# Patient Record
Sex: MALE | Race: BLACK OR AFRICAN AMERICAN | NOT HISPANIC OR LATINO
[De-identification: names, ages, dates, MRNs, and addresses within clinical notes are randomized per-mention and may not be internally consistent; named-entity substitution may affect disease eponyms.]

---

## 2017-01-26 ENCOUNTER — NON-APPOINTMENT (OUTPATIENT)
Age: 67
End: 2017-01-26

## 2017-01-26 ENCOUNTER — APPOINTMENT (OUTPATIENT)
Dept: CARDIOLOGY | Facility: CLINIC | Age: 67
End: 2017-01-26

## 2017-01-26 VITALS
WEIGHT: 120 LBS | SYSTOLIC BLOOD PRESSURE: 130 MMHG | HEART RATE: 86 BPM | BODY MASS INDEX: 19.29 KG/M2 | DIASTOLIC BLOOD PRESSURE: 80 MMHG | HEIGHT: 66 IN | OXYGEN SATURATION: 99 %

## 2017-03-09 ENCOUNTER — LABORATORY RESULT (OUTPATIENT)
Age: 67
End: 2017-03-09

## 2017-03-09 ENCOUNTER — APPOINTMENT (OUTPATIENT)
Dept: INTERNAL MEDICINE | Facility: CLINIC | Age: 67
End: 2017-03-09

## 2017-03-09 VITALS — WEIGHT: 119 LBS | BODY MASS INDEX: 19.13 KG/M2 | HEIGHT: 66 IN

## 2017-03-10 LAB
ALBUMIN SERPL ELPH-MCNC: 3.5 G/DL
ALP BLD-CCNC: 183 U/L
ALT SERPL-CCNC: 77 U/L
ANION GAP SERPL CALC-SCNC: 15 MMOL/L
APPEARANCE: ABNORMAL
AST SERPL-CCNC: 88 U/L
BASOPHILS # BLD AUTO: 0.04 K/UL
BASOPHILS NFR BLD AUTO: 1.1 %
BILIRUB DIRECT SERPL-MCNC: 0.4 MG/DL
BILIRUB INDIRECT SERPL-MCNC: 0.3 MG/DL
BILIRUB SERPL-MCNC: 0.7 MG/DL
BILIRUBIN URINE: ABNORMAL
BLOOD URINE: NEGATIVE
BUN SERPL-MCNC: 21 MG/DL
CALCIUM SERPL-MCNC: 8.8 MG/DL
CHLORIDE SERPL-SCNC: 103 MMOL/L
CHOLEST SERPL-MCNC: 173 MG/DL
CHOLEST/HDLC SERPL: 6.2 RATIO
CO2 SERPL-SCNC: 22 MMOL/L
COLOR: ABNORMAL
CREAT SERPL-MCNC: 1.06 MG/DL
CREAT SPEC-SCNC: 482 MG/DL
EOSINOPHIL # BLD AUTO: 0.08 K/UL
EOSINOPHIL NFR BLD AUTO: 2.3 %
GGT SERPL-CCNC: 808 U/L
GLUCOSE QUALITATIVE U: NORMAL MG/DL
GLUCOSE SERPL-MCNC: 87 MG/DL
HBA1C MFR BLD HPLC: 5.1 %
HCT VFR BLD CALC: 36 %
HCV AB SER QL: ABNORMAL
HCV S/CO RATIO: 4.13 S/CO
HDLC SERPL-MCNC: 28 MG/DL
HGB BLD-MCNC: 12 G/DL
IMM GRANULOCYTES NFR BLD AUTO: 0.3 %
KETONES URINE: ABNORMAL
LDLC SERPL CALC-MCNC: 108 MG/DL
LEUKOCYTE ESTERASE URINE: NEGATIVE
LYMPHOCYTES # BLD AUTO: 1.16 K/UL
LYMPHOCYTES NFR BLD AUTO: 32.7 %
MAN DIFF?: NORMAL
MCHC RBC-ENTMCNC: 28 PG
MCHC RBC-ENTMCNC: 33.3 GM/DL
MCV RBC AUTO: 84.1 FL
MICROALBUMIN 24H UR DL<=1MG/L-MCNC: 2.1 MG/DL
MICROALBUMIN/CREAT 24H UR-RTO: 5 UG/MG
MONOCYTES # BLD AUTO: 0.46 K/UL
MONOCYTES NFR BLD AUTO: 13 %
NEUTROPHILS # BLD AUTO: 1.8 K/UL
NEUTROPHILS NFR BLD AUTO: 50.6 %
NITRITE URINE: NEGATIVE
PH URINE: 5.5
PLATELET # BLD AUTO: 114 K/UL
POTASSIUM SERPL-SCNC: 3.7 MMOL/L
PROT SERPL-MCNC: 6.9 G/DL
PROTEIN URINE: ABNORMAL MG/DL
PSA SERPL-MCNC: 0.1 NG/ML
RBC # BLD: 4.28 M/UL
RBC # FLD: 14.9 %
SODIUM SERPL-SCNC: 140 MMOL/L
SPECIFIC GRAVITY URINE: 1.03
TRIGL SERPL-MCNC: 184 MG/DL
TSH SERPL-ACNC: 0.71 UIU/ML
UROBILINOGEN URINE: 1 MG/DL
WBC # FLD AUTO: 3.55 K/UL

## 2017-03-15 ENCOUNTER — RESULT REVIEW (OUTPATIENT)
Age: 67
End: 2017-03-15

## 2017-04-28 ENCOUNTER — APPOINTMENT (OUTPATIENT)
Dept: CARDIOLOGY | Facility: CLINIC | Age: 67
End: 2017-04-28

## 2017-04-28 VITALS
HEART RATE: 83 BPM | HEIGHT: 66 IN | DIASTOLIC BLOOD PRESSURE: 70 MMHG | WEIGHT: 120 LBS | SYSTOLIC BLOOD PRESSURE: 140 MMHG | BODY MASS INDEX: 19.29 KG/M2 | OXYGEN SATURATION: 98 %

## 2017-06-01 ENCOUNTER — APPOINTMENT (OUTPATIENT)
Dept: INTERNAL MEDICINE | Facility: CLINIC | Age: 67
End: 2017-06-01

## 2017-06-01 VITALS — DIASTOLIC BLOOD PRESSURE: 60 MMHG | HEART RATE: 76 BPM | SYSTOLIC BLOOD PRESSURE: 126 MMHG | RESPIRATION RATE: 12 BRPM

## 2017-06-29 ENCOUNTER — RX RENEWAL (OUTPATIENT)
Age: 67
End: 2017-06-29

## 2017-08-16 ENCOUNTER — RX RENEWAL (OUTPATIENT)
Age: 67
End: 2017-08-16

## 2017-09-06 ENCOUNTER — INPATIENT (INPATIENT)
Facility: HOSPITAL | Age: 67
LOS: 0 days | Discharge: HOME | End: 2017-09-07
Attending: HOSPITALIST | Admitting: HOSPITALIST
Payer: COMMERCIAL

## 2017-09-06 VITALS
RESPIRATION RATE: 18 BRPM | DIASTOLIC BLOOD PRESSURE: 111 MMHG | SYSTOLIC BLOOD PRESSURE: 198 MMHG | TEMPERATURE: 98 F | HEART RATE: 109 BPM

## 2017-09-06 DIAGNOSIS — I10 ESSENTIAL (PRIMARY) HYPERTENSION: ICD-10-CM

## 2017-09-06 DIAGNOSIS — I25.10 ATHEROSCLEROTIC HEART DISEASE OF NATIVE CORONARY ARTERY WITHOUT ANGINA PECTORIS: ICD-10-CM

## 2017-09-06 DIAGNOSIS — B19.20 UNSPECIFIED VIRAL HEPATITIS C WITHOUT HEPATIC COMA: ICD-10-CM

## 2017-09-06 DIAGNOSIS — E87.1 HYPO-OSMOLALITY AND HYPONATREMIA: ICD-10-CM

## 2017-09-06 DIAGNOSIS — M62.82 RHABDOMYOLYSIS: ICD-10-CM

## 2017-09-06 DIAGNOSIS — Z29.9 ENCOUNTER FOR PROPHYLACTIC MEASURES, UNSPECIFIED: ICD-10-CM

## 2017-09-06 DIAGNOSIS — Z98.89 OTHER SPECIFIED POSTPROCEDURAL STATES: Chronic | ICD-10-CM

## 2017-09-06 DIAGNOSIS — R63.8 OTHER SYMPTOMS AND SIGNS CONCERNING FOOD AND FLUID INTAKE: ICD-10-CM

## 2017-09-06 LAB
ALBUMIN SERPL ELPH-MCNC: 4 G/DL — SIGNIFICANT CHANGE UP (ref 3.3–5)
ALP SERPL-CCNC: 186 U/L — HIGH (ref 40–120)
ALT FLD-CCNC: 91 U/L — HIGH (ref 4–41)
APPEARANCE UR: CLEAR — SIGNIFICANT CHANGE UP
AST SERPL-CCNC: 189 U/L — HIGH (ref 4–40)
BASE EXCESS BLDV CALC-SCNC: 2.2 MMOL/L — SIGNIFICANT CHANGE UP
BASOPHILS # BLD AUTO: 0.03 K/UL — SIGNIFICANT CHANGE UP (ref 0–0.2)
BASOPHILS NFR BLD AUTO: 0.5 % — SIGNIFICANT CHANGE UP (ref 0–2)
BILIRUB SERPL-MCNC: 1.4 MG/DL — HIGH (ref 0.2–1.2)
BILIRUB UR-MCNC: NEGATIVE — SIGNIFICANT CHANGE UP
BLOOD GAS VENOUS - CREATININE: 0.8 MG/DL — SIGNIFICANT CHANGE UP (ref 0.5–1.3)
BLOOD UR QL VISUAL: NEGATIVE — SIGNIFICANT CHANGE UP
BUN SERPL-MCNC: 19 MG/DL — SIGNIFICANT CHANGE UP (ref 7–23)
BUN SERPL-MCNC: 20 MG/DL — SIGNIFICANT CHANGE UP (ref 7–23)
BUN SERPL-MCNC: 22 MG/DL — SIGNIFICANT CHANGE UP (ref 7–23)
BUN SERPL-MCNC: 22 MG/DL — SIGNIFICANT CHANGE UP (ref 7–23)
CALCIUM SERPL-MCNC: 8.1 MG/DL — LOW (ref 8.4–10.5)
CALCIUM SERPL-MCNC: 8.2 MG/DL — LOW (ref 8.4–10.5)
CALCIUM SERPL-MCNC: 8.5 MG/DL — SIGNIFICANT CHANGE UP (ref 8.4–10.5)
CALCIUM SERPL-MCNC: 9.4 MG/DL — SIGNIFICANT CHANGE UP (ref 8.4–10.5)
CHLORIDE BLDV-SCNC: 87 MMOL/L — LOW (ref 96–108)
CHLORIDE SERPL-SCNC: 101 MMOL/L — SIGNIFICANT CHANGE UP (ref 98–107)
CHLORIDE SERPL-SCNC: 84 MMOL/L — LOW (ref 98–107)
CHLORIDE SERPL-SCNC: 91 MMOL/L — LOW (ref 98–107)
CHLORIDE SERPL-SCNC: 96 MMOL/L — LOW (ref 98–107)
CHLORIDE UR-SCNC: 21 MMOL/L — SIGNIFICANT CHANGE UP
CK MB BLD-MCNC: 1.9 — SIGNIFICANT CHANGE UP (ref 0–2.5)
CK MB BLD-MCNC: 2 — SIGNIFICANT CHANGE UP (ref 0–2.5)
CK MB BLD-MCNC: 22.27 NG/ML — HIGH (ref 1–6.6)
CK MB BLD-MCNC: 37.25 NG/ML — HIGH (ref 1–6.6)
CK SERPL-CCNC: 1182 U/L — HIGH (ref 30–200)
CK SERPL-CCNC: 1569 U/L — HIGH (ref 30–200)
CK SERPL-CCNC: 1851 U/L — HIGH (ref 30–200)
CO2 SERPL-SCNC: 16 MMOL/L — LOW (ref 22–31)
CO2 SERPL-SCNC: 22 MMOL/L — SIGNIFICANT CHANGE UP (ref 22–31)
CO2 SERPL-SCNC: 23 MMOL/L — SIGNIFICANT CHANGE UP (ref 22–31)
CO2 SERPL-SCNC: 23 MMOL/L — SIGNIFICANT CHANGE UP (ref 22–31)
COLOR SPEC: SIGNIFICANT CHANGE UP
CREAT SERPL-MCNC: 0.83 MG/DL — SIGNIFICANT CHANGE UP (ref 0.5–1.3)
CREAT SERPL-MCNC: 0.9 MG/DL — SIGNIFICANT CHANGE UP (ref 0.5–1.3)
CREAT SERPL-MCNC: 0.93 MG/DL — SIGNIFICANT CHANGE UP (ref 0.5–1.3)
CREAT SERPL-MCNC: 1.08 MG/DL — SIGNIFICANT CHANGE UP (ref 0.5–1.3)
EOSINOPHIL # BLD AUTO: 0.02 K/UL — SIGNIFICANT CHANGE UP (ref 0–0.5)
EOSINOPHIL NFR BLD AUTO: 0.3 % — SIGNIFICANT CHANGE UP (ref 0–6)
GAS PNL BLDV: 119 MMOL/L — CRITICAL LOW (ref 136–146)
GLUCOSE BLDV-MCNC: 110 — HIGH (ref 70–99)
GLUCOSE SERPL-MCNC: 109 MG/DL — HIGH (ref 70–99)
GLUCOSE SERPL-MCNC: 94 MG/DL — SIGNIFICANT CHANGE UP (ref 70–99)
GLUCOSE SERPL-MCNC: 95 MG/DL — SIGNIFICANT CHANGE UP (ref 70–99)
GLUCOSE SERPL-MCNC: 95 MG/DL — SIGNIFICANT CHANGE UP (ref 70–99)
GLUCOSE UR-MCNC: NEGATIVE — SIGNIFICANT CHANGE UP
HCO3 BLDV-SCNC: 26 MMOL/L — SIGNIFICANT CHANGE UP (ref 20–27)
HCT VFR BLD CALC: 34.3 % — LOW (ref 39–50)
HCT VFR BLDV CALC: 39.7 % — SIGNIFICANT CHANGE UP (ref 39–51)
HGB BLD-MCNC: 12.5 G/DL — LOW (ref 13–17)
HGB BLDV-MCNC: 12.9 G/DL — LOW (ref 13–17)
IMM GRANULOCYTES # BLD AUTO: 0.03 # — SIGNIFICANT CHANGE UP
IMM GRANULOCYTES NFR BLD AUTO: 0.5 % — SIGNIFICANT CHANGE UP (ref 0–1.5)
KETONES UR-MCNC: NEGATIVE — SIGNIFICANT CHANGE UP
LACTATE BLDV-MCNC: 1.6 MMOL/L — SIGNIFICANT CHANGE UP (ref 0.5–2)
LEUKOCYTE ESTERASE UR-ACNC: NEGATIVE — SIGNIFICANT CHANGE UP
LYMPHOCYTES # BLD AUTO: 0.87 K/UL — LOW (ref 1–3.3)
LYMPHOCYTES # BLD AUTO: 14.6 % — SIGNIFICANT CHANGE UP (ref 13–44)
MAGNESIUM SERPL-MCNC: 1.3 MG/DL — LOW (ref 1.6–2.6)
MAGNESIUM SERPL-MCNC: 1.9 MG/DL — SIGNIFICANT CHANGE UP (ref 1.6–2.6)
MAGNESIUM SERPL-MCNC: 1.9 MG/DL — SIGNIFICANT CHANGE UP (ref 1.6–2.6)
MCHC RBC-ENTMCNC: 29.4 PG — SIGNIFICANT CHANGE UP (ref 27–34)
MCHC RBC-ENTMCNC: 36.4 % — HIGH (ref 32–36)
MCV RBC AUTO: 80.7 FL — SIGNIFICANT CHANGE UP (ref 80–100)
MONOCYTES # BLD AUTO: 0.51 K/UL — SIGNIFICANT CHANGE UP (ref 0–0.9)
MONOCYTES NFR BLD AUTO: 8.5 % — SIGNIFICANT CHANGE UP (ref 2–14)
NEUTROPHILS # BLD AUTO: 4.51 K/UL — SIGNIFICANT CHANGE UP (ref 1.8–7.4)
NEUTROPHILS NFR BLD AUTO: 75.6 % — SIGNIFICANT CHANGE UP (ref 43–77)
NITRITE UR-MCNC: NEGATIVE — SIGNIFICANT CHANGE UP
NRBC # FLD: 0 — SIGNIFICANT CHANGE UP
OSMOLALITY SERPL: 273 MOSMO/KG — LOW (ref 275–295)
OSMOLALITY UR: 107 MOSMO/KG — SIGNIFICANT CHANGE UP (ref 50–1200)
PCO2 BLDV: 33 MMHG — LOW (ref 41–51)
PH BLDV: 7.5 PH — HIGH (ref 7.32–7.43)
PH UR: 6 — SIGNIFICANT CHANGE UP (ref 4.6–8)
PHOSPHATE SERPL-MCNC: 2.5 MG/DL — SIGNIFICANT CHANGE UP (ref 2.5–4.5)
PHOSPHATE SERPL-MCNC: 2.7 MG/DL — SIGNIFICANT CHANGE UP (ref 2.5–4.5)
PHOSPHATE SERPL-MCNC: 3 MG/DL — SIGNIFICANT CHANGE UP (ref 2.5–4.5)
PLATELET # BLD AUTO: 84 K/UL — LOW (ref 150–400)
PMV BLD: 11.9 FL — SIGNIFICANT CHANGE UP (ref 7–13)
PO2 BLDV: 41 MMHG — HIGH (ref 35–40)
POTASSIUM BLDV-SCNC: 4.1 MMOL/L — SIGNIFICANT CHANGE UP (ref 3.4–4.5)
POTASSIUM SERPL-MCNC: 3.4 MMOL/L — LOW (ref 3.5–5.3)
POTASSIUM SERPL-MCNC: 3.6 MMOL/L — SIGNIFICANT CHANGE UP (ref 3.5–5.3)
POTASSIUM SERPL-MCNC: 3.8 MMOL/L — SIGNIFICANT CHANGE UP (ref 3.5–5.3)
POTASSIUM SERPL-MCNC: 5.1 MMOL/L — SIGNIFICANT CHANGE UP (ref 3.5–5.3)
POTASSIUM SERPL-SCNC: 3.4 MMOL/L — LOW (ref 3.5–5.3)
POTASSIUM SERPL-SCNC: 3.6 MMOL/L — SIGNIFICANT CHANGE UP (ref 3.5–5.3)
POTASSIUM SERPL-SCNC: 3.8 MMOL/L — SIGNIFICANT CHANGE UP (ref 3.5–5.3)
POTASSIUM SERPL-SCNC: 5.1 MMOL/L — SIGNIFICANT CHANGE UP (ref 3.5–5.3)
POTASSIUM UR-SCNC: 14 MEQ/L — SIGNIFICANT CHANGE UP
PROT SERPL-MCNC: 7.9 G/DL — SIGNIFICANT CHANGE UP (ref 6–8.3)
PROT UR-MCNC: NEGATIVE — SIGNIFICANT CHANGE UP
RBC # BLD: 4.25 M/UL — SIGNIFICANT CHANGE UP (ref 4.2–5.8)
RBC # FLD: 13.4 % — SIGNIFICANT CHANGE UP (ref 10.3–14.5)
RBC CASTS # UR COMP ASSIST: SIGNIFICANT CHANGE UP (ref 0–?)
SAO2 % BLDV: 76.2 % — SIGNIFICANT CHANGE UP (ref 60–85)
SODIUM SERPL-SCNC: 124 MMOL/L — LOW (ref 135–145)
SODIUM SERPL-SCNC: 124 MMOL/L — LOW (ref 135–145)
SODIUM SERPL-SCNC: 135 MMOL/L — SIGNIFICANT CHANGE UP (ref 135–145)
SODIUM SERPL-SCNC: 139 MMOL/L — SIGNIFICANT CHANGE UP (ref 135–145)
SODIUM UR-SCNC: 21 MEQ/L — SIGNIFICANT CHANGE UP
SP GR SPEC: 1 — LOW (ref 1–1.03)
SQUAMOUS # UR AUTO: SIGNIFICANT CHANGE UP
TROPONIN T SERPL-MCNC: < 0.06 NG/ML — SIGNIFICANT CHANGE UP (ref 0–0.06)
TROPONIN T SERPL-MCNC: < 0.06 NG/ML — SIGNIFICANT CHANGE UP (ref 0–0.06)
URATE SERPL-MCNC: 6.6 MG/DL — SIGNIFICANT CHANGE UP (ref 3.4–8.8)
UROBILINOGEN FLD QL: NORMAL E.U. — SIGNIFICANT CHANGE UP (ref 0.1–0.2)
WBC # BLD: 5.97 K/UL — SIGNIFICANT CHANGE UP (ref 3.8–10.5)
WBC # FLD AUTO: 5.97 K/UL — SIGNIFICANT CHANGE UP (ref 3.8–10.5)
WBC UR QL: SIGNIFICANT CHANGE UP (ref 0–?)

## 2017-09-06 PROCEDURE — 99223 1ST HOSP IP/OBS HIGH 75: CPT | Mod: GC

## 2017-09-06 RX ORDER — SODIUM CHLORIDE 9 MG/ML
1000 INJECTION INTRAMUSCULAR; INTRAVENOUS; SUBCUTANEOUS
Qty: 0 | Refills: 0 | Status: DISCONTINUED | OUTPATIENT
Start: 2017-09-06 | End: 2017-09-07

## 2017-09-06 RX ORDER — MAGNESIUM SULFATE 500 MG/ML
2 VIAL (ML) INJECTION ONCE
Qty: 0 | Refills: 0 | Status: COMPLETED | OUTPATIENT
Start: 2017-09-06 | End: 2017-09-06

## 2017-09-06 RX ORDER — NIFEDIPINE 30 MG
60 TABLET, EXTENDED RELEASE 24 HR ORAL DAILY
Qty: 0 | Refills: 0 | Status: DISCONTINUED | OUTPATIENT
Start: 2017-09-06 | End: 2017-09-07

## 2017-09-06 RX ORDER — LABETALOL HCL 100 MG
100 TABLET ORAL ONCE
Qty: 0 | Refills: 0 | Status: COMPLETED | OUTPATIENT
Start: 2017-09-06 | End: 2017-09-06

## 2017-09-06 RX ORDER — ASPIRIN/CALCIUM CARB/MAGNESIUM 324 MG
81 TABLET ORAL DAILY
Qty: 0 | Refills: 0 | Status: DISCONTINUED | OUTPATIENT
Start: 2017-09-06 | End: 2017-09-07

## 2017-09-06 RX ORDER — SODIUM CHLORIDE 9 MG/ML
2000 INJECTION INTRAMUSCULAR; INTRAVENOUS; SUBCUTANEOUS ONCE
Qty: 0 | Refills: 0 | Status: COMPLETED | OUTPATIENT
Start: 2017-09-06 | End: 2017-09-06

## 2017-09-06 RX ADMIN — SODIUM CHLORIDE 150 MILLILITER(S): 9 INJECTION INTRAMUSCULAR; INTRAVENOUS; SUBCUTANEOUS at 13:42

## 2017-09-06 RX ADMIN — Medication 100 MILLIGRAM(S): at 07:14

## 2017-09-06 RX ADMIN — Medication 50 GRAM(S): at 08:37

## 2017-09-06 RX ADMIN — Medication 60 MILLIGRAM(S): at 12:16

## 2017-09-06 RX ADMIN — SODIUM CHLORIDE 150 MILLILITER(S): 9 INJECTION INTRAMUSCULAR; INTRAVENOUS; SUBCUTANEOUS at 20:32

## 2017-09-06 RX ADMIN — SODIUM CHLORIDE 2000 MILLILITER(S): 9 INJECTION INTRAMUSCULAR; INTRAVENOUS; SUBCUTANEOUS at 05:49

## 2017-09-06 RX ADMIN — Medication 81 MILLIGRAM(S): at 12:16

## 2017-09-06 RX ADMIN — SODIUM CHLORIDE 100 MILLILITER(S): 9 INJECTION INTRAMUSCULAR; INTRAVENOUS; SUBCUTANEOUS at 22:24

## 2017-09-06 NOTE — H&P ADULT - NSHPSOCIALHISTORY_GEN_ALL_CORE
Social History:    Marital Status:  ( X )    (   ) Single    (   )    (  )   Occupation:    Lives with: (  ) alone  (  ) children   ( X ) spouse   (  ) parents  (  ) other    Substance Use (street drugs): ( X ) never used  (  ) other:  Tobacco Usage:  (   ) never smoked   ( X ) former smoker-  Smoked for 5 years or so in early 20s   Alcohol Usage: Drinks 2 beers a day Social History:    Marital Status:  ( X )    (   ) Single    (   )    (  )   Occupation:    Lives with: (  ) alone  (  ) children   ( X ) spouse   (  ) parents  (  ) other    Substance Use (street drugs): ( X ) never used  (  ) other:  Tobacco Usage:  (   ) never smoked   ( X ) former smoker-  Smoked for 5 years or so in early 20s   Alcohol Usage: Drinks 2 beers a day (denies history of withdrawal)

## 2017-09-06 NOTE — ED ADULT NURSE NOTE - OBJECTIVE STATEMENT
pt received to rm 10 a&ox3 and ambulatory with c/o muscle cramping. pt is a  and states that his legs began to huert and cramp after work this evening. pt had similar c/o in the past and was diagnosed with dehydration. no swelling noted. pt denies dizziness, cp, sob, nvd, fever/chills. 20g IV placed in rt ac, labs sent, will continue to monitor.

## 2017-09-06 NOTE — H&P ADULT - PROBLEM SELECTOR PLAN 4
- Mild transaminitis seen on labs likely 2/2 Hep C  - no signs of acute decompensated liver failure at this time   - has appointment to follow up as outpatient with hepatology

## 2017-09-06 NOTE — H&P ADULT - NSHPLABSRESULTS_GEN_ALL_CORE
12.5   5.97  )-----------( 84       ( 06 Sep 2017 05:39 )             34.3       -    124<L>  |  91<L>  |  22  ----------------------------<  94  5.1   |  16<L>  |  0.83    Ca    8.1<L>      06 Sep 2017 07:44  Phos  3.0       Mg     1.3         TPro  7.9  /  Alb  4.0  /  TBili  1.4<H>  /  DBili  x   /  AST  189<H>  /  ALT  91<H>  /  AlkPhos  186<H>            Urinalysis Basic - ( 06 Sep 2017 05:39 )    Color: PLYEL / Appearance: CLEAR / S.004 / pH: 6.0  Gluc: NEGATIVE / Ketone: NEGATIVE  / Bili: NEGATIVE / Urobili: NORMAL E.U.   Blood: NEGATIVE / Protein: NEGATIVE / Nitrite: NEGATIVE   Leuk Esterase: NEGATIVE / RBC: 0-2 / WBC 0-2   Sq Epi: RARE / Non Sq Epi: x / Bacteria: x        CARDIAC MARKERS ( 06 Sep 2017 07:44 )  x     / x     / 1569 u/L / x     / x      CARDIAC MARKERS ( 06 Sep 2017 05:39 )  x     / < 0.06 ng/mL / 1851 u/L / 37.25 ng/mL / x 12.5   5.97  )-----------( 84       ( 06 Sep 2017 05:39 )             34.3       -    124<L>  |  91<L>  |  22  ----------------------------<  94  5.1   |  16<L>  |  0.83    Ca    8.1<L>      06 Sep 2017 07:44  Phos  3.0       Mg     1.3         TPro  7.9  /  Alb  4.0  /  TBili  1.4<H>  /  DBili  x   /  AST  189<H>  /  ALT  91<H>  /  AlkPhos  186<H>            Urinalysis Basic - ( 06 Sep 2017 05:39 )    Color: PLYEL / Appearance: CLEAR / S.004 / pH: 6.0  Gluc: NEGATIVE / Ketone: NEGATIVE  / Bili: NEGATIVE / Urobili: NORMAL E.U.   Blood: NEGATIVE / Protein: NEGATIVE / Nitrite: NEGATIVE   Leuk Esterase: NEGATIVE / RBC: 0-2 / WBC 0-2   Sq Epi: RARE / Non Sq Epi: x / Bacteria: x        CARDIAC MARKERS ( 06 Sep 2017 07:44 )  x     / x     / 1569 u/L / x     / x      CARDIAC MARKERS ( 06 Sep 2017 05:39 )  x     / < 0.06 ng/mL / 1851 u/L / 37.25 ng/mL / x    EKG- Personally interpreted NSR, normal axis, HR 80, no ST segment elevations or depressions 12.5   5.97  )-----------( 84       ( 06 Sep 2017 05:39 )             34.3       -    124<L>  |  91<L>  |  22  ----------------------------<  94  5.1   |  16<L>  |  0.83    Ca    8.1<L>      06 Sep 2017 07:44  Phos  3.0       Mg     1.3         TPro  7.9  /  Alb  4.0  /  TBili  1.4<H>  /  DBili  x   /  AST  189<H>  /  ALT  91<H>  /  AlkPhos  186<H>            Urinalysis Basic - ( 06 Sep 2017 05:39 )    Color: PLYEL / Appearance: CLEAR / S.004 / pH: 6.0  Gluc: NEGATIVE / Ketone: NEGATIVE  / Bili: NEGATIVE / Urobili: NORMAL E.U.   Blood: NEGATIVE / Protein: NEGATIVE / Nitrite: NEGATIVE   Leuk Esterase: NEGATIVE / RBC: 0-2 / WBC 0-2   Sq Epi: RARE / Non Sq Epi: x / Bacteria: x    CARDIAC MARKERS ( 06 Sep 2017 07:44 )  x     / x     / 1569 u/L / x     / x      CARDIAC MARKERS ( 06 Sep 2017 05:39 )  x     / < 0.06 ng/mL / 1851 u/L / 37.25 ng/mL / x    Labs notable for elevated CK (trop neg x2), hypomagnesemia (s/p repletion) and hyponatremia    No imaging studies available for review.     EKG- Personally interpreted NSR, normal axis, HR 80, no ST segment elevations or depressions

## 2017-09-06 NOTE — H&P ADULT - PROBLEM SELECTOR PLAN 5
- Had LHC in 9/2016 that showed nonobstructive CAD  - Denies any CP or other cardiac complaints   - c/w ASA  - will hold statin 2/2 catarinoo

## 2017-09-06 NOTE — ED PROVIDER NOTE - CARE PLAN
Principal Discharge DX:	Hyponatremia Principal Discharge DX:	Hyponatremia  Secondary Diagnosis:	Hypomagnesemia  Secondary Diagnosis:	Non-traumatic rhabdomyolysis

## 2017-09-06 NOTE — H&P ADULT - PROBLEM SELECTOR PLAN 1
- likely 2/2 dehydration but may have component of SIADH  - will continue to monitor with fluid hydration for Rhabdo  - Will check TSH and AM cortisol as well

## 2017-09-06 NOTE — H&P ADULT - PROBLEM SELECTOR PLAN 3
- BP elevated on admission, given 100mg PO labetalol by ED   - will continue Nifedipine XL 60mg daily for now - BP elevated on admission, given 100mg PO labetalol by ED   - will continue Nifedipine XL 60mg daily for now, monitor pressures, titrate regimen as needed

## 2017-09-06 NOTE — ED PROVIDER NOTE - OBJECTIVE STATEMENT
68 y/o M with PMH HTN, HLD p/w b/l LE cramping since last night. 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 He states he has not been drinking much fluid lately. Currently take nifedipine and a statin for HTN, HLD. denies chest pain, SOB, nausea, vomiting, dysuria.

## 2017-09-06 NOTE — H&P ADULT - NSHPREVIEWOFSYSTEMS_GEN_ALL_CORE
Review of Systems:   CONSTITUTIONAL: No fever, weight changes, fatigue, appetite changes  EYES: No eye pain, visual disturbances, or discharge  ENMT:  No difficulty hearing, tinnitus, vertigo; No sinus or throat pain  NECK: No pain or stiffness  RESPIRATORY: No cough, wheezing, chills or hemoptysis; No shortness of breath  CARDIOVASCULAR: No chest pain, palpitations, dizziness, or leg swelling  GASTROINTESTINAL: No abdominal or epigastric pain. No nausea, vomiting, or hematemesis; No constipation. No melena or hematochezia.+ Loose BM  GENITOURINARY: No dysuria, frequency, hematuria, or incontinence  NEUROLOGICAL: No headaches, memory loss, loss of strength, numbness, or tremors  SKIN: No itching, burning, rashes, or lesions   ENDOCRINE: No heat or cold intolerance; No hair loss  MUSCULOSKELETAL: No joint pain or swelling; + Bilateral LE cramping   PSYCHIATRIC: No depression, anxiety, mood swings, or difficulty sleeping  HEME/LYMPH: No easy bruising, or bleeding gums  ALLERY AND IMMUNOLOGIC: No hives or eczema

## 2017-09-06 NOTE — ED PROVIDER NOTE - MEDICAL DECISION MAKING DETAILS
66 y/o M with PMH HTN, HLD p/w b/l LE cramping since last night. 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 cbc, cmp, ck, IVF, reassess

## 2017-09-06 NOTE — ED PROVIDER NOTE - ATTENDING CONTRIBUTION TO CARE
MD Ruffin:  patient seen and evaluated with the resident.  I was present for key portions of the History & Physical, and I agree with the Impression & Plan.  MD Ruffin:  67M, c/o bilateral thigh cramping x since yesterday afternoon.  Intensity:  now 0/10; was 4/10.  Associated Sx:  no CP/SOB, no abd pain N/V/D, no back pain.  Context:  recently back from vacation in [place], went back to work yesterday as a [employer] .  Symptoms began at the end of his shift.  Location:  bilateral anterior thighs.  VS - borderline tachy.  Physical Exam: adult M, very well-appearing, NCAT, PERRL, EOMI, Neck supple, CTA B, Abd:  s/nd/nt, no edema, no muscle TTP.  Impression:  dehydration vs electrolyte abnormality vs rhabdo.  I do not suspect PVD, given than symptoms did not occur until end of day, after walking for all of the morning.  VBG sodium = 119; confirmatory CMP value pending.  Plan:  confirm electrolyte abnormality; reassess.

## 2017-09-06 NOTE — H&P ADULT - ASSESSMENT
66 y/o M w/ PMHx HTN, HLD, Hepatitis C (newly diagnosed) p/w b/l LE cramping admitted for hyponatremia and rhabdomyolysis with concern for SIADH.

## 2017-09-06 NOTE — ED ADULT TRIAGE NOTE - CHIEF COMPLAINT QUOTE
" I have cramping in my both legs around the ankles." Patient reports that cramping sensation started during the day and is persisting, Denies smoking , drinks alcohol occasionally. Patient works as a mailman. No edema noted.

## 2017-09-06 NOTE — H&P ADULT - NSHPPHYSICALEXAM_GEN_ALL_CORE
Vital Signs Last 24 Hrs  T(C): 36.8 (06 Sep 2017 04:48), Max: 36.9 (06 Sep 2017 04:07)  T(F): 98.2 (06 Sep 2017 04:48), Max: 98.5 (06 Sep 2017 04:07)  HR: 70 (06 Sep 2017 10:15) (70 - 109)  BP: 165/104 (06 Sep 2017 10:15) (165/104 - 198/111)  BP(mean): --  RR: 16 (06 Sep 2017 10:15) (16 - 18)  SpO2: 100% (06 Sep 2017 10:15) (100% - 100%)    PHYSICAL EXAM:    GENERAL: Comfortable, no acute distress   HEAD:  Normocephalic, atraumatic  EYES: EOMI, PERRLA  HEENT: Moist mucous membranes  NECK: Supple, No JVD  NERVOUS SYSTEM:  Alert & Oriented X3, Motor Strength 5/5 B/L upper and lower extremities  CHEST/LUNG: Clear to auscultation bilaterally  HEART: Regular rate and rhythm, no murmur   ABDOMEN: Soft, Nontender, Nondistended, Bowel sounds present, + enlarged liver   EXTREMITIES:   No clubbing, cyanosis, or edema  MUSCULOSKELETAL: No muscle tenderness, no joint tenderness  SKIN:  warm and dry, + skin excoriations 2/2 scratching on legs, arms and upper back Vital Signs Last 24 Hrs  T(C): 36.8 (06 Sep 2017 04:48), Max: 36.9 (06 Sep 2017 04:07)  T(F): 98.2 (06 Sep 2017 04:48), Max: 98.5 (06 Sep 2017 04:07)  HR: 70 (06 Sep 2017 10:15) (70 - 109)  BP: 165/104 (06 Sep 2017 10:15) (165/104 - 198/111)  BP(mean): --  RR: 16 (06 Sep 2017 10:15) (16 - 18)  SpO2: 100% (06 Sep 2017 10:15) (100% - 100%)    PHYSICAL EXAM:    GENERAL: Comfortable, no acute distress   HEAD:  Normocephalic, atraumatic  EYES: EOMI, PERRLA  HEENT: Moist mucous membranes  NECK: Supple, No JVD  NERVOUS SYSTEM:  Alert & Oriented X3, Motor Strength 5/5 B/L upper and lower extremities, sensation intact to light touch  CHEST/LUNG: Clear to auscultation bilaterally  HEART: Regular rate and rhythm, no murmur   ABDOMEN: Soft, Nontender, Nondistended, Bowel sounds present, + enlarged liver   EXTREMITIES:   No clubbing, cyanosis, or edema, distal pulses intact  MUSCULOSKELETAL: No muscle tenderness, no joint tenderness  SKIN:  warm and dry, + skin excoriations 2/2 scratching on legs, arms and upper back

## 2017-09-06 NOTE — H&P ADULT - ATTENDING COMMENTS
Patient seen and examined, d/w HS, agree with above.    66 yo M with hep C, p/w leg cramps setting of exertion on warm day, a/w likely mild rhabdomyolysis and also hyponatremia. No signs of compartment syndrome on exam, symptoms also improving, CK downtrending with IV fluid hydration. Monitoring Na levels. If continues improvement, potential discharge tomorrow, patient in agreement... will f/u otpt GI re: hep C treatment.

## 2017-09-06 NOTE — ED PROVIDER NOTE - FAMILY HISTORY
Father  Still living? Unknown  Family history of heart disease, Age at diagnosis: Age Unknown     Mother  Still living? Yes, Estimated age: Age Unknown  Family history of heart disease, Age at diagnosis: Age Unknown

## 2017-09-06 NOTE — H&P ADULT - PROBLEM SELECTOR PLAN 2
- CK elevated, improved after IV fluids  - likely 2/2 dehydration   - will continue with IV fluids  - will hold statin and meloxicam for now - CK elevated, improved after IV fluids, continue to monitor  - likely 2/2 dehydration/exertion  - will continue with IV fluids  - will hold statin and meloxicam for now

## 2017-09-06 NOTE — H&P ADULT - HISTORY OF PRESENT ILLNESS
66 y/o M w/ PMHx HTN, HLD p/w b/l LE cramping    In ED,   Vitals- T 98.5, , /111, RR 18, SpO2 98 on RA  Given 2L NS, labetalol 100mg PO, 2gr Mag 68 y/o M w/ PMHx HTN, HLD, Hepatitis C (newly diagnosed) p/w b/l LE cramping.   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 Due to the pain, the patient was unable to complete his route and had to come home. Patient report only drinking 1-2 bottles of water that day and was sweating a lot due to the heat. After arriving home, he began drinking a lot of water and Gatorade to relieve the cramping. He reports not urinating during the day, but then urinating every 15mins or so once he began drinking fluids at home. Patient reports his urine was clear in color. After attempting to rehydrate at him, his leg cramping did not improve, so he came to the ED.   Patient reports recently being diagnosed with Hep C by his PCP and is scheduled to see hepatology in the next month or so    In ED,   Vitals- T 98.5, , /111, RR 18, SpO2 98 on RA  Given 2L NS, labetalol 100mg PO, 2gr Mag 68 y/o M w/ PMHx HTN, HLD, Hepatitis C (newly diagnosed) p/w b/l LE cramping.   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 Due to the pain, the patient was unable to complete his route and had to come home. Patient report only drinking 1-2 bottles of water that day and was sweating a lot due to the heat. After arriving home, he began drinking a lot of water and Gatorade to relieve the cramping. He reports not urinating during the day, but then urinating every 15mins or so once he began drinking fluids at home. Patient reports his urine was clear in color. After attempting to rehydrate at him, his leg cramping did not improve, so he came to the ED.   Patient reports recently being diagnosed with Hep C by his PCP and is scheduled to see hepatology in the next month or so.       In ED,   Vitals- T 98.5, , /111, RR 18, SpO2 98 on RA  Given 2L NS, labetalol 100mg PO, 2gr Mag 68 y/o M w/ PMHx HTN, HLD, Hepatitis C (newly diagnosed) p/w b/l LE cramping.   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 Due to the pain, the patient was unable to complete his route and had to come home. Patient report only drinking 1-2 bottles of water that day and was sweating a lot due to the heat. After arriving home, he began drinking a lot of water and Gatorade to relieve the cramping. He reports not urinating during the day, but then urinating every 15mins or so once he began drinking fluids at home. Patient reports his urine was clear in color. After attempting to rehydrate at him, his leg cramping did not improve, so he came to the ED.   Patient reports recently being diagnosed with Hep C by his PCP and is scheduled to see hepatology in the next month or so.   Denies any F/C, N/V, CP, SOB, abdominal pain, constipation, or dysuria. Acknowledges a loose BM after drinking a lot of water at home, but none prior.     Of note, patient reports having very similar symptoms (leg cramping) in Sept 2016 due to dehydration. Patient had a cardiac workup after last admission including Left heart cath that showed no sig coronary disease. Patient reports his symptoms resolved last time with IV fluid hydration.     In ED,   Vitals- T 98.5, , /111, RR 18, SpO2 98 on RA  Given 2L NS, labetalol 100mg PO, 2gr Mag

## 2017-09-06 NOTE — ED PROVIDER NOTE - PROGRESS NOTE DETAILS
MD Ruffin:  Na= 124.  CK 1850.  No SHANIQUA.  +dehydration.  Plan:  hydrate, repeat labs.  Patient has normal renal function, normal UA.  Sign-out:  repeat labs after 2L NS.   If Na improved, and Ck diminishing -->  could d/c home with close PCP follow up. KARLY Berg: Received sign out from Dr. Pichardo and Dr. Ruffin to reassess pt and follow repeat labwork sp 2l NS. Pt also found to have an elevated CK. CK improving, Na persistently low. Will admit to the hospital for further work up. Pt ok with plan. PMD Dr. Dain Saldana, [facility] physician partners admits to hospitalist.

## 2017-09-07 ENCOUNTER — TRANSCRIPTION ENCOUNTER (OUTPATIENT)
Age: 67
End: 2017-09-07

## 2017-09-07 VITALS
TEMPERATURE: 98 F | RESPIRATION RATE: 18 BRPM | SYSTOLIC BLOOD PRESSURE: 142 MMHG | DIASTOLIC BLOOD PRESSURE: 82 MMHG | HEART RATE: 72 BPM | OXYGEN SATURATION: 100 %

## 2017-09-07 LAB
ALBUMIN SERPL ELPH-MCNC: 3.2 G/DL — LOW (ref 3.3–5)
ALP SERPL-CCNC: 128 U/L — HIGH (ref 40–120)
ALT FLD-CCNC: 89 U/L — HIGH (ref 4–41)
AST SERPL-CCNC: 157 U/L — HIGH (ref 4–40)
BASOPHILS # BLD AUTO: 0.01 K/UL — SIGNIFICANT CHANGE UP (ref 0–0.2)
BASOPHILS NFR BLD AUTO: 0.3 % — SIGNIFICANT CHANGE UP (ref 0–2)
BILIRUB DIRECT SERPL-MCNC: 0.7 MG/DL — HIGH (ref 0.1–0.2)
BILIRUB SERPL-MCNC: 1.3 MG/DL — HIGH (ref 0.2–1.2)
BUN SERPL-MCNC: 17 MG/DL — SIGNIFICANT CHANGE UP (ref 7–23)
BUN SERPL-MCNC: 20 MG/DL — SIGNIFICANT CHANGE UP (ref 7–23)
CALCIUM SERPL-MCNC: 8.2 MG/DL — LOW (ref 8.4–10.5)
CALCIUM SERPL-MCNC: 8.3 MG/DL — LOW (ref 8.4–10.5)
CHLORIDE SERPL-SCNC: 100 MMOL/L — SIGNIFICANT CHANGE UP (ref 98–107)
CHLORIDE SERPL-SCNC: 102 MMOL/L — SIGNIFICANT CHANGE UP (ref 98–107)
CK SERPL-CCNC: 628 U/L — HIGH (ref 30–200)
CO2 SERPL-SCNC: 23 MMOL/L — SIGNIFICANT CHANGE UP (ref 22–31)
CO2 SERPL-SCNC: 24 MMOL/L — SIGNIFICANT CHANGE UP (ref 22–31)
CORTIS SERPL-MCNC: 14.7 UG/DL — SIGNIFICANT CHANGE UP (ref 2.7–18.4)
CREAT SERPL-MCNC: 0.92 MG/DL — SIGNIFICANT CHANGE UP (ref 0.5–1.3)
CREAT SERPL-MCNC: 0.94 MG/DL — SIGNIFICANT CHANGE UP (ref 0.5–1.3)
EOSINOPHIL # BLD AUTO: 0.03 K/UL — SIGNIFICANT CHANGE UP (ref 0–0.5)
EOSINOPHIL NFR BLD AUTO: 0.8 % — SIGNIFICANT CHANGE UP (ref 0–6)
GLUCOSE SERPL-MCNC: 135 MG/DL — HIGH (ref 70–99)
GLUCOSE SERPL-MCNC: 89 MG/DL — SIGNIFICANT CHANGE UP (ref 70–99)
HCT VFR BLD CALC: 31.4 % — LOW (ref 39–50)
HGB BLD-MCNC: 11 G/DL — LOW (ref 13–17)
IMM GRANULOCYTES # BLD AUTO: 0.02 # — SIGNIFICANT CHANGE UP
IMM GRANULOCYTES NFR BLD AUTO: 0.5 % — SIGNIFICANT CHANGE UP (ref 0–1.5)
LYMPHOCYTES # BLD AUTO: 0.94 K/UL — LOW (ref 1–3.3)
LYMPHOCYTES # BLD AUTO: 24.7 % — SIGNIFICANT CHANGE UP (ref 13–44)
MAGNESIUM SERPL-MCNC: 1.5 MG/DL — LOW (ref 1.6–2.6)
MCHC RBC-ENTMCNC: 29.3 PG — SIGNIFICANT CHANGE UP (ref 27–34)
MCHC RBC-ENTMCNC: 35 % — SIGNIFICANT CHANGE UP (ref 32–36)
MCV RBC AUTO: 83.5 FL — SIGNIFICANT CHANGE UP (ref 80–100)
MONOCYTES # BLD AUTO: 0.43 K/UL — SIGNIFICANT CHANGE UP (ref 0–0.9)
MONOCYTES NFR BLD AUTO: 11.3 % — SIGNIFICANT CHANGE UP (ref 2–14)
NEUTROPHILS # BLD AUTO: 2.37 K/UL — SIGNIFICANT CHANGE UP (ref 1.8–7.4)
NEUTROPHILS NFR BLD AUTO: 62.4 % — SIGNIFICANT CHANGE UP (ref 43–77)
NRBC # FLD: 0 — SIGNIFICANT CHANGE UP
PHOSPHATE SERPL-MCNC: 2 MG/DL — LOW (ref 2.5–4.5)
PLATELET # BLD AUTO: 70 K/UL — LOW (ref 150–400)
PMV BLD: 12.1 FL — SIGNIFICANT CHANGE UP (ref 7–13)
POTASSIUM SERPL-MCNC: 3.7 MMOL/L — SIGNIFICANT CHANGE UP (ref 3.5–5.3)
POTASSIUM SERPL-MCNC: 3.7 MMOL/L — SIGNIFICANT CHANGE UP (ref 3.5–5.3)
POTASSIUM SERPL-SCNC: 3.7 MMOL/L — SIGNIFICANT CHANGE UP (ref 3.5–5.3)
POTASSIUM SERPL-SCNC: 3.7 MMOL/L — SIGNIFICANT CHANGE UP (ref 3.5–5.3)
PROT SERPL-MCNC: 6.7 G/DL — SIGNIFICANT CHANGE UP (ref 6–8.3)
RBC # BLD: 3.76 M/UL — LOW (ref 4.2–5.8)
RBC # FLD: 13.7 % — SIGNIFICANT CHANGE UP (ref 10.3–14.5)
SODIUM SERPL-SCNC: 135 MMOL/L — SIGNIFICANT CHANGE UP (ref 135–145)
SODIUM SERPL-SCNC: 138 MMOL/L — SIGNIFICANT CHANGE UP (ref 135–145)
TSH SERPL-MCNC: 1.17 UIU/ML — SIGNIFICANT CHANGE UP (ref 0.27–4.2)
WBC # BLD: 3.8 K/UL — SIGNIFICANT CHANGE UP (ref 3.8–10.5)
WBC # FLD AUTO: 3.8 K/UL — SIGNIFICANT CHANGE UP (ref 3.8–10.5)

## 2017-09-07 PROCEDURE — 99239 HOSP IP/OBS DSCHRG MGMT >30: CPT

## 2017-09-07 RX ORDER — MELOXICAM 15 MG/1
1 TABLET ORAL
Qty: 0 | Refills: 0 | COMMUNITY

## 2017-09-07 RX ORDER — ROSUVASTATIN CALCIUM 5 MG/1
1 TABLET ORAL
Qty: 0 | Refills: 0 | COMMUNITY

## 2017-09-07 RX ORDER — MELOXICAM 15 MG/1
1 TABLET ORAL
Qty: 0 | Refills: 0 | DISCHARGE
Start: 2017-09-07

## 2017-09-07 RX ADMIN — Medication 60 MILLIGRAM(S): at 06:20

## 2017-09-07 RX ADMIN — Medication 81 MILLIGRAM(S): at 11:11

## 2017-09-07 NOTE — DISCHARGE NOTE ADULT - MEDICATION SUMMARY - MEDICATIONS TO STOP TAKING
I will STOP taking the medications listed below when I get home from the hospital:    Crestor 10 mg oral tablet  -- 1 tab(s) by mouth once a day (at bedtime)    meloxicam 7.5 mg oral tablet  -- 1 tab(s) by mouth once a day I will STOP taking the medications listed below when I get home from the hospital:    Crestor 10 mg oral tablet  -- 1 tab(s) by mouth once a day (at bedtime)

## 2017-09-07 NOTE — DISCHARGE NOTE ADULT - CARE PROVIDER_API CALL
Gonzales Shaw), Internal MedicineCard Vas Dis  [address] [place] [id number]  Phone: [phone]  Fax: [phone]    Dain Saldana), Internal Medicine  [address] [place] [address]  Phone: [phone]  Fax: [phone]

## 2017-09-07 NOTE — DISCHARGE NOTE ADULT - HOSPITAL COURSE
68 y/o M w/ PMHx HTN, HLD, Hepatitis C (newly diagnosed) p/w b/l LE cramping. Patient was found to have elevated CK and hyponatremia as well as mildly elevated LFTs. He was treated for rhabdomyolysis and hyponatremia w/ IV normal saline infusion. Within 24hrs his Na normalized and CK peaked and continued to down trend. Patient stated he has an established appointment with hepatology and will follow up with them in regards to his hepatitis c. During his hospitalization his statin was held. He is instructed to follow up with his cardiologist and primary care physician within 1-2 weeks of discharge.

## 2017-09-07 NOTE — PROGRESS NOTE ADULT - PROBLEM SELECTOR PLAN 2
- CK downtrensing, improved after IV fluids, continue to monitor  - likely 2/2 dehydration/exertion  - IVF d/c'd will encourage PO water intake  - will hold statin and meloxicam for now

## 2017-09-07 NOTE — PROGRESS NOTE ADULT - ATTENDING COMMENTS
Patient seen and examined earlier today, d/w HS, agree with above.     Patient feels good, ready to go home. CK downtrended, Na improved. Anticipatory guidance provided. Will followup otpt re: hep C.     Discharge time 35 minutes.

## 2017-09-07 NOTE — DISCHARGE NOTE ADULT - PLAN OF CARE
Resolution of symtoms and management of acute illness Please continue to hydrate yourself. It is important Your low sodium levels were corrected with IV fluids. Please follow up with your primary care physician 1-2 weeks from discharge to recheck your levels. Please continue with your home medications. Follow up with your primary care physician 1-2 weeks from discharge. Your Crestor was held due to the rhabdomyolysis. Follow up with your cardiologist or primary care physician 1-2 weeks from discharge to determine when to resume your Crestor. Resolution of symptoms and management of acute illness Please continue to hydrate yourself. A good way do determine adequate water intake is to check the color of your urine. It should be light yellow tinge.

## 2017-09-07 NOTE — DISCHARGE NOTE ADULT - CARE PLAN
Principal Discharge DX:	Non-traumatic rhabdomyolysis  Goal:	Resolution of symtoms and management of acute illness  Instructions for follow-up, activity and diet:	Please continue to hydrate yourself. It is important  Secondary Diagnosis:	Hyponatremia  Secondary Diagnosis:	HTN (hypertension)  Secondary Diagnosis:	Coronary artery disease involving native coronary artery of native heart without angina pectoris Principal Discharge DX:	Non-traumatic rhabdomyolysis  Goal:	Resolution of symptoms and management of acute illness  Instructions for follow-up, activity and diet:	Please continue to hydrate yourself. A good way do determine adequate water intake is to check the color of your urine. It should be light yellow tinge.  Secondary Diagnosis:	Hyponatremia  Instructions for follow-up, activity and diet:	Your low sodium levels were corrected with IV fluids. Please follow up with your primary care physician 1-2 weeks from discharge to recheck your levels.  Secondary Diagnosis:	HTN (hypertension)  Instructions for follow-up, activity and diet:	Please continue with your home medications. Follow up with your primary care physician 1-2 weeks from discharge.  Secondary Diagnosis:	Coronary artery disease involving native coronary artery of native heart without angina pectoris  Instructions for follow-up, activity and diet:	Your Crestor was held due to the rhabdomyolysis. Follow up with your cardiologist or primary care physician 1-2 weeks from discharge to determine when to resume your Crestor.

## 2017-09-07 NOTE — DISCHARGE NOTE ADULT - SECONDARY DIAGNOSIS.
Hyponatremia HTN (hypertension) Coronary artery disease involving native coronary artery of native heart without angina pectoris

## 2017-09-07 NOTE — PROGRESS NOTE ADULT - PROBLEM SELECTOR PLAN 3
-well managed overnight  - will continue Nifedipine XL 60mg daily for now, monitor pressures, titrate regimen as needed

## 2017-09-07 NOTE — PROGRESS NOTE ADULT - SUBJECTIVE AND OBJECTIVE BOX
WANG ZENDEJAS  67y  Male      Patient is a 67y old  Male who presents with a chief complaint of Leg cramping (06 Sep 2017 15:45)      INTERVAL HPI/OVERNIGHT EVENTS:    No events overnight, patient's LE cramps resolving and states he is feeling significantly better since admission. Denies chest pain, SOB, orthopnea, abdomen discomfort, nausea/vomiting.     Vital Signs Last 24 Hrs  T(C): 36.9 (07 Sep 2017 06:17), Max: 37.2 (06 Sep 2017 21:26)  T(F): 98.5 (07 Sep 2017 06:17), Max: 99 (06 Sep 2017 21:26)  HR: 72 (07 Sep 2017 06:17) (69 - 80)  BP: 142/82 (07 Sep 2017 06:17) (122/75 - 170/104)  BP(mean): --  RR: 18 (07 Sep 2017 06:17) (16 - 18)  SpO2: 100% (07 Sep 2017 06:17) (100% - 100%)      PHYSICAL EXAM:  GENERAL: NAD, well-groomed, well-developed  HEAD:  Atraumatic, Normocephalic  EYES: EOMI, PERRLA, conjunctiva and sclera clear  ENMT: No tonsillar erythema, exudates, or enlargement; Moist mucous membranes, Good dentition, No lesions  NECK: Supple, No JVD, Normal thyroid  NERVOUS SYSTEM:  Alert & Oriented X3, Good concentration; Motor Strength 5/5 B/L upper and lower extremities; DTRs 2+ intact and symmetric  CHEST/LUNG: Clear to percussion bilaterally; No rales, rhonchi, wheezing, or rubs  HEART: Regular rate and rhythm; No murmurs, rubs, or gallops  ABDOMEN: Soft, Nontender, Nondistended; Bowel sounds present  EXTREMITIES:  2+ Peripheral Pulses, No clubbing, cyanosis, or edema  LYMPH: No lymphadenopathy noted  SKIN: No rashes or lesions    Consultant(s) Notes Reviewed:  [x ] YES  [ ] NO  Care Discussed with Consultants/Other Providers [ x] YES  [ ] NO    LABS:                        11.0   3.80  )-----------( 70       ( 07 Sep 2017 05:46 )             31.4     09-07    135  |  100  |  17  ----------------------------<  135<H>  3.7   |  23  |  0.92    Ca    8.3<L>      07 Sep 2017 07:49  Phos  2.0       Mg     1.5         TPro  6.7  /  Alb  3.2<L>  /  TBili  1.3<H>  /  DBili  0.7<H>  /  AST  157<H>  /  ALT  89<H>  /  AlkPhos  128<H>        Urinalysis Basic - ( 06 Sep 2017 05:39 )    Color: PLYEL / Appearance: CLEAR / S.004 / pH: 6.0  Gluc: NEGATIVE / Ketone: NEGATIVE  / Bili: NEGATIVE / Urobili: NORMAL E.U.   Blood: NEGATIVE / Protein: NEGATIVE / Nitrite: NEGATIVE   Leuk Esterase: NEGATIVE / RBC: 0-2 / WBC 0-2   Sq Epi: RARE / Non Sq Epi: x / Bacteria: x      CAPILLARY BLOOD GLUCOSE            Urinalysis Basic - ( 06 Sep 2017 05:39 )    Color: PLYEL / Appearance: CLEAR / S.004 / pH: 6.0  Gluc: NEGATIVE / Ketone: NEGATIVE  / Bili: NEGATIVE / Urobili: NORMAL E.U.   Blood: NEGATIVE / Protein: NEGATIVE / Nitrite: NEGATIVE   Leuk Esterase: NEGATIVE / RBC: 0-2 / WBC 0-2   Sq Epi: RARE / Non Sq Epi: x / Bacteria: x        RADIOLOGY & ADDITIONAL TESTS:    Imaging Personally Reviewed:  [ ] YES  [ ] NO    HEALTH ISSUES - PROBLEM Dx:  Nutrition, metabolism, and development symptoms: Nutrition, metabolism, and development symptoms  Prophylactic measure: Prophylactic measure  Coronary artery disease involving native coronary artery of native heart without angina pectoris: Coronary artery disease involving native coronary artery of native heart without angina pectoris  Hepatitis C virus infection, unspecified chronicity: Hepatitis C virus infection, unspecified chronicity  Essential hypertension: Essential hypertension  Non-traumatic rhabdomyolysis: Non-traumatic rhabdomyolysis  Hyponatremia: Hyponatremia WANG ZENDEJAS  67y  Male      Patient is a 67y old  Male who presents with a chief complaint of Leg cramping (06 Sep 2017 15:45)      INTERVAL HPI/OVERNIGHT EVENTS:    No events overnight, patient's LE cramps resolving and states he is feeling significantly better since admission. Denies chest pain, SOB, orthopnea, abdomen discomfort, nausea/vomiting.     Vital Signs Last 24 Hrs  T(C): 36.9 (07 Sep 2017 06:17), Max: 37.2 (06 Sep 2017 21:26)  T(F): 98.5 (07 Sep 2017 06:17), Max: 99 (06 Sep 2017 21:26)  HR: 72 (07 Sep 2017 06:17) (69 - 80)  BP: 142/82 (07 Sep 2017 06:17) (122/75 - 170/104)  BP(mean): --  RR: 18 (07 Sep 2017 06:17) (16 - 18)  SpO2: 100% (07 Sep 2017 06:17) (100% - 100%)      PHYSICAL EXAM:  GENERAL: NAD, well-groomed, well-developed  HEAD:  Atraumatic, Normocephalic  EYES: EOMI, PERRLA, conjunctiva and sclera clear  ENMT: No tonsillar erythema, exudates, or enlargement; Moist mucous membranes, Good dentition, No lesions  NECK: Supple, No JVD, Normal thyroid  NERVOUS SYSTEM:  Alert & Oriented X3, Good concentration; Motor Strength 5/5 B/L upper and lower extremities; DTRs 2+ intact and symmetric  CHEST/LUNG: Clear to percussion bilaterally; No rales, rhonchi, wheezing, or rubs  HEART: Regular rate and rhythm; No murmurs, rubs, or gallops  ABDOMEN: Soft, Nontender, Nondistended; Bowel sounds present  EXTREMITIES:  2+ Peripheral Pulses, No clubbing, cyanosis, or edema  LYMPH: No lymphadenopathy noted  SKIN: No rashes or lesions    Consultant(s) Notes Reviewed:  [x ] YES  [ ] NO  Care Discussed with Consultants/Other Providers [ x] YES  [ ] NO    LABS:                        11.0   3.80  )-----------( 70       ( 07 Sep 2017 05:46 )             31.4     09-07    135  |  100  |  17  ----------------------------<  135<H>  3.7   |  23  |  0.92    Ca    8.3<L>      07 Sep 2017 07:49  Phos  2.0       Mg     1.5         TPro  6.7  /  Alb  3.2<L>  /  TBili  1.3<H>  /  DBili  0.7<H>  /  AST  157<H>  /  ALT  89<H>  /  AlkPhos  128<H>        Urinalysis Basic - ( 06 Sep 2017 05:39 )    Color: PLYEL / Appearance: CLEAR / S.004 / pH: 6.0  Gluc: NEGATIVE / Ketone: NEGATIVE  / Bili: NEGATIVE / Urobili: NORMAL E.U.   Blood: NEGATIVE / Protein: NEGATIVE / Nitrite: NEGATIVE   Leuk Esterase: NEGATIVE / RBC: 0-2 / WBC 0-2   Sq Epi: RARE / Non Sq Epi: x / Bacteria: x     < 1182 < 1569 < 1851      RADIOLOGY & ADDITIONAL TESTS:    Imaging Personally Reviewed:  [ ] YES  [ ] NO    HEALTH ISSUES - PROBLEM Dx:  Nutrition, metabolism, and development symptoms: Nutrition, metabolism, and development symptoms  Prophylactic measure: Prophylactic measure  Coronary artery disease involving native coronary artery of native heart without angina pectoris: Coronary artery disease involving native coronary artery of native heart without angina pectoris  Hepatitis C virus infection, unspecified chronicity: Hepatitis C virus infection, unspecified chronicity  Essential hypertension: Essential hypertension  Non-traumatic rhabdomyolysis: Non-traumatic rhabdomyolysis  Hyponatremia: Hyponatremia

## 2017-09-07 NOTE — DISCHARGE NOTE ADULT - PATIENT PORTAL LINK FT
“You can access the [facility] Patient Portal, offered by [facility], by registering with the following website: [email or web address]”

## 2017-09-07 NOTE — DISCHARGE NOTE ADULT - MEDICATION SUMMARY - MEDICATIONS TO TAKE
I will START or STAY ON the medications listed below when I get home from the hospital:    Ecotrin Adult Low Strength 81 mg oral delayed release tablet  -- 1 tab(s) by mouth once a day  -- Indication: For Coronary artery disease involving native coronary artery of native heart without angina pectoris    NIFEdipine 60 mg oral tablet, extended release  -- 1 tab(s) by mouth once a day  -- Indication: For Essential hypertension I will START or STAY ON the medications listed below when I get home from the hospital:    Ecotrin Adult Low Strength 81 mg oral delayed release tablet  -- 1 tab(s) by mouth once a day  -- Indication: For Coronary artery disease involving native coronary artery of native heart without angina pectoris    Mobic 7.5 mg oral tablet  -- 1 tab(s) by mouth once a day  -- Indication: For arthritis    NIFEdipine 60 mg oral tablet, extended release  -- 1 tab(s) by mouth once a day  -- Indication: For Essential hypertension

## 2017-09-17 ENCOUNTER — RX RENEWAL (OUTPATIENT)
Age: 67
End: 2017-09-17

## 2017-11-08 ENCOUNTER — APPOINTMENT (OUTPATIENT)
Dept: CARDIOLOGY | Facility: CLINIC | Age: 67
End: 2017-11-08
Payer: COMMERCIAL

## 2017-11-08 ENCOUNTER — NON-APPOINTMENT (OUTPATIENT)
Age: 67
End: 2017-11-08

## 2017-11-08 VITALS
BODY MASS INDEX: 18.8 KG/M2 | HEIGHT: 66 IN | WEIGHT: 117 LBS | OXYGEN SATURATION: 99 % | HEART RATE: 92 BPM | SYSTOLIC BLOOD PRESSURE: 130 MMHG | DIASTOLIC BLOOD PRESSURE: 70 MMHG

## 2017-11-08 PROCEDURE — 90471 IMMUNIZATION ADMIN: CPT

## 2017-11-08 PROCEDURE — 90662 IIV NO PRSV INCREASED AG IM: CPT

## 2017-11-08 PROCEDURE — 99214 OFFICE O/P EST MOD 30 MIN: CPT | Mod: 25

## 2017-11-08 PROCEDURE — 93000 ELECTROCARDIOGRAM COMPLETE: CPT

## 2017-11-13 ENCOUNTER — MED ADMIN CHARGE (OUTPATIENT)
Age: 67
End: 2017-11-13

## 2017-11-16 ENCOUNTER — APPOINTMENT (OUTPATIENT)
Age: 67
End: 2017-11-16
Payer: COMMERCIAL

## 2017-11-16 ENCOUNTER — LABORATORY RESULT (OUTPATIENT)
Age: 67
End: 2017-11-16

## 2017-11-16 VITALS
HEIGHT: 65 IN | OXYGEN SATURATION: 99 % | HEART RATE: 14 BPM | DIASTOLIC BLOOD PRESSURE: 77 MMHG | SYSTOLIC BLOOD PRESSURE: 150 MMHG | WEIGHT: 115 LBS | TEMPERATURE: 98.2 F | RESPIRATION RATE: 93 BRPM | BODY MASS INDEX: 19.16 KG/M2

## 2017-11-16 LAB
INR PPP: 0.98 RATIO
PT BLD: 11.1 SEC

## 2017-11-16 PROCEDURE — 99204 OFFICE O/P NEW MOD 45 MIN: CPT

## 2017-11-17 LAB
A1AT SERPL-MCNC: 237 MG/DL
AFP-TM SERPL-MCNC: 13.2 NG/ML
ALBUMIN SERPL ELPH-MCNC: 3.6 G/DL
ALP BLD-CCNC: 179 U/L
ALT SERPL-CCNC: 105 U/L
ANION GAP SERPL CALC-SCNC: 15 MMOL/L
AST SERPL-CCNC: 154 U/L
BASOPHILS # BLD AUTO: 0.01 K/UL
BASOPHILS NFR BLD AUTO: 0.2 %
BILIRUB SERPL-MCNC: 0.8 MG/DL
BUN SERPL-MCNC: 24 MG/DL
CALCIUM SERPL-MCNC: 9.4 MG/DL
CERULOPLASMIN SERPL-MCNC: 53 MG/DL
CHLORIDE SERPL-SCNC: 97 MMOL/L
CO2 SERPL-SCNC: 24 MMOL/L
CREAT SERPL-MCNC: 0.85 MG/DL
DEPRECATED KAPPA LC FREE/LAMBDA SER: 1.59 RATIO
EOSINOPHIL # BLD AUTO: 0.03 K/UL
EOSINOPHIL NFR BLD AUTO: 0.7 %
FERRITIN SERPL-MCNC: 1396 NG/ML
GLUCOSE SERPL-MCNC: 93 MG/DL
HAV IGG+IGM SER QL: NONREACTIVE
HBV CORE IGG+IGM SER QL: REACTIVE
HBV SURFACE AB SER QL: NONREACTIVE
HBV SURFACE AG SER QL: NONREACTIVE
HCT VFR BLD CALC: 33.2 %
HGB BLD-MCNC: 11.6 G/DL
IGA SER QL IEP: 391 MG/DL
IGG SER QL IEP: 1680 MG/DL
IGM SER QL IEP: 216 MG/DL
IMM GRANULOCYTES NFR BLD AUTO: 0.2 %
IRON SATN MFR SERPL: 37 %
IRON SERPL-MCNC: 101 UG/DL
KAPPA LC CSF-MCNC: 5.7 MG/DL
KAPPA LC SERPL-MCNC: 9.07 MG/DL
LKM AB SER QL IF: 0.9 UNITS
LYMPHOCYTES # BLD AUTO: 1.24 K/UL
LYMPHOCYTES NFR BLD AUTO: 29.6 %
MAN DIFF?: NORMAL
MCHC RBC-ENTMCNC: 28.6 PG
MCHC RBC-ENTMCNC: 34.9 GM/DL
MCV RBC AUTO: 81.8 FL
MITOCHONDRIA AB SER IF-ACNC: NORMAL
MONOCYTES # BLD AUTO: 0.35 K/UL
MONOCYTES NFR BLD AUTO: 8.4 %
MPO AB + PR3 PNL SER: NORMAL
NEUTROPHILS # BLD AUTO: 2.55 K/UL
NEUTROPHILS NFR BLD AUTO: 60.9 %
PLATELET # BLD AUTO: 106 K/UL
POTASSIUM SERPL-SCNC: 3.6 MMOL/L
PROT SERPL-MCNC: 7.9 G/DL
RBC # BLD: 4.06 M/UL
RBC # FLD: 14.5 %
SMOOTH MUSCLE AB SER QL IF: NORMAL
SODIUM SERPL-SCNC: 136 MMOL/L
TIBC SERPL-MCNC: 274 UG/DL
TSH SERPL-ACNC: 1.05 UIU/ML
TTG IGA SER IA-ACNC: 5.1 UNITS
TTG IGA SER-ACNC: NEGATIVE
UIBC SERPL-MCNC: 173 UG/DL
WBC # FLD AUTO: 4.19 K/UL

## 2017-11-19 LAB
ANA SER IF-ACNC: NEGATIVE
HCV RNA SERPL NAA DL=5-ACNC: ABNORMAL IU/ML
HCV RNA SERPL NAA+PROBE-LOG IU: 5.94 LOGIU/ML

## 2017-11-21 LAB — HCV GENTYP BLD NAA+PROBE: ABNORMAL

## 2017-11-27 LAB — SOLUBLE LIVER IGG SER IA-ACNC: < 20.1 UNITS

## 2017-12-12 ENCOUNTER — OUTPATIENT (OUTPATIENT)
Dept: OUTPATIENT SERVICES | Facility: HOSPITAL | Age: 67
LOS: 1 days | End: 2017-12-12
Payer: COMMERCIAL

## 2017-12-12 ENCOUNTER — APPOINTMENT (OUTPATIENT)
Dept: ULTRASOUND IMAGING | Facility: CLINIC | Age: 67
End: 2017-12-12
Payer: COMMERCIAL

## 2017-12-12 DIAGNOSIS — Z00.8 ENCOUNTER FOR OTHER GENERAL EXAMINATION: ICD-10-CM

## 2017-12-12 DIAGNOSIS — Z98.89 OTHER SPECIFIED POSTPROCEDURAL STATES: Chronic | ICD-10-CM

## 2017-12-12 PROCEDURE — 76700 US EXAM ABDOM COMPLETE: CPT

## 2017-12-12 PROCEDURE — 76700 US EXAM ABDOM COMPLETE: CPT | Mod: 26

## 2017-12-28 ENCOUNTER — APPOINTMENT (OUTPATIENT)
Dept: CARDIOLOGY | Facility: CLINIC | Age: 67
End: 2017-12-28

## 2018-01-23 ENCOUNTER — APPOINTMENT (OUTPATIENT)
Age: 68
End: 2018-01-23
Payer: COMMERCIAL

## 2018-01-23 VITALS
RESPIRATION RATE: 14 BRPM | HEART RATE: 76 BPM | SYSTOLIC BLOOD PRESSURE: 179 MMHG | BODY MASS INDEX: 19.66 KG/M2 | DIASTOLIC BLOOD PRESSURE: 107 MMHG | WEIGHT: 118 LBS | HEIGHT: 65 IN | TEMPERATURE: 98.8 F

## 2018-01-23 PROCEDURE — 91200 LIVER ELASTOGRAPHY: CPT

## 2018-01-23 PROCEDURE — 99213 OFFICE O/P EST LOW 20 MIN: CPT | Mod: 25

## 2018-01-25 LAB
AFP-TM SERPL-MCNC: 11.9 NG/ML
ANION GAP SERPL CALC-SCNC: 13 MMOL/L
BUN SERPL-MCNC: 17 MG/DL
CALCIUM SERPL-MCNC: 8.9 MG/DL
CHLORIDE SERPL-SCNC: 101 MMOL/L
CO2 SERPL-SCNC: 26 MMOL/L
CREAT SERPL-MCNC: 0.91 MG/DL
GLUCOSE SERPL-MCNC: 79 MG/DL
POTASSIUM SERPL-SCNC: 3.8 MMOL/L
SODIUM SERPL-SCNC: 140 MMOL/L

## 2018-02-19 ENCOUNTER — RX RENEWAL (OUTPATIENT)
Age: 68
End: 2018-02-19

## 2018-03-14 ENCOUNTER — RX RENEWAL (OUTPATIENT)
Age: 68
End: 2018-03-14

## 2018-04-25 ENCOUNTER — APPOINTMENT (OUTPATIENT)
Dept: INTERNAL MEDICINE | Facility: CLINIC | Age: 68
End: 2018-04-25
Payer: COMMERCIAL

## 2018-04-25 VITALS — HEART RATE: 80 BPM | DIASTOLIC BLOOD PRESSURE: 72 MMHG | SYSTOLIC BLOOD PRESSURE: 136 MMHG | RESPIRATION RATE: 12 BRPM

## 2018-04-25 VITALS — BODY MASS INDEX: 20.49 KG/M2 | WEIGHT: 123 LBS | HEIGHT: 65 IN

## 2018-04-25 DIAGNOSIS — M19.042 PRIMARY OSTEOARTHRITIS, LEFT HAND: ICD-10-CM

## 2018-04-25 DIAGNOSIS — L29.9 PRURITUS, UNSPECIFIED: ICD-10-CM

## 2018-04-25 DIAGNOSIS — L30.9 DERMATITIS, UNSPECIFIED: ICD-10-CM

## 2018-04-25 PROCEDURE — 99215 OFFICE O/P EST HI 40 MIN: CPT

## 2018-05-07 ENCOUNTER — RX RENEWAL (OUTPATIENT)
Age: 68
End: 2018-05-07

## 2018-05-21 ENCOUNTER — APPOINTMENT (OUTPATIENT)
Dept: HEPATOLOGY | Facility: CLINIC | Age: 68
End: 2018-05-21

## 2018-06-22 ENCOUNTER — APPOINTMENT (OUTPATIENT)
Dept: HEPATOLOGY | Facility: CLINIC | Age: 68
End: 2018-06-22

## 2018-07-03 ENCOUNTER — EMERGENCY (EMERGENCY)
Facility: HOSPITAL | Age: 68
LOS: 1 days | Discharge: HOME | End: 2018-07-03
Attending: EMERGENCY MEDICINE | Admitting: EMERGENCY MEDICINE
Payer: COMMERCIAL

## 2018-07-03 VITALS
TEMPERATURE: 103 F | RESPIRATION RATE: 16 BRPM | HEART RATE: 132 BPM | OXYGEN SATURATION: 100 % | SYSTOLIC BLOOD PRESSURE: 166 MMHG | DIASTOLIC BLOOD PRESSURE: 92 MMHG

## 2018-07-03 DIAGNOSIS — Z98.89 OTHER SPECIFIED POSTPROCEDURAL STATES: Chronic | ICD-10-CM

## 2018-07-03 LAB
ALBUMIN SERPL ELPH-MCNC: 4.8 G/DL — SIGNIFICANT CHANGE UP (ref 3.3–5)
ALP SERPL-CCNC: 154 U/L — HIGH (ref 40–120)
ALT FLD-CCNC: 22 U/L — SIGNIFICANT CHANGE UP (ref 4–41)
ANISOCYTOSIS BLD QL: SLIGHT — SIGNIFICANT CHANGE UP
APPEARANCE UR: CLEAR — SIGNIFICANT CHANGE UP
APTT BLD: 30.8 SEC — SIGNIFICANT CHANGE UP (ref 27.5–37.4)
AST SERPL-CCNC: 33 U/L — SIGNIFICANT CHANGE UP (ref 4–40)
BASE EXCESS BLDV CALC-SCNC: -0.1 MMOL/L — SIGNIFICANT CHANGE UP
BASOPHILS # BLD AUTO: 0.02 K/UL — SIGNIFICANT CHANGE UP (ref 0–0.2)
BASOPHILS NFR BLD AUTO: 0.2 % — SIGNIFICANT CHANGE UP (ref 0–2)
BASOPHILS NFR SPEC: 0 % — SIGNIFICANT CHANGE UP (ref 0–2)
BILIRUB SERPL-MCNC: 0.4 MG/DL — SIGNIFICANT CHANGE UP (ref 0.2–1.2)
BILIRUB UR-MCNC: NEGATIVE — SIGNIFICANT CHANGE UP
BLASTS # FLD: 0 % — SIGNIFICANT CHANGE UP (ref 0–0)
BLOOD GAS VENOUS - CREATININE: 0.89 MG/DL — SIGNIFICANT CHANGE UP (ref 0.5–1.3)
BLOOD UR QL VISUAL: NEGATIVE — SIGNIFICANT CHANGE UP
BUN SERPL-MCNC: 12 MG/DL — SIGNIFICANT CHANGE UP (ref 7–23)
CALCIUM SERPL-MCNC: 9.9 MG/DL — SIGNIFICANT CHANGE UP (ref 8.4–10.5)
CHLORIDE BLDV-SCNC: 103 MMOL/L — SIGNIFICANT CHANGE UP (ref 96–108)
CHLORIDE SERPL-SCNC: 94 MMOL/L — LOW (ref 98–107)
CK MB BLD-MCNC: 1.3 — SIGNIFICANT CHANGE UP (ref 0–2.5)
CK MB BLD-MCNC: 3.83 NG/ML — SIGNIFICANT CHANGE UP (ref 1–6.6)
CK SERPL-CCNC: 301 U/L — HIGH (ref 30–200)
CO2 SERPL-SCNC: 24 MMOL/L — SIGNIFICANT CHANGE UP (ref 22–31)
COLOR SPEC: YELLOW — SIGNIFICANT CHANGE UP
CREAT SERPL-MCNC: 0.96 MG/DL — SIGNIFICANT CHANGE UP (ref 0.5–1.3)
DACRYOCYTES BLD QL SMEAR: SLIGHT — SIGNIFICANT CHANGE UP
EOSINOPHIL # BLD AUTO: 0 K/UL — SIGNIFICANT CHANGE UP (ref 0–0.5)
EOSINOPHIL NFR BLD AUTO: 0 % — SIGNIFICANT CHANGE UP (ref 0–6)
EOSINOPHIL NFR FLD: 0 % — SIGNIFICANT CHANGE UP (ref 0–6)
GAS PNL BLDV: 135 MMOL/L — LOW (ref 136–146)
GIANT PLATELETS BLD QL SMEAR: PRESENT — SIGNIFICANT CHANGE UP
GLUCOSE BLDV-MCNC: 101 — HIGH (ref 70–99)
GLUCOSE SERPL-MCNC: 112 MG/DL — HIGH (ref 70–99)
GLUCOSE UR-MCNC: NEGATIVE — SIGNIFICANT CHANGE UP
HBA1C BLD-MCNC: 5.2 % — SIGNIFICANT CHANGE UP (ref 4–5.6)
HCO3 BLDV-SCNC: 24 MMOL/L — SIGNIFICANT CHANGE UP (ref 20–27)
HCT VFR BLD CALC: 39.7 % — SIGNIFICANT CHANGE UP (ref 39–50)
HCT VFR BLDV CALC: 41.4 % — SIGNIFICANT CHANGE UP (ref 39–51)
HGB BLD-MCNC: 13.2 G/DL — SIGNIFICANT CHANGE UP (ref 13–17)
HGB BLDV-MCNC: 13.5 G/DL — SIGNIFICANT CHANGE UP (ref 13–17)
IMM GRANULOCYTES # BLD AUTO: 0.06 # — SIGNIFICANT CHANGE UP
IMM GRANULOCYTES NFR BLD AUTO: 0.5 % — SIGNIFICANT CHANGE UP (ref 0–1.5)
INR BLD: 1.14 — SIGNIFICANT CHANGE UP (ref 0.88–1.17)
KETONES UR-MCNC: NEGATIVE — SIGNIFICANT CHANGE UP
LACTATE BLDV-MCNC: 1.9 MMOL/L — SIGNIFICANT CHANGE UP (ref 0.5–2)
LEUKOCYTE ESTERASE UR-ACNC: NEGATIVE — SIGNIFICANT CHANGE UP
LYMPHOCYTES # BLD AUTO: 0.74 K/UL — LOW (ref 1–3.3)
LYMPHOCYTES # BLD AUTO: 6.1 % — LOW (ref 13–44)
LYMPHOCYTES NFR SPEC AUTO: 9.6 % — LOW (ref 13–44)
MCHC RBC-ENTMCNC: 25.9 PG — LOW (ref 27–34)
MCHC RBC-ENTMCNC: 33.2 % — SIGNIFICANT CHANGE UP (ref 32–36)
MCV RBC AUTO: 77.8 FL — LOW (ref 80–100)
METAMYELOCYTES # FLD: 0 % — SIGNIFICANT CHANGE UP (ref 0–1)
MICROCYTES BLD QL: SLIGHT — SIGNIFICANT CHANGE UP
MONOCYTES # BLD AUTO: 0.76 K/UL — SIGNIFICANT CHANGE UP (ref 0–0.9)
MONOCYTES NFR BLD AUTO: 6.3 % — SIGNIFICANT CHANGE UP (ref 2–14)
MONOCYTES NFR BLD: 3.5 % — SIGNIFICANT CHANGE UP (ref 2–9)
MUCOUS THREADS # UR AUTO: SIGNIFICANT CHANGE UP
MYELOCYTES NFR BLD: 0 % — SIGNIFICANT CHANGE UP (ref 0–0)
NEUTROPHIL AB SER-ACNC: 82.5 % — HIGH (ref 43–77)
NEUTROPHILS # BLD AUTO: 10.56 K/UL — HIGH (ref 1.8–7.4)
NEUTROPHILS NFR BLD AUTO: 86.9 % — HIGH (ref 43–77)
NEUTS BAND # BLD: 2.6 % — SIGNIFICANT CHANGE UP (ref 0–6)
NITRITE UR-MCNC: NEGATIVE — SIGNIFICANT CHANGE UP
NRBC # FLD: 0 — SIGNIFICANT CHANGE UP
OTHER - HEMATOLOGY %: 0 — SIGNIFICANT CHANGE UP
OVALOCYTES BLD QL SMEAR: SLIGHT — SIGNIFICANT CHANGE UP
PCO2 BLDV: 37 MMHG — LOW (ref 41–51)
PH BLDV: 7.42 PH — SIGNIFICANT CHANGE UP (ref 7.32–7.43)
PH UR: 5.5 — SIGNIFICANT CHANGE UP (ref 4.6–8)
PLATELET # BLD AUTO: 131 K/UL — LOW (ref 150–400)
PLATELET COUNT - ESTIMATE: SIGNIFICANT CHANGE UP
PMV BLD: 10.4 FL — SIGNIFICANT CHANGE UP (ref 7–13)
PO2 BLDV: 41 MMHG — HIGH (ref 35–40)
POTASSIUM BLDV-SCNC: 3.1 MMOL/L — LOW (ref 3.4–4.5)
POTASSIUM SERPL-MCNC: 3.3 MMOL/L — LOW (ref 3.5–5.3)
POTASSIUM SERPL-SCNC: 3.3 MMOL/L — LOW (ref 3.5–5.3)
PROMYELOCYTES # FLD: 0 % — SIGNIFICANT CHANGE UP (ref 0–0)
PROT SERPL-MCNC: 9.1 G/DL — HIGH (ref 6–8.3)
PROT UR-MCNC: 30 MG/DL — HIGH
PROTHROM AB SERPL-ACNC: 13.2 SEC — HIGH (ref 9.8–13.1)
RBC # BLD: 5.1 M/UL — SIGNIFICANT CHANGE UP (ref 4.2–5.8)
RBC # FLD: 13.3 % — SIGNIFICANT CHANGE UP (ref 10.3–14.5)
RBC CASTS # UR COMP ASSIST: SIGNIFICANT CHANGE UP (ref 0–?)
REVIEW TO FOLLOW: YES — SIGNIFICANT CHANGE UP
SAO2 % BLDV: 75.1 % — SIGNIFICANT CHANGE UP (ref 60–85)
SODIUM SERPL-SCNC: 136 MMOL/L — SIGNIFICANT CHANGE UP (ref 135–145)
SP GR SPEC: 1.01 — SIGNIFICANT CHANGE UP (ref 1–1.04)
TSH SERPL-MCNC: 0.74 UIU/ML — SIGNIFICANT CHANGE UP (ref 0.27–4.2)
UROBILINOGEN FLD QL: NORMAL MG/DL — SIGNIFICANT CHANGE UP
VARIANT LYMPHS # BLD: 1.8 % — SIGNIFICANT CHANGE UP
WBC # BLD: 12.14 K/UL — HIGH (ref 3.8–10.5)
WBC # FLD AUTO: 12.14 K/UL — HIGH (ref 3.8–10.5)
WBC UR QL: SIGNIFICANT CHANGE UP (ref 0–?)

## 2018-07-03 PROCEDURE — 99220: CPT

## 2018-07-03 PROCEDURE — 71045 X-RAY EXAM CHEST 1 VIEW: CPT | Mod: 26

## 2018-07-03 RX ORDER — SODIUM CHLORIDE 9 MG/ML
2000 INJECTION INTRAMUSCULAR; INTRAVENOUS; SUBCUTANEOUS ONCE
Qty: 0 | Refills: 0 | Status: COMPLETED | OUTPATIENT
Start: 2018-07-03 | End: 2018-07-03

## 2018-07-03 RX ORDER — SODIUM CHLORIDE 9 MG/ML
1000 INJECTION INTRAMUSCULAR; INTRAVENOUS; SUBCUTANEOUS
Qty: 0 | Refills: 0 | Status: DISCONTINUED | OUTPATIENT
Start: 2018-07-03 | End: 2018-07-07

## 2018-07-03 RX ORDER — ACETAMINOPHEN 500 MG
975 TABLET ORAL ONCE
Qty: 0 | Refills: 0 | Status: COMPLETED | OUTPATIENT
Start: 2018-07-03 | End: 2018-07-03

## 2018-07-03 RX ORDER — POTASSIUM CHLORIDE 20 MEQ
40 PACKET (EA) ORAL ONCE
Qty: 0 | Refills: 0 | Status: COMPLETED | OUTPATIENT
Start: 2018-07-03 | End: 2018-07-03

## 2018-07-03 RX ADMIN — SODIUM CHLORIDE 125 MILLILITER(S): 9 INJECTION INTRAMUSCULAR; INTRAVENOUS; SUBCUTANEOUS at 23:37

## 2018-07-03 RX ADMIN — SODIUM CHLORIDE 1000 MILLILITER(S): 9 INJECTION INTRAMUSCULAR; INTRAVENOUS; SUBCUTANEOUS at 18:50

## 2018-07-03 RX ADMIN — Medication 975 MILLIGRAM(S): at 20:06

## 2018-07-03 RX ADMIN — Medication 40 MILLIEQUIVALENT(S): at 20:06

## 2018-07-03 RX ADMIN — Medication 975 MILLIGRAM(S): at 19:14

## 2018-07-03 NOTE — ED CDU PROVIDER INITIAL DAY NOTE - RESPIRATORY, MLM
Breath sounds equal bilaterally; breath sounds clear with exception of slightly coarse BS at bases (R>L).  No wheezing, no rales, no retractions.

## 2018-07-03 NOTE — ED PROVIDER NOTE - ATTENDING CONTRIBUTION TO CARE
Attending note:   After face to face evaluation of this patient, I concur with above noted hx, pe, and care plan for this patient.  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    Evaluation in progress.   PAtient well appearing

## 2018-07-03 NOTE — ED CDU PROVIDER INITIAL DAY NOTE - DETAILS
Tele and vital signs monitoring, IV hydration per orders, am labs, general observation and reassessment.  CDU plan expanded to include CT chest due to hx/o productive cough, fever, and small area of opacity noted at medial base of RLL on CXR.

## 2018-07-03 NOTE — ED CDU PROVIDER INITIAL DAY NOTE - ENMT, MLM
Airway patent. Nasal mucosa clear. Mouth with moist mucosa. TMs grey bilaterally; EACs WNL bilaterally.  Throat has no vesicles, no oropharyngeal exudates and uvula is midline.  Neck supple; no lymphadenopathy to cervical region noted on exam.

## 2018-07-03 NOTE — ED CDU PROVIDER INITIAL DAY NOTE - FAMILY HISTORY
Father  Still living? Unknown  Family history of heart disease, Age at diagnosis: Age Unknown     Mother  Still living? Unknown  Family history of cancer, Age at diagnosis: Age Unknown

## 2018-07-03 NOTE — ED CDU PROVIDER INITIAL DAY NOTE - DISCUSSED CLINICAL AND RADIOLOGICAL FINDINGS WITH, MDM
family/patient/Pt's fiancee at bedside; pt gives verbal permission to discuss all info with arelise.

## 2018-07-03 NOTE — ED CDU PROVIDER INITIAL DAY NOTE - ATTENDING CONTRIBUTION TO CARE
Attending note:   After face to face evaluation of this patient, I concur with above noted hx, pe, and care plan for this patient. 68 y/o M  with significant heat exposure today presents with fever, cough with clear lungs.  Treated in ED with air conditioning and cool fluids; will observe

## 2018-07-03 NOTE — ED CDU PROVIDER INITIAL DAY NOTE - CONSTITUTIONAL, MLM
normal... Well appearing, well nourished, awake, alert, oriented to person, place, time/situation and in no apparent distress.  Pt. is verbalizing in full, clear, effortless sentences.

## 2018-07-03 NOTE — ED PROVIDER NOTE - MEDICAL DECISION MAKING DETAILS
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   Will observe overnight

## 2018-07-03 NOTE — ED PROVIDER NOTE - OBJECTIVE STATEMENT
Sharri Eng M.D: 67M hx htn, hld, p/w coughxfew days and [id number]. cough is nonproductive. no cp no sob no abd pain no nvcd. 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  no rhinorrhea no sore throat. no urinary symptoms

## 2018-07-03 NOTE — ED ADULT TRIAGE NOTE - CHIEF COMPLAINT QUOTE
Pt c/o sob x 1 day. Denies cp/cardiac hx. Tachycardic and febrile in triage. Pt endorses cough. Denies urinary symptoms/NVD/recent surgery.

## 2018-07-03 NOTE — ED CDU PROVIDER INITIAL DAY NOTE - PMH
Elevated LFTs    Hepatitis C virus infection, unspecified chronicity  (was tx with Harvoni)  HTN (hypertension)

## 2018-07-03 NOTE — ED CDU PROVIDER INITIAL DAY NOTE - OBJECTIVE STATEMENT
Sharri Eng M.D: 67M hx htn, hld, p/w coughxfew days and [id number]. cough is nonproductive. no cp no sob no abd pain no nvcd. 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  no rhinorrhea no sore throat. no urinary symptoms    CDU KARLY Wall Note-----  66 yo male, PMH of HTN, elevated LFTs, and hx/o Hepatitis C (finished 3 month Harvoni regimen), presented to the ED for generalized malaise and cough as per above.  Pt. was evaluated in the ED, noted to be febrile to Tmax 103.7; pt works as a ; environmental factors include heat wave since 6/30/18 (inclusive of today).  Pt. was dispo'd to the CDU for plan:  Tele and vital signs monitoring, IV hydration per orders, am labs, general observation and reassessment.    On CDU evaluation, pt states he has had productive cough x 3 days (yellow phlegm, no hemoptysis); pt states cough is improving.  Pt was not aware he was having fever; denies chills, headache, vision changes, syncope, generalized or focal weakness, otalgia, nasal congestion, sinus pain, sore throat, neck discomfort, chest discomfort/pain, SOB, abdominal pain, N/V/D, urinary c/o, or other c/o.  Pt. states he has felt "off kilter" for the past few days.  Pt. states he works as a  and has been trying to stay well hydrated during work hours, though unable to take periodic breaks in cool place intermittently during work hours.  No other c/o.  No hx/o recent travel.  CDU plan discussed with pt who verbalizes agreement to plan; CDU plan expanded to include CT chest due to hx/o productive cough, fever, and small area of opacity noted at medial base of RLL on CXR.

## 2018-07-04 VITALS
TEMPERATURE: 98 F | RESPIRATION RATE: 16 BRPM | HEART RATE: 80 BPM | OXYGEN SATURATION: 99 % | DIASTOLIC BLOOD PRESSURE: 78 MMHG | SYSTOLIC BLOOD PRESSURE: 152 MMHG

## 2018-07-04 LAB
ALBUMIN SERPL ELPH-MCNC: 3.3 G/DL — SIGNIFICANT CHANGE UP (ref 3.3–5)
ALP SERPL-CCNC: 104 U/L — SIGNIFICANT CHANGE UP (ref 40–120)
ALT FLD-CCNC: 14 U/L — SIGNIFICANT CHANGE UP (ref 4–41)
AST SERPL-CCNC: 23 U/L — SIGNIFICANT CHANGE UP (ref 4–40)
BASE EXCESS BLDV CALC-SCNC: -0.3 MMOL/L — SIGNIFICANT CHANGE UP
BASOPHILS # BLD AUTO: 0.03 K/UL — SIGNIFICANT CHANGE UP (ref 0–0.2)
BASOPHILS NFR BLD AUTO: 0.2 % — SIGNIFICANT CHANGE UP (ref 0–2)
BILIRUB SERPL-MCNC: 0.6 MG/DL — SIGNIFICANT CHANGE UP (ref 0.2–1.2)
BLOOD GAS VENOUS - CREATININE: 0.78 MG/DL — SIGNIFICANT CHANGE UP (ref 0.5–1.3)
BUN SERPL-MCNC: 13 MG/DL — SIGNIFICANT CHANGE UP (ref 7–23)
CALCIUM SERPL-MCNC: 8.4 MG/DL — SIGNIFICANT CHANGE UP (ref 8.4–10.5)
CHLORIDE BLDV-SCNC: 110 MMOL/L — HIGH (ref 96–108)
CHLORIDE SERPL-SCNC: 106 MMOL/L — SIGNIFICANT CHANGE UP (ref 98–107)
CO2 SERPL-SCNC: 21 MMOL/L — LOW (ref 22–31)
CREAT SERPL-MCNC: 0.83 MG/DL — SIGNIFICANT CHANGE UP (ref 0.5–1.3)
EOSINOPHIL # BLD AUTO: 0.03 K/UL — SIGNIFICANT CHANGE UP (ref 0–0.5)
EOSINOPHIL NFR BLD AUTO: 0.2 % — SIGNIFICANT CHANGE UP (ref 0–6)
GAS PNL BLDV: 136 MMOL/L — SIGNIFICANT CHANGE UP (ref 136–146)
GLUCOSE BLDV-MCNC: 95 — SIGNIFICANT CHANGE UP (ref 70–99)
GLUCOSE SERPL-MCNC: 91 MG/DL — SIGNIFICANT CHANGE UP (ref 70–99)
HCO3 BLDV-SCNC: 24 MMOL/L — SIGNIFICANT CHANGE UP (ref 20–27)
HCT VFR BLD CALC: 31.8 % — LOW (ref 39–50)
HCT VFR BLDV CALC: 32.7 % — LOW (ref 39–51)
HGB BLD-MCNC: 10.3 G/DL — LOW (ref 13–17)
HGB BLDV-MCNC: 10.6 G/DL — LOW (ref 13–17)
IMM GRANULOCYTES # BLD AUTO: 0.08 # — SIGNIFICANT CHANGE UP
IMM GRANULOCYTES NFR BLD AUTO: 0.6 % — SIGNIFICANT CHANGE UP (ref 0–1.5)
LACTATE BLDV-MCNC: 1.4 MMOL/L — SIGNIFICANT CHANGE UP (ref 0.5–2)
LYMPHOCYTES # BLD AUTO: 1.62 K/UL — SIGNIFICANT CHANGE UP (ref 1–3.3)
LYMPHOCYTES # BLD AUTO: 12.5 % — LOW (ref 13–44)
MCHC RBC-ENTMCNC: 26.1 PG — LOW (ref 27–34)
MCHC RBC-ENTMCNC: 32.4 % — SIGNIFICANT CHANGE UP (ref 32–36)
MCV RBC AUTO: 80.7 FL — SIGNIFICANT CHANGE UP (ref 80–100)
MONOCYTES # BLD AUTO: 0.75 K/UL — SIGNIFICANT CHANGE UP (ref 0–0.9)
MONOCYTES NFR BLD AUTO: 5.8 % — SIGNIFICANT CHANGE UP (ref 2–14)
NEUTROPHILS # BLD AUTO: 10.49 K/UL — HIGH (ref 1.8–7.4)
NEUTROPHILS NFR BLD AUTO: 80.7 % — HIGH (ref 43–77)
NRBC # FLD: 0 — SIGNIFICANT CHANGE UP
PCO2 BLDV: 35 MMHG — LOW (ref 41–51)
PH BLDV: 7.44 PH — HIGH (ref 7.32–7.43)
PLATELET # BLD AUTO: 109 K/UL — LOW (ref 150–400)
PMV BLD: 10.9 FL — SIGNIFICANT CHANGE UP (ref 7–13)
PO2 BLDV: 101 MMHG — HIGH (ref 35–40)
POTASSIUM BLDV-SCNC: 3.6 MMOL/L — SIGNIFICANT CHANGE UP (ref 3.4–4.5)
POTASSIUM SERPL-MCNC: 3.9 MMOL/L — SIGNIFICANT CHANGE UP (ref 3.5–5.3)
POTASSIUM SERPL-SCNC: 3.9 MMOL/L — SIGNIFICANT CHANGE UP (ref 3.5–5.3)
PROT SERPL-MCNC: 6.9 G/DL — SIGNIFICANT CHANGE UP (ref 6–8.3)
RBC # BLD: 3.94 M/UL — LOW (ref 4.2–5.8)
RBC # FLD: 13.1 % — SIGNIFICANT CHANGE UP (ref 10.3–14.5)
SAO2 % BLDV: 98.3 % — HIGH (ref 60–85)
SODIUM SERPL-SCNC: 139 MMOL/L — SIGNIFICANT CHANGE UP (ref 135–145)
SPECIMEN SOURCE: SIGNIFICANT CHANGE UP
SPECIMEN SOURCE: SIGNIFICANT CHANGE UP
WBC # BLD: 13 K/UL — HIGH (ref 3.8–10.5)
WBC # FLD AUTO: 13 K/UL — HIGH (ref 3.8–10.5)

## 2018-07-04 PROCEDURE — 99217: CPT

## 2018-07-04 PROCEDURE — 71250 CT THORAX DX C-: CPT | Mod: 26

## 2018-07-04 RX ADMIN — SODIUM CHLORIDE 125 MILLILITER(S): 9 INJECTION INTRAMUSCULAR; INTRAVENOUS; SUBCUTANEOUS at 07:57

## 2018-07-04 RX ADMIN — Medication 1200 MILLIGRAM(S): at 07:56

## 2018-07-04 NOTE — ED CDU PROVIDER SUBSEQUENT DAY NOTE - HISTORY
66 yo male, PMH of HTN, elevated LFTs, and hx/o Hepatitis C (finished 3 month Harvoni regimen), presented to the ED for generalized malaise and cough. Pt was evaluated in the ED, noted to be febrile to Tmax 103.7 pt works as a ; environmental factors include heat wave since 6/30/18 (inclusive of today). In CDU pt fw RLL Pna on CT, levaquin given, IVF given, VSS, pt appears well and feels much improved this am upon eval. Plan for dispo home.

## 2018-07-04 NOTE — ED CDU PROVIDER DISPOSITION NOTE - PLAN OF CARE
Follow up with your Primary Medical Doctor within 2-3days. REST, Increase fluid intake. TAKE Levaquin (antibiotic) once a day for 5 more days. Take Mucinex (over the counter) as instructed on packaging to help bring up mucus. Take Tylenol 650mg 1 tab every 4-6 hours as needed for pain or fever greater than 99.9. If results or reports were given to you, show copies of your reports given to you. Take all of your medications as previously prescribed. RETURN for worsening cough, fevers not improved with tylenol, any chest pain, weakness, vomiting, shortness of breath, OR ANY other new or concerning symptoms.

## 2018-07-04 NOTE — ED CDU PROVIDER DISPOSITION NOTE - CLINICAL COURSE
68 year old presented with cough and fever. found to have RLL pna. treated with iv levaquin and monitored for response. patient remained asymptomatic.  will dc to complete course of abx

## 2018-07-04 NOTE — ED CDU PROVIDER SUBSEQUENT DAY NOTE - PROGRESS NOTE DETAILS
Pt has been objectively noted to be resting comfortably in the interim; no issues or c/o thus far.  CT chest shows RLL pneumonia; Levaquin ordered.  Will continue to monitor; pt will be signed out to the day CDU PA and attending physician at 0700 hrs.

## 2018-07-04 NOTE — ED CDU PROVIDER SUBSEQUENT DAY NOTE - MEDICAL DECISION MAKING DETAILS
68 yo male, PMH of HTN, elevated LFTs, and hx/o Hepatitis C (finished 3 month Harvoni regimen), presented to the ED for generalized malaise and cough. Pt was evaluated in the ED, noted to be febrile to Tmax 103.7 pt works as a ; environmental factors include heat wave since 6/30/18 (inclusive of today). In CDU pt fw RLL Pna on CT, levaquin given, IVF given, VSS, pt appears well and feels much improved this am upon eval. Plan for dispo home.

## 2018-07-04 NOTE — ED CDU PROVIDER DISPOSITION NOTE - ATTENDING CONTRIBUTION TO CARE
68 year old presented with cough and fever. found to have RLL pna. treated with iv levaquin and monitored for response. patient remained asymptomatic.  will dc to complete course of abx    CON : NAD  EENT : EOMI, MMM  NECK : Full ROM  RESP : CTAB no increased WOB  CARD : rrr no m/r/g  ABD : S NT ND NABS no rebound  EXT : No edema  NEURO : AAOX3

## 2018-07-04 NOTE — ED CDU PROVIDER SUBSEQUENT DAY NOTE - ATTENDING CONTRIBUTION TO CARE
I agree with the above H&P.  Briefly this is a 68 year old admitted to obs for CAP treatment and monitoring of response. well appearing.  CON : NAD  EENT : EOMI, MMM  NECK : Full ROM  RESP : CTAB no increased WOB  CARD : rrr no m/r/g  ABD : S NT ND NABS no rebound  EXT : No edema  NEURO : AAOX3

## 2018-07-05 LAB
BACTERIA UR CULT: SIGNIFICANT CHANGE UP
SPECIMEN SOURCE: SIGNIFICANT CHANGE UP

## 2018-07-08 LAB
BACTERIA BLD CULT: SIGNIFICANT CHANGE UP
BACTERIA BLD CULT: SIGNIFICANT CHANGE UP

## 2018-07-10 ENCOUNTER — RX RENEWAL (OUTPATIENT)
Age: 68
End: 2018-07-10

## 2018-07-18 ENCOUNTER — APPOINTMENT (OUTPATIENT)
Dept: INTERNAL MEDICINE | Facility: CLINIC | Age: 68
End: 2018-07-18
Payer: COMMERCIAL

## 2018-07-18 VITALS — DIASTOLIC BLOOD PRESSURE: 84 MMHG | RESPIRATION RATE: 12 BRPM | HEART RATE: 80 BPM | SYSTOLIC BLOOD PRESSURE: 144 MMHG

## 2018-07-18 VITALS — SYSTOLIC BLOOD PRESSURE: 160 MMHG | RESPIRATION RATE: 12 BRPM | DIASTOLIC BLOOD PRESSURE: 70 MMHG | HEART RATE: 78 BPM

## 2018-07-18 VITALS — BODY MASS INDEX: 19.16 KG/M2 | HEIGHT: 65 IN | WEIGHT: 115 LBS

## 2018-07-18 PROCEDURE — 99214 OFFICE O/P EST MOD 30 MIN: CPT

## 2018-07-18 NOTE — ASSESSMENT
[FreeTextEntry1] : COPD.  Recent PNA and dehydration and still smokes.  Counselled 3-5 min.  Declines cessation program. \par HTN.  Pt aware that he did not take his meds today.\par L Knee pain with mod Degenerative changes.  Resolved.\par L calf stiffness that takes 1 hour in AM to loosen up.  Then able to work as . Suggest stretching.  No need for doppler. \par Pruritus. Diffuse old scars from scratching. Resolved without ETOH.\par Hep C   Positive.  Pt completed 3 mo of Harvoni\par Still Needs Hep A vaccine.\par ETOH  Pt has reduced from qd to prn due to Harvoni.  Counselled to DC. \par Non obstructive CAD\par Hyperchol on Atorvastatin\par Pt declines colonoscopy.  "I am afraid"  Accepts FIT.  Lost 1st kit.  Will provide a 2nd. \par L index finger OA L PIP.   \par Depression discussed during exam.  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 \par Smokes at times.\par

## 2018-07-18 NOTE — PHYSICAL EXAM
[No Acute Distress] : no acute distress [Well Nourished] : well nourished [Well Developed] : well developed [Well-Appearing] : well-appearing [Normal Sclera/Conjunctiva] : normal sclera/conjunctiva [PERRL] : pupils equal round and reactive to light [EOMI] : extraocular movements intact [Normal Outer Ear/Nose] : the outer ears and nose were normal in appearance [Normal Oropharynx] : the oropharynx was normal [Normal TMs] : both tympanic membranes were normal [No JVD] : no jugular venous distention [No Lymphadenopathy] : no lymphadenopathy [Thyroid Normal, No Nodules] : the thyroid was normal and there were no nodules present [No Respiratory Distress] : no respiratory distress  [Clear to Auscultation] : lungs were clear to auscultation bilaterally [No Accessory Muscle Use] : no accessory muscle use [Normal Rate] : normal rate  [Regular Rhythm] : with a regular rhythm [Normal S1, S2] : normal S1 and S2 [No Murmur] : no murmur heard [No Carotid Bruits] : no carotid bruits [No Abdominal Bruit] : a ~M bruit was not heard ~T in the abdomen [No Varicosities] : no varicosities [Pedal Pulses Present] : the pedal pulses are present [No Edema] : there was no peripheral edema [No Extremity Clubbing/Cyanosis] : no extremity clubbing/cyanosis [No Palpable Aorta] : no palpable aorta [Soft] : abdomen soft [Non Tender] : non-tender [Non-distended] : non-distended [No Masses] : no abdominal mass palpated [No HSM] : no HSM [Normal Bowel Sounds] : normal bowel sounds [Normal Supraclavicular Nodes] : no supraclavicular lymphadenopathy [Normal Anterior Cervical Nodes] : no anterior cervical lymphadenopathy [No CVA Tenderness] : no CVA  tenderness [No Spinal Tenderness] : no spinal tenderness [No Joint Swelling] : no joint swelling [Grossly Normal Strength/Tone] : grossly normal strength/tone [Normal Gait] : normal gait [Coordination Grossly Intact] : coordination grossly intact [No Focal Deficits] : no focal deficits [Normal Affect] : the affect was normal [Alert and Oriented x3] : oriented to person, place, and time [Comprehensive Foot Exam Normal] : Right and left foot were examined and both feet are normal. No ulcers in either foot. Toes are normal and with full ROM.  Normal tactile sensation with monofilament testing throughout both feet [de-identified] : Degen changes L knee.  L calf entirely neg.

## 2018-07-18 NOTE — HEALTH RISK ASSESSMENT
[] : No [No falls in past year] : Patient reported no falls in the past year [0] : 2) Feeling down, depressed, or hopeless: Not at all (0) [de-identified] : Has beer prn rather than qd as he did before Daniela.   [[id number]] : 0

## 2018-07-18 NOTE — REVIEW OF SYSTEMS
[Shortness Of Breath] : no shortness of breath [Wheezing] : no wheezing [Suicidal] : not suicidal [Insomnia] : no insomnia [Anxiety] : anxiety [Depression] : depression [Negative] : Neurological [FreeTextEntry6] : see HPI [FreeTextEntry9] : see HPI [de-identified] : Itch resolved with improved liver disease and no ETOH [de-identified] : Used OTC pills for insomnia in past for "rough times"

## 2018-07-18 NOTE — HISTORY OF PRESENT ILLNESS
[FreeTextEntry1] : Eval of Hep C now on Harvoni hyperchol HTN .  \par Recentl ER eval for RLL PNA and dehyd.\par Feels better about [facility] planning.  \par \par   [de-identified] : Says he no longer drinks beers and shots q PM.

## 2018-08-13 ENCOUNTER — APPOINTMENT (OUTPATIENT)
Dept: HEPATOLOGY | Facility: CLINIC | Age: 68
End: 2018-08-13
Payer: COMMERCIAL

## 2018-08-13 VITALS
TEMPERATURE: 98.3 F | WEIGHT: 118 LBS | HEART RATE: 83 BPM | DIASTOLIC BLOOD PRESSURE: 85 MMHG | SYSTOLIC BLOOD PRESSURE: 169 MMHG | BODY MASS INDEX: 19.66 KG/M2 | HEIGHT: 65 IN | RESPIRATION RATE: 12 BRPM

## 2018-08-13 LAB
AFP-TM SERPL-MCNC: 6.8 NG/ML
ALBUMIN SERPL ELPH-MCNC: 4.4 G/DL
ALP BLD-CCNC: 136 U/L
ALT SERPL-CCNC: 19 U/L
ANION GAP SERPL CALC-SCNC: 16 MMOL/L
AST SERPL-CCNC: 29 U/L
BASOPHILS # BLD AUTO: 0.03 K/UL
BASOPHILS NFR BLD AUTO: 0.7 %
BILIRUB SERPL-MCNC: 0.3 MG/DL
BUN SERPL-MCNC: 18 MG/DL
CALCIUM SERPL-MCNC: 9.5 MG/DL
CHLORIDE SERPL-SCNC: 99 MMOL/L
CO2 SERPL-SCNC: 22 MMOL/L
CREAT SERPL-MCNC: 0.85 MG/DL
EOSINOPHIL # BLD AUTO: 0.1 K/UL
EOSINOPHIL NFR BLD AUTO: 2.2 %
GLUCOSE SERPL-MCNC: 102 MG/DL
HCT VFR BLD CALC: 35.5 %
HGB BLD-MCNC: 11.3 G/DL
IMM GRANULOCYTES NFR BLD AUTO: 0 %
INR PPP: 1.03 RATIO
LYMPHOCYTES # BLD AUTO: 0.94 K/UL
LYMPHOCYTES NFR BLD AUTO: 20.9 %
MAN DIFF?: NORMAL
MCHC RBC-ENTMCNC: 26.3 PG
MCHC RBC-ENTMCNC: 31.8 GM/DL
MCV RBC AUTO: 82.6 FL
MONOCYTES # BLD AUTO: 0.45 K/UL
MONOCYTES NFR BLD AUTO: 10 %
NEUTROPHILS # BLD AUTO: 2.98 K/UL
NEUTROPHILS NFR BLD AUTO: 66.2 %
PLATELET # BLD AUTO: 128 K/UL
POTASSIUM SERPL-SCNC: 4.3 MMOL/L
PROT SERPL-MCNC: 7.7 G/DL
PT BLD: 11.7 SEC
RBC # BLD: 4.3 M/UL
RBC # FLD: 15.4 %
SODIUM SERPL-SCNC: 137 MMOL/L
WBC # FLD AUTO: 4.5 K/UL

## 2018-08-13 PROCEDURE — 99214 OFFICE O/P EST MOD 30 MIN: CPT

## 2018-08-13 RX ORDER — LEDIPASVIR AND SOFOSBUVIR 90; 400 MG/1; MG/1
90-400 TABLET, FILM COATED ORAL
Qty: 28 | Refills: 2 | Status: DISCONTINUED | COMMUNITY
Start: 2017-11-21 | End: 2018-08-13

## 2018-08-13 RX ORDER — HEPATITIS A VIRUS VACCINE 1440/ML
1440 VIAL (ML) INTRAMUSCULAR ONCE
Qty: 1 | Refills: 0 | Status: DISCONTINUED | COMMUNITY
Start: 2018-04-25 | End: 2018-08-13

## 2018-08-15 LAB
HCV RNA SERPL NAA DL=5-ACNC: NOT DETECTED IU/ML
HCV RNA SERPL NAA+PROBE-LOG IU: NOT DETECTED LOGIU/ML

## 2018-10-01 ENCOUNTER — FORM ENCOUNTER (OUTPATIENT)
Age: 68
End: 2018-10-01

## 2018-10-02 ENCOUNTER — APPOINTMENT (OUTPATIENT)
Dept: ULTRASOUND IMAGING | Facility: CLINIC | Age: 68
End: 2018-10-02
Payer: COMMERCIAL

## 2018-10-02 ENCOUNTER — OUTPATIENT (OUTPATIENT)
Dept: OUTPATIENT SERVICES | Facility: HOSPITAL | Age: 68
LOS: 1 days | End: 2018-10-02
Payer: COMMERCIAL

## 2018-10-02 DIAGNOSIS — R74.8 ABNORMAL LEVELS OF OTHER SERUM ENZYMES: ICD-10-CM

## 2018-10-02 DIAGNOSIS — B18.2 CHRONIC VIRAL HEPATITIS C: ICD-10-CM

## 2018-10-02 DIAGNOSIS — Z00.8 ENCOUNTER FOR OTHER GENERAL EXAMINATION: ICD-10-CM

## 2018-10-02 DIAGNOSIS — Z98.89 OTHER SPECIFIED POSTPROCEDURAL STATES: Chronic | ICD-10-CM

## 2018-10-02 PROCEDURE — 93975 VASCULAR STUDY: CPT

## 2018-10-02 PROCEDURE — 93975 VASCULAR STUDY: CPT | Mod: 26

## 2018-10-10 ENCOUNTER — APPOINTMENT (OUTPATIENT)
Dept: HEPATOLOGY | Facility: CLINIC | Age: 68
End: 2018-10-10
Payer: COMMERCIAL

## 2018-10-10 ENCOUNTER — APPOINTMENT (OUTPATIENT)
Dept: INTERNAL MEDICINE | Facility: CLINIC | Age: 68
End: 2018-10-10
Payer: COMMERCIAL

## 2018-10-10 VITALS
RESPIRATION RATE: 16 BRPM | BODY MASS INDEX: 19.16 KG/M2 | HEIGHT: 65 IN | DIASTOLIC BLOOD PRESSURE: 90 MMHG | WEIGHT: 115 LBS | HEART RATE: 78 BPM | SYSTOLIC BLOOD PRESSURE: 169 MMHG | TEMPERATURE: 98.2 F | OXYGEN SATURATION: 100 %

## 2018-10-10 VITALS — SYSTOLIC BLOOD PRESSURE: 180 MMHG | DIASTOLIC BLOOD PRESSURE: 94 MMHG

## 2018-10-10 VITALS — RESPIRATION RATE: 12 BRPM | HEART RATE: 84 BPM | SYSTOLIC BLOOD PRESSURE: 178 MMHG | DIASTOLIC BLOOD PRESSURE: 88 MMHG

## 2018-10-10 PROCEDURE — 99214 OFFICE O/P EST MOD 30 MIN: CPT | Mod: 25

## 2018-10-10 PROCEDURE — 90662 IIV NO PRSV INCREASED AG IM: CPT

## 2018-10-10 PROCEDURE — 99214 OFFICE O/P EST MOD 30 MIN: CPT

## 2018-10-10 PROCEDURE — G0008: CPT

## 2018-10-10 NOTE — HISTORY OF PRESENT ILLNESS
[FreeTextEntry1] : Eval of Hep C now finished with Daniela \par F/up for hyperchol HTN .  \par Recentl ER eval for RLL PNA and dehyd.\par Feels better about [facility] planning now.\par L knee improved well on Meloxicam\par pt has good report from [facility]. \par \par   [de-identified] : Says he no longer drinks beers and shots q PM.

## 2018-10-10 NOTE — ASSESSMENT
[FreeTextEntry1] : COPD.  s/p PNA and dehydration and still smokes.  Counselled 3-5 min.  Declined cessation program in past. \par HTN.  poor control.  hesitant to raise Nifedipine or add new Rx\par L Knee pain with mod Degenerative changes.  Resolved well with Meloxicam.  Advised to change to PRN now. .\par Pruritus. Diffuse old scars from scratching. Resolved with less ETOH use.  Advised him to stop. .\par Hep C      Pt completed 3 mo of Harvoni  hep C RNA now non detectable and LFTs improved.  Perhaps ALk Phos still elevated due to ETOH. \par Non obstructive CAD\par Hyperchol on Atorvastatin\par Pt declines colonoscopy.  "I am afraid"  Accepts FIT.  Lost 1st kit.  Pt has a 2nd. Instructed him to do it. \par L index finger OA L PIP.   \par Depression discussed during prior exam.  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 He feels the previous discussion helped and has a more positive outlook on custodial. \par Smokes at times. Counselled again since he is more positive about life and custodial and wants to be healthy\par

## 2018-10-10 NOTE — REVIEW OF SYSTEMS
[Shortness Of Breath] : no shortness of breath [Wheezing] : no wheezing [Joint Pain] : joint pain [Suicidal] : not suicidal [Insomnia] : no insomnia [Anxiety] : anxiety [Depression] : depression [Negative] : Neurological [FreeTextEntry9] : see HPI [de-identified] : Itch resolved with improved liver disease and no ETOH [de-identified] : Used OTC pills for insomnia in past for "rough times"

## 2018-10-10 NOTE — HEALTH RISK ASSESSMENT
[] : No [No falls in past year] : Patient reported no falls in the past year [0] : 2) Feeling down, depressed, or hopeless: Not at all (0) [[id number]] : 0

## 2018-10-10 NOTE — PHYSICAL EXAM
[No Acute Distress] : no acute distress [Well Nourished] : well nourished [Well Developed] : well developed [Well-Appearing] : well-appearing [Normal Sclera/Conjunctiva] : normal sclera/conjunctiva [PERRL] : pupils equal round and reactive to light [EOMI] : extraocular movements intact [Normal Outer Ear/Nose] : the outer ears and nose were normal in appearance [Normal Oropharynx] : the oropharynx was normal [Normal TMs] : both tympanic membranes were normal [No JVD] : no jugular venous distention [No Lymphadenopathy] : no lymphadenopathy [Thyroid Normal, No Nodules] : the thyroid was normal and there were no nodules present [No Respiratory Distress] : no respiratory distress  [Clear to Auscultation] : lungs were clear to auscultation bilaterally [No Accessory Muscle Use] : no accessory muscle use [Normal Rate] : normal rate  [Regular Rhythm] : with a regular rhythm [Normal S1, S2] : normal S1 and S2 [No Murmur] : no murmur heard [No Carotid Bruits] : no carotid bruits [No Abdominal Bruit] : a ~M bruit was not heard ~T in the abdomen [No Varicosities] : no varicosities [Pedal Pulses Present] : the pedal pulses are present [No Edema] : there was no peripheral edema [No Extremity Clubbing/Cyanosis] : no extremity clubbing/cyanosis [No Palpable Aorta] : no palpable aorta [Normal Appearance] : normal in appearance [Soft] : abdomen soft [Non Tender] : non-tender [Non-distended] : non-distended [No Masses] : no abdominal mass palpated [No HSM] : no HSM [Normal Bowel Sounds] : normal bowel sounds [Normal Supraclavicular Nodes] : no supraclavicular lymphadenopathy [Normal Anterior Cervical Nodes] : no anterior cervical lymphadenopathy [No CVA Tenderness] : no CVA  tenderness [No Spinal Tenderness] : no spinal tenderness [No Joint Swelling] : no joint swelling [Grossly Normal Strength/Tone] : grossly normal strength/tone [No Skin Lesions] : no skin lesions [Normal Gait] : normal gait [Coordination Grossly Intact] : coordination grossly intact [No Focal Deficits] : no focal deficits [Normal Affect] : the affect was normal [Alert and Oriented x3] : oriented to person, place, and time [Comprehensive Foot Exam Normal] : Right and left foot were examined and both feet are normal. No ulcers in either foot. Toes are normal and with full ROM.  Normal tactile sensation with monofilament testing throughout both feet [de-identified] : Severe Degen changes L knee.  L calf entirely neg.

## 2018-10-11 LAB
ALBUMIN SERPL ELPH-MCNC: 4.5 G/DL
ALP BLD-CCNC: 128 U/L
ALT SERPL-CCNC: 23 U/L
AST SERPL-CCNC: 43 U/L
BILIRUB DIRECT SERPL-MCNC: 0.1 MG/DL
BILIRUB INDIRECT SERPL-MCNC: 0.3 MG/DL
BILIRUB SERPL-MCNC: 0.4 MG/DL
PROT SERPL-MCNC: 8.2 G/DL

## 2018-10-12 LAB
HCV RNA SERPL NAA DL=5-ACNC: NOT DETECTED
HCV RNA SERPL NAA+PROBE-LOG IU: NOT DETECTED LOGIU/ML

## 2018-11-02 ENCOUNTER — RX RENEWAL (OUTPATIENT)
Age: 68
End: 2018-11-02

## 2018-11-19 ENCOUNTER — EMERGENCY (EMERGENCY)
Facility: HOSPITAL | Age: 68
LOS: 1 days | Discharge: HOME | End: 2018-11-19
Admitting: EMERGENCY MEDICINE
Payer: COMMERCIAL

## 2018-11-19 VITALS
TEMPERATURE: 98 F | OXYGEN SATURATION: 100 % | DIASTOLIC BLOOD PRESSURE: 78 MMHG | RESPIRATION RATE: 16 BRPM | SYSTOLIC BLOOD PRESSURE: 166 MMHG | HEART RATE: 100 BPM

## 2018-11-19 VITALS
TEMPERATURE: 98 F | SYSTOLIC BLOOD PRESSURE: 174 MMHG | DIASTOLIC BLOOD PRESSURE: 96 MMHG | RESPIRATION RATE: 16 BRPM | HEART RATE: 76 BPM | OXYGEN SATURATION: 100 %

## 2018-11-19 DIAGNOSIS — Z98.89 OTHER SPECIFIED POSTPROCEDURAL STATES: Chronic | ICD-10-CM

## 2018-11-19 PROCEDURE — 73562 X-RAY EXAM OF KNEE 3: CPT | Mod: 26,LT

## 2018-11-19 PROCEDURE — 99283 EMERGENCY DEPT VISIT LOW MDM: CPT | Mod: 25

## 2018-11-19 PROCEDURE — 20610 DRAIN/INJ JOINT/BURSA W/O US: CPT | Mod: LT

## 2018-11-19 NOTE — ED PROVIDER NOTE - PROGRESS NOTE DETAILS
KARLY Berg: Therapeutic arthrocentesis performed 3cc of clear fluid removed. Pt w/ full ROM of knee, states although not much fluid was removed that he feels improvement in his sx. Sarika xiong applied, will dc w/ close ortho follow up

## 2018-11-19 NOTE — ED PROVIDER NOTE - PHYSICAL EXAMINATION
Left knee: large notable effusion, minimal warmth to touch, pain w/ manipulation of the patella, full active and passive ROM, no calf tenderness, neg anterior/posterior drawer

## 2018-11-19 NOTE — ED PROVIDER NOTE - MEDICAL DECISION MAKING DETAILS
67 y/o M w/ acute on chronic knee pain, plan xray, pain control reassess. Does not want pain meds now because he took Meloxicam prior to arrival.

## 2018-11-19 NOTE — ED PROVIDER NOTE - NSFOLLOWUPINSTRUCTIONS_ED_ALL_ED_FT
Follow up with ortho this week.  Referral list provided.  Rest, ice and elevate knee.  Ambulate as tolerated with use of cane. Keep ace wrap on during the day.  Continue Meloxicam and Tylenol as needed for pain. Worsening pain, swelling, weakness, numbness return to ER

## 2018-11-21 ENCOUNTER — APPOINTMENT (OUTPATIENT)
Dept: ORTHOPEDIC SURGERY | Facility: CLINIC | Age: 68
End: 2018-11-21
Payer: COMMERCIAL

## 2018-11-21 VITALS
HEIGHT: 65 IN | HEART RATE: 89 BPM | DIASTOLIC BLOOD PRESSURE: 76 MMHG | BODY MASS INDEX: 19.99 KG/M2 | SYSTOLIC BLOOD PRESSURE: 172 MMHG | WEIGHT: 120 LBS

## 2018-11-21 DIAGNOSIS — Z78.9 OTHER SPECIFIED HEALTH STATUS: ICD-10-CM

## 2018-11-21 PROCEDURE — 99204 OFFICE O/P NEW MOD 45 MIN: CPT | Mod: 25

## 2018-11-21 PROCEDURE — 20610 DRAIN/INJ JOINT/BURSA W/O US: CPT | Mod: LT

## 2018-11-21 PROCEDURE — 73564 X-RAY EXAM KNEE 4 OR MORE: CPT | Mod: LT

## 2018-11-26 ENCOUNTER — RX RENEWAL (OUTPATIENT)
Age: 68
End: 2018-11-26

## 2018-12-07 ENCOUNTER — RX RENEWAL (OUTPATIENT)
Age: 68
End: 2018-12-07

## 2019-01-03 ENCOUNTER — RX RENEWAL (OUTPATIENT)
Age: 69
End: 2019-01-03

## 2019-02-13 ENCOUNTER — APPOINTMENT (OUTPATIENT)
Dept: INTERNAL MEDICINE | Facility: CLINIC | Age: 69
End: 2019-02-13
Payer: COMMERCIAL

## 2019-02-13 VITALS — HEART RATE: 80 BPM | DIASTOLIC BLOOD PRESSURE: 70 MMHG | SYSTOLIC BLOOD PRESSURE: 132 MMHG | RESPIRATION RATE: 12 BRPM

## 2019-02-13 VITALS — WEIGHT: 118 LBS | HEIGHT: 65 IN | BODY MASS INDEX: 19.66 KG/M2

## 2019-02-13 DIAGNOSIS — G89.29 PAIN IN LEFT KNEE: ICD-10-CM

## 2019-02-13 DIAGNOSIS — Z23 ENCOUNTER FOR IMMUNIZATION: ICD-10-CM

## 2019-02-13 DIAGNOSIS — M25.562 PAIN IN LEFT KNEE: ICD-10-CM

## 2019-02-13 PROCEDURE — 90670 PCV13 VACCINE IM: CPT

## 2019-02-13 PROCEDURE — G0009: CPT

## 2019-02-13 PROCEDURE — 99214 OFFICE O/P EST MOD 30 MIN: CPT | Mod: 25

## 2019-02-13 NOTE — PHYSICAL EXAM
[No Acute Distress] : no acute distress [Well Nourished] : well nourished [Well Developed] : well developed [Well-Appearing] : well-appearing [Normal Sclera/Conjunctiva] : normal sclera/conjunctiva [PERRL] : pupils equal round and reactive to light [EOMI] : extraocular movements intact [Normal Outer Ear/Nose] : the outer ears and nose were normal in appearance [Normal Oropharynx] : the oropharynx was normal [No JVD] : no jugular venous distention [No Lymphadenopathy] : no lymphadenopathy [Thyroid Normal, No Nodules] : the thyroid was normal and there were no nodules present [No Respiratory Distress] : no respiratory distress  [Clear to Auscultation] : lungs were clear to auscultation bilaterally [No Accessory Muscle Use] : no accessory muscle use [Normal Rate] : normal rate  [Regular Rhythm] : with a regular rhythm [Normal S1, S2] : normal S1 and S2 [No Murmur] : no murmur heard [No Carotid Bruits] : no carotid bruits [No Abdominal Bruit] : a ~M bruit was not heard ~T in the abdomen [No Varicosities] : no varicosities [Pedal Pulses Present] : the pedal pulses are present [No Edema] : there was no peripheral edema [No Extremity Clubbing/Cyanosis] : no extremity clubbing/cyanosis [No Palpable Aorta] : no palpable aorta [Normal Appearance] : normal in appearance [Soft] : abdomen soft [Non Tender] : non-tender [Non-distended] : non-distended [No Masses] : no abdominal mass palpated [No HSM] : no HSM [Normal Bowel Sounds] : normal bowel sounds [Normal Supraclavicular Nodes] : no supraclavicular lymphadenopathy [Normal Anterior Cervical Nodes] : no anterior cervical lymphadenopathy [No CVA Tenderness] : no CVA  tenderness [No Spinal Tenderness] : no spinal tenderness [No Joint Swelling] : no joint swelling [Grossly Normal Strength/Tone] : grossly normal strength/tone [No Skin Lesions] : no skin lesions [Normal Gait] : normal gait [Coordination Grossly Intact] : coordination grossly intact [No Focal Deficits] : no focal deficits [Normal Affect] : the affect was normal [Alert and Oriented x3] : oriented to person, place, and time [Comprehensive Foot Exam Normal] : Right and left foot were examined and both feet are normal. No ulcers in either foot. Toes are normal and with full ROM.  Normal tactile sensation with monofilament testing throughout both feet [de-identified] : Pilar bilat [de-identified] : Severe Degen changes L knee.  L calf entirely neg.

## 2019-02-13 NOTE — ASSESSMENT
[FreeTextEntry1] : COPD.  Recent PNA and dehydration and still smokes.  Declined cessation program in the past\par HTN.  Pt aware that he did not take his meds today.\par L Knee pain with mod Degenerative changes.  Better with IA injec and Meloxicam\par Pruritus. Diffuse old scars from scratching. Resolved without ETOH.\par Hep C RNA now not detected.  AFP was elevated and now normal.  Pt completed 3 mo of Harvoni\par Non obstructive CAD\par Hyperchol on Atorvastatin\par Pt declines colonoscopy.  "I am afraid"  Accepts FIT.  Lost 1st kit.  Will provide a 2nd. \par L index finger OA L PIP.   \par Depression and anxeity better since pt now has half-way plans\par COPD Smokes at times.\par PNA 2018.  Got IV ABX\par

## 2019-02-13 NOTE — REVIEW OF SYSTEMS
[Joint Pain] : joint pain [Anxiety] : anxiety [Depression] : depression [Negative] : Neurological [Shortness Of Breath] : no shortness of breath [Wheezing] : no wheezing [Suicidal] : not suicidal [Insomnia] : no insomnia [FreeTextEntry9] : see HPI [de-identified] : Itch resolved with improved liver disease and no ETOH [de-identified] :  Improved anxiety and depression

## 2019-02-13 NOTE — HISTORY OF PRESENT ILLNESS
[FreeTextEntry1] : Eval of Hep C now finished with Daniela \par F/up for hyperchol HTN .  \par s/p ER eval for RLL PNA and dehyd.\par Saw ortho for L knee pain and got IA injec.  Uses Meloxicam qd\par Planning to retire 12-18 and feels much better about.  \par Feels better about [facility] planning now.\par L knee improved well on Meloxicam\par pt has good report from [facility]. \par \par   [de-identified] : Says he no longer drinks beers and shots q PM.

## 2019-03-08 ENCOUNTER — RX RENEWAL (OUTPATIENT)
Age: 69
End: 2019-03-08

## 2019-03-11 ENCOUNTER — TRANSCRIPTION ENCOUNTER (OUTPATIENT)
Age: 69
End: 2019-03-11

## 2019-03-11 ENCOUNTER — APPOINTMENT (OUTPATIENT)
Dept: CARDIOLOGY | Facility: CLINIC | Age: 69
End: 2019-03-11
Payer: COMMERCIAL

## 2019-03-11 ENCOUNTER — APPOINTMENT (OUTPATIENT)
Dept: INTERNAL MEDICINE | Facility: CLINIC | Age: 69
End: 2019-03-11
Payer: COMMERCIAL

## 2019-03-11 VITALS — HEART RATE: 68 BPM | SYSTOLIC BLOOD PRESSURE: 142 MMHG | DIASTOLIC BLOOD PRESSURE: 82 MMHG | RESPIRATION RATE: 12 BRPM

## 2019-03-11 VITALS
OXYGEN SATURATION: 99 % | HEIGHT: 66 IN | DIASTOLIC BLOOD PRESSURE: 62 MMHG | SYSTOLIC BLOOD PRESSURE: 130 MMHG | BODY MASS INDEX: 18.64 KG/M2 | WEIGHT: 116 LBS | HEART RATE: 92 BPM

## 2019-03-11 VITALS — WEIGHT: 117 LBS | HEIGHT: 66 IN | BODY MASS INDEX: 18.8 KG/M2

## 2019-03-11 DIAGNOSIS — M62.838 OTHER MUSCLE SPASM: ICD-10-CM

## 2019-03-11 DIAGNOSIS — R94.31 ABNORMAL ELECTROCARDIOGRAM [ECG] [EKG]: ICD-10-CM

## 2019-03-11 PROCEDURE — 93000 ELECTROCARDIOGRAM COMPLETE: CPT

## 2019-03-11 PROCEDURE — 99213 OFFICE O/P EST LOW 20 MIN: CPT

## 2019-03-11 PROCEDURE — 99214 OFFICE O/P EST MOD 30 MIN: CPT

## 2019-03-11 NOTE — PHYSICAL EXAM
[No Acute Distress] : no acute distress [Well Nourished] : well nourished [Well Developed] : well developed [Well-Appearing] : well-appearing [Normal Sclera/Conjunctiva] : normal sclera/conjunctiva [PERRL] : pupils equal round and reactive to light [EOMI] : extraocular movements intact [Normal Outer Ear/Nose] : the outer ears and nose were normal in appearance [Normal Oropharynx] : the oropharynx was normal [No JVD] : no jugular venous distention [No Lymphadenopathy] : no lymphadenopathy [Thyroid Normal, No Nodules] : the thyroid was normal and there were no nodules present [No Respiratory Distress] : no respiratory distress  [Clear to Auscultation] : lungs were clear to auscultation bilaterally [No Accessory Muscle Use] : no accessory muscle use [Normal Rate] : normal rate  [Regular Rhythm] : with a regular rhythm [Normal S1, S2] : normal S1 and S2 [No Murmur] : no murmur heard [No Carotid Bruits] : no carotid bruits [No Abdominal Bruit] : a ~M bruit was not heard ~T in the abdomen [No Varicosities] : no varicosities [Pedal Pulses Present] : the pedal pulses are present [No Edema] : there was no peripheral edema [No Extremity Clubbing/Cyanosis] : no extremity clubbing/cyanosis [No Palpable Aorta] : no palpable aorta [Normal Appearance] : normal in appearance [Soft] : abdomen soft [Non Tender] : non-tender [Non-distended] : non-distended [No Masses] : no abdominal mass palpated [No HSM] : no HSM [Normal Bowel Sounds] : normal bowel sounds [Normal Supraclavicular Nodes] : no supraclavicular lymphadenopathy [Normal Anterior Cervical Nodes] : no anterior cervical lymphadenopathy [No CVA Tenderness] : no CVA  tenderness [No Spinal Tenderness] : no spinal tenderness [No Joint Swelling] : no joint swelling [Grossly Normal Strength/Tone] : grossly normal strength/tone [No Rash] : no rash [No Skin Lesions] : no skin lesions [Normal Gait] : normal gait [Coordination Grossly Intact] : coordination grossly intact [No Focal Deficits] : no focal deficits [Normal Affect] : the affect was normal [Alert and Oriented x3] : oriented to person, place, and time [Comprehensive Foot Exam Normal] : Right and left foot were examined and both feet are normal. No ulcers in either foot. Toes are normal and with full ROM.  Normal tactile sensation with monofilament testing throughout both feet [de-identified] :  NT sacrum and lower back and flanks  Gait and balance normal. [de-identified] : No rash or vesicles

## 2019-03-11 NOTE — ASSESSMENT
[FreeTextEntry1] : R sided sciatica.  No evidence of shingles.  Despite no relief from Meloxicam qd for knee pain and Tylenol arthritis, pt will benefit from Muscle relaxant.  \par HCVD stable\par COPD and hx of PNA last summer, counselled again\par Hep C cured.  \par Anxiety, strain of planning [facility] still significant. Counseled \par

## 2019-03-11 NOTE — HISTORY OF PRESENT ILLNESS
[FreeTextEntry1] : Eval of Hep C now finished with Harvoni \par F/up for hyperchol HTN .  \par c/o 3 days R sided flank pain.  ABle to go to work and reduce some work.  Feels that "it settled into the R buttock"  Using Tylenol Arthritis without benefit.  On Meloxicam qd for knee but no relief.  \par No urinary changes.  No BM changes.  Worse with certain mvt especially "when I arch my back back"  No fever no chills.  No blood in urine.  No B & B incont.  \par Smokes.\par Saw Dr Dee earlier and had labs.\par  [de-identified] : Says he no longer drinks beers and shots q PM.

## 2019-03-12 ENCOUNTER — NON-APPOINTMENT (OUTPATIENT)
Age: 69
End: 2019-03-12

## 2019-03-12 NOTE — PHYSICAL EXAM
[General Appearance - Well Developed] : well developed [Normal Appearance] : normal appearance [Well Groomed] : well groomed [General Appearance - Well Nourished] : well nourished [No Deformities] : no deformities [General Appearance - In No Acute Distress] : no acute distress [Normal Conjunctiva] : the conjunctiva exhibited no abnormalities [Eyelids - No Xanthelasma] : the eyelids demonstrated no xanthelasmas [Normal Oral Mucosa] : normal oral mucosa [No Oral Pallor] : no oral pallor [No Oral Cyanosis] : no oral cyanosis [Normal Jugular Venous A Waves Present] : normal jugular venous A waves present [Normal Jugular Venous V Waves Present] : normal jugular venous V waves present [No Jugular Venous Baugh A Waves] : no jugular venous baugh A waves [Respiration, Rhythm And Depth] : normal respiratory rhythm and effort [Exaggerated Use Of Accessory Muscles For Inspiration] : no accessory muscle use [Auscultation Breath Sounds / Voice Sounds] : lungs were clear to auscultation bilaterally [Bowel Sounds] : normal bowel sounds [Abdomen Soft] : soft [Abdomen Tenderness] : non-tender [Abnormal Walk] : normal gait [Gait - Sufficient For Exercise Testing] : the gait was sufficient for exercise testing [Nail Clubbing] : no clubbing of the fingernails [Cyanosis, Localized] : no localized cyanosis [Petechial Hemorrhages (___cm)] : no petechial hemorrhages [Skin Color & Pigmentation] : normal skin color and pigmentation [] : no rash [No Venous Stasis] : no venous stasis [Skin Lesions] : no skin lesions [No Skin Ulcers] : no skin ulcer [No Xanthoma] : no  xanthoma was observed [Oriented To Time, Place, And Person] : oriented to person, place, and time [Affect] : the affect was normal [Mood] : the mood was normal [Memory Recent] : recent memory was not impaired [No Anxiety] : not feeling anxious [Not Palpable] : not palpable [No Precordial Heave] : no precordial heave was noted [Normal Rate] : normal [Heart Rate ___] : [unfilled] bpm [Rhythm Regular] : regular [Normal S1] : normal S1 [Normal S2] : normal S2 [No Gallop] : no gallop heard [No Murmur] : no murmurs heard [Left Carotid Bruit] : left carotid bruit heard [1+] : left 1+ [No Abnormalities] : the abdominal aorta was not enlarged and no bruit was heard [No Pitting Edema] : no pitting edema present [Apical Thrill] : no thrill palpable at the apex [S3] : no S3 [S4] : no S4 [Click] : no click [Pericardial Rub] : no pericardial rub [Right Carotid Bruit] : no bruit heard over the right carotid [Bruit] : no bruit heard [Rt] : no varicose veins of the right leg [Lt] : no varicose veins of the left leg

## 2019-03-12 NOTE — HISTORY OF PRESENT ILLNESS
[FreeTextEntry1] : Shelton is 68 years of age with CAD, hypertensive heart disease and abnormal ECG. 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 Perhaps an element of exertional dyspnea is present as well. No current chest pains, syncope, palpitations, or edema.

## 2019-03-12 NOTE — DISCUSSION/SUMMARY
[___ Month(s)] : [unfilled] month(s) [FreeTextEntry3] : or sooner if needed. [FreeTextEntry1] : He will continue on aspirin and statin for cardiovascular risk reduction along with his nifedipine for blood pressure management. Ordered an echocardiogram to assess his current LV function and valvular status. Have ordered a bilateral carotid duplex to assess his underlying carotid Doppler study for carotid disease. I recommended a heart healthy lifestyle including a low-saturated fat, low cholesterol diet with improved aerobic physical fitness over time for cardiovascular benefits. We will call him with his test results and he'll follow up with you for care.

## 2019-03-14 ENCOUNTER — APPOINTMENT (OUTPATIENT)
Dept: INTERNAL MEDICINE | Facility: CLINIC | Age: 69
End: 2019-03-14
Payer: COMMERCIAL

## 2019-03-14 VITALS — HEART RATE: 70 BPM | SYSTOLIC BLOOD PRESSURE: 120 MMHG | DIASTOLIC BLOOD PRESSURE: 70 MMHG | RESPIRATION RATE: 12 BRPM

## 2019-03-14 VITALS — HEIGHT: 66 IN | BODY MASS INDEX: 18.8 KG/M2 | WEIGHT: 117 LBS

## 2019-03-14 DIAGNOSIS — M54.41 LUMBAGO WITH SCIATICA, RIGHT SIDE: ICD-10-CM

## 2019-03-14 PROCEDURE — 99213 OFFICE O/P EST LOW 20 MIN: CPT

## 2019-03-14 NOTE — ASSESSMENT
[FreeTextEntry1] : R sided sciatica.  No evidence of shingles.  No relief from Meloxicam qd for knee pain and Tylenol arthritis and ASA, Try Tramadol.  needs ortho MD Dr Jacinto valentin\par HCVD stable\par COPD and hx of PNA last summer, smoking counselled again\par Hep C cured.  \par Anxiety, strain of planning long term still significant. Counseled \par

## 2019-03-14 NOTE — HISTORY OF PRESENT ILLNESS
[Joint Pain, Localized In The Shoulder] : shoulder [___ Weeks ago] : [unfilled] weeks ago [Constant] : constant [Moderate] : moderate [Activity] : with activity [Worsening] : worsening [de-identified] : Worse.  Feels that he can not walk well.  Appears comfortable on exam table.  Posture poor.  Leaning forward.   [FreeTextEntry7] : R sides and feels that it is rad down R leg [FreeTextEntry5] : Used bradly Augustin buot no ice or heat.  [FreeTextEntry4] : Any mvt makes it worse.

## 2019-03-14 NOTE — PHYSICAL EXAM
[Clear to Auscultation] : lungs were clear to auscultation bilaterally [Regular Rhythm] : with a regular rhythm [No Edema] : there was no peripheral edema [de-identified] : Calves NT [de-identified] : Poot posture  [de-identified] : Grossly NF

## 2019-03-19 ENCOUNTER — APPOINTMENT (OUTPATIENT)
Dept: ORTHOPEDIC SURGERY | Facility: CLINIC | Age: 69
End: 2019-03-19
Payer: COMMERCIAL

## 2019-03-19 ENCOUNTER — RX RENEWAL (OUTPATIENT)
Age: 69
End: 2019-03-19

## 2019-03-19 VITALS — HEIGHT: 66 IN | WEIGHT: 117 LBS | BODY MASS INDEX: 18.8 KG/M2

## 2019-03-19 DIAGNOSIS — G89.29 LUMBAGO WITH SCIATICA, RIGHT SIDE: ICD-10-CM

## 2019-03-19 DIAGNOSIS — M25.551 PAIN IN RIGHT HIP: ICD-10-CM

## 2019-03-19 DIAGNOSIS — M17.12 UNILATERAL PRIMARY OSTEOARTHRITIS, LEFT KNEE: ICD-10-CM

## 2019-03-19 DIAGNOSIS — M54.41 LUMBAGO WITH SCIATICA, RIGHT SIDE: ICD-10-CM

## 2019-03-19 PROCEDURE — 72100 X-RAY EXAM L-S SPINE 2/3 VWS: CPT

## 2019-03-19 PROCEDURE — 99214 OFFICE O/P EST MOD 30 MIN: CPT

## 2019-03-19 PROCEDURE — 73502 X-RAY EXAM HIP UNI 2-3 VIEWS: CPT | Mod: RT

## 2019-03-23 NOTE — HISTORY OF PRESENT ILLNESS
[de-identified] : This is very nice 68-year-old gentleman experiencing right low back pain that radiates to his buttock and foot, which is severe in intensity. I previously treated him for left knee pain which is not completely improved from his prior evaluation. His right lower leg pain is a new problem for him. He states that he does have numbness and tingling in the leg as well as weakness. She denies bowel or bladder incontinence. He denies groin pain. He is taking Mobic and, though both of which helped. The pain substantially limits activities of daily living. He does not use a cane or walker. He has not had physical therapy for this.

## 2019-03-23 NOTE — PHYSICAL EXAM
[de-identified] : Patient is well nourished, well-developed, in no acute distress, with appropriate mood and affect. The patient is oriented to time, place, and person. Respirations are even and unlabored. Gait evaluation does not reveal a limp. There is no inguinal adenopathy. Examination of the contralateral hip shows normal range of motion, strength, no tenderness, and intact skin. The affected limb is well-perfused and showed 2+ dp/pt pulses, without skin lesions, shows a grossly normal motor and sensory examination. Examination of the hip shows no skin lesions. Hip motion is full and painless from 0-90 degrees extension to flexion, 20 degrees adduction and 20 degrees abduction, and 15 degrees internal and 30 degrees external rotation. Leg lengths are approximately equal. FADIR is negative and ROSCOE is negative. Stinchfield test is negative. Both hips are stable and muscle strength is normal with good strength with resisted abduction and adduction. Pedal pulses are palpable.\par  [de-identified] : AP and lateral x-rays of the right hip, pelvis, and femur were ordered and taken in the office and demonstrate no evidence of degenerative joint disease of the hip with maintained joint space and no evidence of fractures or other intraarticular pathology.\par \par AP and lateral radiographs of the lumbar spine were ordered and obtained in the office in demonstrate significant degenerative disease as well as lumbar scoliosis deformity.

## 2019-03-23 NOTE — DISCUSSION/SUMMARY
[de-identified] : This patient has right lower extremity radiculopathy secondary to severe spinal stenosis. A referral was made to physiatry for further workup and management of this issue.

## 2019-03-26 LAB
ALBUMIN SERPL ELPH-MCNC: 4.8 G/DL
ALP BLD-CCNC: 118 U/L
ALT SERPL-CCNC: 17 U/L
ANION GAP SERPL CALC-SCNC: 20 MMOL/L
AST SERPL-CCNC: 31 U/L
BASOPHILS # BLD AUTO: 0.05 K/UL
BASOPHILS NFR BLD AUTO: 1 %
BILIRUB SERPL-MCNC: 0.2 MG/DL
BUN SERPL-MCNC: 12 MG/DL
CALCIUM SERPL-MCNC: 10 MG/DL
CHLORIDE SERPL-SCNC: 100 MMOL/L
CHOLEST SERPL-MCNC: 147 MG/DL
CHOLEST/HDLC SERPL: 2.2 RATIO
CO2 SERPL-SCNC: 19 MMOL/L
CREAT SERPL-MCNC: 0.73 MG/DL
EOSINOPHIL # BLD AUTO: 0.12 K/UL
EOSINOPHIL NFR BLD AUTO: 2.4 %
GLUCOSE SERPL-MCNC: 98 MG/DL
HBA1C MFR BLD HPLC: 5.1 %
HCT VFR BLD CALC: 40 %
HDLC SERPL-MCNC: 68 MG/DL
HGB BLD-MCNC: 12.4 G/DL
IMM GRANULOCYTES NFR BLD AUTO: 0.2 %
LDLC SERPL CALC-MCNC: 63 MG/DL
LYMPHOCYTES # BLD AUTO: 1.16 K/UL
LYMPHOCYTES NFR BLD AUTO: 22.9 %
MAN DIFF?: NORMAL
MCHC RBC-ENTMCNC: 26.2 PG
MCHC RBC-ENTMCNC: 31 GM/DL
MCV RBC AUTO: 84.6 FL
MONOCYTES # BLD AUTO: 0.58 K/UL
MONOCYTES NFR BLD AUTO: 11.5 %
NEUTROPHILS # BLD AUTO: 3.14 K/UL
NEUTROPHILS NFR BLD AUTO: 62 %
PLATELET # BLD AUTO: 176 K/UL
POTASSIUM SERPL-SCNC: 3.7 MMOL/L
PROT SERPL-MCNC: 7.7 G/DL
PSA FREE FLD-MCNC: 32 %
PSA FREE SERPL-MCNC: 0.04 NG/ML
PSA SERPL-MCNC: 0.13 NG/ML
RBC # BLD: 4.73 M/UL
RBC # FLD: 14.2 %
SODIUM SERPL-SCNC: 139 MMOL/L
TRIGL SERPL-MCNC: 80 MG/DL
TSH SERPL-ACNC: 1.3 UIU/ML
WBC # FLD AUTO: 5.06 K/UL

## 2019-03-27 ENCOUNTER — RESULT REVIEW (OUTPATIENT)
Age: 69
End: 2019-03-27

## 2019-03-27 LAB — 25(OH)D3 SERPL-MCNC: 28.6 NG/ML

## 2019-04-22 ENCOUNTER — APPOINTMENT (OUTPATIENT)
Dept: CARDIOLOGY | Facility: CLINIC | Age: 69
End: 2019-04-22

## 2019-05-16 ENCOUNTER — RX RENEWAL (OUTPATIENT)
Age: 69
End: 2019-05-16

## 2019-05-16 ENCOUNTER — APPOINTMENT (OUTPATIENT)
Dept: INTERNAL MEDICINE | Facility: CLINIC | Age: 69
End: 2019-05-16

## 2019-05-30 ENCOUNTER — APPOINTMENT (OUTPATIENT)
Dept: HEPATOLOGY | Facility: CLINIC | Age: 69
End: 2019-05-30

## 2019-06-28 NOTE — ED CDU PROVIDER INITIAL DAY NOTE - ENMT NEGATIVE STATEMENT, MLM
Principal Discharge DX:	Chest tightness or pressure Ears: no ear pain. Nose: no nasal congestion and no nasal drainage. Mouth/Throat: no dysphagia, no hoarseness and no throat pain. Neck: no lumps, no pain, no stiffness and no swollen glands

## 2019-07-25 ENCOUNTER — APPOINTMENT (OUTPATIENT)
Dept: INTERNAL MEDICINE | Facility: CLINIC | Age: 69
End: 2019-07-25
Payer: COMMERCIAL

## 2019-07-25 VITALS — HEIGHT: 66 IN | WEIGHT: 117 LBS | BODY MASS INDEX: 18.8 KG/M2

## 2019-07-25 VITALS — SYSTOLIC BLOOD PRESSURE: 128 MMHG | RESPIRATION RATE: 12 BRPM | DIASTOLIC BLOOD PRESSURE: 72 MMHG | HEART RATE: 80 BPM

## 2019-07-25 DIAGNOSIS — B35.1 TINEA UNGUIUM: ICD-10-CM

## 2019-07-25 PROCEDURE — 99214 OFFICE O/P EST MOD 30 MIN: CPT

## 2019-07-25 NOTE — PHYSICAL EXAM
[No Acute Distress] : no acute distress [Well-Appearing] : well-appearing [Normal Voice/Communication] : normal voice/communication [Cachectic] : cachexia was observed [Normal] : the outer ears and nose were normal in appearance and the oropharynx was normal [No Lymphadenopathy] : no lymphadenopathy [No Accessory Muscle Use] : no accessory muscle use [Clear to Auscultation] : lungs were clear to auscultation bilaterally [Regular Rhythm] : with a regular rhythm [No Edema] : there was no peripheral edema [Normal Appearance] : normal in appearance [Soft] : abdomen soft [Normal Supraclavicular Nodes] : no supraclavicular lymphadenopathy [Normal Posterior Cervical Nodes] : no posterior cervical lymphadenopathy [Normal Anterior Cervical Nodes] : no anterior cervical lymphadenopathy [No CVA Tenderness] : no CVA  tenderness [No Focal Deficits] : no focal deficits [de-identified] : R 2nd to proximal discoloration  [de-identified] : Onychomycosis of all nails [de-identified] : Flat affect

## 2019-07-25 NOTE — COUNSELING
[Discussed Risks and Advised to Quit Smoking] : Discussed risks and advised to quit smoking [Quit Smoking] : Quit Smoking [de-identified] : ALcoholism counselled

## 2019-07-25 NOTE — ASSESSMENT
[FreeTextEntry1] : R sided sciatica. Resolved\par HCVD stable\par COPD and hx of PNA last summer, smoking counselled again\par Hep C cured.  \par Alcoholism counselled\par Anxiety, depression,  strain of planning senior care and need to cont to work still significant. Counseled Accepts SSRI.  \par R 2nd toe discoloration suspicious for Fx but no trauma.  Diffuse onychomycosis.  Needs POD\par

## 2019-07-25 NOTE — REVIEW OF SYSTEMS
[Back Pain] : back pain [Anxiety] : anxiety [Depression] : depression [FreeTextEntry2] : see HPI [FreeTextEntry6] : Smokes [FreeTextEntry9] : See HPI

## 2019-07-25 NOTE — HEALTH RISK ASSESSMENT
[30 or more] : 30 or more [Yes] : Yes [4 or more  times a week (4 pts)] : 4 or more  times a week (4 points) [3 or 4 (1 pt)] : 3 or 4  (1 point) [Audit-CScore] : 4

## 2019-07-25 NOTE — HISTORY OF PRESENT ILLNESS
[FreeTextEntry1] : Eval of Hep C now finished with Daniela \par F/up for hyperchol HTN .  \par c/o R 2nd toe pain and discoloration.  No trauma.  \par No urinary changes.  No BM changes.  Worse with certain mvt especially "when I arch my back back"  No fever no chills.  No blood in urine.  No B & B incont.  \par Smokes.\par  Uses ETOH most nights of week  Beer. \par Admits unable to go to work several days last week due to depression\par  [de-identified] : Says he no longer drinks beers and shots q PM.

## 2019-08-22 ENCOUNTER — APPOINTMENT (OUTPATIENT)
Dept: CARDIOLOGY | Facility: CLINIC | Age: 69
End: 2019-08-22
Payer: COMMERCIAL

## 2019-08-22 DIAGNOSIS — R09.89 OTHER SPECIFIED SYMPTOMS AND SIGNS INVOLVING THE CIRCULATORY AND RESPIRATORY SYSTEMS: ICD-10-CM

## 2019-08-22 PROCEDURE — 93880 EXTRACRANIAL BILAT STUDY: CPT

## 2019-08-22 PROCEDURE — 93306 TTE W/DOPPLER COMPLETE: CPT

## 2019-08-26 PROBLEM — R09.89 LEFT CAROTID BRUIT: Status: ACTIVE | Noted: 2019-03-11

## 2019-09-12 ENCOUNTER — NON-APPOINTMENT (OUTPATIENT)
Age: 69
End: 2019-09-12

## 2019-09-12 ENCOUNTER — APPOINTMENT (OUTPATIENT)
Dept: CARDIOLOGY | Facility: CLINIC | Age: 69
End: 2019-09-12
Payer: COMMERCIAL

## 2019-09-12 VITALS
WEIGHT: 121 LBS | HEART RATE: 98 BPM | HEIGHT: 66 IN | SYSTOLIC BLOOD PRESSURE: 152 MMHG | DIASTOLIC BLOOD PRESSURE: 80 MMHG | BODY MASS INDEX: 19.44 KG/M2

## 2019-09-12 VITALS — DIASTOLIC BLOOD PRESSURE: 76 MMHG | SYSTOLIC BLOOD PRESSURE: 140 MMHG

## 2019-09-12 DIAGNOSIS — I77.89 OTHER SPECIFIED DISORDERS OF ARTERIES AND ARTERIOLES: ICD-10-CM

## 2019-09-12 DIAGNOSIS — I11.9 HYPERTENSIVE HEART DISEASE W/OUT HEART FAILURE: ICD-10-CM

## 2019-09-12 DIAGNOSIS — I10 ESSENTIAL (PRIMARY) HYPERTENSION: ICD-10-CM

## 2019-09-12 DIAGNOSIS — I77.9 DISORDER OF ARTERIES AND ARTERIOLES, UNSPECIFIED: ICD-10-CM

## 2019-09-12 PROCEDURE — 99215 OFFICE O/P EST HI 40 MIN: CPT

## 2019-09-12 PROCEDURE — 93000 ELECTROCARDIOGRAM COMPLETE: CPT

## 2019-09-12 NOTE — HISTORY OF PRESENT ILLNESS
[FreeTextEntry1] : Shelton is a pleasant 69-year-old gentleman with known CAD, known carotid arterial disease, hypertensive heart disease, aortic root enlargement which is stable at 4 cm approximately follows up in the office feeling generally well and notes sertraline to help with his mood.He denies any current chest pains, shortness of breath, palpitations or edema. Eating healthier blood pressure is a bit up fell and he has some salt indiscretion at times.

## 2019-09-12 NOTE — DISCUSSION/SUMMARY
[___ Month(s)] : [unfilled] month(s) [FreeTextEntry3] : or sooner if needed. [FreeTextEntry1] : I reviewed his latest cardiac data including echocardiogram and carotid duplex scan. I advised a low fat, low sodium, low fat diet with improved aerobic fitness over time for physical and mental fitness as well. He will continue with his current antihypertensive medication regimen and follow home blood pressure readings along with his aspirin and current statin dosage. He will followup with you for care and see me in about 3 months or sooner if needed. I also suggested he have followup fasting surveillance blood work in about 2 weeks' time.

## 2019-09-12 NOTE — PHYSICAL EXAM
[General Appearance - Well Developed] : well developed [Normal Appearance] : normal appearance [General Appearance - Well Nourished] : well nourished [Well Groomed] : well groomed [No Deformities] : no deformities [General Appearance - In No Acute Distress] : no acute distress [Normal Conjunctiva] : the conjunctiva exhibited no abnormalities [Eyelids - No Xanthelasma] : the eyelids demonstrated no xanthelasmas [Normal Oral Mucosa] : normal oral mucosa [No Oral Pallor] : no oral pallor [No Oral Cyanosis] : no oral cyanosis [Normal Jugular Venous A Waves Present] : normal jugular venous A waves present [Normal Jugular Venous V Waves Present] : normal jugular venous V waves present [No Jugular Venous Baugh A Waves] : no jugular venous baugh A waves [Exaggerated Use Of Accessory Muscles For Inspiration] : no accessory muscle use [Respiration, Rhythm And Depth] : normal respiratory rhythm and effort [Auscultation Breath Sounds / Voice Sounds] : lungs were clear to auscultation bilaterally [Bowel Sounds] : normal bowel sounds [Abdomen Soft] : soft [Abdomen Tenderness] : non-tender [Abnormal Walk] : normal gait [Gait - Sufficient For Exercise Testing] : the gait was sufficient for exercise testing [Cyanosis, Localized] : no localized cyanosis [Petechial Hemorrhages (___cm)] : no petechial hemorrhages [Nail Clubbing] : no clubbing of the fingernails [Skin Color & Pigmentation] : normal skin color and pigmentation [] : no rash [No Venous Stasis] : no venous stasis [Skin Lesions] : no skin lesions [No Skin Ulcers] : no skin ulcer [No Xanthoma] : no  xanthoma was observed [Oriented To Time, Place, And Person] : oriented to person, place, and time [Affect] : the affect was normal [Mood] : the mood was normal [No Anxiety] : not feeling anxious [Memory Recent] : recent memory was not impaired [Not Palpable] : not palpable [No Precordial Heave] : no precordial heave was noted [Heart Rate ___] : [unfilled] bpm [Normal Rate] : normal [Normal S1] : normal S1 [Rhythm Regular] : regular [Normal S2] : normal S2 [No Gallop] : no gallop heard [No Murmur] : no murmurs heard [1+] : left 1+ [No Abnormalities] : the abdominal aorta was not enlarged and no bruit was heard [No Pitting Edema] : no pitting edema present [Apical Thrill] : no thrill palpable at the apex [S3] : no S3 [S4] : no S4 [Click] : no click [Pericardial Rub] : no pericardial rub [Right Carotid Bruit] : no bruit heard over the right carotid [Left Carotid Bruit] : no bruit heard over the left carotid [Bruit] : no bruit heard [Rt] : no varicose veins of the right leg [Lt] : no varicose veins of the left leg

## 2019-10-03 ENCOUNTER — LABORATORY RESULT (OUTPATIENT)
Age: 69
End: 2019-10-03

## 2019-10-03 ENCOUNTER — APPOINTMENT (OUTPATIENT)
Dept: CARDIOLOGY | Facility: CLINIC | Age: 69
End: 2019-10-03

## 2019-10-04 LAB
AFP-TM SERPL-MCNC: 4.1 NG/ML
ALBUMIN SERPL ELPH-MCNC: 5.2 G/DL
ALP BLD-CCNC: 96 U/L
ALT SERPL-CCNC: 20 U/L
ANION GAP SERPL CALC-SCNC: 17 MMOL/L
AST SERPL-CCNC: 31 U/L
BASOPHILS # BLD AUTO: 0.02 K/UL
BASOPHILS NFR BLD AUTO: 0.3 %
BILIRUB SERPL-MCNC: 0.5 MG/DL
BUN SERPL-MCNC: 15 MG/DL
CALCIUM SERPL-MCNC: 10 MG/DL
CHLORIDE SERPL-SCNC: 99 MMOL/L
CO2 SERPL-SCNC: 24 MMOL/L
CREAT SERPL-MCNC: 0.79 MG/DL
EOSINOPHIL # BLD AUTO: 0.14 K/UL
EOSINOPHIL NFR BLD AUTO: 2.3 %
GLUCOSE SERPL-MCNC: 88 MG/DL
HCT VFR BLD CALC: 41.2 %
HGB BLD-MCNC: 13.2 G/DL
IMM GRANULOCYTES NFR BLD AUTO: 0.2 %
INR PPP: 1.03 RATIO
LYMPHOCYTES # BLD AUTO: 1.71 K/UL
LYMPHOCYTES NFR BLD AUTO: 28.6 %
MAN DIFF?: NORMAL
MCHC RBC-ENTMCNC: 26.6 PG
MCHC RBC-ENTMCNC: 32 GM/DL
MCV RBC AUTO: 83.1 FL
MONOCYTES # BLD AUTO: 0.56 K/UL
MONOCYTES NFR BLD AUTO: 9.4 %
NEUTROPHILS # BLD AUTO: 3.53 K/UL
NEUTROPHILS NFR BLD AUTO: 59.2 %
PLATELET # BLD AUTO: 201 K/UL
POTASSIUM SERPL-SCNC: 3.7 MMOL/L
PROT SERPL-MCNC: 7.6 G/DL
PT BLD: 11.8 SEC
RBC # BLD: 4.96 M/UL
RBC # FLD: 14.3 %
SODIUM SERPL-SCNC: 140 MMOL/L
WBC # FLD AUTO: 5.97 K/UL

## 2019-10-24 ENCOUNTER — APPOINTMENT (OUTPATIENT)
Dept: INTERNAL MEDICINE | Facility: CLINIC | Age: 69
End: 2019-10-24
Payer: COMMERCIAL

## 2019-10-24 VITALS — WEIGHT: 122 LBS | HEIGHT: 66 IN | BODY MASS INDEX: 19.61 KG/M2

## 2019-10-24 VITALS — SYSTOLIC BLOOD PRESSURE: 168 MMHG | DIASTOLIC BLOOD PRESSURE: 86 MMHG

## 2019-10-24 DIAGNOSIS — B18.2 CHRONIC VIRAL HEPATITIS C: ICD-10-CM

## 2019-10-24 DIAGNOSIS — N52.9 MALE ERECTILE DYSFUNCTION, UNSPECIFIED: ICD-10-CM

## 2019-10-24 DIAGNOSIS — T14.8XXA OTHER INJURY OF UNSPECIFIED BODY REGION, INITIAL ENCOUNTER: ICD-10-CM

## 2019-10-24 DIAGNOSIS — Z72.89 OTHER PROBLEMS RELATED TO LIFESTYLE: ICD-10-CM

## 2019-10-24 DIAGNOSIS — Z23 ENCOUNTER FOR IMMUNIZATION: ICD-10-CM

## 2019-10-24 DIAGNOSIS — R94.39 ABNORMAL RESULT OF OTHER CARDIOVASCULAR FUNCTION STUDY: ICD-10-CM

## 2019-10-24 PROCEDURE — G0008: CPT

## 2019-10-24 PROCEDURE — 90662 IIV NO PRSV INCREASED AG IM: CPT

## 2019-10-24 PROCEDURE — 99214 OFFICE O/P EST MOD 30 MIN: CPT | Mod: 25

## 2019-10-24 NOTE — HISTORY OF PRESENT ILLNESS
[FreeTextEntry1] : Eval of Hep C now finished with Harvoni   LFTs improved since no longer drinking.  Chronic itch fully resolved.  \par F/up for hyperchol HTN .LVH abnormal nuclear test and carotid dis  \par Echo showed aortic root enlargement and LVH.    \par Smokes cigar only now\par  Uses ETOH most nights of week  Beer. \par Seems to be pleased with Zoloft 50\par c/o L triceps area soreness.  Feels it is due to lifting packages at [employer]\par  [de-identified] : Says he no longer drinks beers and shots q PM.

## 2019-10-24 NOTE — REVIEW OF SYSTEMS
[Anxiety] : anxiety [Depression] : depression [FreeTextEntry2] : see HPI [FreeTextEntry6] : Smokes cigar [FreeTextEntry9] : See HPI

## 2019-10-24 NOTE — HEALTH RISK ASSESSMENT
[4 or more  times a week (4 pts)] : 4 or more  times a week (4 points) [30 or more] : 30 or more [] : Yes [1 or 2 (0 pts)] : 1 or 2 (0 points) [Never (0 pts)] : Never (0 points) [No falls in past year] : Patient reported no falls in the past year [No] : In the past 12 months have you used drugs other than those required for medical reasons? No [1] : 2) Feeling down, depressed, or hopeless for several days (1) [Audit-CScore] : 4 [de-identified] : Former IVDA [[id number]] : 2

## 2019-10-24 NOTE — PHYSICAL EXAM
[No Acute Distress] : no acute distress [Well-Appearing] : well-appearing [Normal Voice/Communication] : normal voice/communication [Cachectic] : cachexia was observed [Normal] : the outer ears and nose were normal in appearance and the oropharynx was normal [No Lymphadenopathy] : no lymphadenopathy [Clear to Auscultation] : lungs were clear to auscultation bilaterally [No Accessory Muscle Use] : no accessory muscle use [Regular Rhythm] : with a regular rhythm [No Edema] : there was no peripheral edema [Normal Appearance] : normal in appearance [Soft] : abdomen soft [Normal Supraclavicular Nodes] : no supraclavicular lymphadenopathy [Normal Anterior Cervical Nodes] : no anterior cervical lymphadenopathy [Normal Posterior Cervical Nodes] : no posterior cervical lymphadenopathy [No CVA Tenderness] : no CVA  tenderness [No Focal Deficits] : no focal deficits [Alert and Oriented x3] : oriented to person, place, and time [Comprehensive Foot Exam Normal] : Right and left foot were examined and both feet are normal. No ulcers in either foot. Toes are normal and with full ROM.  Normal tactile sensation with monofilament testing throughout both feet [de-identified] : No findings L triceps [de-identified] : Onychomycosis of all nails [de-identified] : Flat affect

## 2019-10-24 NOTE — ASSESSMENT
[FreeTextEntry1] : L triceps strain largely resolved from lifting at [employer]\par R sided sciatica. Resolved\par HCVD stable  Echo Aortic root enlargement and LVH\par Carotid dis.  Cardio rec to cont ASA\par COPD and hx of PNA last summer, smoking cigar only now\par Hep C cured post Harvoni x 12 wks 2018\par Thrombocytopenia and anemia have improved with Hep C treatment and improvement of ETOH and normalization of LFTs\par Alcoholism counselled.  Still has beer qd but no hard liquor\par LFTs and itch have resolved since Hep C treated and no longer drinks excessively\par Anxiety, depression,  strain of planning [facility] and need to cont to work still significant. Doing well on Zoloft 50\par ED.  Interested in medication\par \par

## 2020-01-22 ENCOUNTER — EMERGENCY (EMERGENCY)
Facility: HOSPITAL | Age: 70
LOS: 1 days | Discharge: HOME | End: 2020-01-22
Attending: EMERGENCY MEDICINE | Admitting: EMERGENCY MEDICINE
Payer: COMMERCIAL

## 2020-01-22 VITALS
OXYGEN SATURATION: 100 % | RESPIRATION RATE: 16 BRPM | DIASTOLIC BLOOD PRESSURE: 103 MMHG | HEART RATE: 78 BPM | TEMPERATURE: 98 F | SYSTOLIC BLOOD PRESSURE: 186 MMHG

## 2020-01-22 DIAGNOSIS — Z98.89 OTHER SPECIFIED POSTPROCEDURAL STATES: Chronic | ICD-10-CM

## 2020-01-22 PROCEDURE — 99282 EMERGENCY DEPT VISIT SF MDM: CPT

## 2020-01-22 RX ORDER — TETANUS TOXOID, REDUCED DIPHTHERIA TOXOID AND ACELLULAR PERTUSSIS VACCINE, ADSORBED 5; 2.5; 8; 8; 2.5 [IU]/.5ML; [IU]/.5ML; UG/.5ML; UG/.5ML; UG/.5ML
0.5 SUSPENSION INTRAMUSCULAR ONCE
Refills: 0 | Status: COMPLETED | OUTPATIENT
Start: 2020-01-22 | End: 2020-01-22

## 2020-01-22 RX ADMIN — TETANUS TOXOID, REDUCED DIPHTHERIA TOXOID AND ACELLULAR PERTUSSIS VACCINE, ADSORBED 0.5 MILLILITER(S): 5; 2.5; 8; 8; 2.5 SUSPENSION INTRAMUSCULAR at 23:12

## 2020-01-22 NOTE — ED PROVIDER NOTE - PATIENT PORTAL LINK FT
You can access the FollowMyHealth Patient Portal offered by [facility] by registering at the following website: [email or web address]. By joining [employer]’s FollowMyHealth portal, you will also be able to view your health information using other applications (apps) compatible with our system.

## 2020-01-22 NOTE — ED PROVIDER NOTE - CLINICAL SUMMARY MEDICAL DECISION MAKING FREE TEXT BOX
68yo M with PMHx HTN, HLD, unkown last Tdap, p/w R 1st digit abrasion after fall 4hrs when walking dog.  Says his dog was startled, pulled on leash quickly causing him to fall and was dragged on ground several feet on R hand. No head trauma or loc, no body pain or any complaints other than mild pain to R 1st finger. Came to ED as he thought he may need stitches. a/p Abrasion. will give tdap. No tenderness and no signs of tendon rupture based on NROM on exam. No need for sutures. Patient to irrigate in tap water here then dc

## 2020-01-22 NOTE — ED PROVIDER NOTE - NS ED ROS FT
Constitutional: No weakness or fatigue, no fevers or chills  Skin: No rash or pruritis  HEENT: No sore throat  Cardiovascular: No chest pain, no cough, no shortness of breath  Respiratory: No shortness of breath, no cough  Gastrointestinal: No abdominal pain, no nausea, no vomiting, no diarrhea, no constipation  Musculoskeletal: No joint pain  Genitourinary: No dysuria   Neurological: No weakness or tinlging

## 2020-01-22 NOTE — ED PROVIDER NOTE - OBJECTIVE STATEMENT
68yo M with PMHx HTN, HLD, unkown last Tdap, p/w R 1st digit abrasion after fall 4hrs when walking dog.  Says his dog was startled, pulled on leash quickly causing him to fall and was dragged on ground several feet on R hand. No head trauma or loc, no body pain or any complaints other than mild pain to R 1st finger. Came to ED as he thought he may need stitches

## 2020-02-03 NOTE — ED ADULT TRIAGE NOTE - PAIN RATING/NUMBER SCALE (0-10): REST
Milk/cream  Ice cream  Soft cheeses  Hard cheeses  Yogurt (plain Greek yogurt)  Butter  Dairy in baked goods    Give up dairy for 10-14 days, then slowly reintroduce      For fasting blood work, don't eat or drink anything besides plain water for 10 hours.   8

## 2020-02-26 ENCOUNTER — APPOINTMENT (OUTPATIENT)
Dept: INTERNAL MEDICINE | Facility: CLINIC | Age: 70
End: 2020-02-26

## 2020-03-26 ENCOUNTER — APPOINTMENT (OUTPATIENT)
Dept: CARDIOLOGY | Facility: CLINIC | Age: 70
End: 2020-03-26

## 2020-04-13 ENCOUNTER — APPOINTMENT (OUTPATIENT)
Dept: INTERNAL MEDICINE | Facility: CLINIC | Age: 70
End: 2020-04-13
Payer: MEDICARE

## 2020-04-13 DIAGNOSIS — J31.0 CHRONIC RHINITIS: ICD-10-CM

## 2020-04-13 DIAGNOSIS — F17.200 NICOTINE DEPENDENCE, UNSPECIFIED, UNCOMPLICATED: ICD-10-CM

## 2020-04-13 PROCEDURE — 99213 OFFICE O/P EST LOW 20 MIN: CPT | Mod: 95

## 2020-04-13 NOTE — ASSESSMENT
[FreeTextEntry1] : Elevated anxiety related to COVID 19 pandemic.  OK to raise Zoloft to 100 mg qd\par Rhinitis with dry cough. Suggest Robitussin or Mucinex\par HCVD stable  Echo Aortic root enlargement and LVH\par Carotid dis.  Cardio rec to cont ASA\par COPD and hx of PNA last summer, smoking cigar only now\par Hep C cured post Harvoni x 12 wks 2018\par Thrombocytopenia and anemia have improved with Hep C treatment and improvement of ETOH and normalization of LFTs\par Alcoholism counselled.  Still has beer qd but no hard liquor\par LFTs and itch have resolved since Hep C treated and no longer drinks excessively\par \par \par

## 2020-04-13 NOTE — HISTORY OF PRESENT ILLNESS
[FreeTextEntry1] : Eval of Hep C now finished with Harvoni   LFTs improved since no longer drinking.  Chronic itch fully resolved.  \par F/up for hyperchol HTN .LVH abnormal nuclear test and carotid dis  \par Echo showed aortic root enlargement and LVH.    \par Smokes cigar only now\par  Uses ETOH most nights of week  Beer. \par Patient requests eval and discussion of Zoloft 100 due to fear of COVID 19.  He missed work 4-11 and today.  He has a dry cough and no  fever and is aware that it is sinus related.  He dies not feel sick.  \par \par Consent obtained for virtual visit. \par   [de-identified] : Says he no longer drinks beers and shots q PM.

## 2020-05-07 ENCOUNTER — APPOINTMENT (OUTPATIENT)
Dept: INTERNAL MEDICINE | Facility: CLINIC | Age: 70
End: 2020-05-07

## 2020-05-18 ENCOUNTER — RX RENEWAL (OUTPATIENT)
Age: 70
End: 2020-05-18

## 2021-06-14 ENCOUNTER — RX RENEWAL (OUTPATIENT)
Age: 71
End: 2021-06-14

## 2022-07-13 ENCOUNTER — RX RENEWAL (OUTPATIENT)
Age: 72
End: 2022-07-13

## 2022-09-06 ENCOUNTER — RX RENEWAL (OUTPATIENT)
Age: 72
End: 2022-09-06

## 2022-09-07 ENCOUNTER — RX RENEWAL (OUTPATIENT)
Age: 72
End: 2022-09-07

## 2022-10-06 ENCOUNTER — INPATIENT (INPATIENT)
Facility: HOSPITAL | Age: 72
LOS: 26 days | Discharge: NURSING, REHAB OR LONG-TERM CARE | End: 2022-11-02
Attending: INTERNAL MEDICINE | Admitting: INTERNAL MEDICINE
Payer: MEDICARE

## 2022-10-06 VITALS
OXYGEN SATURATION: 93 % | DIASTOLIC BLOOD PRESSURE: 107 MMHG | SYSTOLIC BLOOD PRESSURE: 186 MMHG | HEART RATE: 123 BPM | TEMPERATURE: 98 F | HEIGHT: 66 IN | RESPIRATION RATE: 22 BRPM

## 2022-10-06 DIAGNOSIS — J81.0 ACUTE PULMONARY EDEMA: ICD-10-CM

## 2022-10-06 DIAGNOSIS — Z98.89 OTHER SPECIFIED POSTPROCEDURAL STATES: Chronic | ICD-10-CM

## 2022-10-06 DIAGNOSIS — F32.9 MAJOR DEPRESSIVE DISORDER, SINGLE EPISODE, UNSPECIFIED: ICD-10-CM

## 2022-10-06 DIAGNOSIS — D72.829 ELEVATED WHITE BLOOD CELL COUNT, UNSPECIFIED: ICD-10-CM

## 2022-10-06 DIAGNOSIS — E78.5 HYPERLIPIDEMIA, UNSPECIFIED: ICD-10-CM

## 2022-10-06 DIAGNOSIS — I10 ESSENTIAL (PRIMARY) HYPERTENSION: ICD-10-CM

## 2022-10-06 LAB
ALBUMIN SERPL ELPH-MCNC: 4.1 G/DL — SIGNIFICANT CHANGE UP (ref 3.3–5)
ALP SERPL-CCNC: 85 U/L — SIGNIFICANT CHANGE UP (ref 40–120)
ALT FLD-CCNC: 17 U/L — SIGNIFICANT CHANGE UP (ref 4–41)
ANION GAP SERPL CALC-SCNC: 24 MMOL/L — HIGH (ref 7–14)
APTT BLD: 25.7 SEC — LOW (ref 27–36.3)
AST SERPL-CCNC: 39 U/L — SIGNIFICANT CHANGE UP (ref 4–40)
BASE EXCESS BLDV CALC-SCNC: -1.4 MMOL/L — SIGNIFICANT CHANGE UP (ref -2–3)
BASOPHILS # BLD AUTO: 0.01 K/UL — SIGNIFICANT CHANGE UP (ref 0–0.2)
BASOPHILS NFR BLD AUTO: 0.1 % — SIGNIFICANT CHANGE UP (ref 0–2)
BILIRUB SERPL-MCNC: 0.9 MG/DL — SIGNIFICANT CHANGE UP (ref 0.2–1.2)
BLOOD GAS ARTERIAL - LYTES,HGB,ICA,LACT RESULT: SIGNIFICANT CHANGE UP
BLOOD GAS VENOUS COMPREHENSIVE RESULT: SIGNIFICANT CHANGE UP
BUN SERPL-MCNC: 19 MG/DL — SIGNIFICANT CHANGE UP (ref 7–23)
CALCIUM SERPL-MCNC: 9.1 MG/DL — SIGNIFICANT CHANGE UP (ref 8.4–10.5)
CHLORIDE BLDV-SCNC: 88 MMOL/L — LOW (ref 96–108)
CHLORIDE SERPL-SCNC: 86 MMOL/L — LOW (ref 98–107)
CK MB BLD-MCNC: 4.9 % — HIGH (ref 0–2.5)
CK MB CFR SERPL CALC: 9.8 NG/ML — HIGH
CK SERPL-CCNC: 199 U/L — SIGNIFICANT CHANGE UP (ref 30–200)
CO2 BLDV-SCNC: 23.8 MMOL/L — SIGNIFICANT CHANGE UP (ref 22–26)
CO2 SERPL-SCNC: 16 MMOL/L — LOW (ref 22–31)
CREAT SERPL-MCNC: 0.87 MG/DL — SIGNIFICANT CHANGE UP (ref 0.5–1.3)
EGFR: 92 ML/MIN/1.73M2 — SIGNIFICANT CHANGE UP
EOSINOPHIL # BLD AUTO: 0 K/UL — SIGNIFICANT CHANGE UP (ref 0–0.5)
EOSINOPHIL NFR BLD AUTO: 0 % — SIGNIFICANT CHANGE UP (ref 0–6)
FLUAV AG NPH QL: SIGNIFICANT CHANGE UP
FLUBV AG NPH QL: SIGNIFICANT CHANGE UP
GAS PNL BLDV: 122 MMOL/L — LOW (ref 136–145)
GLUCOSE BLDV-MCNC: 239 MG/DL — HIGH (ref 70–99)
GLUCOSE SERPL-MCNC: 151 MG/DL — HIGH (ref 70–99)
HCO3 BLDV-SCNC: 23 MMOL/L — SIGNIFICANT CHANGE UP (ref 22–29)
HCT VFR BLD CALC: 36.5 % — LOW (ref 39–50)
HCT VFR BLDA CALC: 32 % — LOW (ref 39–51)
HGB BLD CALC-MCNC: 10.7 G/DL — LOW (ref 13–17)
HGB BLD-MCNC: 12.3 G/DL — LOW (ref 13–17)
IANC: 10.32 K/UL — HIGH (ref 1.8–7.4)
IMM GRANULOCYTES NFR BLD AUTO: 0.6 % — SIGNIFICANT CHANGE UP (ref 0–0.9)
INR BLD: 1.15 RATIO — SIGNIFICANT CHANGE UP (ref 0.88–1.16)
LACTATE BLDV-MCNC: 2.5 MMOL/L — HIGH (ref 0.5–2)
LYMPHOCYTES # BLD AUTO: 0.45 K/UL — LOW (ref 1–3.3)
LYMPHOCYTES # BLD AUTO: 3.9 % — LOW (ref 13–44)
MCHC RBC-ENTMCNC: 28.1 PG — SIGNIFICANT CHANGE UP (ref 27–34)
MCHC RBC-ENTMCNC: 33.7 GM/DL — SIGNIFICANT CHANGE UP (ref 32–36)
MCV RBC AUTO: 83.3 FL — SIGNIFICANT CHANGE UP (ref 80–100)
MONOCYTES # BLD AUTO: 0.76 K/UL — SIGNIFICANT CHANGE UP (ref 0–0.9)
MONOCYTES NFR BLD AUTO: 6.5 % — SIGNIFICANT CHANGE UP (ref 2–14)
NEUTROPHILS # BLD AUTO: 10.32 K/UL — HIGH (ref 1.8–7.4)
NEUTROPHILS NFR BLD AUTO: 88.9 % — HIGH (ref 43–77)
NRBC # BLD: 0 /100 WBCS — SIGNIFICANT CHANGE UP (ref 0–0)
NRBC # FLD: 0 K/UL — SIGNIFICANT CHANGE UP (ref 0–0)
NT-PROBNP SERPL-SCNC: SIGNIFICANT CHANGE UP PG/ML
PCO2 BLDV: 35 MMHG — LOW (ref 42–55)
PH BLDV: 7.42 — SIGNIFICANT CHANGE UP (ref 7.32–7.43)
PLATELET # BLD AUTO: 204 K/UL — SIGNIFICANT CHANGE UP (ref 150–400)
PO2 BLDV: 55 MMHG — SIGNIFICANT CHANGE UP
POTASSIUM BLDV-SCNC: 3.5 MMOL/L — SIGNIFICANT CHANGE UP (ref 3.5–5.1)
POTASSIUM SERPL-MCNC: 4.2 MMOL/L — SIGNIFICANT CHANGE UP (ref 3.5–5.3)
POTASSIUM SERPL-SCNC: 4.2 MMOL/L — SIGNIFICANT CHANGE UP (ref 3.5–5.3)
PROT SERPL-MCNC: 7.3 G/DL — SIGNIFICANT CHANGE UP (ref 6–8.3)
PROTHROM AB SERPL-ACNC: 13.4 SEC — SIGNIFICANT CHANGE UP (ref 10.5–13.4)
RBC # BLD: 4.38 M/UL — SIGNIFICANT CHANGE UP (ref 4.2–5.8)
RBC # FLD: 13 % — SIGNIFICANT CHANGE UP (ref 10.3–14.5)
RSV RNA NPH QL NAA+NON-PROBE: SIGNIFICANT CHANGE UP
SAO2 % BLDV: 83.5 % — SIGNIFICANT CHANGE UP
SARS-COV-2 RNA SPEC QL NAA+PROBE: SIGNIFICANT CHANGE UP
SODIUM SERPL-SCNC: 126 MMOL/L — LOW (ref 135–145)
TROPONIN T, HIGH SENSITIVITY RESULT: 108 NG/L — CRITICAL HIGH
WBC # BLD: 11.61 K/UL — HIGH (ref 3.8–10.5)
WBC # FLD AUTO: 11.61 K/UL — HIGH (ref 3.8–10.5)

## 2022-10-06 PROCEDURE — 93306 TTE W/DOPPLER COMPLETE: CPT | Mod: 26

## 2022-10-06 PROCEDURE — 36620 INSERTION CATHETER ARTERY: CPT

## 2022-10-06 PROCEDURE — 99222 1ST HOSP IP/OBS MODERATE 55: CPT

## 2022-10-06 PROCEDURE — 93010 ELECTROCARDIOGRAM REPORT: CPT | Mod: 59

## 2022-10-06 PROCEDURE — 99285 EMERGENCY DEPT VISIT HI MDM: CPT | Mod: 25

## 2022-10-06 PROCEDURE — 71045 X-RAY EXAM CHEST 1 VIEW: CPT | Mod: 26

## 2022-10-06 RX ORDER — NITROGLYCERIN 6.5 MG
200 CAPSULE, EXTENDED RELEASE ORAL
Qty: 50 | Refills: 0 | Status: DISCONTINUED | OUTPATIENT
Start: 2022-10-06 | End: 2022-10-07

## 2022-10-06 RX ORDER — NIFEDIPINE 30 MG
60 TABLET, EXTENDED RELEASE 24 HR ORAL DAILY
Refills: 0 | Status: DISCONTINUED | OUTPATIENT
Start: 2022-10-06 | End: 2022-10-07

## 2022-10-06 RX ORDER — ATORVASTATIN CALCIUM 80 MG/1
10 TABLET, FILM COATED ORAL AT BEDTIME
Refills: 0 | Status: DISCONTINUED | OUTPATIENT
Start: 2022-10-06 | End: 2022-10-06

## 2022-10-06 RX ORDER — CHLORHEXIDINE GLUCONATE 213 G/1000ML
1 SOLUTION TOPICAL
Refills: 0 | Status: DISCONTINUED | OUTPATIENT
Start: 2022-10-06 | End: 2022-10-14

## 2022-10-06 RX ORDER — ATORVASTATIN CALCIUM 80 MG/1
20 TABLET, FILM COATED ORAL AT BEDTIME
Refills: 0 | Status: DISCONTINUED | OUTPATIENT
Start: 2022-10-06 | End: 2022-10-07

## 2022-10-06 RX ORDER — SERTRALINE 25 MG/1
100 TABLET, FILM COATED ORAL DAILY
Refills: 0 | Status: DISCONTINUED | OUTPATIENT
Start: 2022-10-06 | End: 2022-10-08

## 2022-10-06 RX ORDER — FUROSEMIDE 40 MG
10 TABLET ORAL
Qty: 500 | Refills: 0 | Status: DISCONTINUED | OUTPATIENT
Start: 2022-10-06 | End: 2022-10-08

## 2022-10-06 RX ORDER — HYDRALAZINE HCL 50 MG
50 TABLET ORAL EVERY 8 HOURS
Refills: 0 | Status: DISCONTINUED | OUTPATIENT
Start: 2022-10-06 | End: 2022-10-12

## 2022-10-06 RX ORDER — ISOSORBIDE DINITRATE 5 MG/1
10 TABLET ORAL THREE TIMES A DAY
Refills: 0 | Status: DISCONTINUED | OUTPATIENT
Start: 2022-10-06 | End: 2022-11-02

## 2022-10-06 RX ORDER — LANOLIN ALCOHOL/MO/W.PET/CERES
3 CREAM (GRAM) TOPICAL AT BEDTIME
Refills: 0 | Status: DISCONTINUED | OUTPATIENT
Start: 2022-10-06 | End: 2022-11-02

## 2022-10-06 RX ORDER — ALPRAZOLAM 0.25 MG
0.25 TABLET ORAL ONCE
Refills: 0 | Status: DISCONTINUED | OUTPATIENT
Start: 2022-10-06 | End: 2022-10-06

## 2022-10-06 RX ORDER — ACETAMINOPHEN 500 MG
650 TABLET ORAL ONCE
Refills: 0 | Status: COMPLETED | OUTPATIENT
Start: 2022-10-06 | End: 2022-10-06

## 2022-10-06 RX ORDER — FUROSEMIDE 40 MG
40 TABLET ORAL
Refills: 0 | Status: DISCONTINUED | OUTPATIENT
Start: 2022-10-06 | End: 2022-10-06

## 2022-10-06 RX ORDER — HEPARIN SODIUM 5000 [USP'U]/ML
5000 INJECTION INTRAVENOUS; SUBCUTANEOUS EVERY 8 HOURS
Refills: 0 | Status: DISCONTINUED | OUTPATIENT
Start: 2022-10-06 | End: 2022-10-07

## 2022-10-06 RX ORDER — ASPIRIN/CALCIUM CARB/MAGNESIUM 324 MG
162 TABLET ORAL ONCE
Refills: 0 | Status: COMPLETED | OUTPATIENT
Start: 2022-10-06 | End: 2022-10-06

## 2022-10-06 RX ORDER — ASPIRIN/CALCIUM CARB/MAGNESIUM 324 MG
81 TABLET ORAL DAILY
Refills: 0 | Status: DISCONTINUED | OUTPATIENT
Start: 2022-10-06 | End: 2022-10-07

## 2022-10-06 RX ORDER — NITROGLYCERIN 6.5 MG
0.4 CAPSULE, EXTENDED RELEASE ORAL ONCE
Refills: 0 | Status: COMPLETED | OUTPATIENT
Start: 2022-10-06 | End: 2022-10-06

## 2022-10-06 RX ORDER — FUROSEMIDE 40 MG
40 TABLET ORAL ONCE
Refills: 0 | Status: COMPLETED | OUTPATIENT
Start: 2022-10-06 | End: 2022-10-06

## 2022-10-06 RX ORDER — HYDRALAZINE HCL 50 MG
25 TABLET ORAL THREE TIMES A DAY
Refills: 0 | Status: DISCONTINUED | OUTPATIENT
Start: 2022-10-06 | End: 2022-10-06

## 2022-10-06 RX ORDER — INFLUENZA VIRUS VACCINE 15; 15; 15; 15 UG/.5ML; UG/.5ML; UG/.5ML; UG/.5ML
0.7 SUSPENSION INTRAMUSCULAR ONCE
Refills: 0 | Status: DISCONTINUED | OUTPATIENT
Start: 2022-10-06 | End: 2022-11-02

## 2022-10-06 RX ADMIN — Medication 25 MILLIGRAM(S): at 18:35

## 2022-10-06 RX ADMIN — HEPARIN SODIUM 5000 UNIT(S): 5000 INJECTION INTRAVENOUS; SUBCUTANEOUS at 22:54

## 2022-10-06 RX ADMIN — Medication 60 MICROGRAM(S)/MIN: at 22:54

## 2022-10-06 RX ADMIN — Medication 60 MICROGRAM(S)/MIN: at 15:20

## 2022-10-06 RX ADMIN — Medication 60 MICROGRAM(S)/MIN: at 18:32

## 2022-10-06 RX ADMIN — Medication 2.5 MG/HR: at 22:54

## 2022-10-06 RX ADMIN — Medication 0.4 MILLIGRAM(S): at 15:24

## 2022-10-06 RX ADMIN — Medication 40 MILLIGRAM(S): at 15:28

## 2022-10-06 RX ADMIN — ISOSORBIDE DINITRATE 10 MILLIGRAM(S): 5 TABLET ORAL at 18:32

## 2022-10-06 RX ADMIN — ATORVASTATIN CALCIUM 20 MILLIGRAM(S): 80 TABLET, FILM COATED ORAL at 22:54

## 2022-10-06 RX ADMIN — Medication 650 MILLIGRAM(S): at 23:30

## 2022-10-06 RX ADMIN — Medication 650 MILLIGRAM(S): at 22:55

## 2022-10-06 RX ADMIN — Medication 3 MILLIGRAM(S): at 22:55

## 2022-10-06 RX ADMIN — Medication 0.25 MILLIGRAM(S): at 18:32

## 2022-10-06 RX ADMIN — Medication 162 MILLIGRAM(S): at 15:45

## 2022-10-06 NOTE — H&P ADULT - NSICDXPASTMEDICALHX_GEN_ALL_CORE_FT
PAST MEDICAL HISTORY:  Hepatitis C virus infection, unspecified chronicity (was tx with Harvoni)    HTN (hypertension)     Major depression, chronic

## 2022-10-06 NOTE — ED ADULT NURSE REASSESSMENT NOTE - NS ED NURSE REASSESS COMMENT FT1
LAte entry : Pt received in the room in resp distress c/o difficulty breathing , increased work of breathing noted . Pt placed on CM and pulse OX - Oxygen reading 68. Pt placed on non rebreather . MD Bustamante at the bedside . EKG concerning for STEMI. Pt immediately undressed and placed in the gown . Cardiology team at the bedside to eval pt .

## 2022-10-06 NOTE — ED PROVIDER NOTE - PHYSICAL EXAMINATION
GEN: Patient awake alert NAD.   HEENT: normocephalic, atraumatic, EOMI, no scleral icterus, moist MM  CARDIAC: RRR, S1, S2, no murmur.   PULM: crackles thorughout b/l  ABD: soft NT, ND, no rebound no guarding,  MSK: Moving all extremities, no edema. 5/5 strength and full ROM in all extremities.     NEURO: A&Ox3,no focal neurological deficits, CN 2-12 grossly intact  SKIN: warm, dry, no rash.

## 2022-10-06 NOTE — CONSULT NOTE ADULT - SUBJECTIVE AND OBJECTIVE BOX
CARDIOLOGY CONSULT INITIAL EVALUATION  WANG ZENDEJAS  MRN-[id number]    CONSULTING SERVICE: Emergency Medicine  CONSULT QUESTION: Hypertensive Emergency    HPI:  72M w/ hx of HTN, HLD, presenting with shortness of breath. The pt works at a [employer], after work yesterday he felt sudden onset of shortness of breath. The shortness of breath occurred both with exertion and at rest, causing him to have poor sleep the entire night as he was unable to lie flat comfortably. His dyspnea worsened on day of presentation, severely limiting his ability to ambulate, causing him to come to ED for further evaluation. He denies recent chest pain, palpitations, light-headedness/dizzinesss/syncope, nausea/vomiting, diaphoresis, LE swelling.    In ED pt was found to have 's, hypoxic respiratory distress. Initial EKG concerning for WEN in anterior leads, cardiology consult fellow was called for STEMI alert. Discussed with interventional attending, did not meet STEMI criteria, CCU consulted for evaluation for CCU. Pt placed on BiPAP with improvement in respiratory status and hypoxia. Started on nitro gtt with improvement in SBP to 170's.    ROS:  Negative, except as above.    PAST MEDICAL & SURGICAL HISTORY:  HTN (hypertension)      Hepatitis C virus infection, unspecified chronicity  (was tx with Harvoni)      Elevated LFTs      S/P hernia repair  x 2        Prescriptions:  Levaquin 750 mg oral tablet: 1 tab(s) orally once a day  (04 Jul 2018 08:58)    Home Medications:  Lipitor 20 mg oral tablet: 1 tab(s) orally once a day (03 Jul 2018 23:00)  Mobic 7.5 mg oral tablet: 1 tab(s) orally once a day, As Needed (03 Jul 2018 23:00)  NIFEdipine 60 mg oral tablet, extended release: 1 tab(s) orally once a day (03 Jul 2018 23:00)    MEDICATIONS  (STANDING):  aspirin  chewable 81 milliGRAM(s) Oral daily  chlorhexidine 2% Cloths 1 Application(s) Topical <User Schedule>  furosemide   Injectable 40 milliGRAM(s) IV Push two times a day  influenza  Vaccine (HIGH DOSE) 0.7 milliLiter(s) IntraMuscular once  nitroglycerin  Infusion 200 MICROgram(s)/Min (60 mL/Hr) IV Continuous <Continuous>    MEDICATIONS  (PRN):    Allergies    No Known Allergies    Intolerances      FAMILY HISTORY:  Family history of heart disease (Father)    Family history of cancer (Mother)      Social History:    P/E:  Vital Signs Last 24 Hrs  T(C): 36.7 (06 Oct 2022 14:33), Max: 36.7 (06 Oct 2022 14:33)  T(F): 98 (06 Oct 2022 14:33), Max: 98 (06 Oct 2022 14:33)  HR: 112 (06 Oct 2022 16:05) (109 - 123)  BP: 163/111 (06 Oct 2022 16:05) (159/112 - 187/132)  BP(mean): 125 (06 Oct 2022 16:05) (123 - 148)  RR: 16 (06 Oct 2022 16:05) (16 - 34)  SpO2: 88% (06 Oct 2022 16:05) (88% - 100%)    Parameters below as of 06 Oct 2022 16:05  Patient On (Oxygen Delivery Method): nasal cannula  O2 Flow (L/min): 6    Daily Height in cm: 167.64 (06 Oct 2022 14:33)    Daily   I&O's Summary    - gen: laying in hospital bed, breathing comfortably on BiPAP  - HEENT: MMM, NCAT  - neck: no JVD, supple  - heart: regularly tachycardic, nml S1/S2, no m/r/g  - lungs: diffuse crackles heard b/l  - abd: soft, NTND, no r/g  - ext: WWP, no LE edema b/l    RELEVANT RECENT LABS/IMAGING/STUDIES:                        12.3   11.61 )-----------( 204      ( 06 Oct 2022 15:07 )             36.5     10-06    126<L>  |  86<L>  |  19  ----------------------------<  151<H>  4.2   |  16<L>  |  0.87    Ca    9.1      06 Oct 2022 15:07    TPro  7.3  /  Alb  4.1  /  TBili  0.9  /  DBili  x   /  AST  39  /  ALT  17  /  AlkPhos  85  10-06    LIVER FUNCTIONS - ( 06 Oct 2022 15:07 )  Alb: 4.1 g/dL / Pro: 7.3 g/dL / ALK PHOS: 85 U/L / ALT: 17 U/L / AST: 39 U/L / GGT: x           PT/INR - ( 06 Oct 2022 15:07 )   PT: 13.4 sec;   INR: 1.15 ratio         PTT - ( 06 Oct 2022 15:07 )  PTT:25.7 sec  Troponin: pending  proBNP: pending  --------------------------------------  EKG:  10/6/22 4pm: sinus tachycardia (105), normal axis, normal intervals, Q waves in aVL, V1-V3, repolarization ST abnormalities in V2-V4  10/6/22 2:40pm: sinus tachycardia (121), normal axis, normal intervals, Q waves in aVL, V1-V3, repolarization ST abnormalities in V2-V4  7/3/18: NSR, normal axis, normal intervals, Q waves in aVL, V1-V3

## 2022-10-06 NOTE — H&P ADULT - NSHPREVIEWOFSYSTEMS_GEN_ALL_CORE
General: no fatigue/malaise, weight loss/gain.  Skin: no rashes.  Ophthalmologic: no blurred vision, no loss of vision. 	  ENT: no sore throat, rhinorrhea, sinus congestion.  Cardiovascular: no chest pain ,no palpitation, no dizziness, no diaphoresis, no edema  Respiratory: no SOB, cough or wheeze.  Gastrointestinal:  no N/V/D, no melena/hematemesis/hematochezia.  Genitourinary: no dysuria/hesitancy or hematuria.  Musculoskeletal: no myalgias, + knee apin .  Neurological: no changes in vision or hearing, no lightheadedness/dizziness, no syncope/near syncope	  Psychiatric: no unusual stress/anxiety

## 2022-10-06 NOTE — ED PROVIDER NOTE - PROGRESS NOTE DETAILS
Vanna Pedro PGY-3: Patient hypoxic and tachypneic. Diffuse B lines on POCUS. Decreased EF on POCUS. Suspect acute flash pulmonary edema. Also EKG with elevations in V3 and depressions II, III. Cardiology aware. Pulmonary aware. Cardiology fellow evaluated at bedside. Placed on BIPAP. TBA to CCU. Started on nitroglycerin drip.

## 2022-10-06 NOTE — ED PROVIDER NOTE - CLINICAL SUMMARY MEDICAL DECISION MAKING FREE TEXT BOX
73 y/o M PMHx HTN presents with SOB since yesterday with no improvement. STEMI on EKG. Cards aware. Concern for acute heart failure.

## 2022-10-06 NOTE — ED PROVIDER NOTE - ATTENDING CONTRIBUTION TO CARE
I (Esther) agree with above, I performed a history and physical. Counseled abimael medical staff, physician assistant, and/or medical student on medical decision making as documented. Medical decisions and treatment interventions were made in real time during the patient encounter. Additionally and/or with the following exceptions: The patient presented to the ED with sob as above, required admission for acute onset heart failure and elevated troponin. The patient's condition was not amenable to outpatient treatment due either the lack of feasibility of outpatient care coordination, possibility for further decompensation with adverse outcome if discharge, or treatments and diagnostic  modalities only available during an inpatient hospitalization. I reviewed monitor data at least 2 times 10 minutes or more apart during the patients stay.

## 2022-10-06 NOTE — ED ADULT NURSE REASSESSMENT NOTE - NS ED NURSE REASSESS COMMENT FT1
Mobile Critical Care RN: patient is 72/M c/o SOB PMHx of HLD and HLD. notification for STEMI. cardiology currently at bedside. patient hypertensive above 200, now on nitroglycerin gtt @ 200mcg/min. SBP goal 120. patient arrived hypoxic on RA, placed on NRB mask O2sat 95%. now on Bipap mask 10/5 @ 100%, O2sat 100%. patient states his SOB has improved. tachycardic to 117, PIV x2. NPO at this time. 40mg lasix IVP given, urinal at bedside. skin intact. patient to be admitted to CCU

## 2022-10-06 NOTE — ED PROVIDER NOTE - NSICDXFAMILYHX_GEN_ALL_CORE_FT
FAMILY HISTORY:  Father  Still living? Unknown  Family history of heart disease, Age at diagnosis: Age Unknown    Mother  Still living? Unknown  Family history of cancer, Age at diagnosis: Age Unknown

## 2022-10-06 NOTE — H&P ADULT - ASSESSMENT
73 yo male, PMH of HTN,HLD,CAD, Hepatitis C (finished Harvoni )  presenting with acute dyspnea, found to have hypoxic respiratory distress, hypertension to , and sinus tachycardia, with nonspecific ST changes on EKG, overall concerning for hypertensive emergency with pulmonary edema. Started on nitro drip and placed on bipap. Pt admitted to CCU for further care. 73 yo male, PMH of HTN,HLD,CAD, Hepatitis C ( completed  Harvoni 2018)  presenting with acute dyspnea, found to have hypoxic respiratory distress, hypertension to , and sinus tachycardia, with nonspecific ST changes on EKG, overall concerning for hypertensive emergency with pulmonary edema. Started on nitro drip and placed on bipap. Pt admitted to CCU for further care. 71 yo male, PMH of HTN,HLD,CAD, Hepatitis C ( completed  Harvoni 2018) and current smoker  presenting with acute dyspnea, found to have hypoxic respiratory distress, hypertension to , and sinus tachycardia, with nonspecific ST changes on EKG, overall concerning for hypertensive emergency with pulmonary edema. Started on nitro drip and placed on bipap. Pt admitted to CCU for further care.

## 2022-10-06 NOTE — ED PROVIDER NOTE - NSICDXPASTMEDICALHX_GEN_ALL_CORE_FT
PAST MEDICAL HISTORY:  Elevated LFTs     Hepatitis C virus infection, unspecified chronicity (was tx with Harvoni)    HTN (hypertension)

## 2022-10-06 NOTE — ED ADULT NURSE REASSESSMENT NOTE - NS ED NURSE REASSESS COMMENT FT1
Mobile Critical Care RN: attempt to wean off BiPAP mask at this time. desat noted to 88% on 6L NC, no acute dyspnea. BiPAP resumed 10/5 @ 90%.

## 2022-10-06 NOTE — H&P ADULT - PROBLEM SELECTOR PLAN 2
presents with  HTN emergency   c/w  on nitro drip goal -150  start on isordil 10mg TID-> titrate up as tolerate  start on hydralazine 25mg TID  Elevated trop with no new EKG changes and chest pain srinivasa 2/2 demand  echo ordered  check risk factor profile to include TSH, HGBA1c, Lipid profile, presents with  HTN emergency   c/w  on nitro drip goal -150  start on isordil 10mg TID-> titrate up as tolerate  start on hydralazine 25mg TID  Elevated trop with no new EKG changes and chest pain likely 2/2 demand, will trend trops  echo ordered  check risk factor profile to include TSH, HGBA1c, Lipid profile, presents with  HTN emergency   c/w  on nitro drip goal -150  start on isordil 10mg TID-> titrate up as tolerate  start on hydralazine 25mg TID  Elevated trop with nonspecific ST change in V1-V3 on  EKG  and chest pain likely 2/2 demand, will trend trops  echo ordered  check risk factor profile to include TSH, HGBA1c, Lipid profile,

## 2022-10-06 NOTE — ED PROVIDER NOTE - OBJECTIVE STATEMENT
71 y/o M PMHx HTN presents with SOB since yesterday with no improvement. Patient took a cab to the ED. Denies headaches, chest pain, N/V. 73 y/o M PMHx HTN presents with acute onset SOB since yesterday with no improvement. No change with position. Patient decided to toke a cab to the ED. Denies headaches, chest pain, N/V.

## 2022-10-06 NOTE — H&P ADULT - PROBLEM SELECTOR PLAN 5
elevated WBC  no fever  likely reactive  will monitor off abx elevated WBC  no fever  covid PCR neg  likely reactive  will monitor off abx

## 2022-10-06 NOTE — CONSULT NOTE ADULT - ASSESSMENT
72M w/ hx of HTN, HLD, presenting with acute dyspnea, found to have hypoxic respiratory distress, hypertension to 's, and sinus tachycardia, with nonspecific ST changes on EKG, overall concerning for hypertensive emergency with pulmonary edema. Pt is appropriate for admission to CCU for further care.    Plan:  - continue aggressive BP lowering 25% to approx  with nitro gtt  - s/p lasix 40mg IVP in ED, closely monitor urine output and start lasix gtt if needed  - stat TTE (echo lab called) to evaluate for acute valvular pathology leading to pulmonary edema  - continue BiPAP goal O2 >92%  - follow-up troponin trend  - trended EKG stable  - state EKG/troponin if pt with any chest pain    Discussed with CCU attending.    Smith Lynn MD  Cardiology Fellow - PGY4    For all New Consults and Questions:  [email or web address]   Login: [email or web address]    *** Recommendations are preliminary until cosigned by the attending.

## 2022-10-06 NOTE — H&P ADULT - PROBLEM SELECTOR PLAN 1
presents with acute shortness of breath found in pulmonary edema  chest Xary showed pulmonary congestion  c/w bipap  lasix 40mg IVP x1- will start lasix 40mg IVP bid  strict i/O- goal net neg 2 L /24hr  start on lasix drip if not appropriate response to lasix IVP presents with acute shortness of breath found in pulmonary edema  chest Xary showed pulmonary congestion  c/w bipap  lasix 40mg IVP x1- will start lasix 40mg IVP bid  strict i/O- goal net neg 1-2 L /24hr  start on lasix drip if not appropriate response to lasix IVP presents with acute shortness of breath found in pulmonary edema  chest Xary showed pulmonary congestion  c/w bipap  start on lasix  drip at 5mg   strict i/O- goal net neg 1-2 L /24hr presents with acute shortness of breath found in pulmonary edema  chest Xray showed pulmonary congestion  c/w bipap  start on lasix  drip at 5mg   strict i/O- goal net neg 1-2 L /24hr presents with acute shortness of breath found in pulmonary edema  chest Xray showed pulmonary congestion  c/w bipap  hyponatremia likely 2/2 hypervolemia   lactate 2.5  start on lasix  drip at 5mg   strict i/O- goal net neg 1-2 L /24hr

## 2022-10-06 NOTE — H&P ADULT - NSHPPHYSICALEXAM_GEN_ALL_CORE
Appearance: anxious and in resp distress	  HEENT:   Normal oral mucosa, PERRL, EOMI	  Cardiovascular: Normal S1 S2, + JVD, No murmurs, No edema  Respiratory: b/l Lungs decrease BS to auscultation	  Psychiatry: A & O x 3, Mood & affect appropriate  Gastrointestinal:  Soft, Non-tender, + BS	  Skin: + healed upper back scar, No ecchymoses, No cyanosis	  Neurologic: Non-focal  Extremities: Normal range of motion, No clubbing, cyanosis or edema  Vascular: Peripheral pulses palpable 2+ bilaterally

## 2022-10-06 NOTE — H&P ADULT - HISTORY OF PRESENT ILLNESS
66 yo male, PMH of HTN, HLD, CAD, Hepatitis C (finished Daniela )  who presents with acute onset SOB since yesterday with no improvement.The shortness of breath occurred both with exertion and at rest, causing him to have poor sleep the entire night as he was unable to lie flat comfortably. His dyspnea worsened on day of presentation, severely limiting his ability to ambulate. In ED /107, . Patient hypoxic and tachypneic. Diffuse B lines on POCUS. ProBNP 00236, lactate  2.5. Started on nitro drip and Bipap. HsTrop 108.  He denies recent chest pain, palpitations, light-headedness/dizzinesss/syncope, nausea/vomiting, diaphoresis, LE swelling.Initial EKG concerning for WEN in anterior leads, cardiology consult fellow was called for STEMI alert. Discussed with interventional attending, did not meet STEMI criteria,  EKG showed NSR, no new changes from 2018.Pateint admitted to CCU. 66 yo male, PMH of HTN, HLD, CAD, Hepatitis C (completed  Harvoni 2018 )  who presents with acute onset SOB since yesterday with no improvement.The shortness of breath occurred both with exertion and at rest, causing him to have poor sleep the entire night as he was unable to lie flat comfortably. His dyspnea worsened on day of presentation, severely limiting his ability to ambulate. In ED /107, . Patient hypoxic and tachypneic. Diffuse B lines on POCUS. ProBNP 63269, lactate  2.5. Started on nitro drip and Bipap. HsTrop 108.  He denies recent chest pain, palpitations, light-headedness/dizzinesss/syncope, nausea/vomiting, diaphoresis, LE swelling.Initial EKG concerning for WEN in anterior leads, cardiology consult fellow was called for STEMI alert. Discussed with interventional attending, did not meet STEMI criteria,  EKG showed NSR, no new changes from 2018.Pateint admitted to CCU. 68 yo male, PMH of HTN, HLD, CAD, Hepatitis C (completed  Harvoni 2018 ) and currently smoke  who presents with acute onset SOB since yesterday with no improvement.The shortness of breath occurred both with exertion and at rest, causing him to have poor sleep the entire night as he was unable to lie flat comfortably. His dyspnea worsened on day of presentation, severely limiting his ability to ambulate. In ED /107, . Patient hypoxic and tachypneic. Diffuse B lines on POCUS. ProBNP 38820, lactate  2.5. Started on nitro drip and Bipap. HsTrop 108.  He denies recent chest pain, palpitations, light-headedness/dizzinesss/syncope, nausea/vomiting, diaphoresis, LE swelling.Initial EKG concerning for WEN in anterior leads, cardiology consult fellow was called for STEMI alert. Discussed with interventional attending, did not meet STEMI criteria,  EKG showed NSR, no new changes from 2018.Pateint admitted to CCU. 68 yo male, PMH of HTN, HLD, CAD, Hepatitis C (completed  Harvoni 2018 ) and current smoker  who presents with acute onset SOB since yesterday with no improvement.The shortness of breath occurred both with exertion and at rest, causing him to have poor sleep the entire night as he was unable to lie flat comfortably. His dyspnea worsened on day of presentation, severely limiting his ability to ambulate. In ED /107, . Patient hypoxic and tachypneic. Diffuse B lines on POCUS. ProBNP 53300, lactate  2.5. Started on nitro drip and Bipap. HsTrop 108.  He denies recent chest pain, palpitations, light-headedness/dizzinesss/syncope, nausea/vomiting, diaphoresis, LE swelling.Initial EKG concerning for WEN in anterior leads, cardiology consult fellow was called for STEMI alert. Discussed with interventional attending, did not meet STEMI criteria,  EKG showed NSR, no new changes from 2018.Pateint admitted to CCU. 66 yo male, PMH of HTN, HLD, CAD, Hepatitis C (completed  Harvoni 2018 ) and current smoker  who presents with acute onset SOB since yesterday with no improvement.The shortness of breath occurred both with exertion and at rest, causing him to have poor sleep the entire night as he was unable to lie flat comfortably. His dyspnea worsened on day of presentation, severely limiting his ability to ambulate. In ED /107, . Patient hypoxic and tachypneic. Diffuse B lines on POCUS. ProBNP 84409, lactate  2.5. Started on nitro drip and Bipap. HsTrop 108.  He denies recent chest pain, palpitations, light-headedness/dizzinesss/syncope, nausea/vomiting, diaphoresis, LE swelling.Initial EKG concerning for WEN in anterior leads, cardiology consult fellow was called for STEMI alert. Discussed with interventional attending, did not meet STEMI criteria. Pateint admitted to CCU. 66 yo male, PMH of HTN, HLD, CAD, Hepatitis C (completed  Harvoni 2018 ) and current smoker  who presents with acute onset SOB since last Saturday 10/1 with no improvement. The shortness of breath occurred both with exertion and at rest, causing him to have poor sleep at the  night as he was unable to lie flat comfortably. His dyspnea worsened on day of presentation, severely limiting his ability to ambulate. He thought he came to hospital on Sunday. He does  not remember what happen between Monday to Thursday   In ED /107, . Patient hypoxic and tachypneic. Diffuse B lines on POCUS. ProBNP 77815, lactate  2.5. Started on nitro drip and Bipap. HsTrop 108.He report non adherence to medications. Some time he miss medication for 1 week .Some time eat high salt diet. He denies recent chest pain, palpitations, light-headedness/ dizziness syncope, nausea/vomiting, diaphoresis, LE swelling. Initial EKG concerning for WEN in anterior leads, cardiology consult fellow was called for STEMI alert. Discussed with interventional attending, did not meet STEMI criteria. Patient admitted to CCU. 68 yo male, PMH of HTN, HLD, CAD, Hepatitis C (completed  Harvoni 2018 ) and current smoker  who presents with acute onset SOB since last Saturday 10/1 with no improvement. The shortness of breath occurred both with exertion and at rest, causing him to have poor sleep at the  night as he was unable to lie flat comfortably. His dyspnea worsened on day of presentation, severely limiting his ability to ambulate. He thought he came to hospital on Sunday. He does  not remember what happen between Monday to Thursday   In ED /107, . Patient hypoxic and tachypneic. Diffuse B lines on POCUS. ProBNP 55321, lactate  2.5. Started on nitro drip and Bipap. HsTrop 108.He report non adherence to medications. Some time he miss medication for 1 week .Some time eat high salt diet. Last time he saw doctor was >3 years ago.He denies recent chest pain, palpitations, light-headedness/ dizziness syncope, nausea/vomiting, diaphoresis, LE swelling. Initial EKG concerning for WEN in anterior leads, cardiology consult fellow was called for STEMI alert. Discussed with interventional attending, did not meet STEMI criteria. Patient admitted to CCU.

## 2022-10-07 DIAGNOSIS — I50.21 ACUTE SYSTOLIC (CONGESTIVE) HEART FAILURE: ICD-10-CM

## 2022-10-07 LAB
A1C WITH ESTIMATED AVERAGE GLUCOSE RESULT: 5 % — SIGNIFICANT CHANGE UP (ref 4–5.6)
ALBUMIN SERPL ELPH-MCNC: 3.6 G/DL — SIGNIFICANT CHANGE UP (ref 3.3–5)
ALBUMIN SERPL ELPH-MCNC: 3.7 G/DL — SIGNIFICANT CHANGE UP (ref 3.3–5)
ALP SERPL-CCNC: 65 U/L — SIGNIFICANT CHANGE UP (ref 40–120)
ALP SERPL-CCNC: 67 U/L — SIGNIFICANT CHANGE UP (ref 40–120)
ALT FLD-CCNC: 16 U/L — SIGNIFICANT CHANGE UP (ref 4–41)
ALT FLD-CCNC: 17 U/L — SIGNIFICANT CHANGE UP (ref 4–41)
ANION GAP SERPL CALC-SCNC: 12 MMOL/L — SIGNIFICANT CHANGE UP (ref 7–14)
ANION GAP SERPL CALC-SCNC: 13 MMOL/L — SIGNIFICANT CHANGE UP (ref 7–14)
ANION GAP SERPL CALC-SCNC: 14 MMOL/L — SIGNIFICANT CHANGE UP (ref 7–14)
ANION GAP SERPL CALC-SCNC: 16 MMOL/L — HIGH (ref 7–14)
AST SERPL-CCNC: 45 U/L — HIGH (ref 4–40)
AST SERPL-CCNC: 52 U/L — HIGH (ref 4–40)
BASE EXCESS BLDA CALC-SCNC: 3.1 MMOL/L — HIGH (ref -2–3)
BASE EXCESS BLDA CALC-SCNC: 5.1 MMOL/L — HIGH (ref -2–3)
BILIRUB SERPL-MCNC: 0.8 MG/DL — SIGNIFICANT CHANGE UP (ref 0.2–1.2)
BILIRUB SERPL-MCNC: 0.8 MG/DL — SIGNIFICANT CHANGE UP (ref 0.2–1.2)
BLOOD GAS ARTERIAL - LYTES,HGB,ICA,LACT RESULT: SIGNIFICANT CHANGE UP
BLOOD GAS ARTERIAL - LYTES,HGB,ICA,LACT RESULT: SIGNIFICANT CHANGE UP
BUN SERPL-MCNC: 18 MG/DL — SIGNIFICANT CHANGE UP (ref 7–23)
BUN SERPL-MCNC: 19 MG/DL — SIGNIFICANT CHANGE UP (ref 7–23)
BUN SERPL-MCNC: 20 MG/DL — SIGNIFICANT CHANGE UP (ref 7–23)
BUN SERPL-MCNC: 21 MG/DL — SIGNIFICANT CHANGE UP (ref 7–23)
CALCIUM SERPL-MCNC: 8 MG/DL — LOW (ref 8.4–10.5)
CALCIUM SERPL-MCNC: 8.4 MG/DL — SIGNIFICANT CHANGE UP (ref 8.4–10.5)
CALCIUM SERPL-MCNC: 8.4 MG/DL — SIGNIFICANT CHANGE UP (ref 8.4–10.5)
CALCIUM SERPL-MCNC: 8.5 MG/DL — SIGNIFICANT CHANGE UP (ref 8.4–10.5)
CHLORIDE SERPL-SCNC: 86 MMOL/L — LOW (ref 98–107)
CHLORIDE SERPL-SCNC: 86 MMOL/L — LOW (ref 98–107)
CHLORIDE SERPL-SCNC: 88 MMOL/L — LOW (ref 98–107)
CHLORIDE SERPL-SCNC: 88 MMOL/L — LOW (ref 98–107)
CHOLEST SERPL-MCNC: 140 MG/DL — SIGNIFICANT CHANGE UP
CK MB BLD-MCNC: 3.7 % — HIGH (ref 0–2.5)
CK MB BLD-MCNC: 4.1 % — HIGH (ref 0–2.5)
CK MB BLD-MCNC: 4.4 % — HIGH (ref 0–2.5)
CK MB CFR SERPL CALC: 10.3 NG/ML — HIGH
CK MB CFR SERPL CALC: 7.8 NG/ML — HIGH
CK MB CFR SERPL CALC: 8.9 NG/ML — HIGH
CK SERPL-CCNC: 213 U/L — HIGH (ref 30–200)
CK SERPL-CCNC: 218 U/L — HIGH (ref 30–200)
CK SERPL-CCNC: 233 U/L — HIGH (ref 30–200)
CO2 BLDA-SCNC: 26 MMOL/L — HIGH (ref 19–24)
CO2 BLDA-SCNC: 29 MMOL/L — HIGH (ref 19–24)
CO2 SERPL-SCNC: 22 MMOL/L — SIGNIFICANT CHANGE UP (ref 22–31)
CO2 SERPL-SCNC: 22 MMOL/L — SIGNIFICANT CHANGE UP (ref 22–31)
CO2 SERPL-SCNC: 23 MMOL/L — SIGNIFICANT CHANGE UP (ref 22–31)
CO2 SERPL-SCNC: 24 MMOL/L — SIGNIFICANT CHANGE UP (ref 22–31)
CREAT SERPL-MCNC: 0.7 MG/DL — SIGNIFICANT CHANGE UP (ref 0.5–1.3)
CREAT SERPL-MCNC: 0.75 MG/DL — SIGNIFICANT CHANGE UP (ref 0.5–1.3)
CREAT SERPL-MCNC: 0.81 MG/DL — SIGNIFICANT CHANGE UP (ref 0.5–1.3)
CREAT SERPL-MCNC: 0.86 MG/DL — SIGNIFICANT CHANGE UP (ref 0.5–1.3)
EGFR: 92 ML/MIN/1.73M2 — SIGNIFICANT CHANGE UP
EGFR: 94 ML/MIN/1.73M2 — SIGNIFICANT CHANGE UP
EGFR: 96 ML/MIN/1.73M2 — SIGNIFICANT CHANGE UP
EGFR: 98 ML/MIN/1.73M2 — SIGNIFICANT CHANGE UP
ESTIMATED AVERAGE GLUCOSE: 97 — SIGNIFICANT CHANGE UP
GAS PNL BLDA: SIGNIFICANT CHANGE UP
GLUCOSE SERPL-MCNC: 128 MG/DL — HIGH (ref 70–99)
GLUCOSE SERPL-MCNC: 135 MG/DL — HIGH (ref 70–99)
GLUCOSE SERPL-MCNC: 136 MG/DL — HIGH (ref 70–99)
GLUCOSE SERPL-MCNC: 141 MG/DL — HIGH (ref 70–99)
HCO3 BLDA-SCNC: 25 MMOL/L — SIGNIFICANT CHANGE UP (ref 21–28)
HCO3 BLDA-SCNC: 28 MMOL/L — SIGNIFICANT CHANGE UP (ref 21–28)
HCT VFR BLD CALC: 30.2 % — LOW (ref 39–50)
HCV AB S/CO SERPL IA: 6.64 S/CO — HIGH (ref 0–0.99)
HCV AB SERPL-IMP: REACTIVE
HCV RNA FLD QL NAA+PROBE: SIGNIFICANT CHANGE UP
HCV RNA SPEC QL PROBE+SIG AMP: SIGNIFICANT CHANGE UP
HDLC SERPL-MCNC: 68 MG/DL — SIGNIFICANT CHANGE UP
HGB BLD-MCNC: 10.4 G/DL — LOW (ref 13–17)
HOROWITZ INDEX BLDA+IHG-RTO: 45 — SIGNIFICANT CHANGE UP
HOROWITZ INDEX BLDA+IHG-RTO: 45 — SIGNIFICANT CHANGE UP
LIPID PNL WITH DIRECT LDL SERPL: 60 MG/DL — SIGNIFICANT CHANGE UP
MAGNESIUM SERPL-MCNC: 1.2 MG/DL — LOW (ref 1.6–2.6)
MAGNESIUM SERPL-MCNC: 1.6 MG/DL — SIGNIFICANT CHANGE UP (ref 1.6–2.6)
MAGNESIUM SERPL-MCNC: 1.7 MG/DL — SIGNIFICANT CHANGE UP (ref 1.6–2.6)
MAGNESIUM SERPL-MCNC: 2.5 MG/DL — SIGNIFICANT CHANGE UP (ref 1.6–2.6)
MCHC RBC-ENTMCNC: 27.5 PG — SIGNIFICANT CHANGE UP (ref 27–34)
MCHC RBC-ENTMCNC: 34.4 GM/DL — SIGNIFICANT CHANGE UP (ref 32–36)
MCV RBC AUTO: 79.9 FL — LOW (ref 80–100)
NON HDL CHOLESTEROL: 72 MG/DL — SIGNIFICANT CHANGE UP
NRBC # BLD: 0 /100 WBCS — SIGNIFICANT CHANGE UP (ref 0–0)
NRBC # FLD: 0 K/UL — SIGNIFICANT CHANGE UP (ref 0–0)
PCO2 BLDA: 29 MMHG — LOW (ref 35–48)
PCO2 BLDA: 33 MMHG — LOW (ref 35–48)
PH BLDA: 7.53 — HIGH (ref 7.35–7.45)
PH BLDA: 7.54 — HIGH (ref 7.35–7.45)
PHOSPHATE SERPL-MCNC: 1.8 MG/DL — LOW (ref 2.5–4.5)
PHOSPHATE SERPL-MCNC: 2.4 MG/DL — LOW (ref 2.5–4.5)
PHOSPHATE SERPL-MCNC: 2.9 MG/DL — SIGNIFICANT CHANGE UP (ref 2.5–4.5)
PHOSPHATE SERPL-MCNC: 3.5 MG/DL — SIGNIFICANT CHANGE UP (ref 2.5–4.5)
PLATELET # BLD AUTO: 157 K/UL — SIGNIFICANT CHANGE UP (ref 150–400)
PO2 BLDA: 59 MMHG — LOW (ref 83–108)
PO2 BLDA: 68 MMHG — LOW (ref 83–108)
POTASSIUM SERPL-MCNC: 3.1 MMOL/L — LOW (ref 3.5–5.3)
POTASSIUM SERPL-MCNC: 3.6 MMOL/L — SIGNIFICANT CHANGE UP (ref 3.5–5.3)
POTASSIUM SERPL-MCNC: 3.6 MMOL/L — SIGNIFICANT CHANGE UP (ref 3.5–5.3)
POTASSIUM SERPL-MCNC: 3.7 MMOL/L — SIGNIFICANT CHANGE UP (ref 3.5–5.3)
POTASSIUM SERPL-SCNC: 3.1 MMOL/L — LOW (ref 3.5–5.3)
POTASSIUM SERPL-SCNC: 3.6 MMOL/L — SIGNIFICANT CHANGE UP (ref 3.5–5.3)
POTASSIUM SERPL-SCNC: 3.6 MMOL/L — SIGNIFICANT CHANGE UP (ref 3.5–5.3)
POTASSIUM SERPL-SCNC: 3.7 MMOL/L — SIGNIFICANT CHANGE UP (ref 3.5–5.3)
PROT SERPL-MCNC: 6.1 G/DL — SIGNIFICANT CHANGE UP (ref 6–8.3)
PROT SERPL-MCNC: 6.3 G/DL — SIGNIFICANT CHANGE UP (ref 6–8.3)
RBC # BLD: 3.78 M/UL — LOW (ref 4.2–5.8)
RBC # FLD: 12.4 % — SIGNIFICANT CHANGE UP (ref 10.3–14.5)
SAO2 % BLDA: 93.2 % — LOW (ref 94–98)
SAO2 % BLDA: 95.4 % — SIGNIFICANT CHANGE UP (ref 94–98)
SODIUM SERPL-SCNC: 122 MMOL/L — LOW (ref 135–145)
SODIUM SERPL-SCNC: 123 MMOL/L — LOW (ref 135–145)
SODIUM SERPL-SCNC: 124 MMOL/L — LOW (ref 135–145)
SODIUM SERPL-SCNC: 125 MMOL/L — LOW (ref 135–145)
TRIGL SERPL-MCNC: 62 MG/DL — SIGNIFICANT CHANGE UP
TROPONIN T, HIGH SENSITIVITY RESULT: 141 NG/L — CRITICAL HIGH
TROPONIN T, HIGH SENSITIVITY RESULT: 149 NG/L — CRITICAL HIGH
TROPONIN T, HIGH SENSITIVITY RESULT: 166 NG/L — CRITICAL HIGH
TSH SERPL-MCNC: 3.15 UIU/ML — SIGNIFICANT CHANGE UP (ref 0.27–4.2)
WBC # BLD: 10.33 K/UL — SIGNIFICANT CHANGE UP (ref 3.8–10.5)
WBC # FLD AUTO: 10.33 K/UL — SIGNIFICANT CHANGE UP (ref 3.8–10.5)

## 2022-10-07 PROCEDURE — 99223 1ST HOSP IP/OBS HIGH 75: CPT

## 2022-10-07 PROCEDURE — 99223 1ST HOSP IP/OBS HIGH 75: CPT | Mod: GC

## 2022-10-07 PROCEDURE — 99232 SBSQ HOSP IP/OBS MODERATE 35: CPT

## 2022-10-07 RX ORDER — IPRATROPIUM/ALBUTEROL SULFATE 18-103MCG
3 AEROSOL WITH ADAPTER (GRAM) INHALATION EVERY 6 HOURS
Refills: 0 | Status: COMPLETED | OUTPATIENT
Start: 2022-10-07 | End: 2022-10-08

## 2022-10-07 RX ORDER — FOLIC ACID 0.8 MG
1 TABLET ORAL DAILY
Refills: 0 | Status: DISCONTINUED | OUTPATIENT
Start: 2022-10-07 | End: 2022-10-07

## 2022-10-07 RX ORDER — ASPIRIN/CALCIUM CARB/MAGNESIUM 324 MG
81 TABLET ORAL DAILY
Refills: 0 | Status: DISCONTINUED | OUTPATIENT
Start: 2022-10-07 | End: 2022-11-02

## 2022-10-07 RX ORDER — MAGNESIUM SULFATE 500 MG/ML
1 VIAL (ML) INJECTION ONCE
Refills: 0 | Status: DISCONTINUED | OUTPATIENT
Start: 2022-10-07 | End: 2022-10-07

## 2022-10-07 RX ORDER — IPRATROPIUM/ALBUTEROL SULFATE 18-103MCG
3 AEROSOL WITH ADAPTER (GRAM) INHALATION EVERY 6 HOURS
Refills: 0 | Status: DISCONTINUED | OUTPATIENT
Start: 2022-10-07 | End: 2022-10-07

## 2022-10-07 RX ORDER — ATORVASTATIN CALCIUM 80 MG/1
40 TABLET, FILM COATED ORAL AT BEDTIME
Refills: 0 | Status: DISCONTINUED | OUTPATIENT
Start: 2022-10-07 | End: 2022-11-02

## 2022-10-07 RX ORDER — THIAMINE MONONITRATE (VIT B1) 100 MG
100 TABLET ORAL DAILY
Refills: 0 | Status: DISCONTINUED | OUTPATIENT
Start: 2022-10-07 | End: 2022-10-07

## 2022-10-07 RX ORDER — MAGNESIUM SULFATE 500 MG/ML
4 VIAL (ML) INJECTION ONCE
Refills: 0 | Status: COMPLETED | OUTPATIENT
Start: 2022-10-07 | End: 2022-10-07

## 2022-10-07 RX ORDER — POTASSIUM CHLORIDE 20 MEQ
40 PACKET (EA) ORAL ONCE
Refills: 0 | Status: COMPLETED | OUTPATIENT
Start: 2022-10-07 | End: 2022-10-07

## 2022-10-07 RX ORDER — NIFEDIPINE 30 MG
60 TABLET, EXTENDED RELEASE 24 HR ORAL DAILY
Refills: 0 | Status: DISCONTINUED | OUTPATIENT
Start: 2022-10-07 | End: 2022-10-07

## 2022-10-07 RX ORDER — FUROSEMIDE 40 MG
80 TABLET ORAL ONCE
Refills: 0 | Status: COMPLETED | OUTPATIENT
Start: 2022-10-07 | End: 2022-10-07

## 2022-10-07 RX ORDER — SACUBITRIL AND VALSARTAN 24; 26 MG/1; MG/1
1 TABLET, FILM COATED ORAL
Refills: 0 | Status: DISCONTINUED | OUTPATIENT
Start: 2022-10-07 | End: 2022-10-10

## 2022-10-07 RX ORDER — PANTOPRAZOLE SODIUM 20 MG/1
8 TABLET, DELAYED RELEASE ORAL
Qty: 80 | Refills: 0 | Status: DISCONTINUED | OUTPATIENT
Start: 2022-10-07 | End: 2022-10-09

## 2022-10-07 RX ORDER — POTASSIUM CHLORIDE 20 MEQ
10 PACKET (EA) ORAL
Refills: 0 | Status: COMPLETED | OUTPATIENT
Start: 2022-10-07 | End: 2022-10-07

## 2022-10-07 RX ORDER — PANTOPRAZOLE SODIUM 20 MG/1
40 TABLET, DELAYED RELEASE ORAL ONCE
Refills: 0 | Status: COMPLETED | OUTPATIENT
Start: 2022-10-07 | End: 2022-10-07

## 2022-10-07 RX ORDER — DEXMEDETOMIDINE HYDROCHLORIDE IN 0.9% SODIUM CHLORIDE 4 UG/ML
0.2 INJECTION INTRAVENOUS
Qty: 400 | Refills: 0 | Status: DISCONTINUED | OUTPATIENT
Start: 2022-10-07 | End: 2022-10-08

## 2022-10-07 RX ORDER — POTASSIUM CHLORIDE 20 MEQ
40 PACKET (EA) ORAL EVERY 4 HOURS
Refills: 0 | Status: COMPLETED | OUTPATIENT
Start: 2022-10-07 | End: 2022-10-07

## 2022-10-07 RX ORDER — TIOTROPIUM BROMIDE 18 UG/1
1 CAPSULE ORAL; RESPIRATORY (INHALATION) DAILY
Refills: 0 | Status: DISCONTINUED | OUTPATIENT
Start: 2022-10-08 | End: 2022-11-02

## 2022-10-07 RX ORDER — POTASSIUM CHLORIDE 20 MEQ
40 PACKET (EA) ORAL ONCE
Refills: 0 | Status: DISCONTINUED | OUTPATIENT
Start: 2022-10-07 | End: 2022-10-07

## 2022-10-07 RX ORDER — SPIRONOLACTONE 25 MG/1
25 TABLET, FILM COATED ORAL DAILY
Refills: 0 | Status: DISCONTINUED | OUTPATIENT
Start: 2022-10-07 | End: 2022-11-02

## 2022-10-07 RX ORDER — HYDRALAZINE HCL 50 MG
50 TABLET ORAL ONCE
Refills: 0 | Status: COMPLETED | OUTPATIENT
Start: 2022-10-07 | End: 2022-10-07

## 2022-10-07 RX ORDER — NITROGLYCERIN 6.5 MG
30 CAPSULE, EXTENDED RELEASE ORAL
Qty: 50 | Refills: 0 | Status: DISCONTINUED | OUTPATIENT
Start: 2022-10-07 | End: 2022-10-08

## 2022-10-07 RX ORDER — MAGNESIUM SULFATE 500 MG/ML
2 VIAL (ML) INJECTION ONCE
Refills: 0 | Status: COMPLETED | OUTPATIENT
Start: 2022-10-07 | End: 2022-10-07

## 2022-10-07 RX ORDER — ALBUTEROL 90 UG/1
2.5 AEROSOL, METERED ORAL EVERY 6 HOURS
Refills: 0 | Status: DISCONTINUED | OUTPATIENT
Start: 2022-10-08 | End: 2022-10-11

## 2022-10-07 RX ORDER — HEPARIN SODIUM 5000 [USP'U]/ML
5000 INJECTION INTRAVENOUS; SUBCUTANEOUS EVERY 12 HOURS
Refills: 0 | Status: DISCONTINUED | OUTPATIENT
Start: 2022-10-07 | End: 2022-11-02

## 2022-10-07 RX ADMIN — Medication 80 MILLIGRAM(S): at 02:58

## 2022-10-07 RX ADMIN — Medication 3 MILLILITER(S): at 21:37

## 2022-10-07 RX ADMIN — Medication 25 GRAM(S): at 20:02

## 2022-10-07 RX ADMIN — Medication 2 MILLIGRAM(S): at 17:45

## 2022-10-07 RX ADMIN — Medication 85 MILLIMOLE(S): at 14:23

## 2022-10-07 RX ADMIN — Medication 100 MILLIEQUIVALENT(S): at 10:14

## 2022-10-07 RX ADMIN — Medication 9 MICROGRAM(S)/MIN: at 20:30

## 2022-10-07 RX ADMIN — Medication 40 MILLIEQUIVALENT(S): at 11:54

## 2022-10-07 RX ADMIN — Medication 5 MG/HR: at 02:29

## 2022-10-07 RX ADMIN — Medication 100 MILLIEQUIVALENT(S): at 21:15

## 2022-10-07 RX ADMIN — Medication 5 MG/HR: at 21:22

## 2022-10-07 RX ADMIN — HEPARIN SODIUM 5000 UNIT(S): 5000 INJECTION INTRAVENOUS; SUBCUTANEOUS at 17:46

## 2022-10-07 RX ADMIN — Medication 81 MILLIGRAM(S): at 11:56

## 2022-10-07 RX ADMIN — Medication 5 MG/HR: at 07:50

## 2022-10-07 RX ADMIN — Medication 40 MILLIEQUIVALENT(S): at 01:39

## 2022-10-07 RX ADMIN — Medication 50 MILLIGRAM(S): at 13:07

## 2022-10-07 RX ADMIN — HEPARIN SODIUM 5000 UNIT(S): 5000 INJECTION INTRAVENOUS; SUBCUTANEOUS at 06:00

## 2022-10-07 RX ADMIN — CHLORHEXIDINE GLUCONATE 1 APPLICATION(S): 213 SOLUTION TOPICAL at 06:27

## 2022-10-07 RX ADMIN — PANTOPRAZOLE SODIUM 40 MILLIGRAM(S): 20 TABLET, DELAYED RELEASE ORAL at 19:40

## 2022-10-07 RX ADMIN — Medication 60 MICROGRAM(S)/MIN: at 07:50

## 2022-10-07 RX ADMIN — Medication 100 MILLIEQUIVALENT(S): at 08:07

## 2022-10-07 RX ADMIN — Medication 1 MILLIGRAM(S): at 11:57

## 2022-10-07 RX ADMIN — Medication 100 MILLIEQUIVALENT(S): at 20:02

## 2022-10-07 RX ADMIN — Medication 50 MILLIGRAM(S): at 06:00

## 2022-10-07 RX ADMIN — HEPARIN SODIUM 5000 UNIT(S): 5000 INJECTION INTRAVENOUS; SUBCUTANEOUS at 13:07

## 2022-10-07 RX ADMIN — Medication 40 MILLIEQUIVALENT(S): at 08:05

## 2022-10-07 RX ADMIN — PANTOPRAZOLE SODIUM 10 MG/HR: 20 TABLET, DELAYED RELEASE ORAL at 22:00

## 2022-10-07 RX ADMIN — Medication 2 MILLIGRAM(S): at 15:40

## 2022-10-07 RX ADMIN — Medication 50 MILLIGRAM(S): at 00:42

## 2022-10-07 RX ADMIN — SPIRONOLACTONE 25 MILLIGRAM(S): 25 TABLET, FILM COATED ORAL at 17:45

## 2022-10-07 RX ADMIN — ISOSORBIDE DINITRATE 10 MILLIGRAM(S): 5 TABLET ORAL at 13:07

## 2022-10-07 RX ADMIN — Medication 1 MILLIGRAM(S): at 13:07

## 2022-10-07 RX ADMIN — Medication 5 MG/HR: at 02:40

## 2022-10-07 RX ADMIN — Medication 1 TABLET(S): at 11:57

## 2022-10-07 RX ADMIN — SACUBITRIL AND VALSARTAN 1 TABLET(S): 24; 26 TABLET, FILM COATED ORAL at 17:45

## 2022-10-07 RX ADMIN — DEXMEDETOMIDINE HYDROCHLORIDE IN 0.9% SODIUM CHLORIDE 2.56 MICROGRAM(S)/KG/HR: 4 INJECTION INTRAVENOUS at 20:04

## 2022-10-07 RX ADMIN — SERTRALINE 100 MILLIGRAM(S): 25 TABLET, FILM COATED ORAL at 11:53

## 2022-10-07 RX ADMIN — ISOSORBIDE DINITRATE 10 MILLIGRAM(S): 5 TABLET ORAL at 06:00

## 2022-10-07 RX ADMIN — SACUBITRIL AND VALSARTAN 1 TABLET(S): 24; 26 TABLET, FILM COATED ORAL at 10:58

## 2022-10-07 RX ADMIN — Medication 100 MILLIEQUIVALENT(S): at 22:20

## 2022-10-07 RX ADMIN — Medication 60 MICROGRAM(S)/MIN: at 01:39

## 2022-10-07 RX ADMIN — Medication 25 GRAM(S): at 02:40

## 2022-10-07 RX ADMIN — Medication 1 MILLIGRAM(S): at 10:58

## 2022-10-07 RX ADMIN — Medication 100 MILLIGRAM(S): at 11:57

## 2022-10-07 RX ADMIN — Medication 1 MILLIGRAM(S): at 11:54

## 2022-10-07 RX ADMIN — Medication 100 MILLIEQUIVALENT(S): at 07:50

## 2022-10-07 RX ADMIN — Medication 60 MILLIGRAM(S): at 00:14

## 2022-10-07 RX ADMIN — Medication 3 MILLILITER(S): at 13:58

## 2022-10-07 NOTE — CONSULT NOTE ADULT - SUBJECTIVE AND OBJECTIVE BOX
CHIEF COMPLAINT:    HPI:  HPI:  68 yo male, PMH of HTN, HLD, CAD, Hepatitis C (completed  Harvoni 2018 ) and current smoker  who presents with acute onset SOB since last Saturday 10/1 with no improvement. The shortness of breath occurred both with exertion and at rest, causing him to have poor sleep at the  night as he was unable to lie flat comfortably. His dyspnea worsened on day of presentation, severely limiting his ability to ambulate. He thought he came to hospital on Sunday. He does  not remember what happen between Monday to Thursday   In ED /107, . Patient hypoxic and tachypneic. Diffuse B lines on POCUS. ProBNP 65591, lactate  2.5. Started on nitro drip and Bipap. HsTrop 108.He report non adherence to medications. Some time he miss medication for 1 week .Some time eat high salt diet. Last time he saw doctor was >3 years ago.He denies recent chest pain, palpitations, light-headedness/ dizziness syncope, nausea/vomiting, diaphoresis, LE swelling. Initial EKG concerning for WEN in anterior leads, cardiology consult fellow was called for STEMI alert. Discussed with interventional attending, did not meet STEMI criteria. Patient admitted to CCU. (06 Oct 2022 16:53)    since admission patient has been diuresed, net negative roughly 1 L, remained on bipap and on nitro drip. troponin elevation thought to be secondary to demand   patient is active smoker, never had pulmnologist, or had pfts, never with COPD/asthma, never used inhaler. no sleep study. CPAP or apnea. patient has been largely functional prior to arrival at the hospital and able to walk. patient has orthopnea, FELDER unclear if patient has orthopnea. denies fevers, chills, increase in cough phlegm, wheezing     PAST MEDICAL & SURGICAL HISTORY:  HTN (hypertension)      Hepatitis C virus infection, unspecified chronicity  (was tx with Harvoni)      Major depression, chronic      S/P hernia repair  x 2          FAMILY HISTORY:  Family history of heart disease (Father)    Family history of cancer (Mother)        SOCIAL HISTORY:  active smoker 2-3 greater than 20, social occasional smoker    Allergies    No Known Allergies    Intolerances        HOME MEDICATIONS:  Home Medications:  Lipitor 20 mg oral tablet: 1 tab(s) orally once a day (06 Oct 2022 17:27)  NIFEdipine 60 mg oral tablet, extended release: 1 tab(s) orally once a day (06 Oct 2022 17:27)  sertraline 100 mg oral tablet: 1 tab(s) orally once a day (06 Oct 2022 17:27)      REVIEW OF SYSTEMS:  Patient denies fevers, chills, chest pain, shortness of breath, nausea, abdominal pain, diarrhea, constipation, dysuria, leg swelling, headache, light headedness    OBJECTIVE:  ICU Vital Signs Last 24 Hrs  T(C): 36.6 (07 Oct 2022 08:00), Max: 36.8 (06 Oct 2022 17:11)  T(F): 97.8 (07 Oct 2022 08:00), Max: 98.2 (06 Oct 2022 17:11)  HR: 105 (07 Oct 2022 10:48) (85 - 123)  BP: 145/101 (06 Oct 2022 23:15) (143/104 - 187/132)  BP(mean): 109 (06 Oct 2022 23:15) (101 - 148)  ABP: 149/73 (07 Oct 2022 10:00) (135/54 - 193/98)  ABP(mean): 98 (07 Oct 2022 10:00) (78 - 129)  RR: 22 (07 Oct 2022 10:00) (16 - 34)  SpO2: 95% (07 Oct 2022 10:48) (88% - 100%)    O2 Parameters below as of 07 Oct 2022 08:00  Patient On (Oxygen Delivery Method): nasal cannula              10-06 @ 07:01  -  10-07 @ 07:00  --------------------------------------------------------  IN: 852.5 mL / OUT: 1700 mL / NET: -847.5 mL    10-07 @ 07:01  -  10-07 @ 10:58  --------------------------------------------------------  IN: 65 mL / OUT: 200 mL / NET: -135 mL      CAPILLARY BLOOD GLUCOSE          PHYSICAL EXAM:  General: awake and alert, on bipap sitting elevated in bed  HEENT: NC/AT, EOMI b/l, conjunctiva normal, MMM  Lymph Nodes: no cervical LAD  Neck: supple. full range of motion  Respiratory: bilateral crackles   Cardiovascular: S1 S2 present, RRR, no m/r/g  Abdomen: soft, NT/ND, +BS  Extremities: no significant bilateral pitting edema  Skin: no rashes or lesions noted  Neurological: AAOx3, no focal deficits  Psychiatry: calm, cooperative    LINES:     HOSPITAL MEDICATIONS:  Standing Meds:  aspirin enteric coated 81 milliGRAM(s) Oral daily  atorvastatin 20 milliGRAM(s) Oral at bedtime  chlorhexidine 2% Cloths 1 Application(s) Topical <User Schedule>  folic acid 1 milliGRAM(s) Oral daily  furosemide Infusion 10 mG/Hr IV Continuous <Continuous>  heparin   Injectable 5000 Unit(s) SubCutaneous every 8 hours  hydrALAZINE 50 milliGRAM(s) Oral every 8 hours  influenza  Vaccine (HIGH DOSE) 0.7 milliLiter(s) IntraMuscular once  isosorbide   dinitrate Tablet (ISORDIL) 10 milliGRAM(s) Oral three times a day  multivitamin 1 Tablet(s) Oral daily  nitroglycerin  Infusion 200 MICROgram(s)/Min IV Continuous <Continuous>  potassium chloride    Tablet ER 40 milliEquivalent(s) Oral every 4 hours  sacubitril 24 mG/valsartan 26 mG 1 Tablet(s) Oral two times a day  sertraline 100 milliGRAM(s) Oral daily  thiamine 100 milliGRAM(s) Oral daily      PRN Meds:  LORazepam     Tablet 1 milliGRAM(s) Oral every 1 hour PRN  LORazepam     Tablet 2 milliGRAM(s) Oral every 2 hours PRN  melatonin 3 milliGRAM(s) Oral at bedtime PRN      LABS:                        10.4   10.33 )-----------( 157      ( 07 Oct 2022 05:45 )             30.2     Hgb Trend: 10.4<--, 12.3<--  10-07    123<L>  |  86<L>  |  20  ----------------------------<  141<H>  3.1<L>   |  24  |  0.81    Ca    8.4      07 Oct 2022 05:45  Phos  2.4     10-07  Mg     2.50     10-07    TPro  6.3  /  Alb  3.7  /  TBili  0.8  /  DBili  x   /  AST  45<H>  /  ALT  16  /  AlkPhos  67  10-07    Creatinine Trend: 0.81<--, 0.86<--, 0.87<--  PT/INR - ( 06 Oct 2022 15:07 )   PT: 13.4 sec;   INR: 1.15 ratio         PTT - ( 06 Oct 2022 15:07 )  PTT:25.7 sec    Arterial Blood Gas:  10-07 @ 05:45  7.51/33/73/26/96.4/3.4  ABG lactate: --  Arterial Blood Gas:  10-06 @ 23:10  7.51/30/62/24/92.0/1.4  ABG lactate: --    Venous Blood Gas:  10-06 @ 16:07  7.42/35/55/23/83.5  VBG Lactate: 2.5      MICROBIOLOGY:       RADIOLOGY:  [ ] Reviewed and interpreted by me    PULMONARY FUNCTION TESTS:    EKG:

## 2022-10-07 NOTE — BH CONSULTATION LIAISON ASSESSMENT NOTE - NSBHATTESTAPPAMEND_PSY_A_CORE
I have personally seen and examined this patient. I fully participated in the care of this patient. I have made amendments to the documentation where appropriate and otherwise agree with the history, physical exam, and plan as documented by the [facility]

## 2022-10-07 NOTE — CONSULT NOTE ADULT - NS ATTEND AMEND GEN_ALL_CORE FT
As above.  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  Continue Lasix drip.  Start spironolactone 25 mg po qd.

## 2022-10-07 NOTE — BH CONSULTATION LIAISON ASSESSMENT NOTE - CURRENT MEDICATION
MEDICATIONS  (STANDING):  albuterol/ipratropium for Nebulization 3 milliLiter(s) Nebulizer every 6 hours  aspirin enteric coated 81 milliGRAM(s) Oral daily  atorvastatin 40 milliGRAM(s) Oral at bedtime  chlorhexidine 2% Cloths 1 Application(s) Topical <User Schedule>  furosemide Infusion 10 mG/Hr (5 mL/Hr) IV Continuous <Continuous>  heparin   Injectable 5000 Unit(s) SubCutaneous every 12 hours  hydrALAZINE 50 milliGRAM(s) Oral every 8 hours  influenza  Vaccine (HIGH DOSE) 0.7 milliLiter(s) IntraMuscular once  isosorbide   dinitrate Tablet (ISORDIL) 10 milliGRAM(s) Oral three times a day  LORazepam     Tablet   Oral   LORazepam     Tablet 2 milliGRAM(s) Oral every 4 hours  sacubitril 24 mG/valsartan 26 mG 1 Tablet(s) Oral two times a day  sertraline 100 milliGRAM(s) Oral daily  spironolactone 25 milliGRAM(s) Oral daily    MEDICATIONS  (PRN):  LORazepam   Injectable 2 milliGRAM(s) IV Push every 2 hours PRN Symptom-triggered: 2 point increase in CIWA -Ar score and a total score of 7 or LESS  melatonin 3 milliGRAM(s) Oral at bedtime PRN Insomnia

## 2022-10-07 NOTE — PROGRESS NOTE ADULT - SUBJECTIVE AND OBJECTIVE BOX
Subjective/Objective: [id number] denies chest pain, sob, remained on Bipap overnight due to desat on 6L nc. Patient reports shortness of breath started on last Saturday and Sunday shortness of breath was very bad that he stayed in bed most of the day. He thought he came to hospital on past Monday afternoon. I clarified that he came to ED on Thursday afternoon. Patient stated " I don't remember what happen between Monday to Thursday". Patient reports non adherence to medication .sometime he missed 1 week of Blood Pressure medication. At time nonadherence to diet too.Took off Bipap this morning sating 90-93% on 4L nc. Appear comfortable. Nitro drip at 110mcg/min    Overnight event: restart home dose Nifedipine XL in an attempt to wean down nitro drip , Lasix 80mg IVP x1 given and increase Lasix drip to 10mg/hrr    Tele event: NSR with [facility]    MEDICATIONS    aspirin  chewable 81 milliGRAM(s) Oral daily  furosemide Infusion 10 mG/Hr IV Continuous <Continuous>  heparin   Injectable 5000 Unit(s) SubCutaneous every 8 hours  hydrALAZINE 50 milliGRAM(s) Oral every 8 hours  isosorbide   dinitrate Tablet (ISORDIL) 10 milliGRAM(s) Oral three times a day  nitroglycerin  Infusion 200 MICROgram(s)/Min IV Continuous <Continuous>  melatonin 3 milliGRAM(s) Oral at bedtime PRN  sertraline 100 milliGRAM(s) Oral daily  atorvastatin 20 milliGRAM(s) Oral at bedtimechlorhexidine 2% Cloths 1 Application(s) Topical <User Schedule>  influenza  Vaccine (HIGH DOSE) 0.7 milliLiter(s) IntraMuscular once  potassium chloride    Tablet ER 40 milliEquivalent(s) Oral every 4 hours  potassium chloride  10 mEq/100 mL IVPB 10 milliEquivalent(s) IV Intermittent every 1 hour          REVIEW OF SYSTEMS    General: no fatigue/malaise, weight loss/gain.  Skin: no rashes.  Ophthalmologic: no blurred vision, no loss of vision. 	  ENT: no sore throat, rhinorrhea, sinus congestion.  Cardiovascular:no chest pain ,no palpitation,no dizziness,no diaphoresis,no edema  Respiratory: + SOB, + cough or no  wheeze.  Gastrointestinal:  no N/V/D, no melena/hematemesis/hematochezia.  Genitourinary: no dysuria/hesitancy or hematuria.  Musculoskeletal: no myalgias or arthralgias.  Neurological: no changes in vision or hearing, no lightheadedness/dizziness, no syncope/near syncope	  Psychiatric: no unusual stress/anxiety      	    ICU Vital Signs Last 24 Hrs  T(C): 36.6 (07 Oct 2022 08:00), Max: 36.8 (06 Oct 2022 17:11)  T(F): 97.8 (07 Oct 2022 08:00), Max: 98.2 (06 Oct 2022 17:11)  HR: 86 (07 Oct 2022 07:00) (85 - 123)  BP: 145/101 (06 Oct 2022 23:15) (143/104 - 187/132)  BP(mean): 109 (06 Oct 2022 23:15) (101 - 148)  ABP: 150/66 (07 Oct 2022 07:00) (135/54 - 193/98)  ABP(mean): 91 (07 Oct 2022 07:00) (78 - 129)  RR: 27 (07 Oct 2022 07:00) (16 - 34)  SpO2: 95% (07 Oct 2022 07:00) (88% - 100%)    O2 Parameters below as of 07 Oct 2022 08:00  Patient On (Oxygen Delivery Method): nasal cannula            PHYSICAL EXAMINATION  Appearance: NAD, cachetic  HEENT: Moist Mucous Membranes, Anicteric, PERRL, EOMI  Cardiovascular: Regular rate and rhythm, Normal S1 S2, + JVD, No murmurs  Respiratory: b/l  Lungs crackle  to auscultation, No rhonchi, No wheezing. No tenderness to palpation  Gastrointestinal:  Soft, Non-tender, + BS  Neurologic: Non-focal, A&Ox3  Skin: Warm and dry, No rashes, No ecchymosis, No cyanosis  Musculoskeletal: No clubbing, No cyanosis, No edema  Vascular: Peripheral pulses palpable 2+ bilaterally  Psychiatry: Mood & affect appropriate      	    		      I&O's Summary    06 Oct 2022 07:01  -  07 Oct 2022 07:00  --------------------------------------------------------  IN: 721.5 mL / OUT: 1700 mL / NET: -978.5 mL    	 	  LABORATORY VALUES	 	                          10.4   10.33 )-----------( 157      ( 07 Oct 2022 05:45 )             30.2       10-07    123<L>  |  86<L>  |  20  ----------------------------<  141<H>  3.1<L>   |  24  |  0.81  10-06    124<L>  |  88<L>  |  21  ----------------------------<  135<H>  3.6   |  22  |  0.86    Ca    8.4      07 Oct 2022 05:45  Ca    8.5      06 Oct 2022 23:10  Phos  2.4     10-07  Phos  2.9     10-06  Mg     2.50     10-07  Mg     1.20     10-06    TPro  6.3  /  Alb  3.7  /  TBili  0.8  /  DBili  x   /  AST  45<H>  /  ALT  16  /  AlkPhos  67  10-07  TPro  6.1  /  Alb  3.6  /  TBili  0.8  /  DBili  x   /  AST  52<H>  /  ALT  17  /  AlkPhos  65  10-06    LIVER FUNCTIONS - ( 07 Oct 2022 05:45 )  Alb: 3.7 g/dL / Pro: 6.3 g/dL / ALK PHOS: 67 U/L / ALT: 16 U/L / AST: 45 U/L / GGT: x           Prothrombin Time, Plasma: 13.4 sec (10-06 @ 15:07)      CARDIAC MARKERS:  Creatine Kinase, Serum: 218 U/L (10-07 @ 05:45)  Creatine Kinase, Serum: 233 U/L (10-06 @ 23:10)  Creatine Kinase, Serum: 199 U/L (10-06 @ 15:07)      Serum Pro-Brain Natriuretic Peptide: >77804 pg/mL (10-06 @ 15:07)    Blood Gas Venous - Lactate: 2.5 mmol/L (10-06 @ 16:07)    10-07 @ 05:45  Cholesterol, Serum - 140  HDL Cholesterol, Serum- 68  Triglycerides, Serum- 62      Thyroid Stimulating Hormone, Serum: 3.15 uIU/mL (10-07 @ 05:45)    ABG - ( 07 Oct 2022 05:45 )  pH, Arterial: 7.51  pH, Blood: x     /  pCO2: 33    /  pO2: 73    / HCO3: 26    / Base Excess: 3.4   /  SaO2: 96.4          Diagnostic testing:    echo:    Assessment and Plan:71 yo male, PMH of HTN,HLD,CAD, Hepatitis C ( completed  Harvoni 2018) and current smoker  presenting with acute dyspnea, found to have hypoxic respiratory distress, hypertension to , and sinus tachycardia, with nonspecific ST changes on EKG, overall concerning for hypertensive emergency with pulmonary edema. Started on nitro drip and placed on bipap. Pt admitted to CCU for further care.      Neuro  # chronic depression  -c/w sertraline 100mg PO daily  -melatonin as needed for insomnia.  - AxOx3    Resp  #Dyspnea/pulmonary edema  - continue to wean off bipap  - h/o smoking x20 years   -No h/o COPD    CV  #Acute pulmonary edema.   -presents with acute shortness of breath found in pulmonary edema  -chest Xray showed pulmonary edema on 10/6  -c/w bipap- wean off to NC as tolerated  -lactate improved  - c/w  on lasix  drip at 10mg   - IN: 721.5 mL / OUT: 1700 mL / NET: -978.5 mL  -strict i/O- goal net neg 1-2 L /24hr.    Elevated trop  - Trop up trneding ; 108-> 149->166  -EKG changed  - no chest pain  - c/w asa 81mg , lipitor 20mg PO   - echo  - need ischemic work up once optimized     # HTN (hypertension).   - presents with  HTN emergency   -c/w  on nitro drip goal -150  -c/w isordil 10mg TID-> titrate up as tolerate  - c/w  on hydralazine 50mg TID  -echo ordered  - renal artery US      GI  - no issue  -c/w DASH diet      - no issue  -voiding without difficult        Endo  #Hyperlipidemia.   -c/w Lipitor 20mg Po daily  - lipid profile WNL    ID  # Leukocytosis on admission   -will monitor off antibiotics.-no fever  -covid PCR neg  -likely reactive  -will monitor off abx.    DVT ppx  heparin SQ               Subjective/Objective: Pateint denies chest pain, sob, remained on Bipap overnight due to desat on 6L nc. Patient reports shortness of breath started on last Saturday and Sunday shortness of breath was very bad that he stayed in bed most of the day. He thought he came to hospital on past Monday afternoon. I clarified that he came to ED on Thursday afternoon. Patient stated " I don't remember what happen between Monday to Thursday". Patient reports non adherence to medication .sometime he missed 1 week of Blood Pressure medication. At time nonadherence to diet too.Took off Bipap this morning sating 90-93% on 4L nc. Appear comfortable. Nitro drip at 110mcg/min    Overnight event: restart home dose Nifedipine XL in an attempt to wean down nitro drip , Lasix 80mg IVP x1 given and increase Lasix drip to 10mg/hr    Tele event: NSR with [facility]    MEDICATIONS    aspirin  chewable 81 milliGRAM(s) Oral daily  furosemide Infusion 10 mG/Hr IV Continuous <Continuous>  heparin   Injectable 5000 Unit(s) SubCutaneous every 8 hours  hydrALAZINE 50 milliGRAM(s) Oral every 8 hours  isosorbide   dinitrate Tablet (ISORDIL) 10 milliGRAM(s) Oral three times a day  nitroglycerin  Infusion 200 MICROgram(s)/Min IV Continuous <Continuous>  melatonin 3 milliGRAM(s) Oral at bedtime PRN  sertraline 100 milliGRAM(s) Oral daily  atorvastatin 20 milliGRAM(s) Oral at bedtimechlorhexidine 2% Cloths 1 Application(s) Topical <User Schedule>  influenza  Vaccine (HIGH DOSE) 0.7 milliLiter(s) IntraMuscular once  potassium chloride    Tablet ER 40 milliEquivalent(s) Oral every 4 hours  potassium chloride  10 mEq/100 mL IVPB 10 milliEquivalent(s) IV Intermittent every 1 hour          REVIEW OF SYSTEMS    General: no fatigue/malaise, weight loss/gain.  Skin: no rashes.  Ophthalmologic: no blurred vision, no loss of vision. 	  ENT: no sore throat, rhinorrhea, sinus congestion.  Cardiovascular:no chest pain ,no palpitation,no dizziness,no diaphoresis,no edema  Respiratory: + SOB, + cough or no  wheeze.  Gastrointestinal:  no N/V/D, no melena/hematemesis/hematochezia.  Genitourinary: no dysuria/hesitancy or hematuria.  Musculoskeletal: no myalgias or arthralgias.  Neurological: no changes in vision or hearing, no lightheadedness/dizziness, no syncope/near syncope	  Psychiatric: no unusual stress/anxiety      	    ICU Vital Signs Last 24 Hrs  T(C): 36.6 (07 Oct 2022 08:00), Max: 36.8 (06 Oct 2022 17:11)  T(F): 97.8 (07 Oct 2022 08:00), Max: 98.2 (06 Oct 2022 17:11)  HR: 86 (07 Oct 2022 07:00) (85 - 123)  BP: 145/101 (06 Oct 2022 23:15) (143/104 - 187/132)  BP(mean): 109 (06 Oct 2022 23:15) (101 - 148)  ABP: 150/66 (07 Oct 2022 07:00) (135/54 - 193/98)  ABP(mean): 91 (07 Oct 2022 07:00) (78 - 129)  RR: 27 (07 Oct 2022 07:00) (16 - 34)  SpO2: 95% (07 Oct 2022 07:00) (88% - 100%)    O2 Parameters below as of 07 Oct 2022 08:00  Patient On (Oxygen Delivery Method): nasal cannula            PHYSICAL EXAMINATION  Appearance: NAD, cachetic  HEENT: Moist Mucous Membranes, Anicteric, PERRL, EOMI  Cardiovascular: Regular rate and rhythm, Normal S1 S2, + JVD, No murmurs  Respiratory: b/l  Lungs crackle  to auscultation, No rhonchi, No wheezing. No tenderness to palpation  Gastrointestinal:  Soft, Non-tender, + BS  Neurologic: Non-focal, A&Ox3  Skin: Warm and dry, No rashes, No ecchymosis, No cyanosis  Musculoskeletal: No clubbing, No cyanosis, No edema  Vascular: Peripheral pulses palpable 2+ bilaterally  Psychiatry: Mood & affect appropriate      	    		      I&O's Summary    06 Oct 2022 07:01  -  07 Oct 2022 07:00  --------------------------------------------------------  IN: 721.5 mL / OUT: 1700 mL / NET: -978.5 mL    	 	  LABORATORY VALUES	 	                          10.4   10.33 )-----------( 157      ( 07 Oct 2022 05:45 )             30.2       10-07    123<L>  |  86<L>  |  20  ----------------------------<  141<H>  3.1<L>   |  24  |  0.81  10-06    124<L>  |  88<L>  |  21  ----------------------------<  135<H>  3.6   |  22  |  0.86    Ca    8.4      07 Oct 2022 05:45  Ca    8.5      06 Oct 2022 23:10  Phos  2.4     10-07  Phos  2.9     10-06  Mg     2.50     10-07  Mg     1.20     10-06    TPro  6.3  /  Alb  3.7  /  TBili  0.8  /  DBili  x   /  AST  45<H>  /  ALT  16  /  AlkPhos  67  10-07  TPro  6.1  /  Alb  3.6  /  TBili  0.8  /  DBili  x   /  AST  52<H>  /  ALT  17  /  AlkPhos  65  10-06    LIVER FUNCTIONS - ( 07 Oct 2022 05:45 )  Alb: 3.7 g/dL / Pro: 6.3 g/dL / ALK PHOS: 67 U/L / ALT: 16 U/L / AST: 45 U/L / GGT: x           Prothrombin Time, Plasma: 13.4 sec (10-06 @ 15:07)      CARDIAC MARKERS:  Creatine Kinase, Serum: 218 U/L (10-07 @ 05:45)  Creatine Kinase, Serum: 233 U/L (10-06 @ 23:10)  Creatine Kinase, Serum: 199 U/L (10-06 @ 15:07)      Serum Pro-Brain Natriuretic Peptide: >80422 pg/mL (10-06 @ 15:07)    Blood Gas Venous - Lactate: 2.5 mmol/L (10-06 @ 16:07)    10-07 @ 05:45  Cholesterol, Serum - 140  HDL Cholesterol, Serum- 68  Triglycerides, Serum- 62      Thyroid Stimulating Hormone, Serum: 3.15 uIU/mL (10-07 @ 05:45)    ABG - ( 07 Oct 2022 05:45 )  pH, Arterial: 7.51  pH, Blood: x     /  pCO2: 33    /  pO2: 73    / HCO3: 26    / Base Excess: 3.4   /  SaO2: 96.4          Diagnostic testing:    echo:    Assessment and Plan:73 yo male, PMH of HTN,HLD,CAD, Hepatitis C ( completed  Harvoni 2018) and current smoker  presenting with acute dyspnea, found to have hypoxic respiratory distress, hypertension to , and sinus tachycardia, with nonspecific ST changes on EKG, overall concerning for hypertensive emergency with pulmonary edema. Started on nitro drip and placed on bipap. Pt admitted to CCU for further care.      Neuro  # chronic depression  -c/w sertraline 100mg PO daily  -melatonin as needed for insomnia.  - AxOx3    Resp  #Dyspnea/pulmonary edema  - continue to wean off bipap  - h/o smoking x20 years   -No h/o COPD    CV  #Acute pulmonary edema.   -presents with acute shortness of breath found in pulmonary edema  -chest Xray showed pulmonary edema on 10/6  -c/w bipap- wean off to NC as tolerated  -lactate improved  - c/w  on lasix  drip at 10mg   - IN: 721.5 mL / OUT: 1700 mL / NET: -978.5 mL  -strict i/O- goal net neg 1-2 L /24hr.    Elevated trop  - Trop up trneding ; 108-> 149->166  -EKG changed  - no chest pain  - c/w asa 81mg , lipitor 20mg PO   - echo  - need ischemic work up once optimized     # HTN (hypertension).   - presents with  HTN emergency   -c/w  on nitro drip goal -150  -c/w isordil 10mg TID-> titrate up as tolerate  - c/w  on hydralazine 50mg TID  -echo ordered  - renal artery US      GI  - no issue  -c/w DASH diet      - no issue  -voiding without difficult        Endo  #Hyperlipidemia.   -c/w Lipitor 20mg Po daily  - lipid profile WNL    ID  # Leukocytosis on admission   -will monitor off antibiotics.-no fever  -covid PCR neg  -likely reactive  -will monitor off abx.    DVT ppx  heparin SQ               Subjective/Objective: Pateint denies chest pain, sob, remained on Bipap overnight due to desat on 6L nc. Patient reports shortness of breath started on last Saturday and Sunday shortness of breath was very bad that he stayed in bed most of the day. He thought he came to hospital on past Monday afternoon. I clarified that he came to ED on Thursday afternoon. Patient stated " I don't remember what happen between Monday to Thursday". Patient reports non adherence to medication .sometime he missed 1 week of Blood Pressure medication. At time nonadherence to diet too.Took off Bipap this morning sating 90-93% on 4L nc. Appear comfortable. Nitro drip at 110mcg/min    Overnight event: restart home dose Nifedipine XL in an attempt to wean down nitro drip , Lasix 80mg IVP x1 given and increase Lasix drip to 10mg/hr    Tele event: NSR with [facility]    MEDICATIONS    aspirin  chewable 81 milliGRAM(s) Oral daily  furosemide Infusion 10 mG/Hr IV Continuous <Continuous>  heparin   Injectable 5000 Unit(s) SubCutaneous every 8 hours  hydrALAZINE 50 milliGRAM(s) Oral every 8 hours  isosorbide   dinitrate Tablet (ISORDIL) 10 milliGRAM(s) Oral three times a day  nitroglycerin  Infusion 200 MICROgram(s)/Min IV Continuous <Continuous>  melatonin 3 milliGRAM(s) Oral at bedtime PRN  sertraline 100 milliGRAM(s) Oral daily  atorvastatin 20 milliGRAM(s) Oral at bedtimechlorhexidine 2% Cloths 1 Application(s) Topical <User Schedule>  influenza  Vaccine (HIGH DOSE) 0.7 milliLiter(s) IntraMuscular once  potassium chloride    Tablet ER 40 milliEquivalent(s) Oral every 4 hours  potassium chloride  10 mEq/100 mL IVPB 10 milliEquivalent(s) IV Intermittent every 1 hour          REVIEW OF SYSTEMS    General: no fatigue/malaise, weight loss/gain.  Skin: no rashes.  Ophthalmologic: no blurred vision, no loss of vision. 	  ENT: no sore throat, rhinorrhea, sinus congestion.  Cardiovascular:no chest pain ,no palpitation,no dizziness,no diaphoresis,no edema  Respiratory: + SOB, + cough or no  wheeze.  Gastrointestinal:  no N/V/D, no melena/hematemesis/hematochezia.  Genitourinary: no dysuria/hesitancy or hematuria.  Musculoskeletal: no myalgias or arthralgias.  Neurological: no changes in vision or hearing, no lightheadedness/dizziness, no syncope/near syncope	  Psychiatric: no unusual stress/anxiety      	    ICU Vital Signs Last 24 Hrs  T(C): 36.6 (07 Oct 2022 08:00), Max: 36.8 (06 Oct 2022 17:11)  T(F): 97.8 (07 Oct 2022 08:00), Max: 98.2 (06 Oct 2022 17:11)  HR: 86 (07 Oct 2022 07:00) (85 - 123)  BP: 145/101 (06 Oct 2022 23:15) (143/104 - 187/132)  BP(mean): 109 (06 Oct 2022 23:15) (101 - 148)  ABP: 150/66 (07 Oct 2022 07:00) (135/54 - 193/98)  ABP(mean): 91 (07 Oct 2022 07:00) (78 - 129)  RR: 27 (07 Oct 2022 07:00) (16 - 34)  SpO2: 95% (07 Oct 2022 07:00) (88% - 100%)    O2 Parameters below as of 07 Oct 2022 08:00  Patient On (Oxygen Delivery Method): nasal cannula            PHYSICAL EXAMINATION  Appearance: NAD, cachetic  HEENT: Moist Mucous Membranes, Anicteric, PERRL, EOMI  Cardiovascular: Regular rate and rhythm, Normal S1 S2, + JVD, No murmurs  Respiratory: b/l  Lungs crackle  to auscultation, No rhonchi, No wheezing. No tenderness to palpation  Gastrointestinal:  Soft, Non-tender, + BS  Neurologic: Non-focal, A&Ox3  Skin: Warm and dry, No rashes, No ecchymosis, No cyanosis  Musculoskeletal: No clubbing, No cyanosis, No edema  Vascular: Peripheral pulses palpable 2+ bilaterally  Psychiatry: Mood & affect appropriate      	    		      I&O's Summary    06 Oct 2022 07:01  -  07 Oct 2022 07:00  --------------------------------------------------------  IN: 721.5 mL / OUT: 1700 mL / NET: -978.5 mL    	 	  LABORATORY VALUES	 	                          10.4   10.33 )-----------( 157      ( 07 Oct 2022 05:45 )             30.2       10-07    123<L>  |  86<L>  |  20  ----------------------------<  141<H>  3.1<L>   |  24  |  0.81  10-06    124<L>  |  88<L>  |  21  ----------------------------<  135<H>  3.6   |  22  |  0.86    Ca    8.4      07 Oct 2022 05:45  Ca    8.5      06 Oct 2022 23:10  Phos  2.4     10-07  Phos  2.9     10-06  Mg     2.50     10-07  Mg     1.20     10-06    TPro  6.3  /  Alb  3.7  /  TBili  0.8  /  DBili  x   /  AST  45<H>  /  ALT  16  /  AlkPhos  67  10-07  TPro  6.1  /  Alb  3.6  /  TBili  0.8  /  DBili  x   /  AST  52<H>  /  ALT  17  /  AlkPhos  65  10-06    LIVER FUNCTIONS - ( 07 Oct 2022 05:45 )  Alb: 3.7 g/dL / Pro: 6.3 g/dL / ALK PHOS: 67 U/L / ALT: 16 U/L / AST: 45 U/L / GGT: x           Prothrombin Time, Plasma: 13.4 sec (10-06 @ 15:07)      CARDIAC MARKERS:  Creatine Kinase, Serum: 218 U/L (10-07 @ 05:45)  Creatine Kinase, Serum: 233 U/L (10-06 @ 23:10)  Creatine Kinase, Serum: 199 U/L (10-06 @ 15:07)      Serum Pro-Brain Natriuretic Peptide: >94310 pg/mL (10-06 @ 15:07)    Blood Gas Venous - Lactate: 2.5 mmol/L (10-06 @ 16:07)    10-07 @ 05:45  Cholesterol, Serum - 140  HDL Cholesterol, Serum- 68  Triglycerides, Serum- 62      Thyroid Stimulating Hormone, Serum: 3.15 uIU/mL (10-07 @ 05:45)    ABG - ( 07 Oct 2022 05:45 )  pH, Arterial: 7.51  pH, Blood: x     /  pCO2: 33    /  pO2: 73    / HCO3: 26    / Base Excess: 3.4   /  SaO2: 96.4          Diagnostic testing:    echo:    Assessment and Plan:73 yo male, PMH of HTN,HLD,CAD, Hepatitis C ( completed  Harvoni 2018) and current smoker  presenting with acute dyspnea, found to have hypoxic respiratory distress, hypertension to , and sinus tachycardia, with nonspecific ST changes on EKG, overall concerning for hypertensive emergency with pulmonary edema. Started on nitro drip and placed on bipap. Pt admitted to CCU for further care.      Neuro  # chronic depression  -c/w sertraline 100mg PO daily  -melatonin as needed for insomnia.  - AxOx3    Resp  #Dyspnea/pulmonary edema  - continue to wean off bipap  - h/o smoking x20 years   -No h/o COPD    CV  #acute CHFrEF/Acute pulmonary edema.   -presents with acute shortness of breath found in pulmonary edema  -chest Xray showed pulmonary edema on 10/6  -c/w bipap- wean off to NC as tolerated  -lactate improved  -c/w hydralazine, isordil and entresto  - wean off nitro drip   - c/w  on lasix  drip at 10mg   echo ; ,  global LVSD, mod MR, enlarge RV with normal RVF  - IN: 721.5 mL / OUT: 1700 mL / NET: -978.5 mL  -strict i/O- goal net neg 1-2 L /24hr.   HF consulted      Elevated trop  - Trop up trending ; 108-> 149->166. CKMB dowm trending- likely Type II  - intial EKG WEN 1mm in V1-V3, STD in inferior leads which improved  - no chest pain  - c/w asa 81mg , increase Lipitor 80mg PO   - echo  - need ischemic work up once optimized     # HTN (hypertension).   - presents with  HTN emergency   -c/w  on nitro drip goal -150  -c/w isordil 10mg TID-> titrate up as tolerate  - c/w  on hydralazine 50mg TID    Electrolyte  # Hyponatremia  -Na 126-> 123  - likely 2/2 hypervolemia or  diuretic or alcohol use    - monitor BMP    ETOH abuse  - drink beer 2-3 cans every day and 1 hot of RUM every day  - no h/o withdrawal  -CIWA protocol       GI  - no issue  -c/w DASH diet      - no issue  -voiding without difficult        Endo  #Hyperlipidemia.   -c/w Lipitor 20mg Po daily  - lipid profile WNL    ID  # Leukocytosis on admission   -will monitor off antibiotics.-no fever  -covid PCR neg  -likely reactive  -will monitor off abx.    DVT ppx  heparin SQ               Subjective/Objective: Pateint denies chest pain, sob, remained on Bipap overnight due to desat on 6L nc. Patient reports shortness of breath started on last Saturday and Sunday shortness of breath was very bad that he stayed in bed most of the day. He thought he came to hospital on past Monday afternoon. I clarified that he came to ED on Thursday afternoon. Patient stated " I don't remember what happen between Monday to Thursday". Patient reports non adherence to medication .sometime he missed 1 week of Blood Pressure medication. At time nonadherence to diet too.Took off Bipap this morning sating 90-93% on 4L nc. Appear comfortable. Nitro drip at 110mcg/min    Overnight event: restart home dose Nifedipine XL in an attempt to wean down nitro drip , Lasix 80mg IVP x1 given and increase Lasix drip to 10mg/hr    Tele event: NSR with [facility]    MEDICATIONS    aspirin  chewable 81 milliGRAM(s) Oral daily  furosemide Infusion 10 mG/Hr IV Continuous <Continuous>  heparin   Injectable 5000 Unit(s) SubCutaneous every 8 hours  hydrALAZINE 50 milliGRAM(s) Oral every 8 hours  isosorbide   dinitrate Tablet (ISORDIL) 10 milliGRAM(s) Oral three times a day  nitroglycerin  Infusion 200 MICROgram(s)/Min IV Continuous <Continuous>  melatonin 3 milliGRAM(s) Oral at bedtime PRN  sertraline 100 milliGRAM(s) Oral daily  atorvastatin 20 milliGRAM(s) Oral at bedtimechlorhexidine 2% Cloths 1 Application(s) Topical <User Schedule>  influenza  Vaccine (HIGH DOSE) 0.7 milliLiter(s) IntraMuscular once  potassium chloride    Tablet ER 40 milliEquivalent(s) Oral every 4 hours  potassium chloride  10 mEq/100 mL IVPB 10 milliEquivalent(s) IV Intermittent every 1 hour          REVIEW OF SYSTEMS    General: no fatigue/malaise, weight loss/gain.  Skin: no rashes.  Ophthalmologic: no blurred vision, no loss of vision. 	  ENT: no sore throat, rhinorrhea, sinus congestion.  Cardiovascular:no chest pain ,no palpitation,no dizziness,no diaphoresis,no edema  Respiratory: + SOB, + cough or no  wheeze.  Gastrointestinal:  no N/V/D, no melena/hematemesis/hematochezia.  Genitourinary: no dysuria/hesitancy or hematuria.  Musculoskeletal: no myalgias or arthralgias.  Neurological: no changes in vision or hearing, no lightheadedness/dizziness, no syncope/near syncope	  Psychiatric: no unusual stress/anxiety      	    ICU Vital Signs Last 24 Hrs  T(C): 36.6 (07 Oct 2022 08:00), Max: 36.8 (06 Oct 2022 17:11)  T(F): 97.8 (07 Oct 2022 08:00), Max: 98.2 (06 Oct 2022 17:11)  HR: 86 (07 Oct 2022 07:00) (85 - 123)  BP: 145/101 (06 Oct 2022 23:15) (143/104 - 187/132)  BP(mean): 109 (06 Oct 2022 23:15) (101 - 148)  ABP: 150/66 (07 Oct 2022 07:00) (135/54 - 193/98)  ABP(mean): 91 (07 Oct 2022 07:00) (78 - 129)  RR: 27 (07 Oct 2022 07:00) (16 - 34)  SpO2: 95% (07 Oct 2022 07:00) (88% - 100%)    O2 Parameters below as of 07 Oct 2022 08:00  Patient On (Oxygen Delivery Method): nasal cannula            PHYSICAL EXAMINATION  Appearance: NAD, cachetic  HEENT: Moist Mucous Membranes, Anicteric, PERRL, EOMI  Cardiovascular: Regular rate and rhythm, Normal S1 S2, + JVD, No murmurs  Respiratory: b/l  Lungs crackle  to auscultation, No rhonchi, No wheezing. No tenderness to palpation  Gastrointestinal:  Soft, Non-tender, + BS  Neurologic: Non-focal, A&Ox3  Skin: Warm and dry, No rashes, No ecchymosis, No cyanosis  Musculoskeletal: No clubbing, No cyanosis, No edema  Vascular: Peripheral pulses palpable 2+ bilaterally  Psychiatry: Mood & affect appropriate      	    		      I&O's Summary    06 Oct 2022 07:01  -  07 Oct 2022 07:00  --------------------------------------------------------  IN: 721.5 mL / OUT: 1700 mL / NET: -978.5 mL    	 	  LABORATORY VALUES	 	                          10.4   10.33 )-----------( 157      ( 07 Oct 2022 05:45 )             30.2       10-07    123<L>  |  86<L>  |  20  ----------------------------<  141<H>  3.1<L>   |  24  |  0.81  10-06    124<L>  |  88<L>  |  21  ----------------------------<  135<H>  3.6   |  22  |  0.86    Ca    8.4      07 Oct 2022 05:45  Ca    8.5      06 Oct 2022 23:10  Phos  2.4     10-07  Phos  2.9     10-06  Mg     2.50     10-07  Mg     1.20     10-06    TPro  6.3  /  Alb  3.7  /  TBili  0.8  /  DBili  x   /  AST  45<H>  /  ALT  16  /  AlkPhos  67  10-07  TPro  6.1  /  Alb  3.6  /  TBili  0.8  /  DBili  x   /  AST  52<H>  /  ALT  17  /  AlkPhos  65  10-06    LIVER FUNCTIONS - ( 07 Oct 2022 05:45 )  Alb: 3.7 g/dL / Pro: 6.3 g/dL / ALK PHOS: 67 U/L / ALT: 16 U/L / AST: 45 U/L / GGT: x           Prothrombin Time, Plasma: 13.4 sec (10-06 @ 15:07)      CARDIAC MARKERS:  Creatine Kinase, Serum: 218 U/L (10-07 @ 05:45)  Creatine Kinase, Serum: 233 U/L (10-06 @ 23:10)  Creatine Kinase, Serum: 199 U/L (10-06 @ 15:07)      Serum Pro-Brain Natriuretic Peptide: >93574 pg/mL (10-06 @ 15:07)    Blood Gas Venous - Lactate: 2.5 mmol/L (10-06 @ 16:07)    10-07 @ 05:45  Cholesterol, Serum - 140  HDL Cholesterol, Serum- 68  Triglycerides, Serum- 62      Thyroid Stimulating Hormone, Serum: 3.15 uIU/mL (10-07 @ 05:45)    ABG - ( 07 Oct 2022 05:45 )  pH, Arterial: 7.51  pH, Blood: x     /  pCO2: 33    /  pO2: 73    / HCO3: 26    / Base Excess: 3.4   /  SaO2: 96.4          Diagnostic testing:    echo:    Assessment and Plan:71 yo male, PMH of HTN,HLD,CAD, Hepatitis C ( completed  [facility] 2018) and current smoker  presenting with acute dyspnea, found to have hypoxic respiratory distress, hypertension to , and sinus tachycardia, with nonspecific ST changes on EKG, overall concerning for hypertensive emergency with pulmonary edema. Started on nitro drip and placed on bipap. Pt admitted to CCU for further care.      Neuro  # chronic depression  -c/w sertraline 100mg PO daily  -melatonin as needed for insomnia.  - AxOx3    Resp  #Dyspnea/pulmonary edema  - continue to wean off bipap  - h/o smoking x20 years   -No h/o COPD    CV  #acute CHFrEF/Acute pulmonary edema.   -presents with acute shortness of breath found in pulmonary edema  -chest Xray showed pulmonary edema on 10/6  -c/w bipap- wean off to NC as tolerated  -lactate improved  -c/w hydralazine, isordil and entresto  - wean off nitro drip   - c/w  on lasix  drip at 10mg   echo ; ,  global LVSD, mod MR, enlarge RV with normal RVF  - IN: 721.5 mL / OUT: 1700 mL / NET: -978.5 mL  -strict i/O- goal net neg 1-2 L /24hr.   HF consulted      Elevated trop  - Trop up trending ; 108-> 149->166. CKMB dowm trending- likely Type II  - intial EKG WEN 1mm in V1-V3, STD in inferior leads which improved  - no chest pain  - c/w asa 81mg , increase Lipitor 80mg PO   - echo  - need ischemic work up once optimized     # HTN (hypertension).   - presents with  HTN emergency   -c/w  on nitro drip goal -150  -c/w isordil 10mg TID-> titrate up as tolerate  - c/w  on hydralazine 50mg TID    Electrolyte  # Hyponatremia  -Na 126-> 123  - likely 2/2 hypervolemia or  diuretic or alcohol use    - monitor BMP    ETOH abuse  - drink beer 2-3 cans every day and 1 hot of RUM every day  - no h/o withdrawal  -CIWA protocol   - started on folic acid and thiamin      GI  - no issue  -c/w DASH diet      - no issue  -voiding without difficult        Endo  #Hyperlipidemia.   -c/w Lipitor 20mg Po daily  - lipid profile WNL    ID  # Leukocytosis on admission   -will monitor off antibiotics.-no fever  -covid PCR neg  -likely reactive  -will monitor off abx.    DVT ppx  heparin SQ               Subjective/Objective: Pateint denies chest pain, sob, remained on Bipap overnight due to desat on 6L nc. Patient reports shortness of breath started on last Saturday and Sunday shortness of breath was very bad that he stayed in bed most of the day. He thought he came to hospital on past Monday afternoon. I clarified that he came to ED on Thursday afternoon. Patient stated " I don't remember what happen between Monday to Thursday". Patient reports non adherence to medication .sometime he missed 1 week of Blood Pressure medication. At time nonadherence to diet too.Took off Bipap this morning sating 90-93% on 4L nc. Appear comfortable. Nitro drip at 110mcg/min    Overnight event: restart home dose Nifedipine XL in an attempt to wean down nitro drip , Lasix 80mg IVP x1 given and increase Lasix drip to 10mg/hr    Tele event: NSR with [facility]    MEDICATIONS    aspirin  chewable 81 milliGRAM(s) Oral daily  furosemide Infusion 10 mG/Hr IV Continuous <Continuous>  heparin   Injectable 5000 Unit(s) SubCutaneous every 8 hours  hydrALAZINE 50 milliGRAM(s) Oral every 8 hours  isosorbide   dinitrate Tablet (ISORDIL) 10 milliGRAM(s) Oral three times a day  nitroglycerin  Infusion 200 MICROgram(s)/Min IV Continuous <Continuous>  melatonin 3 milliGRAM(s) Oral at bedtime PRN  sertraline 100 milliGRAM(s) Oral daily  atorvastatin 20 milliGRAM(s) Oral at bedtimechlorhexidine 2% Cloths 1 Application(s) Topical <User Schedule>  influenza  Vaccine (HIGH DOSE) 0.7 milliLiter(s) IntraMuscular once  potassium chloride    Tablet ER 40 milliEquivalent(s) Oral every 4 hours  potassium chloride  10 mEq/100 mL IVPB 10 milliEquivalent(s) IV Intermittent every 1 hour          REVIEW OF SYSTEMS    General: no fatigue/malaise, weight loss/gain.  Skin: no rashes.  Ophthalmologic: no blurred vision, no loss of vision. 	  ENT: no sore throat, rhinorrhea, sinus congestion.  Cardiovascular:no chest pain ,no palpitation,no dizziness,no diaphoresis,no edema  Respiratory: + SOB, + cough or no  wheeze.  Gastrointestinal:  no N/V/D, no melena/hematemesis/hematochezia.  Genitourinary: no dysuria/hesitancy or hematuria.  Musculoskeletal: no myalgias or arthralgias.  Neurological: no changes in vision or hearing, no lightheadedness/dizziness, no syncope/near syncope	  Psychiatric: no unusual stress/anxiety      	    ICU Vital Signs Last 24 Hrs  T(C): 36.6 (07 Oct 2022 08:00), Max: 36.8 (06 Oct 2022 17:11)  T(F): 97.8 (07 Oct 2022 08:00), Max: 98.2 (06 Oct 2022 17:11)  HR: 86 (07 Oct 2022 07:00) (85 - 123)  BP: 145/101 (06 Oct 2022 23:15) (143/104 - 187/132)  BP(mean): 109 (06 Oct 2022 23:15) (101 - 148)  ABP: 150/66 (07 Oct 2022 07:00) (135/54 - 193/98)  ABP(mean): 91 (07 Oct 2022 07:00) (78 - 129)  RR: 27 (07 Oct 2022 07:00) (16 - 34)  SpO2: 95% (07 Oct 2022 07:00) (88% - 100%)    O2 Parameters below as of 07 Oct 2022 08:00  Patient On (Oxygen Delivery Method): nasal cannula            PHYSICAL EXAMINATION  Appearance: NAD, cachetic  HEENT: Moist Mucous Membranes, Anicteric, PERRL, EOMI  Cardiovascular: Regular rate and rhythm, Normal S1 S2, + JVD, No murmurs  Respiratory: b/l  Lungs crackle  to auscultation, No rhonchi, No wheezing. No tenderness to palpation  Gastrointestinal:  Soft, Non-tender, + BS  Neurologic: Non-focal, A&Ox3  Skin: Warm and dry, No rashes, No ecchymosis, No cyanosis  Musculoskeletal: No clubbing, No cyanosis, No edema  Vascular: Peripheral pulses palpable 2+ bilaterally  Psychiatry: Mood & affect appropriate      	    		      I&O's Summary    06 Oct 2022 07:01  -  07 Oct 2022 07:00  --------------------------------------------------------  IN: 721.5 mL / OUT: 1700 mL / NET: -978.5 mL    	 	  LABORATORY VALUES	 	                          10.4   10.33 )-----------( 157      ( 07 Oct 2022 05:45 )             30.2       10-07    123<L>  |  86<L>  |  20  ----------------------------<  141<H>  3.1<L>   |  24  |  0.81  10-06    124<L>  |  88<L>  |  21  ----------------------------<  135<H>  3.6   |  22  |  0.86    Ca    8.4      07 Oct 2022 05:45  Ca    8.5      06 Oct 2022 23:10  Phos  2.4     10-07  Phos  2.9     10-06  Mg     2.50     10-07  Mg     1.20     10-06    TPro  6.3  /  Alb  3.7  /  TBili  0.8  /  DBili  x   /  AST  45<H>  /  ALT  16  /  AlkPhos  67  10-07  TPro  6.1  /  Alb  3.6  /  TBili  0.8  /  DBili  x   /  AST  52<H>  /  ALT  17  /  AlkPhos  65  10-06    LIVER FUNCTIONS - ( 07 Oct 2022 05:45 )  Alb: 3.7 g/dL / Pro: 6.3 g/dL / ALK PHOS: 67 U/L / ALT: 16 U/L / AST: 45 U/L / GGT: x           Prothrombin Time, Plasma: 13.4 sec (10-06 @ 15:07)      CARDIAC MARKERS:  Creatine Kinase, Serum: 218 U/L (10-07 @ 05:45)  Creatine Kinase, Serum: 233 U/L (10-06 @ 23:10)  Creatine Kinase, Serum: 199 U/L (10-06 @ 15:07)      Serum Pro-Brain Natriuretic Peptide: >67735 pg/mL (10-06 @ 15:07)    Blood Gas Venous - Lactate: 2.5 mmol/L (10-06 @ 16:07)    10-07 @ 05:45  Cholesterol, Serum - 140  HDL Cholesterol, Serum- 68  Triglycerides, Serum- 62      Thyroid Stimulating Hormone, Serum: 3.15 uIU/mL (10-07 @ 05:45)    ABG - ( 07 Oct 2022 05:45 )  pH, Arterial: 7.51  pH, Blood: x     /  pCO2: 33    /  pO2: 73    / HCO3: 26    / Base Excess: 3.4   /  SaO2: 96.4          Diagnostic testing:    echo:< from: Transthoracic Echocardiogram (10.06.22 @ 18:39) >  Dimensions:     Normal Values:  LA:     4.0 cm    2.0 - 4.0 cm  Ao:     3.5 cm    2.0 - 3.8 cm  SEPTUM: 1.1 cm    0.6 - 1.2 cm  PWT:  1.2 cm    0.6 - 1.1 cm  LVIDd:  4.5 cm    3.0 - 5.6 cm  LVIDs:    ---     1.8 - 4.0 cm  Derived Variables:  LVMI: 116 g/m2  RWT: 0.53  Ejection Fraction (Visual Estimate): 25 %  ------------------------------------------------------------------------  OBSERVATIONS:  Mitral Valve: Mitral annular calcification, tethered mitralleaflets with normal diastolic opening. Moderate mitralregurgitation.  Aortic Root: Normal aortic root.  Aortic Valve: Calcified trileaflet aortic valve with normalopening. Mild aortic regurgitation.  Left Atrium: Moderately dilated left atrium.  LA volumeindex = 43 cc/m2.  Left Ventricle: Severe global left ventricular systolicdysfunction. Mild concentric left ventricular hypertrophy.(DT:161 ms)  Right Heart: Normal right atrium. Right ventricularenlargement with normal right ventricular systolicfunction. Normal tricuspid valve.  Moderate tricuspidregurgitation. Normal pulmonic valve.  Pericardium/PleuraNormal pericardium with no pericardialeffusion. Bilateral pleural effusions.Hemodynamic: Estimated right ventricular systolic pressureequals 56 mm Hg, assuming right atrial pressure equals 10  mm Hg, consistent with moderate pulmonary hypertension.    < end of copied text >      Assessment and Plan:71 yo male, PMH of HTN,HLD,CAD, Hepatitis C ( completed  [place] 2018) and current smoker  presenting with acute dyspnea, found to have hypoxic respiratory distress, hypertension to , and sinus tachycardia, with nonspecific ST changes on EKG, overall concerning for hypertensive emergency with pulmonary edema. Started on nitro drip and placed on bipap. Pt admitted to CCU for further care.      Neuro  # chronic depression  -c/w sertraline 100mg PO daily  -melatonin as needed for insomnia.  - AxOx3    Resp  #Dyspnea 2/2 pulmonary edema vs COPD  - ABG showed  Resp alkalosis  - off bipap -> Hiflow for hypoxemia  - h/o smoking x20 years   -No h/o COPD- chest Xray showed hyperinflated lungs  - pulmonary consulted    CV  #acute CHFrEF /Acute pulmonary edema.   -presents with acute shortness of breath found in pulmonary edema  -chest Xray showed pulmonary edema on 10/6  -c/w bipap- wean off to NC as tolerated  -lactate improved  -c/w hydralazine, isordil and Entresto  - wean off nitro drip   - c/w  on lasix  drip at 10mg   - Echo ; EF 25%, Sever global LVSD, mod MR, enlarge RV with normal RVF  - IN: 721.5 mL / OUT: 1700 mL / NET: -978.5 mL  -strict i/O- goal net neg 1-2 L /24hr.   - HF consulted      Elevated trop  - Trop up trending ; 108-> 149->166. CKMB dowm trending- likely Type II  - intial EKG WEN 1mm in V1-V3, STD in inferior leads which improved  - no chest pain  - c/w asa 81mg , increase Lipitor 80mg PO   - echo showed low EF with Severe LVSD  - need  evaluation cath for  ischemic work up once optimized     # HTN (hypertension).   - presents with  HTN emergency   -c/w  on nitro drip goal -150  -c/w isordil 10mg TID-> titrate up as tolerate  - c/w  on hydralazine 50mg TID    Electrolyte  # Hyponatremia  -Na 126-> 123  - likely 2/2 hypervolemia or  diuretic or alcohol use    - monitor BMP    ETOH abuse  - drinks beer 2-3 cans every day and 1 hot of RUM every day  - no h/o withdrawal  -CIWA protocol   - started on folic acid and thiamin      GI  - no issue  -c/w DASH diet      - no issue  -voiding without difficult        Endo  #Hyperlipidemia.   -c/w Lipitor 20mg Po daily  - lipid profile WNL    ID  # Leukocytosis on admission   -will monitor off antibiotics.-no fever  -covid PCR neg  -likely reactive  -will monitor off abx.    DVT ppx  heparin SQ               Subjective/Objective: Pateint denies chest pain, sob, remained on Bipap overnight due to desat on 6L nc. Patient reports shortness of breath started on last Saturday and Sunday shortness of breath was very bad that he stayed in bed most of the day. He thought he came to hospital on past Monday afternoon. I clarified that he came to ED on Thursday afternoon. Patient stated " I don't remember what happen between Monday to Thursday". Patient reports non adherence to medication .sometime he missed 1 week of Blood Pressure medication. At time nonadherence to diet too.Took off Bipap this morning sating 90-93% on 4L nc. Appear comfortable. Nitro drip at 110mcg/min    Overnight event: restart home dose Nifedipine XL in an attempt to wean down nitro drip , Lasix 80mg IVP x1 given and increase Lasix drip to 10mg/hr    Tele event: NSR with [facility]    MEDICATIONS    aspirin  chewable 81 milliGRAM(s) Oral daily  furosemide Infusion 10 mG/Hr IV Continuous <Continuous>  heparin   Injectable 5000 Unit(s) SubCutaneous every 8 hours  hydrALAZINE 50 milliGRAM(s) Oral every 8 hours  isosorbide   dinitrate Tablet (ISORDIL) 10 milliGRAM(s) Oral three times a day  nitroglycerin  Infusion 200 MICROgram(s)/Min IV Continuous <Continuous>  melatonin 3 milliGRAM(s) Oral at bedtime PRN  sertraline 100 milliGRAM(s) Oral daily  atorvastatin 20 milliGRAM(s) Oral at bedtimechlorhexidine 2% Cloths 1 Application(s) Topical <User Schedule>  influenza  Vaccine (HIGH DOSE) 0.7 milliLiter(s) IntraMuscular once  potassium chloride    Tablet ER 40 milliEquivalent(s) Oral every 4 hours  potassium chloride  10 mEq/100 mL IVPB 10 milliEquivalent(s) IV Intermittent every 1 hour          REVIEW OF SYSTEMS    General: no fatigue/malaise, weight loss/gain.  Skin: no rashes.  Ophthalmologic: no blurred vision, no loss of vision. 	  ENT: no sore throat, rhinorrhea, sinus congestion.  Cardiovascular:no chest pain ,no palpitation,no dizziness,no diaphoresis,no edema  Respiratory: + SOB, + cough or no  wheeze.  Gastrointestinal:  no N/V/D, no melena/hematemesis/hematochezia.  Genitourinary: no dysuria/hesitancy or hematuria.  Musculoskeletal: no myalgias or arthralgias.  Neurological: no changes in vision or hearing, no lightheadedness/dizziness, no syncope/near syncope	  Psychiatric: no unusual stress/anxiety      	    ICU Vital Signs Last 24 Hrs  T(C): 36.6 (07 Oct 2022 08:00), Max: 36.8 (06 Oct 2022 17:11)  T(F): 97.8 (07 Oct 2022 08:00), Max: 98.2 (06 Oct 2022 17:11)  HR: 86 (07 Oct 2022 07:00) (85 - 123)  BP: 145/101 (06 Oct 2022 23:15) (143/104 - 187/132)  BP(mean): 109 (06 Oct 2022 23:15) (101 - 148)  ABP: 150/66 (07 Oct 2022 07:00) (135/54 - 193/98)  ABP(mean): 91 (07 Oct 2022 07:00) (78 - 129)  RR: 27 (07 Oct 2022 07:00) (16 - 34)  SpO2: 95% (07 Oct 2022 07:00) (88% - 100%)    O2 Parameters below as of 07 Oct 2022 08:00  Patient On (Oxygen Delivery Method): nasal cannula            PHYSICAL EXAMINATION  Appearance: NAD, cachetic  HEENT: Moist Mucous Membranes, Anicteric, PERRL, EOMI  Cardiovascular: Regular rate and rhythm, Normal S1 S2, + JVD, No murmurs  Respiratory: b/l  Lungs crackle  to auscultation, No rhonchi, No wheezing. No tenderness to palpation  Gastrointestinal:  Soft, Non-tender, + BS  Neurologic: Non-focal, A&Ox3  Skin: Warm and dry, No rashes, No ecchymosis, No cyanosis  Musculoskeletal: No clubbing, No cyanosis, No edema  Vascular: Peripheral pulses palpable 2+ bilaterally  Psychiatry: Mood & affect appropriate      	    		      I&O's Summary    06 Oct 2022 07:01  -  07 Oct 2022 07:00  --------------------------------------------------------  IN: 721.5 mL / OUT: 1700 mL / NET: -978.5 mL    	 	  LABORATORY VALUES	 	                          10.4   10.33 )-----------( 157      ( 07 Oct 2022 05:45 )             30.2       10-07    123<L>  |  86<L>  |  20  ----------------------------<  141<H>  3.1<L>   |  24  |  0.81  10-06    124<L>  |  88<L>  |  21  ----------------------------<  135<H>  3.6   |  22  |  0.86    Ca    8.4      07 Oct 2022 05:45  Ca    8.5      06 Oct 2022 23:10  Phos  2.4     10-07  Phos  2.9     10-06  Mg     2.50     10-07  Mg     1.20     10-06    TPro  6.3  /  Alb  3.7  /  TBili  0.8  /  DBili  x   /  AST  45<H>  /  ALT  16  /  AlkPhos  67  10-07  TPro  6.1  /  Alb  3.6  /  TBili  0.8  /  DBili  x   /  AST  52<H>  /  ALT  17  /  AlkPhos  65  10-06    LIVER FUNCTIONS - ( 07 Oct 2022 05:45 )  Alb: 3.7 g/dL / Pro: 6.3 g/dL / ALK PHOS: 67 U/L / ALT: 16 U/L / AST: 45 U/L / GGT: x           Prothrombin Time, Plasma: 13.4 sec (10-06 @ 15:07)      CARDIAC MARKERS:  Creatine Kinase, Serum: 218 U/L (10-07 @ 05:45)  Creatine Kinase, Serum: 233 U/L (10-06 @ 23:10)  Creatine Kinase, Serum: 199 U/L (10-06 @ 15:07)      Serum Pro-Brain Natriuretic Peptide: >17405 pg/mL (10-06 @ 15:07)    Blood Gas Venous - Lactate: 2.5 mmol/L (10-06 @ 16:07)    10-07 @ 05:45  Cholesterol, Serum - 140  HDL Cholesterol, Serum- 68  Triglycerides, Serum- 62      Thyroid Stimulating Hormone, Serum: 3.15 uIU/mL (10-07 @ 05:45)    ABG - ( 07 Oct 2022 05:45 )  pH, Arterial: 7.51  pH, Blood: x     /  pCO2: 33    /  pO2: 73    / HCO3: 26    / Base Excess: 3.4   /  SaO2: 96.4          Diagnostic testing:    echo:< from: Transthoracic Echocardiogram (10.06.22 @ 18:39) >  Dimensions:     Normal Values:  LA:     4.0 cm    2.0 - 4.0 cm  Ao:     3.5 cm    2.0 - 3.8 cm  SEPTUM: 1.1 cm    0.6 - 1.2 cm  PWT:  1.2 cm    0.6 - 1.1 cm  LVIDd:  4.5 cm    3.0 - 5.6 cm  LVIDs:    ---     1.8 - 4.0 cm  Derived Variables:  LVMI: 116 g/m2  RWT: 0.53  Ejection Fraction (Visual Estimate): 25 %  ------------------------------------------------------------------------  OBSERVATIONS:  Mitral Valve: Mitral annular calcification, tethered mitralleaflets with normal diastolic opening. Moderate mitralregurgitation.  Aortic Root: Normal aortic root.  Aortic Valve: Calcified trileaflet aortic valve with normalopening. Mild aortic regurgitation.  Left Atrium: Moderately dilated left atrium.  LA volumeindex = 43 cc/m2.  Left Ventricle: Severe global left ventricular systolicdysfunction. Mild concentric left ventricular hypertrophy.(DT:161 ms)  Right Heart: Normal right atrium. Right ventricularenlargement with normal right ventricular systolicfunction. Normal tricuspid valve.  Moderate tricuspidregurgitation. Normal pulmonic valve.  Pericardium/PleuraNormal pericardium with no pericardialeffusion. Bilateral pleural effusions.Hemodynamic: Estimated right ventricular systolic pressureequals 56 mm Hg, assuming right atrial pressure equals 10  mm Hg, consistent with moderate pulmonary hypertension.    < end of copied text >      Assessment and Plan:73 yo male, PMH of HTN,HLD,CAD, Hepatitis C ( completed  [place] 2018) and current smoker  presenting with acute dyspnea, found to have hypoxic respiratory distress, hypertension to , and sinus tachycardia, with nonspecific ST changes on EKG, overall concerning for hypertensive emergency with pulmonary edema. Started on nitro drip and placed on bipap. Pt admitted to CCU for further care.      Neuro  # chronic depression  -c/w sertraline 100mg PO daily  -melatonin as needed for insomnia.  - AxOx3    Resp  #Dyspnea 2/2 mixed pulmonary edema and COPD  - ABG showed  Resp alkalosis on Bipap  - hypoxic in NC  - off bipap -> Hiflow for hypoxemia  - h/o smoking x20 years   -No h/o COPD- chest Xray showed hyperinflated lungs  - pulmonary consulted    CV  #acute CHFrEF /Acute pulmonary edema.   -presents with acute shortness of breath found in pulmonary edema  -chest Xray showed pulmonary edema on 10/6  -c/w bipap- wean off to NC as tolerated  -lactate improved  -c/w hydralazine, isordil and start Entresto  - wean off nitro drip   - c/w  on lasix  drip at 10mg   - Echo ; EF 25%, Severe global LVSD, mod MR, enlarge RV with normal RVF  - IN: 721.5 mL / OUT: 1700 mL / NET: -978.5 mL  -strict i/O- goal net neg 1-2 L /24hr.   - HF consulted      Elevated trop  - Trop up trending ; 108-> 149->166. CKMB dowm trending- likely Type II  - intial EKG WEN 1mm in V1-V3, STD in inferior leads which improved  - no chest pain  - c/w asa 81mg , increase Lipitor 80mg PO   - echo showed low EF with Severe LVSD  - need  evaluation cath for  ischemic work up once optimized     # HTN (hypertension).   - presents with  HTN emergency   -c/w  on nitro drip goal -150  -c/w isordil 10mg TID-> titrate up as tolerate  - c/w  on hydralazine 50mg TID    Electrolyte  # Hyponatremia  -Na 126-> 123  - likely 2/2 hypervolemia vs diuretic vs alcohol use    - monitor BMP    ETOH abuse  - drinks beer 2-3 cans every day and 1 hot of RUM every day  - no h/o withdrawal  -CIWA protocol   - started on folic acid and thiamin      GI  - no issue  -c/w DASH diet      - no issue  -voiding without difficult        Endo  #Hyperlipidemia.   -c/w Lipitor 20mg Po daily  - lipid profile WNL    ID  # Leukocytosis on admission   -will monitor off antibiotics.-no fever  -covid PCR neg  -likely reactive  -will monitor off abx.    DVT ppx  heparin SQ               Subjective/Objective: Pateint denies chest pain, sob, remained on Bipap overnight due to desat on 6L nc. Patient reports shortness of breath started on last Saturday and Sunday shortness of breath was very bad that he stayed in bed most of the day. He thought he came to hospital on past Monday afternoon. I clarified that he came to ED on Thursday afternoon. Patient stated " I don't remember what happen between Monday to Thursday". Patient reports non adherence to medication .sometime he missed 1 week of Blood Pressure medication. At time nonadherence to diet too.Took off Bipap this morning sating 90-93% on 4L nc. Appear comfortable. Nitro drip at 110mcg/min    Overnight event: restart home dose Nifedipine XL in an attempt to wean down nitro drip , Lasix 80mg IVP x1 given and increase Lasix drip to 10mg/hr    Tele event: NSR with [facility]    MEDICATIONS    aspirin  chewable 81 milliGRAM(s) Oral daily  furosemide Infusion 10 mG/Hr IV Continuous <Continuous>  heparin   Injectable 5000 Unit(s) SubCutaneous every 8 hours  hydrALAZINE 50 milliGRAM(s) Oral every 8 hours  isosorbide   dinitrate Tablet (ISORDIL) 10 milliGRAM(s) Oral three times a day  nitroglycerin  Infusion 200 MICROgram(s)/Min IV Continuous <Continuous>  melatonin 3 milliGRAM(s) Oral at bedtime PRN  sertraline 100 milliGRAM(s) Oral daily  atorvastatin 20 milliGRAM(s) Oral at bedtimechlorhexidine 2% Cloths 1 Application(s) Topical <User Schedule>  influenza  Vaccine (HIGH DOSE) 0.7 milliLiter(s) IntraMuscular once  potassium chloride    Tablet ER 40 milliEquivalent(s) Oral every 4 hours  potassium chloride  10 mEq/100 mL IVPB 10 milliEquivalent(s) IV Intermittent every 1 hour          REVIEW OF SYSTEMS    General: no fatigue/malaise, weight loss/gain.  Skin: no rashes.  Ophthalmologic: no blurred vision, no loss of vision. 	  ENT: no sore throat, rhinorrhea, sinus congestion.  Cardiovascular:no chest pain ,no palpitation,no dizziness,no diaphoresis,no edema  Respiratory: + SOB, + cough or no  wheeze.  Gastrointestinal:  no N/V/D, no melena/hematemesis/hematochezia.  Genitourinary: no dysuria/hesitancy or hematuria.  Musculoskeletal: no myalgias or arthralgias.  Neurological: no changes in vision or hearing, no lightheadedness/dizziness, no syncope/near syncope	  Psychiatric: no unusual stress/anxiety      	    ICU Vital Signs Last 24 Hrs  T(C): 36.6 (07 Oct 2022 08:00), Max: 36.8 (06 Oct 2022 17:11)  T(F): 97.8 (07 Oct 2022 08:00), Max: 98.2 (06 Oct 2022 17:11)  HR: 86 (07 Oct 2022 07:00) (85 - 123)  BP: 145/101 (06 Oct 2022 23:15) (143/104 - 187/132)  BP(mean): 109 (06 Oct 2022 23:15) (101 - 148)  ABP: 150/66 (07 Oct 2022 07:00) (135/54 - 193/98)  ABP(mean): 91 (07 Oct 2022 07:00) (78 - 129)  RR: 27 (07 Oct 2022 07:00) (16 - 34)  SpO2: 95% (07 Oct 2022 07:00) (88% - 100%)    O2 Parameters below as of 07 Oct 2022 08:00  Patient On (Oxygen Delivery Method): nasal cannula            PHYSICAL EXAMINATION  Appearance: NAD, cachetic  HEENT: Moist Mucous Membranes, Anicteric, PERRL, EOMI  Cardiovascular: Regular rate and rhythm, Normal S1 S2, + JVD, No murmurs  Respiratory: b/l  Lungs crackle  to auscultation, No rhonchi, No wheezing. No tenderness to palpation  Gastrointestinal:  Soft, Non-tender, + BS  Neurologic: Non-focal, A&Ox3  Skin: Warm and dry, No rashes, No ecchymosis, No cyanosis  Musculoskeletal: No clubbing, No cyanosis, No edema  Vascular: Peripheral pulses palpable 2+ bilaterally  Psychiatry: Mood & affect appropriate      	    		      I&O's Summary    06 Oct 2022 07:01  -  07 Oct 2022 07:00  --------------------------------------------------------  IN: 721.5 mL / OUT: 1700 mL / NET: -978.5 mL    	 	  LABORATORY VALUES	 	                          10.4   10.33 )-----------( 157      ( 07 Oct 2022 05:45 )             30.2       10-07    123<L>  |  86<L>  |  20  ----------------------------<  141<H>  3.1<L>   |  24  |  0.81  10-06    124<L>  |  88<L>  |  21  ----------------------------<  135<H>  3.6   |  22  |  0.86    Ca    8.4      07 Oct 2022 05:45  Ca    8.5      06 Oct 2022 23:10  Phos  2.4     10-07  Phos  2.9     10-06  Mg     2.50     10-07  Mg     1.20     10-06    TPro  6.3  /  Alb  3.7  /  TBili  0.8  /  DBili  x   /  AST  45<H>  /  ALT  16  /  AlkPhos  67  10-07  TPro  6.1  /  Alb  3.6  /  TBili  0.8  /  DBili  x   /  AST  52<H>  /  ALT  17  /  AlkPhos  65  10-06    LIVER FUNCTIONS - ( 07 Oct 2022 05:45 )  Alb: 3.7 g/dL / Pro: 6.3 g/dL / ALK PHOS: 67 U/L / ALT: 16 U/L / AST: 45 U/L / GGT: x           Prothrombin Time, Plasma: 13.4 sec (10-06 @ 15:07)      CARDIAC MARKERS:  Creatine Kinase, Serum: 218 U/L (10-07 @ 05:45)  Creatine Kinase, Serum: 233 U/L (10-06 @ 23:10)  Creatine Kinase, Serum: 199 U/L (10-06 @ 15:07)      Serum Pro-Brain Natriuretic Peptide: >81824 pg/mL (10-06 @ 15:07)    Blood Gas Venous - Lactate: 2.5 mmol/L (10-06 @ 16:07)    10-07 @ 05:45  Cholesterol, Serum - 140  HDL Cholesterol, Serum- 68  Triglycerides, Serum- 62      Thyroid Stimulating Hormone, Serum: 3.15 uIU/mL (10-07 @ 05:45)    ABG - ( 07 Oct 2022 05:45 )  pH, Arterial: 7.51  pH, Blood: x     /  pCO2: 33    /  pO2: 73    / HCO3: 26    / Base Excess: 3.4   /  SaO2: 96.4          Diagnostic testing:    echo:< from: Transthoracic Echocardiogram (10.06.22 @ 18:39) >  Dimensions:     Normal Values:  LA:     4.0 cm    2.0 - 4.0 cm  Ao:     3.5 cm    2.0 - 3.8 cm  SEPTUM: 1.1 cm    0.6 - 1.2 cm  PWT:  1.2 cm    0.6 - 1.1 cm  LVIDd:  4.5 cm    3.0 - 5.6 cm  LVIDs:    ---     1.8 - 4.0 cm  Derived Variables:  LVMI: 116 g/m2  RWT: 0.53  Ejection Fraction (Visual Estimate): 25 %  ------------------------------------------------------------------------  OBSERVATIONS:  Mitral Valve: Mitral annular calcification, tethered mitralleaflets with normal diastolic opening. Moderate mitralregurgitation.  Aortic Root: Normal aortic root.  Aortic Valve: Calcified trileaflet aortic valve with normalopening. Mild aortic regurgitation.  Left Atrium: Moderately dilated left atrium.  LA volumeindex = 43 cc/m2.  Left Ventricle: Severe global left ventricular systolicdysfunction. Mild concentric left ventricular hypertrophy.(DT:161 ms)  Right Heart: Normal right atrium. Right ventricularenlargement with normal right ventricular systolicfunction. Normal tricuspid valve.  Moderate tricuspidregurgitation. Normal pulmonic valve.  Pericardium/PleuraNormal pericardium with no pericardialeffusion. Bilateral pleural effusions.Hemodynamic: Estimated right ventricular systolic pressureequals 56 mm Hg, assuming right atrial pressure equals 10  mm Hg, consistent with moderate pulmonary hypertension.    < end of copied text >      Assessment and Plan:73 yo male, PMH of HTN,HLD,CAD, Hepatitis C ( completed  [facility] 2018) and current smoker  presenting with acute dyspnea, found to have hypoxic respiratory distress, hypertension to , and sinus tachycardia, with nonspecific ST changes on EKG, overall concerning for hypertensive emergency with pulmonary edema. Started on nitro drip and placed on bipap. Pt admitted to CCU for further care.      Neuro  # chronic depression  -c/w sertraline 100mg PO daily  -melatonin as needed for insomnia.  - AxOx3    Resp  #Dyspnea 2/2 mixed pulmonary edema and COPD  - ABG showed uncompensated Resp alkalosis on Bipap  - hypoxic on NC  - off bipap -> Hiflow for hypoxemia  - h/o smoking x20 years   -No h/o COPD  - pulmonary consulted- less likely COPD  - start on Duoneb neb q6 x 24 hrs, Albuterol neb and Spiriva starting 10/8    CV  #acute CHFrEF /Acute pulmonary edema.   -presents with acute shortness of breath found in pulmonary edema  -chest Xray showed pulmonary edema on 10/6  -c/w bipap- wean off to NC as tolerated  -lactate improved  -c/w hydralazine, isordil and start Entresto  - wean off nitro drip   - c/w  on lasix  drip at 10mg   - Echo ; EF 25%, Severe global LVSD, mod MR, enlarge RV with normal RVF  - IN: 721.5 mL / OUT: 1700 mL / NET: -978.5 mL  -strict i/O- goal net neg 1-2 L /24hr.   - HF consulted      Elevated trop  - Trop up trending ; 108-> 149->166. CKMB dowm trending- likely Type II  - intial EKG WEN 1mm in V1-V3, STD in inferior leads which improved  - no chest pain  - c/w asa 81mg , increase Lipitor 80mg PO   - echo showed low EF with Severe LVSD  - need  evaluation cath for  ischemic work up once optimized     # HTN (hypertension).   - presents with  HTN emergency   -c/w  on nitro drip goal -150  -c/w isordil 10mg TID-> titrate up as tolerate  - c/w  on hydralazine 50mg TID    Electrolyte  # Hyponatremia  -Na 126-> 123  - likely 2/2 hypervolemia vs diuretic vs alcohol use    - monitor BMP    ETOH abuse  - drinks beer 2-3 cans every day and 1 hot of RUM every day  - no h/o withdrawal  -CIWA protocol   - started on folic acid and thiamin      GI  - no issue  -c/w DASH diet      - no issue  -voiding without difficult        Endo  #Hyperlipidemia.   -c/w Lipitor 20mg Po daily  - lipid profile WNL    ID  # Leukocytosis on admission   -will monitor off antibiotics.-no fever  -covid PCR neg  -likely reactive  -will monitor off abx.    DVT ppx  heparin SQ               Subjective/Objective: Pateint denies chest pain, sob, remained on Bipap overnight due to desat on 6L nc. Patient reports shortness of breath started on last Saturday and Sunday shortness of breath was very bad that he stayed in bed most of the day. He thought he came to hospital on past Monday afternoon. I clarified that he came to ED on Thursday afternoon. Patient stated " I don't remember what happen between Monday to Thursday". Patient reports non adherence to medication .sometime he missed 1 week of Blood Pressure medication. At time nonadherence to diet too.Took off Bipap this morning sating 90-93% on 4L nc. Appear comfortable. Nitro drip at 110mcg/min    Overnight event: restart home dose Nifedipine XL in an attempt to wean down nitro drip , Lasix 80mg IVP x1 given and increase Lasix drip to 10mg/hr    Tele event: NSR with [facility]    MEDICATIONS    aspirin  chewable 81 milliGRAM(s) Oral daily  furosemide Infusion 10 mG/Hr IV Continuous <Continuous>  heparin   Injectable 5000 Unit(s) SubCutaneous every 8 hours  hydrALAZINE 50 milliGRAM(s) Oral every 8 hours  isosorbide   dinitrate Tablet (ISORDIL) 10 milliGRAM(s) Oral three times a day  nitroglycerin  Infusion 200 MICROgram(s)/Min IV Continuous <Continuous>  melatonin 3 milliGRAM(s) Oral at bedtime PRN  sertraline 100 milliGRAM(s) Oral daily  atorvastatin 20 milliGRAM(s) Oral at bedtimechlorhexidine 2% Cloths 1 Application(s) Topical <User Schedule>  influenza  Vaccine (HIGH DOSE) 0.7 milliLiter(s) IntraMuscular once  potassium chloride    Tablet ER 40 milliEquivalent(s) Oral every 4 hours  potassium chloride  10 mEq/100 mL IVPB 10 milliEquivalent(s) IV Intermittent every 1 hour          REVIEW OF SYSTEMS    General: no fatigue/malaise, weight loss/gain.  Skin: no rashes.  Ophthalmologic: no blurred vision, no loss of vision. 	  ENT: no sore throat, rhinorrhea, sinus congestion.  Cardiovascular:no chest pain ,no palpitation,no dizziness,no diaphoresis,no edema  Respiratory: + SOB, + cough or no  wheeze.  Gastrointestinal:  no N/V/D, no melena/hematemesis/hematochezia.  Genitourinary: no dysuria/hesitancy or hematuria.  Musculoskeletal: no myalgias or arthralgias.  Neurological: no changes in vision or hearing, no lightheadedness/dizziness, no syncope/near syncope	  Psychiatric: no unusual stress/anxiety      	    ICU Vital Signs Last 24 Hrs  T(C): 36.6 (07 Oct 2022 08:00), Max: 36.8 (06 Oct 2022 17:11)  T(F): 97.8 (07 Oct 2022 08:00), Max: 98.2 (06 Oct 2022 17:11)  HR: 86 (07 Oct 2022 07:00) (85 - 123)  BP: 145/101 (06 Oct 2022 23:15) (143/104 - 187/132)  BP(mean): 109 (06 Oct 2022 23:15) (101 - 148)  ABP: 150/66 (07 Oct 2022 07:00) (135/54 - 193/98)  ABP(mean): 91 (07 Oct 2022 07:00) (78 - 129)  RR: 27 (07 Oct 2022 07:00) (16 - 34)  SpO2: 95% (07 Oct 2022 07:00) (88% - 100%)    O2 Parameters below as of 07 Oct 2022 08:00  Patient On (Oxygen Delivery Method): nasal cannula            PHYSICAL EXAMINATION  Appearance: NAD, cachetic  HEENT: Moist Mucous Membranes, Anicteric, PERRL, EOMI  Cardiovascular: Regular rate and rhythm, Normal S1 S2, + JVD, No murmurs  Respiratory: b/l  Lungs crackle  to auscultation, No rhonchi, No wheezing. No tenderness to palpation  Gastrointestinal:  Soft, Non-tender, + BS  Neurologic: Non-focal, A&Ox3  Skin: Warm and dry, No rashes, No ecchymosis, No cyanosis  Musculoskeletal: No clubbing, No cyanosis, No edema  Vascular: Peripheral pulses palpable 2+ bilaterally  Psychiatry: Mood & affect appropriate      	    		      I&O's Summary    06 Oct 2022 07:01  -  07 Oct 2022 07:00  --------------------------------------------------------  IN: 721.5 mL / OUT: 1700 mL / NET: -978.5 mL    	 	  LABORATORY VALUES	 	                          10.4   10.33 )-----------( 157      ( 07 Oct 2022 05:45 )             30.2       10-07    123<L>  |  86<L>  |  20  ----------------------------<  141<H>  3.1<L>   |  24  |  0.81  10-06    124<L>  |  88<L>  |  21  ----------------------------<  135<H>  3.6   |  22  |  0.86    Ca    8.4      07 Oct 2022 05:45  Ca    8.5      06 Oct 2022 23:10  Phos  2.4     10-07  Phos  2.9     10-06  Mg     2.50     10-07  Mg     1.20     10-06    TPro  6.3  /  Alb  3.7  /  TBili  0.8  /  DBili  x   /  AST  45<H>  /  ALT  16  /  AlkPhos  67  10-07  TPro  6.1  /  Alb  3.6  /  TBili  0.8  /  DBili  x   /  AST  52<H>  /  ALT  17  /  AlkPhos  65  10-06    LIVER FUNCTIONS - ( 07 Oct 2022 05:45 )  Alb: 3.7 g/dL / Pro: 6.3 g/dL / ALK PHOS: 67 U/L / ALT: 16 U/L / AST: 45 U/L / GGT: x           Prothrombin Time, Plasma: 13.4 sec (10-06 @ 15:07)      CARDIAC MARKERS:  Creatine Kinase, Serum: 218 U/L (10-07 @ 05:45)  Creatine Kinase, Serum: 233 U/L (10-06 @ 23:10)  Creatine Kinase, Serum: 199 U/L (10-06 @ 15:07)      Serum Pro-Brain Natriuretic Peptide: >90127 pg/mL (10-06 @ 15:07)    Blood Gas Venous - Lactate: 2.5 mmol/L (10-06 @ 16:07)    10-07 @ 05:45  Cholesterol, Serum - 140  HDL Cholesterol, Serum- 68  Triglycerides, Serum- 62      Thyroid Stimulating Hormone, Serum: 3.15 uIU/mL (10-07 @ 05:45)    ABG - ( 07 Oct 2022 05:45 )  pH, Arterial: 7.51  pH, Blood: x     /  pCO2: 33    /  pO2: 73    / HCO3: 26    / Base Excess: 3.4   /  SaO2: 96.4          Diagnostic testing:    echo:< from: Transthoracic Echocardiogram (10.06.22 @ 18:39) >  Dimensions:     Normal Values:  LA:     4.0 cm    2.0 - 4.0 cm  Ao:     3.5 cm    2.0 - 3.8 cm  SEPTUM: 1.1 cm    0.6 - 1.2 cm  PWT:  1.2 cm    0.6 - 1.1 cm  LVIDd:  4.5 cm    3.0 - 5.6 cm  LVIDs:    ---     1.8 - 4.0 cm  Derived Variables:  LVMI: 116 g/m2  RWT: 0.53  Ejection Fraction (Visual Estimate): 25 %  ------------------------------------------------------------------------  OBSERVATIONS:  Mitral Valve: Mitral annular calcification, tethered mitralleaflets with normal diastolic opening. Moderate mitralregurgitation.  Aortic Root: Normal aortic root.  Aortic Valve: Calcified trileaflet aortic valve with normalopening. Mild aortic regurgitation.  Left Atrium: Moderately dilated left atrium.  LA volumeindex = 43 cc/m2.  Left Ventricle: Severe global left ventricular systolicdysfunction. Mild concentric left ventricular hypertrophy.(DT:161 ms)  Right Heart: Normal right atrium. Right ventricularenlargement with normal right ventricular systolicfunction. Normal tricuspid valve.  Moderate tricuspidregurgitation. Normal pulmonic valve.  Pericardium/PleuraNormal pericardium with no pericardialeffusion. Bilateral pleural effusions.Hemodynamic: Estimated right ventricular systolic pressureequals 56 mm Hg, assuming right atrial pressure equals 10  mm Hg, consistent with moderate pulmonary hypertension.    < end of copied text >      Assessment and Plan:73 yo male, PMH of HTN,HLD,CAD, Hepatitis C ( completed  [place] 2018) and current smoker  presenting with acute dyspnea, found to have hypoxic respiratory distress, hypertension to , and sinus tachycardia, with nonspecific ST changes on EKG, overall concerning for hypertensive emergency with pulmonary edema. Started on nitro drip and placed on bipap. Pt admitted to CCU for further care.      Neuro  # chronic depression  -c/w sertraline 100mg PO daily  -melatonin as needed for insomnia.  - AxOx3    Resp  #Dyspnea 2/2 mixed pulmonary edema and COPD  - ABG showed uncompensated Resp alkalosis on Bipap  - hypoxic on NC  - off bipap -> Hiflow for hypoxemia  - h/o smoking x20 years   -No h/o COPD  - pulmonary consulted- less likely COPD  - start on Duoneb neb q6 x 24 hrs, Albuterol neb and Spiriva starting 10/8    CV  #acute CHFrEF /Acute pulmonary edema.   -presents with acute shortness of breath found in pulmonary edema  -chest Xray showed pulmonary edema on 10/6  -c/w bipap- wean off to NC as tolerated  -lactate improved  -c/w hydralazine, isordil and start Entresto  - wean off nitro drip   - c/w  on lasix  drip at 10mg   - Echo ; EF 25%, Severe global LVSD, mod MR, enlarge RV with normal RVF  - IN: 721.5 mL / OUT: 1700 mL / NET: -978.5 mL  -strict i/O- goal net neg 1-2 L /24hr.   - HF consulted      Elevated trop  - Trop up trending ; 108-> 149->166. CKMB dowm trending- likely Type II  - intial EKG WEN 1mm in V1-V3, STD in inferior leads which improved  - no chest pain  - c/w asa 81mg , increase Lipitor 80mg PO   - echo showed low EF with Severe LVSD  - need  evaluation cath for  ischemic work up once optimized     # HTN (hypertension).   - presents with  HTN emergency   -c/w  on nitro drip goal -150  -c/w isordil 10mg TID-> titrate up as tolerate  - c/w  on hydralazine 50mg TID    Electrolyte  # Hyponatremia  -Na 126-> 123  - likely 2/2 hypervolemia vs diuretic vs alcohol use    - fluid restriction 1500cc/24hr  - monitor BMP    ETOH abuse  - drinks beer 2-3 cans every day and 1 hot of RUM every day  - no h/o withdrawal  -CIWA protocol   - started on folic acid and thiamin      GI  - no issue  -c/w DASH diet      - no issue  -voiding without difficult      Endo  #Hyperlipidemia.   -c/w Lipitor 20mg Po daily  - lipid profile WNL    ID  # Leukocytosis on admission   -will monitor off antibiotics.-no fever  -covid PCR neg  -likely reactive  -will monitor off abx.    DVT ppx  heparin SQ

## 2022-10-07 NOTE — CONSULT NOTE ADULT - PROBLEM SELECTOR RECOMMENDATION 9
Moderately hypervolemic and hypertensive.   Has diuresed 1700 ml and net negative 847 ml in 24 hrs.   New LV systolic dysfunction. Will need to evaluate coronary arteries when he is closer to euvolemic.  Hyponatremia, hypochloremia, likely all secondary to hypervolemia.   Agree to increase Lasix gtt to 10 mg/hr.   Continue Entresto 24/26 mg po BID. Can consider uptitration over the next few doses.  Now off nitro gtt. Continue hydralazine 50 mg po TID.   Continue Isordil 10 mg po TID.   Add spironolactone 12.5 mg po qd.   Supplement K to keep 4.0-5.0 and mag >2.0.   He is a current smoker and drinks ETOH- counseled patient to stop, he agreed to plan.   Educated patient on low salt diet.   We will continue to follow.

## 2022-10-07 NOTE — BH CONSULTATION LIAISON ASSESSMENT NOTE - NSBHCHARTREVIEWLAB_PSY_A_CORE FT
CBC Full  -  ( 07 Oct 2022 05:45 )  WBC Count : 10.33 K/uL  RBC Count : 3.78 M/uL  Hemoglobin : 10.4 g/dL  Hematocrit : 30.2 %  Platelet Count - Automated : 157 K/uL  Mean Cell Volume : 79.9 fL  Mean Cell Hemoglobin : 27.5 pg  Mean Cell Hemoglobin Concentration : 34.4 gm/dL  Auto Neutrophil # : x  Auto Lymphocyte # : x  Auto Monocyte # : x  Auto Eosinophil # : x  Auto Basophil # : x  Auto Neutrophil % : x  Auto Lymphocyte % : x  Auto Monocyte % : x  Auto Eosinophil % : x  Auto Basophil % : x  10-07    122<L>  |  88<L>  |  18  ----------------------------<  128<H>  3.6   |  22  |  0.70    Ca    8.0<L>      07 Oct 2022 11:07  Phos  1.8     10-07  Mg     1.70     10-07    TPro  6.3  /  Alb  3.7  /  TBili  0.8  /  DBili  x   /  AST  45<H>  /  ALT  16  /  AlkPhos  67  10-07

## 2022-10-07 NOTE — CONSULT NOTE ADULT - SUBJECTIVE AND OBJECTIVE BOX
Date of Admission:    CHIEF COMPLAINT:    HISTORY OF PRESENT ILLNESS:      Allergies    No Known Allergies    Intolerances    	    MEDICATIONS:  aspirin enteric coated 81 milliGRAM(s) Oral daily  furosemide Infusion 10 mG/Hr IV Continuous <Continuous>  heparin   Injectable 5000 Unit(s) SubCutaneous every 8 hours  hydrALAZINE 50 milliGRAM(s) Oral every 8 hours  isosorbide   dinitrate Tablet (ISORDIL) 10 milliGRAM(s) Oral three times a day  nitroglycerin  Infusion 200 MICROgram(s)/Min IV Continuous <Continuous>  sacubitril 24 mG/valsartan 26 mG 1 Tablet(s) Oral two times a day        LORazepam     Tablet 1 milliGRAM(s) Oral every 1 hour PRN  LORazepam     Tablet 2 milliGRAM(s) Oral every 2 hours PRN  melatonin 3 milliGRAM(s) Oral at bedtime PRN  sertraline 100 milliGRAM(s) Oral daily      atorvastatin 20 milliGRAM(s) Oral at bedtime    chlorhexidine 2% Cloths 1 Application(s) Topical <User Schedule>  folic acid 1 milliGRAM(s) Oral daily  influenza  Vaccine (HIGH DOSE) 0.7 milliLiter(s) IntraMuscular once  multivitamin 1 Tablet(s) Oral daily  potassium chloride    Tablet ER 40 milliEquivalent(s) Oral every 4 hours  thiamine 100 milliGRAM(s) Oral daily      PAST MEDICAL & SURGICAL HISTORY:  HTN (hypertension)      Hepatitis C virus infection, unspecified chronicity  (was tx with Harvoni)      Major depression, chronic      S/P hernia repair  x 2          FAMILY HISTORY:  Family history of heart disease (Father)    Family history of cancer (Mother)        SOCIAL HISTORY:    [ ] Non-smoker  [ ] Smoker  [ ] Alcohol      REVIEW OF SYSTEMS:  CONSTITUTIONAL: No fever, weight loss, or fatigue  EYES: No eye pain, visual disturbances, or discharge  ENMT:  No difficulty hearing, tinnitus, vertigo; No sinus or throat pain  NECK: No pain or stiffness  RESPIRATORY: No cough, wheezing, chills or hemoptysis; No Shortness of Breath  CARDIOVASCULAR: No chest pain, palpitations, passing out, dizziness, or leg swelling  GASTROINTESTINAL: No abdominal or epigastric pain. No nausea, vomiting, or hematemesis; No diarrhea or constipation. No melena or hematochezia.  GENITOURINARY: No dysuria, frequency, hematuria, or incontinence  NEUROLOGICAL: No headaches, memory loss, loss of strength, numbness, or tremors  SKIN: No itching, burning, rashes, or lesions   LYMPH Nodes: No enlarged glands  ENDOCRINE: No heat or cold intolerance; No hair loss  MUSCULOSKELETAL: No joint pain or swelling; No muscle, back, or extremity pain  PSYCHIATRIC: No depression, anxiety, mood swings, or difficulty sleeping  HEME/LYMPH: No easy bruising, or bleeding gums  ALLERY AND IMMUNOLOGIC: No hives or eczema	    [ ] All others negative	  [ ] Unable to obtain    PHYSICAL EXAM:  T(C): 36.6 (10-07-22 @ 08:00), Max: 36.8 (10-06-22 @ 17:11)  HR: 101 (10-07-22 @ 09:00) (85 - 123)  BP: 145/101 (10-06-22 @ 23:15) (143/104 - 187/132)  RR: 30 (10-07-22 @ 09:00) (16 - 34)  SpO2: 96% (10-07-22 @ 09:00) (88% - 100%)  Wt(kg): --  I&O's Summary    06 Oct 2022 07:01  -  07 Oct 2022 07:00  --------------------------------------------------------  IN: 852.5 mL / OUT: 1700 mL / NET: -847.5 mL    07 Oct 2022 07:01  -  07 Oct 2022 10:33  --------------------------------------------------------  IN: 65 mL / OUT: 200 mL / NET: -135 mL        Appearance: Normal	  HEENT:   Normal oral mucosa, PERRL, EOMI	  Lymphatic: No lymphadenopathy  Cardiovascular: Normal S1 S2, No JVD, No murmurs, No edema  Respiratory: Lungs clear to auscultation	  Psychiatry: A & O x 3, Mood & affect appropriate  Gastrointestinal:  Soft, Non-tender, + BS	  Skin: No rashes, No ecchymoses, No cyanosis	  Neurologic: Non-focal  Extremities: Normal range of motion, No clubbing, cyanosis or edema  Vascular: Peripheral pulses palpable 2+ bilaterally        LABS:	 	    CBC Full  -  ( 07 Oct 2022 05:45 )  WBC Count : 10.33 K/uL  Hemoglobin : 10.4 g/dL  Hematocrit : 30.2 %  Platelet Count - Automated : 157 K/uL  Mean Cell Volume : 79.9 fL  Mean Cell Hemoglobin : 27.5 pg  Mean Cell Hemoglobin Concentration : 34.4 gm/dL  Auto Neutrophil # : x  Auto Lymphocyte # : x  Auto Monocyte # : x  Auto Eosinophil # : x  Auto Basophil # : x  Auto Neutrophil % : x  Auto Lymphocyte % : x  Auto Monocyte % : x  Auto Eosinophil % : x  Auto Basophil % : x    10-07    123<L>  |  86<L>  |  20  ----------------------------<  141<H>  3.1<L>   |  24  |  0.81  10-06    124<L>  |  88<L>  |  21  ----------------------------<  135<H>  3.6   |  22  |  0.86    Ca    8.4      07 Oct 2022 05:45  Ca    8.5      06 Oct 2022 23:10  Phos  2.4     10-07  Phos  2.9     10-06  Mg     2.50     10-07  Mg     1.20     10-06    TPro  6.3  /  Alb  3.7  /  TBili  0.8  /  DBili  x   /  AST  45<H>  /  ALT  16  /  AlkPhos  67  10-07  TPro  6.1  /  Alb  3.6  /  TBili  0.8  /  DBili  x   /  AST  52<H>  /  ALT  17  /  AlkPhos  65  10-06      proBNP: Serum Pro-Brain Natriuretic Peptide: >30030 pg/mL (10-06 @ 15:07)    Lipid Profile:   HgA1c:   TSH: Thyroid Stimulating Hormone, Serum: 3.15 uIU/mL (10-07 @ 05:45)        < from: Transthoracic Echocardiogram (10.06.22 @ 18:39) >  DIMENSIONS:  Dimensions:     Normal Values:  LA:     4.0 cm    2.0 - 4.0 cm  Ao:     3.5 cm    2.0 - 3.8 cm  SEPTUM: 1.1 cm    0.6 - 1.2 cm  PWT:  1.2 cm    0.6 - 1.1 cm  LVIDd:  4.5 cm    3.0 - 5.6 cm  LVIDs:    ---     1.8 - 4.0 cm  Derived Variables:  LVMI: 116 g/m2  RWT: 0.53  Ejection Fraction (Visual Estimate): 25 %  ------------------------------------------------------------------------  OBSERVATIONS:  Mitral Valve: Mitral annular calcification, tethered mitral  leaflets with normal diastolic opening. Moderate mitral  regurgitation.  Aortic Root: Normal aortic root.  Aortic Valve: Calcified trileaflet aortic valve with normal  opening. Mild aortic regurgitation.  Left Atrium: Moderately dilated left atrium.  LA volume  index = 43 cc/m2.  Left Ventricle: Severe global left ventricular systolic  dysfunction. Mild concentric left ventricular hypertrophy.  (DT:161 ms)  Right Heart: Normal right atrium. Right ventricular  enlargement with normal right ventricular systolic  function. Normal tricuspid valve.  Moderate tricuspid  regurgitation. Normal pulmonic valve.  Pericardium/PleuraNormal pericardium with no pericardial  effusion. Bilateral pleural effusions.  Hemodynamic: Estimated right ventricular systolic pressure  equals 56 mm Hg, assuming right atrial pressure equals 10  mm Hg, consistent with moderate pulmonary hypertension.    < end of copied text >    < from: Cardiac Cath Lab - Adult (09.29.16 @ 12:08) >  CORONARY VESSELS: The coronary circulation is right dominant.  LM:   --  LM: Normal.  LAD:   --  LAD: Normal.  CX:   --  Proximal circumflex: There was a 40 % stenosis.  RCA:   --  RCA:Normal.    < end of copied text >      < from: Xray Chest 1 View- PORTABLE-Urgent (10.06.22 @ 17:08) >  FINDINGS:  The cardiomediastinal silhouette size cannot be accurately assessed on   this projection.  Bilateral perihilar hazy opacities consistent with pulmonary edema.  No focal consolidation.  There is no pneumothorax or pleural effusion.  No acute bony abnormality.    IMPRESSION: Pulmonary edema.   .    < end of copied text >           Date of Admission: 10/6/22    CHIEF COMPLAINT: SOB    HISTORY OF PRESENT ILLNESS:    73 y/o AA male with PMHX of HTN, HLD, former HFpEF (LVEF 63% on TTE from 8/22/19 on [employer]) now with new reduction in LV function (TTE 10/6/22 LVEF 25% severe global LV dysfunction, LA 4.0, LVIDD 4.5 cm, mod MR, mild AR, enlarged RV with normal systolic function, mod TR, b/l pleural effusions, RVSP 56, mod pulm HTN), HepC (s/p Harvoni treatment in 2018), CAD ([facility] on 9/26/16- 40% stenosis of prox circ), current smoker (smokes 2-3 cigarettes daily for the past 20 years), current ETOH use (2 beers and 1 shot of white run daily) comes in with complaints of acute SOB which started on Sunday (10/2/22). He states it started while resting and continued throughout the day. He found himself feeling better when seated up. In ED patient was found to be hypoxic, hypertensive (SBP 180s), and tachycardic. He was started on nitro gtt and BIPAP and moved to CCU for closer monitoring. Labs significant for proBNP 75457, lactate 2.7, ->233->218, trop 108-->149-->166, H/H 10.4/30.2, K 3.1, na 123, cl 86. Currently states SOB at rest is improving and he is also able to sleep a little flatter. No CP, abdominal pain, palpitations, dizziness. Denies frequent hospitalizations last was in summer of 2021, per patient was for heat exhaustion. He admits to being compliant with his medications. His last visit to his cardiologist Dr. Gonzales Dee was in 2019. He currently still works as a  at the [employer] office. He lives along with his pet dog, cooks his own meals.     Cardiac studies:  10/6/22 TTE: LVEF 25% severe global LV dysfunction, LA 4.0, LVIDD 4.5 cm, mod MR, mild AR, enlarged RV with normal systolic function, mod TR, b/l pleural effusions, RVSP 56, mod pulm HTN  9/26/16 LHC: 40% stenosis of prox circ      Allergies    No Known Allergies    Intolerances    	    MEDICATIONS:  aspirin enteric coated 81 milliGRAM(s) Oral daily  furosemide Infusion 10 mG/Hr IV Continuous <Continuous>  heparin   Injectable 5000 Unit(s) SubCutaneous every 8 hours  hydrALAZINE 50 milliGRAM(s) Oral every 8 hours  isosorbide   dinitrate Tablet (ISORDIL) 10 milliGRAM(s) Oral three times a day  nitroglycerin  Infusion 200 MICROgram(s)/Min IV Continuous <Continuous>  sacubitril 24 mG/valsartan 26 mG 1 Tablet(s) Oral two times a day        LORazepam     Tablet 1 milliGRAM(s) Oral every 1 hour PRN  LORazepam     Tablet 2 milliGRAM(s) Oral every 2 hours PRN  melatonin 3 milliGRAM(s) Oral at bedtime PRN  sertraline 100 milliGRAM(s) Oral daily      atorvastatin 20 milliGRAM(s) Oral at bedtime    chlorhexidine 2% Cloths 1 Application(s) Topical <User Schedule>  folic acid 1 milliGRAM(s) Oral daily  influenza  Vaccine (HIGH DOSE) 0.7 milliLiter(s) IntraMuscular once  multivitamin 1 Tablet(s) Oral daily  potassium chloride    Tablet ER 40 milliEquivalent(s) Oral every 4 hours  thiamine 100 milliGRAM(s) Oral daily      PAST MEDICAL & SURGICAL HISTORY:  HTN (hypertension)      Hepatitis C virus infection, unspecified chronicity  (was tx with Harvoni)      Major depression, chronic      S/P hernia repair  x 2          FAMILY HISTORY:  Family history of heart disease (Father)    Family history of cancer (Mother)        SOCIAL HISTORY:    see HPI       REVIEW OF SYSTEMS:  CONSTITUTIONAL: No fever, weight loss, or fatigue  EYES: No eye pain, visual disturbances, or discharge  ENMT:  No difficulty hearing, tinnitus, vertigo; No sinus or throat pain  NECK: No pain or stiffness  RESPIRATORY: No cough, wheezing, chills or hemoptysis; admits to Shortness of Breath  CARDIOVASCULAR: No chest pain, palpitations, passing out, dizziness, or leg swelling  GASTROINTESTINAL: No abdominal or epigastric pain. No nausea, vomiting, or hematemesis; No diarrhea or constipation. No melena or hematochezia.  GENITOURINARY: No dysuria, frequency, hematuria, or incontinence  NEUROLOGICAL: No headaches, memory loss, loss of strength, numbness, or tremors  SKIN: No itching, burning, rashes, or lesions   LYMPH Nodes: No enlarged glands  ENDOCRINE: No heat or cold intolerance; No hair loss  MUSCULOSKELETAL: No joint pain or swelling; No muscle, back, or extremity pain  PSYCHIATRIC: No depression, anxiety, mood swings, or difficulty sleeping  HEME/LYMPH: No easy bruising, or bleeding gums  ALLERY AND IMMUNOLOGIC: No hives or eczema	    [ ] All others negative	  [ ] Unable to obtain    PHYSICAL EXAM:  T(C): 36.6 (10-07-22 @ 08:00), Max: 36.8 (10-06-22 @ 17:11)  HR: 101 (10-07-22 @ 09:00) (85 - 123)  BP: 145/101 (10-06-22 @ 23:15) (143/104 - 187/132)  RR: 30 (10-07-22 @ 09:00) (16 - 34)  SpO2: 96% (10-07-22 @ 09:00) (88% - 100%)  Wt(kg): --  I&O's Summary    06 Oct 2022 07:01  -  07 Oct 2022 07:00  --------------------------------------------------------  IN: 852.5 mL / OUT: 1700 mL / NET: -847.5 mL    07 Oct 2022 07:01  -  07 Oct 2022 10:33  --------------------------------------------------------  IN: 65 mL / OUT: 200 mL / NET: -135 mL        Appearance: Normal	  HEENT:   Normal oral mucosa, PERRL, EOMI	  Lymphatic: No lymphadenopathy  Cardiovascular: Normal S1 S2, JVP 12cm, No murmurs, No edema b/l, warm b/l  Respiratory: Decreased on b/l bases  Psychiatry: A & O x 3, Mood & affect appropriate  Gastrointestinal:  Soft, Non-tender, + BS	  Skin: No rashes, No ecchymoses, No cyanosis	  Neurologic: Non-focal  Extremities: Normal range of motion, No clubbing, cyanosis or edema  Vascular: Peripheral pulses palpable 2+ bilaterally        LABS:	 	    CBC Full  -  ( 07 Oct 2022 05:45 )  WBC Count : 10.33 K/uL  Hemoglobin : 10.4 g/dL  Hematocrit : 30.2 %  Platelet Count - Automated : 157 K/uL  Mean Cell Volume : 79.9 fL  Mean Cell Hemoglobin : 27.5 pg  Mean Cell Hemoglobin Concentration : 34.4 gm/dL  Auto Neutrophil # : x  Auto Lymphocyte # : x  Auto Monocyte # : x  Auto Eosinophil # : x  Auto Basophil # : x  Auto Neutrophil % : x  Auto Lymphocyte % : x  Auto Monocyte % : x  Auto Eosinophil % : x  Auto Basophil % : x    10-07    123<L>  |  86<L>  |  20  ----------------------------<  141<H>  3.1<L>   |  24  |  0.81  10-06    124<L>  |  88<L>  |  21  ----------------------------<  135<H>  3.6   |  22  |  0.86    Ca    8.4      07 Oct 2022 05:45  Ca    8.5      06 Oct 2022 23:10  Phos  2.4     10-07  Phos  2.9     10-06  Mg     2.50     10-07  Mg     1.20     10-06    TPro  6.3  /  Alb  3.7  /  TBili  0.8  /  DBili  x   /  AST  45<H>  /  ALT  16  /  AlkPhos  67  10-07  TPro  6.1  /  Alb  3.6  /  TBili  0.8  /  DBili  x   /  AST  52<H>  /  ALT  17  /  AlkPhos  65  10-06      proBNP: Serum Pro-Brain Natriuretic Peptide: >54697 pg/mL (10-06 @ 15:07)    Lipid Profile:   HgA1c:   TSH: Thyroid Stimulating Hormone, Serum: 3.15 uIU/mL (10-07 @ 05:45)        < from: Transthoracic Echocardiogram (10.06.22 @ 18:39) >  DIMENSIONS:  Dimensions:     Normal Values:  LA:     4.0 cm    2.0 - 4.0 cm  Ao:     3.5 cm    2.0 - 3.8 cm  SEPTUM: 1.1 cm    0.6 - 1.2 cm  PWT:  1.2 cm    0.6 - 1.1 cm  LVIDd:  4.5 cm    3.0 - 5.6 cm  LVIDs:    ---     1.8 - 4.0 cm  Derived Variables:  LVMI: 116 g/m2  RWT: 0.53  Ejection Fraction (Visual Estimate): 25 %  ------------------------------------------------------------------------  OBSERVATIONS:  Mitral Valve: Mitral annular calcification, tethered mitral  leaflets with normal diastolic opening. Moderate mitral  regurgitation.  Aortic Root: Normal aortic root.  Aortic Valve: Calcified trileaflet aortic valve with normal  opening. Mild aortic regurgitation.  Left Atrium: Moderately dilated left atrium.  LA volume  index = 43 cc/m2.  Left Ventricle: Severe global left ventricular systolic  dysfunction. Mild concentric left ventricular hypertrophy.  (DT:161 ms)  Right Heart: Normal right atrium. Right ventricular  enlargement with normal right ventricular systolic  function. Normal tricuspid valve.  Moderate tricuspid  regurgitation. Normal pulmonic valve.  Pericardium/PleuraNormal pericardium with no pericardial  effusion. Bilateral pleural effusions.  Hemodynamic: Estimated right ventricular systolic pressure  equals 56 mm Hg, assuming right atrial pressure equals 10  mm Hg, consistent with moderate pulmonary hypertension.    < end of copied text >    < from: Cardiac Cath Lab - Adult (09.29.16 @ 12:08) >  CORONARY VESSELS: The coronary circulation is right dominant.  LM:   --  LM: Normal.  LAD:   --  LAD: Normal.  CX:   --  Proximal circumflex: There was a 40 % stenosis.  RCA:   --  RCA:Normal.    < end of copied text >      < from: Xray Chest 1 View- PORTABLE-Urgent (10.06.22 @ 17:08) >  FINDINGS:  The cardiomediastinal silhouette size cannot be accurately assessed on   this projection.  Bilateral perihilar hazy opacities consistent with pulmonary edema.  No focal consolidation.  There is no pneumothorax or pleural effusion.  No acute bony abnormality.    IMPRESSION: Pulmonary edema.   .    < end of copied text >

## 2022-10-07 NOTE — CONSULT NOTE ADULT - ASSESSMENT
73 y/o AA male with PMHX of HTN, HLD, former HFpEF (LVEF 63% on TTE from 8/22/19 on [employer]) now with new reduction in LV function (TTE 10/6/22 LVEF 25% severe global LV dysfunction, LA 4.0, LVIDD 4.5 cm, mod MR, mild AR, enlarged RV with normal systolic function, mod TR, b/l pleural effusions, RVSP 56, mod pulm HTN), HepC (s/p Harvoni treatment in 2018), CAD ([facility] on 9/26/16- 40% stenosis of prox circ), current smoker (smokes 2-3 cigarettes daily for the past 20 years), current ETOH use (2 beers and 1 shot of white run daily) comes in with complaints of acute SOB which started on Sunday (10/2/22). He states it started while resting and continued throughout the day. He found himself feeling better when seated up. In ED patient was found to be hypoxic, hypertensive (SBP 180s), and tachycardic. He was started on nitro gtt and BIPAP and moved to CCU for closer monitoring. Labs significant for proBNP 32491, lactate 2.7, ->233->218, trop 108-->149-->166, H/H 10.4/30.2, K 3.1, na 123, cl 86. Currently states SOB at rest is improving and he is also able to sleep a little flatter. No CP, abdominal pain, palpitations, dizziness. Denies frequent hospitalizations last was in summer of 2021, per patient was for heat exhaustion. He admits to being compliant with his medications. His last visit to his cardiologist Dr. Gonzales Dee was in 2019. He currently still works as a  at the [employer] office. He lives along with his pet dog, cooks his own meals.     Cardiac studies:  10/6/22 TTE: LVEF 25% severe global LV dysfunction, LA 4.0, LVIDD 4.5 cm, mod MR, mild AR, enlarged RV with normal systolic function, mod TR, b/l pleural effusions, RVSP 56, mod pulm HTN  9/26/16 LHC: 40% stenosis of prox circ    Hypervolemic and hypertensive

## 2022-10-07 NOTE — CONSULT NOTE ADULT - NS ATTEST RISK GEN_ALL_CORE
Risk Statement (NON-critical care) Alert and oriented, no focal deficits, no motor or sensory deficits.

## 2022-10-07 NOTE — BH CONSULTATION LIAISON ASSESSMENT NOTE - HPI (INCLUDE ILLNESS QUALITY, SEVERITY, DURATION, TIMING, CONTEXT, MODIFYING FACTORS, ASSOCIATED SIGNS AND SYMPTOMS)
66yo male, domiciled alone, with PMHx HTN, HLD, CAD, Hepatitis C, current smoker  who presents to [facility] with acute onset SOB since last Saturday 10/1 with no improvement, c/o shortness of breath occurred with exertion and at rest, causing poor sleep at the  night, impairing ambulation and dyspnea worsened on day of presentation, severely limiting his ability to ambulate.     Patient seen, alert to self, reports he is in the [employer] and year if 1921, unable to sustain engaging interview d/t confusion and frequently dozing and inability to keep eyes open, also noted jerking movements. Patient tachycardic, tachypneic, high flow in place, ciwa=15.    Unable to reach patient's girlfriend and tenant Lexi Rosario [phone], left voicemail.  71yo male, domiciled alone, with PMHx HTN, HLD, CAD, Hepatitis C, current smoker  who presents to [facility] with acute onset SOB since last Saturday 10/1 with no improvement, c/o shortness of breath occurred with exertion and at rest, causing poor sleep at the  night, impairing ambulation and dyspnea worsened on day of presentation, severely limiting his ability to ambulate.     Patient seen, alert to self, reports he is in the [employer] and year if 1921, unable to sustain engaging interview d/t confusion and frequently dozing and inability to keep eyes open, also noted jerking movements. Patient tachycardic, tachypneic, high flow in place, ciwa=15.    Unable to reach patient's girlfriend and tenant Lexi Rosario [phone], left voicemail.

## 2022-10-07 NOTE — CONSULT NOTE ADULT - ASSESSMENT
71 yo male, PMH of HTN,HLD,CAD, Hepatitis C ( completed  Harvoni 2018) and current smoker  presenting with acute dyspnea, found to have hypoxic respiratory distress, hypertension to , and sinus tachycardia, with nonspecific ST changes on EKG, overall concerning for hypertensive emergency with pulmonary edema. Started on nitro drip and placed on bipap. Pt admitted to CCU for further care. Pulm consulted for persistent hypoxemia    Prior to discharge:  Please email: [email or web address] to setup an appointment prior to discharge. Include the patient's name, , MRN and contact information in the email.      Pulmonary/Sleep Clinic  [address] [place] [address]  [phone]  CXR: Bilateral perihilar hazy opacities consistent with pulmonary edema. No focal consolidation.  ECHO: severe global LVSD, mild concentric LVH, RVE with nml RV sysfxn, bilateral pleural effusions, moderate PHTN PASP 56, with moderately dilated LA  AB.51/33/73/26/96.4% while on Fio2 40%    #recommendations  will POCUS patient  STOP bipap due to increasing uncompensated respiratory alkalosis with possible contraction metabolic alkalosis  if patient continues to desaturate then should transition to CPAP vs HiFlo NC  at this time favor still fluid overload and should continue aggressive diuresis  moderate PHTN most likely group 2 PHTN   patient with active long standing smoking, with possible hyperinflation of lungs however difficult to assess without spirometry, duonebs q6 standing for 24 hours, then start albuterol nebulizer q6 and LAMA/spiriva standing daily. would hold off steroids for now.        final recommendations to follow   71 yo male, PMH of HTN,HLD,CAD, Hepatitis C ( completed  Harvoni 2018) and current smoker  presenting with acute dyspnea, found to have hypoxic respiratory distress, hypertension to , and sinus tachycardia, with nonspecific ST changes on EKG, overall concerning for hypertensive emergency with pulmonary edema. Started on nitro drip and placed on bipap. Pt admitted to CCU for further care. Pulm consulted for persistent hypoxemia    Prior to discharge:  Please email: [email or web address] to setup an appointment prior to discharge. Include the patient's name, , MRN and contact information in the email.      Pulmonary/Sleep Clinic  [address] [place] [address]  [phone]  CXR: Bilateral perihilar hazy opacities consistent with pulmonary edema. No focal consolidation.  ECHO: severe global LVSD, mild concentric LVH, RVE with nml RV sysfxn, bilateral pleural effusions, moderate PHTN PASP 56, with moderately dilated LA  AB.51/33/73/26/96.4% while on Fio2 40%    #recommendations  POCUS revealing diffuse B lines with smooth pleura and bilateral small to moderate simple pleural effusions suggestive of fluid overload  STOP bipap due to increasing uncompensated respiratory alkalosis with possible contraction metabolic alkalosis  if patient continues to desaturate then should transition to CPAP 10 otherwise continue [place]  at this time favor still fluid overload and should continue aggressive diuresis  moderate PHTN most likely group 2 PHTN   patient with active long standing smoking, with possible hyperinflation of lungs however difficult to assess without spirometry, duonebs q6 standing for 24 hours, then start albuterol nebulizer q6 and LAMA/spiriva standing daily. would hold off steroids for now.    Prior to discharge:  Please email: [email or web address] to setup an appointment prior to discharge. Include the patient's name, , MRN and contact information in the email.      Pulmonary/Sleep Clinic  [address] [place] [address]  [phone]      Guillermo Morgan MD  PCCM PGY4  [address] [id number], [facility] [phone]

## 2022-10-07 NOTE — CHART NOTE - NSCHARTNOTEFT_GEN_A_CORE
S: Patient is a 72M p/w HTN emergency/pulm edema, PMH hep C, CAD, HTN, HLD, +smoker+ETOH, started on CIWA protocol today, requiring Ativan as per protocol. Patient is now with delerium, having hemoptysis  O:ICU Vital Signs Last 24 Hrs  T(C): 36.3 (07 Oct 2022 16:00), Max: 36.8 (07 Oct 2022 12:00)  T(F): 97.4 (07 Oct 2022 16:00), Max: 98.2 (07 Oct 2022 12:00)  HR: 111 (07 Oct 2022 19:00) (85 - 119)  BP: 145/101 (06 Oct 2022 23:15) (143/104 - 158/99)  BP(mean): 109 (06 Oct 2022 23:15) (107 - 121)  ABP: 156/74 (07 Oct 2022 19:00) (135/54 - 193/98)  ABP(mean): 100 (07 Oct 2022 19:00) (78 - 129)  RR: 29 (07 Oct 2022 19:00) (17 - 37)  SpO2: 93% (07 Oct 2022 19:00) (88% - 98%)    O2 Parameters below as of 07 Oct 2022 14:03  Patient On (Oxygen Delivery Method): nasal cannula, high flow  O2 Flow (L/min): 40  O2 Concentration (%): 45    Blood gas @ 7pm on High Flow Nasal cannula  PH: 7.51  PCO2: 34  PO2: 107  HCO3: 27  O2 sat: 99    A: 72M 72M p/w HTN emergency/pulm edema, PMH hep C, CAD, HTN, HLD, +smoker+ETOH, started on CIWA protocol today. Patient is now with delerium, having hemoptysis, in active ETOH withdrawal, and remains lethargic, thrashing about in the bed, and spitting out secretions, he appears to have eyes closed, but spits out in an effort to clear his airway.    P:  Protonix 40mg IV x 1  Protonix gtt @8mg/hr  -CIWA reinstated  -precedex gtt initiated  -Suction prn  -the amount of blood that patient is coughing up is minimal, but it is bright red  -will closely monitor and call GI if needed  -ABG done stat, patient is doing well on high flow for now, no need for urgent intubation, will closely monitor  -1:1 maintained  -Patient was restarted on nitro gtt  -hold oral meds for lethargy  -close monitoring

## 2022-10-07 NOTE — BH CONSULTATION LIAISON ASSESSMENT NOTE - RISK ASSESSMENT
Risk Factors: acute/chronic medical condition, altered mental status, etoh use  Protective Factors: residential stability, supportive friend, beloved dog

## 2022-10-07 NOTE — BH CONSULTATION LIAISON ASSESSMENT NOTE - NSBHCHARTREVIEWVS_PSY_A_CORE FT
Vital Signs Last 24 Hrs  T(C): 36.3 (07 Oct 2022 16:00), Max: 36.8 (07 Oct 2022 12:00)  T(F): 97.4 (07 Oct 2022 16:00), Max: 98.2 (07 Oct 2022 12:00)  HR: 119 (07 Oct 2022 18:00) (85 - 119)  BP: 145/101 (06 Oct 2022 23:15) (143/104 - 158/99)  BP(mean): 109 (06 Oct 2022 23:15) (101 - 121)  RR: 28 (07 Oct 2022 18:00) (17 - 37)  SpO2: 94% (07 Oct 2022 18:00) (88% - 98%)    Parameters below as of 07 Oct 2022 14:03  Patient On (Oxygen Delivery Method): nasal cannula, high flow  O2 Flow (L/min): 40  O2 Concentration (%): 45

## 2022-10-07 NOTE — BH CONSULTATION LIAISON ASSESSMENT NOTE - SUMMARY
66yo male, domiciled alone, with PMHx HTN, HLD, CAD, Hepatitis C, current smoker  who presents to [facility] with acute onset SOB since last Saturday 10/1 with no improvement, c/o shortness of breath occurred with exertion and at rest, causing poor sleep at the  night, impairing ambulation and dyspnea worsened on day of presentation, severely limiting his ability to ambulate.     Patient seen, alert to self, reports he is in the [employer] [place] [employer] and year if 1921, unable to sustain engaging interview d/t confusion and frequently dozing and inability to keep eyes open, also noted jerking movements. Patient tachycardic, tachypneic, high flow in place, ciwa=15.    Unable to reach patient's girlfriend and tenant Lexi Abraham [phone], left voicemail.     Given patient's inability to engage in interview and writer's inability to speak with collateral, history is limited including etoh/drug use, however, given patient is able to articulate drinking daily, it is likely patient is in active alcohol withdrawal given s/s alcohol withdrawal including elevated heart rate, agitation, confusion and increased ciwa scores, however, have concerns at this time given age and respiratory as well as cardiac status would highly recommend close monitoring of pulse oximetry and vital signs before and after Ativan taper dose, also be mindful taper may need to be modified depending on patient's response to treatment.     PLAN:  -DEFER obs status to primary team, however, given confusion would recommend enhanced supervision  -Labs: utox, etoh level  -CHANGE standing Ativan taper, starting at 1.5mg  -c/w Ativan symptom triggered ciwa  -Monitor vital signs including pulse oximetry before and after Ativan dose  -c/w folic acid  -thiamine 500mg iv tid x 3 days  -CL psychiatry to follow   73yo male, domiciled alone, with PMHx HTN, HLD, CAD, Hepatitis C, current smoker  who presents to [facility] with acute onset SOB since last Saturday 10/1 with no improvement, c/o shortness of breath occurred with exertion and at rest, causing poor sleep at the  night, impairing ambulation and dyspnea worsened on day of presentation, severely limiting his ability to ambulate.     Patient seen, alert to self, reports he is in the [place] [employer] and year if 1921, unable to sustain engaging interview d/t confusion and frequently dozing and inability to keep eyes open, also noted jerking movements. Patient tachycardic, tachypneic, high flow in place, ciwa=15.    Unable to reach patient's girlfriend and tenant Lexi Abraham [phone], left voicemail.     Given patient's inability to engage in interview and writer's inability to speak with collateral, history is limited including etoh/drug use, however, given patient is able to articulate drinking daily, it is likely patient is in active alcohol withdrawal given s/s alcohol withdrawal including elevated heart rate, agitation, confusion and increased ciwa scores, however, have concerns at this time given age and respiratory as well as cardiac status would highly recommend close monitoring of pulse oximetry and vital signs before and after Ativan taper dose, also be mindful taper may need to be modified depending on patient's response to treatment.     PLAN:  -DEFER obs status to primary team, however, given confusion would recommend enhanced supervision  -Labs: utox, etoh level  -CHANGE standing Ativan taper, starting at 1.5mg  -c/w Ativan symptom triggered ciwa  -Monitor vital signs including pulse oximetry before and after Ativan dose  -c/w folic acid  -thiamine 500mg iv tid x 3 days  -CL psychiatry to follow  -Na is low, consider decreasing the dose of Zoloft 50mg, continue to monitor Na , if continues to be low, HOLD Zoloft and may call psychiatry with any further questions

## 2022-10-08 LAB
ALBUMIN SERPL ELPH-MCNC: 3.6 G/DL — SIGNIFICANT CHANGE UP (ref 3.3–5)
ALP SERPL-CCNC: 60 U/L — SIGNIFICANT CHANGE UP (ref 40–120)
ALT FLD-CCNC: 26 U/L — SIGNIFICANT CHANGE UP (ref 4–41)
AMPHET UR-MCNC: NEGATIVE — SIGNIFICANT CHANGE UP
ANION GAP SERPL CALC-SCNC: 12 MMOL/L — SIGNIFICANT CHANGE UP (ref 7–14)
APAP SERPL-MCNC: <10 UG/ML — LOW (ref 15–25)
APPEARANCE UR: CLEAR — SIGNIFICANT CHANGE UP
APTT BLD: 25 SEC — LOW (ref 27–36.3)
AST SERPL-CCNC: 51 U/L — HIGH (ref 4–40)
B PERT DNA SPEC QL NAA+PROBE: SIGNIFICANT CHANGE UP
B PERT+PARAPERT DNA PNL SPEC NAA+PROBE: SIGNIFICANT CHANGE UP
BARBITURATES UR SCN-MCNC: NEGATIVE — SIGNIFICANT CHANGE UP
BENZODIAZ UR-MCNC: NEGATIVE — SIGNIFICANT CHANGE UP
BILIRUB SERPL-MCNC: 0.8 MG/DL — SIGNIFICANT CHANGE UP (ref 0.2–1.2)
BILIRUB UR-MCNC: NEGATIVE — SIGNIFICANT CHANGE UP
BLOOD GAS ARTERIAL - LYTES,HGB,ICA,LACT RESULT: SIGNIFICANT CHANGE UP
BLOOD GAS ARTERIAL - LYTES,HGB,ICA,LACT RESULT: SIGNIFICANT CHANGE UP
BORDETELLA PARAPERTUSSIS (RAPRVP): SIGNIFICANT CHANGE UP
BUN SERPL-MCNC: 19 MG/DL — SIGNIFICANT CHANGE UP (ref 7–23)
C PNEUM DNA SPEC QL NAA+PROBE: SIGNIFICANT CHANGE UP
CALCIUM SERPL-MCNC: 7.9 MG/DL — LOW (ref 8.4–10.5)
CHLORIDE SERPL-SCNC: 86 MMOL/L — LOW (ref 98–107)
CO2 SERPL-SCNC: 25 MMOL/L — SIGNIFICANT CHANGE UP (ref 22–31)
COCAINE METAB.OTHER UR-MCNC: NEGATIVE — SIGNIFICANT CHANGE UP
COLOR SPEC: YELLOW — SIGNIFICANT CHANGE UP
CREAT SERPL-MCNC: 0.75 MG/DL — SIGNIFICANT CHANGE UP (ref 0.5–1.3)
CREATININE URINE RESULT, DAU: 24 MG/DL — SIGNIFICANT CHANGE UP
DIFF PNL FLD: NEGATIVE — SIGNIFICANT CHANGE UP
EGFR: 96 ML/MIN/1.73M2 — SIGNIFICANT CHANGE UP
ETHANOL SERPL-MCNC: <10 MG/DL — SIGNIFICANT CHANGE UP
FLUAV SUBTYP SPEC NAA+PROBE: SIGNIFICANT CHANGE UP
FLUBV RNA SPEC QL NAA+PROBE: SIGNIFICANT CHANGE UP
GLUCOSE SERPL-MCNC: 124 MG/DL — HIGH (ref 70–99)
GLUCOSE UR QL: NEGATIVE — SIGNIFICANT CHANGE UP
HADV DNA SPEC QL NAA+PROBE: SIGNIFICANT CHANGE UP
HCOV 229E RNA SPEC QL NAA+PROBE: SIGNIFICANT CHANGE UP
HCOV HKU1 RNA SPEC QL NAA+PROBE: SIGNIFICANT CHANGE UP
HCOV NL63 RNA SPEC QL NAA+PROBE: SIGNIFICANT CHANGE UP
HCOV OC43 RNA SPEC QL NAA+PROBE: SIGNIFICANT CHANGE UP
HCT VFR BLD CALC: 28.3 % — LOW (ref 39–50)
HCV RNA SPEC NAA+PROBE-LOG IU: SIGNIFICANT CHANGE UP
HCV RNA SPEC NAA+PROBE-LOG IU: SIGNIFICANT CHANGE UP LOGIU/ML
HGB BLD-MCNC: 9.9 G/DL — LOW (ref 13–17)
HMPV RNA SPEC QL NAA+PROBE: SIGNIFICANT CHANGE UP
HPIV1 RNA SPEC QL NAA+PROBE: SIGNIFICANT CHANGE UP
HPIV2 RNA SPEC QL NAA+PROBE: SIGNIFICANT CHANGE UP
HPIV3 RNA SPEC QL NAA+PROBE: SIGNIFICANT CHANGE UP
HPIV4 RNA SPEC QL NAA+PROBE: SIGNIFICANT CHANGE UP
INR BLD: 1.17 RATIO — HIGH (ref 0.88–1.16)
KETONES UR-MCNC: NEGATIVE — SIGNIFICANT CHANGE UP
LEUKOCYTE ESTERASE UR-ACNC: NEGATIVE — SIGNIFICANT CHANGE UP
M PNEUMO DNA SPEC QL NAA+PROBE: SIGNIFICANT CHANGE UP
MAGNESIUM SERPL-MCNC: 2 MG/DL — SIGNIFICANT CHANGE UP (ref 1.6–2.6)
MCHC RBC-ENTMCNC: 27.9 PG — SIGNIFICANT CHANGE UP (ref 27–34)
MCHC RBC-ENTMCNC: 35 GM/DL — SIGNIFICANT CHANGE UP (ref 32–36)
MCV RBC AUTO: 79.7 FL — LOW (ref 80–100)
METHADONE UR-MCNC: NEGATIVE — SIGNIFICANT CHANGE UP
NITRITE UR-MCNC: NEGATIVE — SIGNIFICANT CHANGE UP
NRBC # BLD: 0 /100 WBCS — SIGNIFICANT CHANGE UP (ref 0–0)
NRBC # FLD: 0 K/UL — SIGNIFICANT CHANGE UP (ref 0–0)
OPIATES UR-MCNC: NEGATIVE — SIGNIFICANT CHANGE UP
OSMOLALITY UR: 315 MOSM/KG — SIGNIFICANT CHANGE UP (ref 50–1200)
OXYCODONE UR-MCNC: NEGATIVE — SIGNIFICANT CHANGE UP
PCP SPEC-MCNC: SIGNIFICANT CHANGE UP
PCP UR-MCNC: NEGATIVE — SIGNIFICANT CHANGE UP
PH UR: 6 — SIGNIFICANT CHANGE UP (ref 5–8)
PHOSPHATE SERPL-MCNC: 3.9 MG/DL — SIGNIFICANT CHANGE UP (ref 2.5–4.5)
PLATELET # BLD AUTO: 155 K/UL — SIGNIFICANT CHANGE UP (ref 150–400)
POTASSIUM SERPL-MCNC: 3.3 MMOL/L — LOW (ref 3.5–5.3)
POTASSIUM SERPL-SCNC: 3.3 MMOL/L — LOW (ref 3.5–5.3)
PROT SERPL-MCNC: 6 G/DL — SIGNIFICANT CHANGE UP (ref 6–8.3)
PROT UR-MCNC: NEGATIVE — SIGNIFICANT CHANGE UP
PROTHROM AB SERPL-ACNC: 13.6 SEC — HIGH (ref 10.5–13.4)
RAPID RVP RESULT: SIGNIFICANT CHANGE UP
RBC # BLD: 3.55 M/UL — LOW (ref 4.2–5.8)
RBC # FLD: 12.3 % — SIGNIFICANT CHANGE UP (ref 10.3–14.5)
RSV RNA SPEC QL NAA+PROBE: SIGNIFICANT CHANGE UP
RV+EV RNA SPEC QL NAA+PROBE: SIGNIFICANT CHANGE UP
SALICYLATES SERPL-MCNC: <0.3 MG/DL — LOW (ref 15–30)
SARS-COV-2 RNA SPEC QL NAA+PROBE: SIGNIFICANT CHANGE UP
SODIUM SERPL-SCNC: 123 MMOL/L — LOW (ref 135–145)
SP GR SPEC: 1.01 — SIGNIFICANT CHANGE UP (ref 1.01–1.05)
THC UR QL: POSITIVE
TOXICOLOGY SCREEN, DRUGS OF ABUSE, SERUM RESULT: SIGNIFICANT CHANGE UP
UROBILINOGEN FLD QL: SIGNIFICANT CHANGE UP
WBC # BLD: 9.39 K/UL — SIGNIFICANT CHANGE UP (ref 3.8–10.5)
WBC # FLD AUTO: 9.39 K/UL — SIGNIFICANT CHANGE UP (ref 3.8–10.5)

## 2022-10-08 PROCEDURE — 99232 SBSQ HOSP IP/OBS MODERATE 35: CPT

## 2022-10-08 PROCEDURE — 90792 PSYCH DIAG EVAL W/MED SRVCS: CPT

## 2022-10-08 RX ORDER — THIAMINE MONONITRATE (VIT B1) 100 MG
500 TABLET ORAL DAILY
Refills: 0 | Status: COMPLETED | OUTPATIENT
Start: 2022-10-08 | End: 2022-10-10

## 2022-10-08 RX ORDER — POTASSIUM CHLORIDE 20 MEQ
10 PACKET (EA) ORAL
Refills: 0 | Status: COMPLETED | OUTPATIENT
Start: 2022-10-08 | End: 2022-10-08

## 2022-10-08 RX ORDER — CARVEDILOL PHOSPHATE 80 MG/1
3.12 CAPSULE, EXTENDED RELEASE ORAL EVERY 12 HOURS
Refills: 0 | Status: DISCONTINUED | OUTPATIENT
Start: 2022-10-08 | End: 2022-10-12

## 2022-10-08 RX ORDER — FOLIC ACID 0.8 MG
1 TABLET ORAL DAILY
Refills: 0 | Status: DISCONTINUED | OUTPATIENT
Start: 2022-10-08 | End: 2022-11-02

## 2022-10-08 RX ORDER — FUROSEMIDE 40 MG
5 TABLET ORAL
Qty: 500 | Refills: 0 | Status: DISCONTINUED | OUTPATIENT
Start: 2022-10-08 | End: 2022-10-09

## 2022-10-08 RX ORDER — ACETAMINOPHEN 500 MG
650 TABLET ORAL ONCE
Refills: 0 | Status: COMPLETED | OUTPATIENT
Start: 2022-10-08 | End: 2022-10-08

## 2022-10-08 RX ORDER — SERTRALINE 25 MG/1
50 TABLET, FILM COATED ORAL DAILY
Refills: 0 | Status: DISCONTINUED | OUTPATIENT
Start: 2022-10-08 | End: 2022-11-02

## 2022-10-08 RX ADMIN — Medication 2.5 MG/HR: at 08:24

## 2022-10-08 RX ADMIN — Medication 650 MILLIGRAM(S): at 18:13

## 2022-10-08 RX ADMIN — Medication 100 MILLIEQUIVALENT(S): at 06:59

## 2022-10-08 RX ADMIN — CARVEDILOL PHOSPHATE 3.12 MILLIGRAM(S): 80 CAPSULE, EXTENDED RELEASE ORAL at 18:14

## 2022-10-08 RX ADMIN — SERTRALINE 50 MILLIGRAM(S): 25 TABLET, FILM COATED ORAL at 16:11

## 2022-10-08 RX ADMIN — ISOSORBIDE DINITRATE 10 MILLIGRAM(S): 5 TABLET ORAL at 18:14

## 2022-10-08 RX ADMIN — ATORVASTATIN CALCIUM 40 MILLIGRAM(S): 80 TABLET, FILM COATED ORAL at 21:43

## 2022-10-08 RX ADMIN — Medication 1 MILLIGRAM(S): at 12:28

## 2022-10-08 RX ADMIN — Medication 2.5 MG/HR: at 20:41

## 2022-10-08 RX ADMIN — SPIRONOLACTONE 25 MILLIGRAM(S): 25 TABLET, FILM COATED ORAL at 12:28

## 2022-10-08 RX ADMIN — Medication 105 MILLIGRAM(S): at 13:41

## 2022-10-08 RX ADMIN — ISOSORBIDE DINITRATE 10 MILLIGRAM(S): 5 TABLET ORAL at 08:53

## 2022-10-08 RX ADMIN — Medication 100 MILLIEQUIVALENT(S): at 05:51

## 2022-10-08 RX ADMIN — CHLORHEXIDINE GLUCONATE 1 APPLICATION(S): 213 SOLUTION TOPICAL at 05:59

## 2022-10-08 RX ADMIN — ALBUTEROL 2.5 MILLIGRAM(S): 90 AEROSOL, METERED ORAL at 10:06

## 2022-10-08 RX ADMIN — SACUBITRIL AND VALSARTAN 1 TABLET(S): 24; 26 TABLET, FILM COATED ORAL at 08:53

## 2022-10-08 RX ADMIN — PANTOPRAZOLE SODIUM 10 MG/HR: 20 TABLET, DELAYED RELEASE ORAL at 07:33

## 2022-10-08 RX ADMIN — ALBUTEROL 2.5 MILLIGRAM(S): 90 AEROSOL, METERED ORAL at 03:56

## 2022-10-08 RX ADMIN — Medication 100 MILLIGRAM(S): at 12:29

## 2022-10-08 RX ADMIN — SACUBITRIL AND VALSARTAN 1 TABLET(S): 24; 26 TABLET, FILM COATED ORAL at 20:31

## 2022-10-08 RX ADMIN — Medication 1.5 MILLIGRAM(S): at 16:11

## 2022-10-08 RX ADMIN — Medication 1.5 MILLIGRAM(S): at 20:28

## 2022-10-08 RX ADMIN — DEXMEDETOMIDINE HYDROCHLORIDE IN 0.9% SODIUM CHLORIDE 2.56 MICROGRAM(S)/KG/HR: 4 INJECTION INTRAVENOUS at 07:34

## 2022-10-08 RX ADMIN — Medication 100 MILLIEQUIVALENT(S): at 04:53

## 2022-10-08 RX ADMIN — HEPARIN SODIUM 5000 UNIT(S): 5000 INJECTION INTRAVENOUS; SUBCUTANEOUS at 05:48

## 2022-10-08 RX ADMIN — PANTOPRAZOLE SODIUM 10 MG/HR: 20 TABLET, DELAYED RELEASE ORAL at 20:41

## 2022-10-08 RX ADMIN — Medication 50 MILLIGRAM(S): at 16:11

## 2022-10-08 RX ADMIN — Medication 50 MILLIGRAM(S): at 08:53

## 2022-10-08 RX ADMIN — ALBUTEROL 2.5 MILLIGRAM(S): 90 AEROSOL, METERED ORAL at 15:25

## 2022-10-08 RX ADMIN — Medication 650 MILLIGRAM(S): at 18:58

## 2022-10-08 RX ADMIN — Medication 50 MILLIGRAM(S): at 21:42

## 2022-10-08 RX ADMIN — Medication 1 TABLET(S): at 13:43

## 2022-10-08 RX ADMIN — PANTOPRAZOLE SODIUM 10 MG/HR: 20 TABLET, DELAYED RELEASE ORAL at 15:02

## 2022-10-08 RX ADMIN — ISOSORBIDE DINITRATE 10 MILLIGRAM(S): 5 TABLET ORAL at 13:45

## 2022-10-08 RX ADMIN — ALBUTEROL 2.5 MILLIGRAM(S): 90 AEROSOL, METERED ORAL at 22:02

## 2022-10-08 RX ADMIN — Medication 81 MILLIGRAM(S): at 12:28

## 2022-10-08 RX ADMIN — HEPARIN SODIUM 5000 UNIT(S): 5000 INJECTION INTRAVENOUS; SUBCUTANEOUS at 18:15

## 2022-10-08 RX ADMIN — Medication 1.5 MILLIGRAM(S): at 12:27

## 2022-10-08 NOTE — DIETITIAN INITIAL EVALUATION ADULT - PERTINENT MEDS FT
atorvastatin, dexMEDEtomidine IV Continuous, folic acid, furosemide IV Continuous, multivitamin, pantoprazole IV Continuous, spironolactone , thiamine IV

## 2022-10-08 NOTE — PROGRESS NOTE ADULT - ASSESSMENT
Assessment and Plan:73 yo male, PMH of HTN,HLD,CAD, Hepatitis C ( completed  [place] 2018) and current smoker  presenting with acute dyspnea, found to have hypoxic respiratory distress, hypertension to , and sinus tachycardia, with nonspecific ST changes on EKG, overall concerning for hypertensive emergency with pulmonary edema. Started on nitro drip and placed on bipap. Pt admitted to CCU for further care.    Neuro  # chronic depression  -c/w sertraline 100mg PO daily  -melatonin as needed for insomnia.  - AxOx3    Resp  #Dyspnea 2/2 mixed pulmonary edema and COPD  - ABG showed uncompensated Resp alkalosis on Bipap  - hypoxic on NC  - off bipap -> Hiflow for hypoxemia  - h/o smoking x20 years   -No h/o COPD  - pulmonary consulted- less likely COPD  - start on Duoneb neb q6 x 24 hrs, Albuterol neb and Spiriva starting 10/8    CV  #acute CHFrEF /Acute pulmonary edema.   -presents with acute shortness of breath found in pulmonary edema  -chest Xray showed pulmonary edema on 10/6  -c/w bipap- wean off to NC as tolerated  -lactate improved  -c/w hydralazine, isordil and start Entresto  - wean off nitro drip   - c/w  on lasix  drip at 10mg   - Echo ; EF 25%, Severe global LVSD, mod MR, enlarge RV with normal RVF  - IN: 721.5 mL / OUT: 1700 mL / NET: -978.5 mL  -strict i/O- goal net neg 1-2 L /24hr.   - HF consulted      Elevated trop  - Trop up trending ; 108-> 149->166. CKMB dowm trending- likely Type II  - intial EKG WEN 1mm in V1-V3, STD in inferior leads which improved  - no chest pain  - c/w asa 81mg , increase Lipitor 80mg PO   - echo showed low EF with Severe LVSD  - need  evaluation cath for  ischemic work up once optimized     # HTN (hypertension).   - presents with  HTN emergency   -c/w  on nitro drip goal -150  -c/w isordil 10mg TID-> titrate up as tolerate  - c/w  on hydralazine 50mg TID    Electrolyte  # Hyponatremia  -Na 126-> 123  - likely 2/2 hypervolemia vs diuretic vs alcohol use    - fluid restriction 1500cc/24hr  - monitor BMP    ETOH abuse  - drinks beer 2-3 cans every day and 1 hot of RUM every day  - no h/o withdrawal  -[facility] protocol   - started on folic acid and thiamin      GI  - no issue  -c/w DASH diet      - no issue  -voiding without difficult      Endo  #Hyperlipidemia.   -c/w Lipitor 20mg Po daily  - lipid profile WNL    ID  # Leukocytosis on admission   -will monitor off antibiotics.-no fever  -covid PCR neg  -likely reactive  -will monitor off abx.    DVT ppx  heparin SQ   Assessment and Plan:71 yo male, PMH of HTN,HLD,CAD, Hepatitis C ( completed  [place] 2018) and current smoker  presenting with acute dyspnea, found to have hypoxic respiratory distress, hypertension to , and sinus tachycardia, with nonspecific ST changes on EKG, overall concerning for hypertensive emergency with pulmonary edema. Started on nitro drip and placed on bipap. Pt admitted to CCU for further care.    Neuro  # chronic depression  -c/w sertraline 100mg PO daily  - melatonin as needed for insomnia.  - AxOx3    # EtOH withdrawal  - unclear hx of when last drink was, however pt acknowledges to at least 3 drinks/day (beer 2-3 cans every day and 1 hot of RUM every day)  - no known hx withdrawal  - started on CIWA protocol, received total 7 mg Ativan on 10/7 w/ high CIWA scores > 12  - started on precedex overnight due to concern for hypoxia from benzo-induced respiratory depression  - folate, thiamine  - psych recs appreciated    Resp  #Dyspnea 2/2 mixed pulmonary edema and COPD  - ABG showed uncompensated Resp alkalosis on Bipap  - hypoxic on NC  - off bipap -> Hiflow for hypoxemia  - h/o smoking x20 years, though no known hx COPD  - pulmonary consulted- less likely COPD  - start on Duoneb neb q6 x 24 hrs, Albuterol neb and Spiriva starting 10/8    #hemoptysis  - episode of hemoptysis w/ BRB overnight on 10/7 pm  - pt has had cough since arrival  - no evidence of consolidation on 10/6 CXR  - s/p IV Protonix 40 x1 & started on IV Protonix gtt    CV  #acute CHFrEF /Acute pulmonary edema.   -presents with acute shortness of breath found in pulmonary edema  -chest Xray showed pulmonary edema on 10/6  -lactate improved  -c/w hydralazine, isordil and Entresto  - off nitro as of 10/8 am  - c/w lasix  drip at 10mg   - Echo ; EF 25%, Severe global LVSD, mod MR, enlarge RV with normal RVF -> new HF diagnosis  - strict i/O- goal net neg 1-2 L /24hr.  - HF following    Elevated trop  - Trop up trending ; 108-> 149->166. CKMB dowm trending- likely Type II  - intial EKG WEN 1mm in V1-V3, STD in inferior leads which improved  - no chest pain  - c/w asa 81mg , increase Lipitor 80mg PO   - echo showed low EF with Severe LVSD  - need  evaluation cath for  ischemic work up once optimized     # HTN (hypertension).   - presents with  HTN emergency   - now off nitro gtt  - c/w isordil 10mg TID-> titrate up as tolerate  - c/w  on hydralazine 50mg TID    Electrolyte  # Hyponatremia  -Na 126-> 123  - likely 2/2 hypervolemia vs diuretic vs alcohol use    - fluid restriction 1500cc/24hr  - monitor BMP      GI  - no issue  -c/w DASH diet      - no issue  -voiding without difficult      Endo  #Hyperlipidemia.   -c/w Lipitor 20mg Po daily  - lipid profile WNL    ID  # Leukocytosis on admission   -no fever  -covid PCR neg  -likely reactive, now downtrending   -will monitor off abx.    DVT ppx  heparin SQ   Assessment and Plan:73 yo male, PMH of HTN,HLD,CAD, Hepatitis C ( completed  [place] 2018) and current smoker  presenting with acute dyspnea, found to have hypoxic respiratory distress, hypertension to , and sinus tachycardia, with nonspecific ST changes on EKG, overall concerning for hypertensive emergency with pulmonary edema. Started on nitro drip and placed on bipap. Pt admitted to CCU for further care.    Neuro  # chronic depression  -c/w sertraline 100mg PO daily  - melatonin as needed for insomnia.  - AxOx3    # EtOH withdrawal  - unclear hx of when last drink was, however pt acknowledges to at least 3 drinks/day (beer 2-3 cans every day and 1 hot of RUM every day)  - tox screen positive for THC, otherwise negative for tested substances  - no known hx withdrawal  - started on CIWA protocol, received total 7 mg Ativan on 10/7 w/ high CIWA scores > 12  - started on precedex on 10/7 pm due to concern for benzo-related hypoxia. Off precedex as of this am  - start ativan taper from 1.5 mg per psych recs   - replete folate, thiamine  - psych recs appreciated    Resp  #Dyspnea 2/2 mixed pulmonary edema and COPD  - ABG showed uncompensated Resp alkalosis on Bipap  - hypoxic on NC  - off bipap -> Hiflow for hypoxemia  - h/o smoking x20 years, though no known hx COPD  - pulmonary consulted- less likely COPD  - start on Duoneb neb q6 x 24 hrs, Albuterol neb and Spiriva starting 10/8    #hemoptysis  - episode of hemoptysis w/ BRB overnight on 10/7 pm  - pt has had cough since arrival  - no evidence of consolidation on 10/6 CXR  - s/p IV Protonix 40 x1 & started on IV Protonix gtt  - c/w Protonix gtt 24 hrs  - ctm    CV  #acute CHFrEF /Acute pulmonary edema.   -presents with acute shortness of breath found in pulmonary edema  -chest Xray showed pulmonary edema on 10/6  - Echo ; EF 25%, Severe global LVSD, mod MR, enlarge RV with normal RVF -> new HF diagnosis  -lactate improved  -off nitro as of 10/8 am  -Entresto started  - start coreg today  - c/w lasix drip at 10mg   - strict i/O  - HF following    Elevated trop  - Trop up trending ; 108-> 149->166. CKMB dowm trending- likely Type II  - intial EKG WEN 1mm in V1-V3, STD in inferior leads which improved  - no chest pain  - c/w asa 81mg , increase Lipitor 80mg PO   - echo showed low EF with Severe LVSD  - need  evaluation cath for  ischemic work up once optimized     # HTN (hypertension).   - presents with  HTN emergency   - now off nitro gtt  - c/w isordil 10mg TID-> titrate up as needed  - c/w  on hydralazine 50mg TID      GI  - no issue  -c/w DASH diet    Renal/  # Hyponatremia  -Na 126-> 123  - likely 2/2 hypervolemia vs diuretic vs alcohol use    - fluid restriction 1500cc/24hr  - monitor BMP  - f/u urine studies      Endo  #Hyperlipidemia.   -c/w Lipitor 20mg Po daily  - lipid profile WNL    ID  # Leukocytosis on admission   -no fever  -covid PCR neg  -likely reactive, now downtrending   -will monitor off abx    DVT ppx  heparin SQ

## 2022-10-08 NOTE — DIETITIAN INITIAL EVALUATION ADULT - PERTINENT LABORATORY DATA
(10/8) Na 123<L>, BUN 19, Cr 0.75, <H>, K+ 3.3<L>, Phos 3.9, Mg 2.00, Alk Phos 60, ALT/SGPT 26, AST/SGOT 51<H>  (10/7) HbA1c 5.0%

## 2022-10-08 NOTE — DIETITIAN INITIAL EVALUATION ADULT - ADD RECOMMEND
1) Consider bedside swallow assessment.  2) Recommend continue DASH/TLC diet, fluid restriction per medical discretion.  3) Recommend addition of Ensure Compact 1 PO 3x daily (provides 220 kcal, 9 gm protein per 4oz serving).  4) Recommend continue micronutrient supplementation.  5) Obtain daily weights.  5) RDN remains available to provide nutrition education as appropriate.

## 2022-10-08 NOTE — DIETITIAN NUTRITION RISK NOTIFICATION - ADDITIONAL COMMENTS/DIETITIAN RECOMMENDATIONS
Please see Dietitian Initial Assessment for complete recommendations.  Paige Martinez, FANY, CDN #[id number]  Also available on [employer] Teams

## 2022-10-08 NOTE — DIETITIAN INITIAL EVALUATION ADULT - OTHER CALCULATIONS
Current weight (10/8) 107.1. Dosing weight (10/7) 112.8 lbs.   No recent weight history available via chart.  Ideal Body Weight: 142 lbs / 64.5 kg +/-10%

## 2022-10-08 NOTE — DIETITIAN INITIAL EVALUATION ADULT - OTHER INFO
Per chart, pt is 72 year old male PMH HTN, HLD, CAD, Hepatitis C, EtOH misuse, current smoker presenting with acute dyspnea overall concerning for hypertensive emergency with pulmonary edema. Screen positive for THC. Pt with episode of hemoptysis in secretions yesterday with AMS, agitation. Psych was consulted. Pt remains in CCU.    Pt unable to participate in discussion, comprehensive chart review completed. Pt was previously on BiPAP, now on HFNC. NKFA. Noted with nonadherence to medications, active smoking and EtOH use (3 beers daily, occasional rum shot). Unable to assess diet/appetite PTA, chewing/swallowing difficulties.     Pt was previously ordered for regular texture diet, now changed to minced and moist per MD. Pt likely with minimal to no PO intake since admission given current clinical condition. Last BM 10/6 per flowsheets. Labs notable for low K+/Na. Pt continues on Lasix infusion, spironolactone and Precedex.

## 2022-10-08 NOTE — PROGRESS NOTE ADULT - SUBJECTIVE AND OBJECTIVE BOX
Alcohol withdrawal      Patient is a 72y old  Male who presents with a chief complaint of SOB (07 Oct 2022 10:57)    HPI:  66 yo male, PMH of HTN, HLD, CAD, Hepatitis C (completed  [facility]voni  ) and current smoker  who presents with acute onset SOB since last Saturday 10/1 with no improvement. The shortness of breath occurred both with exertion and at rest, causing him to have poor sleep at the  night as he was unable to lie flat comfortably. His dyspnea worsened on day of presentation, severely limiting his ability to ambulate. He thought he came to hospital on . He does  not remember what happen between Monday to Thursday   In ED /107, . Patient hypoxic and tachypneic. Diffuse B lines on POCUS. ProBNP 14885, lactate  2.5. Started on nitro drip and Bipap. HsTrop 108.He report non adherence to medications. Some time he miss medication for 1 week .Some time eat high salt diet. Last time he saw doctor was >3 years ago.He denies recent chest pain, palpitations, light-headedness/ dizziness syncope, nausea/vomiting, diaphoresis, LE swelling. Initial EKG concerning for WEN in anterior leads, cardiology consult fellow was called for STEMI alert. Discussed with interventional attending, did not meet STEMI criteria. Patient admitted to CCU. (06 Oct 2022 16:53)       INTERVAL HPI/OVERNIGHT EVENTS:   ***  Afebrile, hemodynamically stable     Subjective: ***    ICU Vital Signs Last 24 Hrs  T(C): 36.7 (08 Oct 2022 07:43), Max: 36.8 (07 Oct 2022 12:00)  T(F): 98.1 (08 Oct 2022 07:43), Max: 98.2 (07 Oct 2022 12:00)  HR: 82 (08 Oct 2022 06:00) (71 - 119)  ABP: 130/73 (08 Oct 2022 06:00) (103/54 - 162/78)  ABP(mean): 90 (08 Oct 2022 06:00) (66 - 104)  RR: 40 (08 Oct 2022 07:28) (17 - 40)  SpO2: 100% (08 Oct 2022 07:28) (92% - 100%)    O2 Parameters below as of 08 Oct 2022 07:28  Patient On (Oxygen Delivery Method): nasal cannula, high flow  O2 Flow (L/min): 40  O2 Concentration (%): 60      I&O's Summary    07 Oct 2022 07:01  -  08 Oct 2022 07:00  --------------------------------------------------------  IN: 372 mL / OUT: 3550 mL / NET: -3178 mL        Daily     Daily Weight in k.6 (08 Oct 2022 04:00)      EKG/Telemetry Events: NSR    MEDICATIONS  (STANDING):  ALBUTerol    0.083% 2.5 milliGRAM(s) Nebulizer every 6 hours  albuterol/ipratropium for Nebulization 3 milliLiter(s) Nebulizer every 6 hours  aspirin enteric coated 81 milliGRAM(s) Oral daily  atorvastatin 40 milliGRAM(s) Oral at bedtime  chlorhexidine 2% Cloths 1 Application(s) Topical <User Schedule>  dexMEDEtomidine Infusion 0.2 MICROgram(s)/kG/Hr (2.56 mL/Hr) IV Continuous <Continuous>  furosemide Infusion 5 mG/Hr (2.5 mL/Hr) IV Continuous <Continuous>  heparin   Injectable 5000 Unit(s) SubCutaneous every 12 hours  hydrALAZINE 50 milliGRAM(s) Oral every 8 hours  influenza  Vaccine (HIGH DOSE) 0.7 milliLiter(s) IntraMuscular once  isosorbide   dinitrate Tablet (ISORDIL) 10 milliGRAM(s) Oral three times a day  pantoprazole Infusion 8 mG/Hr (10 mL/Hr) IV Continuous <Continuous>  sacubitril 24 mG/valsartan 26 mG 1 Tablet(s) Oral two times a day  sertraline 100 milliGRAM(s) Oral daily  spironolactone 25 milliGRAM(s) Oral daily  tiotropium 18 MICROgram(s) Capsule 1 Capsule(s) Inhalation daily    MEDICATIONS  (PRN):  guaiFENesin Oral Liquid (Sugar-Free) 100 milliGRAM(s) Oral every 6 hours PRN Cough  melatonin 3 milliGRAM(s) Oral at bedtime PRN Insomnia      PHYSICAL EXAM:  GENERAL: No apparent distress, cachectic  EYES: EOMI, PERRLA, conjunctiva and sclera clear  NECK: Supple, No JVD  NERVOUS SYSTEM:  Alert & Awake.   CHEST/LUNG: Bilateral crackles, no wheezing  HEART: Regular rate and rhythm; No murmurs or gallops  ABDOMEN: Soft, Nontender, Nondistended; Bowel sounds present  EXTREMITIES:  2+ Peripheral Pulses, No clubbing, cyanosis, or edema  LYMPH: No lymphadenopathy noted  SKIN: No rashes or lesions        LABS:                        9.9    9.39  )-----------( 155      ( 08 Oct 2022 02:32 )             28.3     10-    123<L>  |  86<L>  |  19  ----------------------------<  124<H>  3.3<L>   |  25  |  0.75    Ca    7.9<L>      08 Oct 2022 02:32  Phos  3.9     10-  Mg     2.00     10-    TPro  6.0  /  Alb  3.6  /  TBili  0.8  /  DBili  x   /  AST  51<H>  /  ALT  26  /  AlkPhos  60  10-08         PT/INR - ( 08 Oct 2022 02:32 )   PT: 13.6 sec;   INR: 1.17 ratio      PTT - ( 08 Oct 2022 02:32 )  PTT:25.0 sec      ABG - ( 08 Oct 2022 06:21 )  pH, Arterial: 7.46  pH, Blood: x     /  pCO2: 38    /  pO2: 110   / HCO3: 27    / Base Excess: 3.0   /  SaO2: 98.0          Creatine Kinase, Serum: 213 U/L (10-07 @ 11:07)  CKMB Units: 7.8 ng/mL (10-07 @ 11:07)    CARDIAC MARKERS ( 07 Oct 2022 11:07 )  x     / x     / 213 U/L / x     / 7.8 ng/mL  CARDIAC MARKERS ( 07 Oct 2022 05:45 )  x     / x     / 218 U/L / x     / 8.9 ng/mL  CARDIAC MARKERS ( 06 Oct 2022 23:10 )  x     / x     / 233 U/L / x     / 10.3 ng/mL  CARDIAC MARKERS ( 06 Oct 2022 15:07 )  x     / x     / 199 U/L / x     / 9.8 ng/mL        RADIOLOGY & ADDITIONAL TESTS:  Reviewed        Care Discussed with Consultants/Other Providers [ x] YES  [ ] NO         Alcohol withdrawal      Patient is a 72y old  Male who presents with a chief complaint of SOB (07 Oct 2022 10:57)    HPI:  66 yo male, PMH of HTN, HLD, CAD, Hepatitis C (completed  [facility]voni  ) and current smoker  who presents with acute onset SOB since last Saturday 10/1 with no improvement. The shortness of breath occurred both with exertion and at rest, causing him to have poor sleep at the  night as he was unable to lie flat comfortably. His dyspnea worsened on day of presentation, severely limiting his ability to ambulate. He thought he came to hospital on . He does  not remember what happen between Monday to Thursday   In ED /107, . Patient hypoxic and tachypneic. Diffuse B lines on POCUS. ProBNP 18375, lactate  2.5. Started on nitro drip and Bipap. HsTrop 108.He report non adherence to medications. Some time he miss medication for 1 week .Some time eat high salt diet. Last time he saw doctor was >3 years ago.He denies recent chest pain, palpitations, light-headedness/ dizziness syncope, nausea/vomiting, diaphoresis, LE swelling. Initial EKG concerning for WEN in anterior leads, cardiology consult fellow was called for STEMI alert. Discussed with interventional attending, did not meet STEMI criteria. Patient admitted to CCU. (06 Oct 2022 16:53)       INTERVAL HPI/OVERNIGHT EVENTS:   ***  Afebrile, hemodynamically stable     Subjective: Lightly sedated on Precedex this am, denies acute complaints. On HFNC    ICU Vital Signs Last 24 Hrs  T(C): 36.7 (08 Oct 2022 07:43), Max: 36.8 (07 Oct 2022 12:00)  T(F): 98.1 (08 Oct 2022 07:43), Max: 98.2 (07 Oct 2022 12:00)  HR: 82 (08 Oct 2022 06:00) (71 - 119)  ABP: 130/73 (08 Oct 2022 06:00) (103/54 - 162/78)  ABP(mean): 90 (08 Oct 2022 06:00) (66 - 104)  RR: 40 (08 Oct 2022 07:28) (17 - 40)  SpO2: 100% (08 Oct 2022 07:28) (92% - 100%)    O2 Parameters below as of 08 Oct 2022 07:28  Patient On (Oxygen Delivery Method): nasal cannula, high flow  O2 Flow (L/min): 40  O2 Concentration (%): 60      I&O's Summary    07 Oct 2022 07:01  -  08 Oct 2022 07:00  --------------------------------------------------------  IN: 372 mL / OUT: 3550 mL / NET: -3178 mL        Daily     Daily Weight in k.6 (08 Oct 2022 04:00)      EKG/Telemetry Events: NSR    MEDICATIONS  (STANDING):  ALBUTerol    0.083% 2.5 milliGRAM(s) Nebulizer every 6 hours  albuterol/ipratropium for Nebulization 3 milliLiter(s) Nebulizer every 6 hours  aspirin enteric coated 81 milliGRAM(s) Oral daily  atorvastatin 40 milliGRAM(s) Oral at bedtime  chlorhexidine 2% Cloths 1 Application(s) Topical <User Schedule>  dexMEDEtomidine Infusion 0.2 MICROgram(s)/kG/Hr (2.56 mL/Hr) IV Continuous <Continuous>  furosemide Infusion 5 mG/Hr (2.5 mL/Hr) IV Continuous <Continuous>  heparin   Injectable 5000 Unit(s) SubCutaneous every 12 hours  hydrALAZINE 50 milliGRAM(s) Oral every 8 hours  influenza  Vaccine (HIGH DOSE) 0.7 milliLiter(s) IntraMuscular once  isosorbide   dinitrate Tablet (ISORDIL) 10 milliGRAM(s) Oral three times a day  pantoprazole Infusion 8 mG/Hr (10 mL/Hr) IV Continuous <Continuous>  sacubitril 24 mG/valsartan 26 mG 1 Tablet(s) Oral two times a day  sertraline 100 milliGRAM(s) Oral daily  spironolactone 25 milliGRAM(s) Oral daily  tiotropium 18 MICROgram(s) Capsule 1 Capsule(s) Inhalation daily    MEDICATIONS  (PRN):  guaiFENesin Oral Liquid (Sugar-Free) 100 milliGRAM(s) Oral every 6 hours PRN Cough  melatonin 3 milliGRAM(s) Oral at bedtime PRN Insomnia      PHYSICAL EXAM:  GENERAL: Sleeping, cachectic appearing  EYES: EOMI, PERRLA, conjunctiva and sclera clear  NECK: Supple, No JVD  NERVOUS SYSTEM:  Lightly sedated (RASS 0 to -1), awakens to voice but drowsy. A&Ox3, following commands.  CHEST/LUNG: Bilateral crackles, no wheezing  HEART: Regular rate and rhythm; No murmurs or gallops  ABDOMEN: Soft, Nontender, Nondistended; Bowel sounds present  EXTREMITIES:  2+ Peripheral Pulses, No clubbing, cyanosis, or edema  LYMPH: No lymphadenopathy noted  SKIN: No rashes or lesions        LABS:                        9.9    9.39  )-----------( 155      ( 08 Oct 2022 02:32 )             28.3     10-    123<L>  |  86<L>  |  19  ----------------------------<  124<H>  3.3<L>   |  25  |  0.75    Ca    7.9<L>      08 Oct 2022 02:32  Phos  3.9     10-08  Mg     2.00     10-08    TPro  6.0  /  Alb  3.6  /  TBili  0.8  /  DBili  x   /  AST  51<H>  /  ALT  26  /  AlkPhos  60  10-08         PT/INR - ( 08 Oct 2022 02:32 )   PT: 13.6 sec;   INR: 1.17 ratio      PTT - ( 08 Oct 2022 02:32 )  PTT:25.0 sec      ABG - ( 08 Oct 2022 06:21 )  pH, Arterial: 7.46  pH, Blood: x     /  pCO2: 38    /  pO2: 110   / HCO3: 27    / Base Excess: 3.0   /  SaO2: 98.0          Creatine Kinase, Serum: 213 U/L (10-07 @ 11:07)  CKMB Units: 7.8 ng/mL (10-07 @ 11:07)    CARDIAC MARKERS ( 07 Oct 2022 11:07 )  x     / x     / 213 U/L / x     / 7.8 ng/mL  CARDIAC MARKERS ( 07 Oct 2022 05:45 )  x     / x     / 218 U/L / x     / 8.9 ng/mL  CARDIAC MARKERS ( 06 Oct 2022 23:10 )  x     / x     / 233 U/L / x     / 10.3 ng/mL  CARDIAC MARKERS ( 06 Oct 2022 15:07 )  x     / x     / 199 U/L / x     / 9.8 ng/mL        RADIOLOGY & ADDITIONAL TESTS:  Reviewed        Care Discussed with Consultants/Other Providers [ x] YES  [ ] NO         Alcohol withdrawal      Patient is a 72y old  Male who presents with a chief complaint of SOB (07 Oct 2022 10:57)    HPI:  66 yo male, PMH of HTN, HLD, CAD, Hepatitis C (completed  [facility]voni  ) and current smoker  who presents with acute onset SOB since last Saturday 10/1 with no improvement. The shortness of breath occurred both with exertion and at rest, causing him to have poor sleep at the  night as he was unable to lie flat comfortably. His dyspnea worsened on day of presentation, severely limiting his ability to ambulate. He thought he came to hospital on . He does  not remember what happen between Monday to Thursday   In ED /107, . Patient hypoxic and tachypneic. Diffuse B lines on POCUS. ProBNP 10936, lactate  2.5. Started on nitro drip and Bipap. HsTrop 108.He report non adherence to medications. Some time he miss medication for 1 week .Some time eat high salt diet. Last time he saw doctor was >3 years ago.He denies recent chest pain, palpitations, light-headedness/ dizziness syncope, nausea/vomiting, diaphoresis, LE swelling. Initial EKG concerning for WEN in anterior leads, cardiology consult fellow was called for STEMI alert. Discussed with interventional attending, did not meet STEMI criteria. Patient admitted to CCU. (06 Oct 2022 16:53)       INTERVAL HPI/OVERNIGHT EVENTS:   Noted to have episode of hemoptysis with scant BRB in secretions yesterday pm. S/p IV Protonix 40 x1, also started on Protonix gtt.  Pt also with AMS, agitated & disoriented. Symptom-triggered Ativan d/crow due to concern for hypoxia, pt started on precedex gtt overnight. Off precedex as of this am.  Afebrile, hemodynamically stable     Subjective: Lightly sedated on Precedex this am, denies acute complaints, A&Ox3. On HFNC    ICU Vital Signs Last 24 Hrs  T(C): 36.7 (08 Oct 2022 07:43), Max: 36.8 (07 Oct 2022 12:00)  T(F): 98.1 (08 Oct 2022 07:43), Max: 98.2 (07 Oct 2022 12:00)  HR: 82 (08 Oct 2022 06:00) (71 - 119)  ABP: 130/73 (08 Oct 2022 06:00) (103/54 - 162/78)  ABP(mean): 90 (08 Oct 2022 06:00) (66 - 104)  RR: 40 (08 Oct 2022 07:28) (17 - 40)  SpO2: 100% (08 Oct 2022 07:28) (92% - 100%)    O2 Parameters below as of 08 Oct 2022 07:28  Patient On (Oxygen Delivery Method): nasal cannula, high flow  O2 Flow (L/min): 40  O2 Concentration (%): 60      I&O's Summary    07 Oct 2022 07:01  -  08 Oct 2022 07:00  --------------------------------------------------------  IN: 372 mL / OUT: 3550 mL / NET: -3178 mL        Daily     Daily Weight in k.6 (08 Oct 2022 04:00)      EKG/Telemetry Events: NSR    MEDICATIONS  (STANDING):  ALBUTerol    0.083% 2.5 milliGRAM(s) Nebulizer every 6 hours  albuterol/ipratropium for Nebulization 3 milliLiter(s) Nebulizer every 6 hours  aspirin enteric coated 81 milliGRAM(s) Oral daily  atorvastatin 40 milliGRAM(s) Oral at bedtime  chlorhexidine 2% Cloths 1 Application(s) Topical <User Schedule>  dexMEDEtomidine Infusion 0.2 MICROgram(s)/kG/Hr (2.56 mL/Hr) IV Continuous <Continuous>  furosemide Infusion 5 mG/Hr (2.5 mL/Hr) IV Continuous <Continuous>  heparin   Injectable 5000 Unit(s) SubCutaneous every 12 hours  hydrALAZINE 50 milliGRAM(s) Oral every 8 hours  influenza  Vaccine (HIGH DOSE) 0.7 milliLiter(s) IntraMuscular once  isosorbide   dinitrate Tablet (ISORDIL) 10 milliGRAM(s) Oral three times a day  pantoprazole Infusion 8 mG/Hr (10 mL/Hr) IV Continuous <Continuous>  sacubitril 24 mG/valsartan 26 mG 1 Tablet(s) Oral two times a day  sertraline 100 milliGRAM(s) Oral daily  spironolactone 25 milliGRAM(s) Oral daily  tiotropium 18 MICROgram(s) Capsule 1 Capsule(s) Inhalation daily    MEDICATIONS  (PRN):  guaiFENesin Oral Liquid (Sugar-Free) 100 milliGRAM(s) Oral every 6 hours PRN Cough  melatonin 3 milliGRAM(s) Oral at bedtime PRN Insomnia      PHYSICAL EXAM:  GENERAL: Sleeping, cachectic appearing  EYES: EOMI, PERRLA, conjunctiva and sclera clear  NECK: Supple, No JVD  NERVOUS SYSTEM:  Lightly sedated (RASS 0 to -1), awakens to voice but drowsy. A&Ox3, following commands.  CHEST/LUNG: Bilateral crackles, no wheezing  HEART: Regular rate and rhythm; No murmurs or gallops  ABDOMEN: Soft, Nontender, Nondistended; Bowel sounds present  EXTREMITIES:  2+ Peripheral Pulses, No clubbing, cyanosis, or edema  LYMPH: No lymphadenopathy noted  SKIN: No rashes or lesions        LABS:                        9.9    9.39  )-----------( 155      ( 08 Oct 2022 02:32 )             28.3     10    123<L>  |  86<L>  |  19  ----------------------------<  124<H>  3.3<L>   |  25  |  0.75    Ca    7.9<L>      08 Oct 2022 02:32  Phos  3.9     10-  Mg     2.00     10-08    TPro  6.0  /  Alb  3.6  /  TBili  0.8  /  DBili  x   /  AST  51<H>  /  ALT  26  /  AlkPhos  60  10-         PT/INR - ( 08 Oct 2022 02:32 )   PT: 13.6 sec;   INR: 1.17 ratio      PTT - ( 08 Oct 2022 02:32 )  PTT:25.0 sec      ABG - ( 08 Oct 2022 06:21 )  pH, Arterial: 7.46  pH, Blood: x     /  pCO2: 38    /  pO2: 110   / HCO3: 27    / Base Excess: 3.0   /  SaO2: 98.0          Creatine Kinase, Serum: 213 U/L (10-07 @ 11:07)  CKMB Units: 7.8 ng/mL (10-07 @ 11:07)    CARDIAC MARKERS ( 07 Oct 2022 11:07 )  x     / x     / 213 U/L / x     / 7.8 ng/mL  CARDIAC MARKERS ( 07 Oct 2022 05:45 )  x     / x     / 218 U/L / x     / 8.9 ng/mL  CARDIAC MARKERS ( 06 Oct 2022 23:10 )  x     / x     / 233 U/L / x     / 10.3 ng/mL  CARDIAC MARKERS ( 06 Oct 2022 15:07 )  x     / x     / 199 U/L / x     / 9.8 ng/mL        RADIOLOGY & ADDITIONAL TESTS:  Reviewed        Care Discussed with Consultants/Other Providers [ x] YES  [ ] NO

## 2022-10-09 LAB
ALBUMIN SERPL ELPH-MCNC: 3.3 G/DL — SIGNIFICANT CHANGE UP (ref 3.3–5)
ALP SERPL-CCNC: 89 U/L — SIGNIFICANT CHANGE UP (ref 40–120)
ALT FLD-CCNC: 30 U/L — SIGNIFICANT CHANGE UP (ref 4–41)
ANION GAP SERPL CALC-SCNC: 13 MMOL/L — SIGNIFICANT CHANGE UP (ref 7–14)
AST SERPL-CCNC: 47 U/L — HIGH (ref 4–40)
BILIRUB SERPL-MCNC: 0.9 MG/DL — SIGNIFICANT CHANGE UP (ref 0.2–1.2)
BLOOD GAS ARTERIAL COMPREHENSIVE RESULT: SIGNIFICANT CHANGE UP
BUN SERPL-MCNC: 24 MG/DL — HIGH (ref 7–23)
CALCIUM SERPL-MCNC: 8.5 MG/DL — SIGNIFICANT CHANGE UP (ref 8.4–10.5)
CHLORIDE SERPL-SCNC: 87 MMOL/L — LOW (ref 98–107)
CO2 SERPL-SCNC: 26 MMOL/L — SIGNIFICANT CHANGE UP (ref 22–31)
CREAT SERPL-MCNC: 0.9 MG/DL — SIGNIFICANT CHANGE UP (ref 0.5–1.3)
EGFR: 91 ML/MIN/1.73M2 — SIGNIFICANT CHANGE UP
GLUCOSE SERPL-MCNC: 105 MG/DL — HIGH (ref 70–99)
HCT VFR BLD CALC: 34.4 % — LOW (ref 39–50)
HGB BLD-MCNC: 11.5 G/DL — LOW (ref 13–17)
MAGNESIUM SERPL-MCNC: 1.6 MG/DL — SIGNIFICANT CHANGE UP (ref 1.6–2.6)
MCHC RBC-ENTMCNC: 27.2 PG — SIGNIFICANT CHANGE UP (ref 27–34)
MCHC RBC-ENTMCNC: 33.4 GM/DL — SIGNIFICANT CHANGE UP (ref 32–36)
MCV RBC AUTO: 81.3 FL — SIGNIFICANT CHANGE UP (ref 80–100)
NRBC # BLD: 0 /100 WBCS — SIGNIFICANT CHANGE UP (ref 0–0)
NRBC # FLD: 0 K/UL — SIGNIFICANT CHANGE UP (ref 0–0)
PHOSPHATE SERPL-MCNC: 2.6 MG/DL — SIGNIFICANT CHANGE UP (ref 2.5–4.5)
PLATELET # BLD AUTO: 213 K/UL — SIGNIFICANT CHANGE UP (ref 150–400)
POTASSIUM SERPL-MCNC: 3.4 MMOL/L — LOW (ref 3.5–5.3)
POTASSIUM SERPL-SCNC: 3.4 MMOL/L — LOW (ref 3.5–5.3)
PROT SERPL-MCNC: 6.6 G/DL — SIGNIFICANT CHANGE UP (ref 6–8.3)
RBC # BLD: 4.23 M/UL — SIGNIFICANT CHANGE UP (ref 4.2–5.8)
RBC # FLD: 13 % — SIGNIFICANT CHANGE UP (ref 10.3–14.5)
SODIUM SERPL-SCNC: 126 MMOL/L — LOW (ref 135–145)
WBC # BLD: 9.1 K/UL — SIGNIFICANT CHANGE UP (ref 3.8–10.5)
WBC # FLD AUTO: 9.1 K/UL — SIGNIFICANT CHANGE UP (ref 3.8–10.5)

## 2022-10-09 PROCEDURE — 99232 SBSQ HOSP IP/OBS MODERATE 35: CPT

## 2022-10-09 RX ORDER — POTASSIUM CHLORIDE 20 MEQ
40 PACKET (EA) ORAL ONCE
Refills: 0 | Status: COMPLETED | OUTPATIENT
Start: 2022-10-09 | End: 2022-10-09

## 2022-10-09 RX ORDER — PANTOPRAZOLE SODIUM 20 MG/1
40 TABLET, DELAYED RELEASE ORAL
Refills: 0 | Status: DISCONTINUED | OUTPATIENT
Start: 2022-10-09 | End: 2022-11-02

## 2022-10-09 RX ORDER — SODIUM CHLORIDE 9 MG/ML
250 INJECTION INTRAMUSCULAR; INTRAVENOUS; SUBCUTANEOUS ONCE
Refills: 0 | Status: COMPLETED | OUTPATIENT
Start: 2022-10-09 | End: 2022-10-09

## 2022-10-09 RX ORDER — FUROSEMIDE 40 MG
40 TABLET ORAL
Refills: 0 | Status: DISCONTINUED | OUTPATIENT
Start: 2022-10-09 | End: 2022-10-10

## 2022-10-09 RX ORDER — POTASSIUM CHLORIDE 20 MEQ
10 PACKET (EA) ORAL
Refills: 0 | Status: COMPLETED | OUTPATIENT
Start: 2022-10-09 | End: 2022-10-09

## 2022-10-09 RX ORDER — MAGNESIUM SULFATE 500 MG/ML
2 VIAL (ML) INJECTION ONCE
Refills: 0 | Status: COMPLETED | OUTPATIENT
Start: 2022-10-09 | End: 2022-10-09

## 2022-10-09 RX ADMIN — PANTOPRAZOLE SODIUM 40 MILLIGRAM(S): 20 TABLET, DELAYED RELEASE ORAL at 08:09

## 2022-10-09 RX ADMIN — Medication 1.5 MILLIGRAM(S): at 08:09

## 2022-10-09 RX ADMIN — HEPARIN SODIUM 5000 UNIT(S): 5000 INJECTION INTRAVENOUS; SUBCUTANEOUS at 05:15

## 2022-10-09 RX ADMIN — Medication 1 TABLET(S): at 13:15

## 2022-10-09 RX ADMIN — Medication 100 MILLIEQUIVALENT(S): at 06:21

## 2022-10-09 RX ADMIN — Medication 1 MILLIGRAM(S): at 22:34

## 2022-10-09 RX ADMIN — ISOSORBIDE DINITRATE 10 MILLIGRAM(S): 5 TABLET ORAL at 05:15

## 2022-10-09 RX ADMIN — HEPARIN SODIUM 5000 UNIT(S): 5000 INJECTION INTRAVENOUS; SUBCUTANEOUS at 17:27

## 2022-10-09 RX ADMIN — ISOSORBIDE DINITRATE 10 MILLIGRAM(S): 5 TABLET ORAL at 10:44

## 2022-10-09 RX ADMIN — SACUBITRIL AND VALSARTAN 1 TABLET(S): 24; 26 TABLET, FILM COATED ORAL at 17:27

## 2022-10-09 RX ADMIN — ISOSORBIDE DINITRATE 10 MILLIGRAM(S): 5 TABLET ORAL at 15:01

## 2022-10-09 RX ADMIN — SACUBITRIL AND VALSARTAN 1 TABLET(S): 24; 26 TABLET, FILM COATED ORAL at 06:19

## 2022-10-09 RX ADMIN — Medication 81 MILLIGRAM(S): at 13:14

## 2022-10-09 RX ADMIN — Medication 50 MILLIGRAM(S): at 05:16

## 2022-10-09 RX ADMIN — Medication 1 MILLIGRAM(S): at 15:01

## 2022-10-09 RX ADMIN — Medication 1.5 MILLIGRAM(S): at 00:17

## 2022-10-09 RX ADMIN — Medication 50 MILLIGRAM(S): at 22:35

## 2022-10-09 RX ADMIN — Medication 1.5 MILLIGRAM(S): at 04:16

## 2022-10-09 RX ADMIN — ALBUTEROL 2.5 MILLIGRAM(S): 90 AEROSOL, METERED ORAL at 11:13

## 2022-10-09 RX ADMIN — Medication 40 MILLIEQUIVALENT(S): at 06:19

## 2022-10-09 RX ADMIN — CARVEDILOL PHOSPHATE 3.12 MILLIGRAM(S): 80 CAPSULE, EXTENDED RELEASE ORAL at 17:27

## 2022-10-09 RX ADMIN — CARVEDILOL PHOSPHATE 3.12 MILLIGRAM(S): 80 CAPSULE, EXTENDED RELEASE ORAL at 05:16

## 2022-10-09 RX ADMIN — Medication 50 MILLIGRAM(S): at 13:14

## 2022-10-09 RX ADMIN — ALBUTEROL 2.5 MILLIGRAM(S): 90 AEROSOL, METERED ORAL at 16:16

## 2022-10-09 RX ADMIN — Medication 100 MILLIEQUIVALENT(S): at 08:09

## 2022-10-09 RX ADMIN — Medication 40 MILLIGRAM(S): at 17:27

## 2022-10-09 RX ADMIN — Medication 105 MILLIGRAM(S): at 12:53

## 2022-10-09 RX ADMIN — ALBUTEROL 2.5 MILLIGRAM(S): 90 AEROSOL, METERED ORAL at 21:13

## 2022-10-09 RX ADMIN — ALBUTEROL 2.5 MILLIGRAM(S): 90 AEROSOL, METERED ORAL at 04:03

## 2022-10-09 RX ADMIN — ATORVASTATIN CALCIUM 40 MILLIGRAM(S): 80 TABLET, FILM COATED ORAL at 22:35

## 2022-10-09 RX ADMIN — SPIRONOLACTONE 25 MILLIGRAM(S): 25 TABLET, FILM COATED ORAL at 08:09

## 2022-10-09 RX ADMIN — SERTRALINE 50 MILLIGRAM(S): 25 TABLET, FILM COATED ORAL at 13:15

## 2022-10-09 RX ADMIN — SODIUM CHLORIDE 250 MILLILITER(S): 9 INJECTION INTRAMUSCULAR; INTRAVENOUS; SUBCUTANEOUS at 11:00

## 2022-10-09 RX ADMIN — Medication 25 GRAM(S): at 05:00

## 2022-10-09 RX ADMIN — CHLORHEXIDINE GLUCONATE 1 APPLICATION(S): 213 SOLUTION TOPICAL at 05:15

## 2022-10-09 RX ADMIN — Medication 100 MILLIEQUIVALENT(S): at 04:40

## 2022-10-09 RX ADMIN — Medication 1 MILLIGRAM(S): at 13:14

## 2022-10-09 RX ADMIN — Medication 2.5 MG/HR: at 08:23

## 2022-10-09 NOTE — PROGRESS NOTE ADULT - ASSESSMENT
Assessment and Plan:71 yo male, PMH of HTN,HLD,CAD, Hepatitis C ( completed  [facility] 2018) and current smoker  presenting with acute dyspnea, found to have hypoxic respiratory distress, hypertension to , and sinus tachycardia, with nonspecific ST changes on EKG, overall concerning for hypertensive emergency with pulmonary edema. Started on nitro drip and placed on bipap. Pt admitted to CCU for further care.    Neuro  # chronic depression  -c/w sertraline 100mg PO daily  - melatonin as needed for insomnia.  - AxOx3    # EtOH withdrawal  - unclear hx of when last drink was, however pt acknowledges to at least 3 drinks/day (beer 2-3 cans every day and 1 hot of RUM every day)  - tox screen positive for THC, otherwise negative for tested substances  - no known hx withdrawal  - started on CIWA protocol, received total 7 mg Ativan on 10/7 w/ high CIWA scores > 12  - started on precedex on 10/7 pm due to concern for benzo-related hypoxia. Off precedex as of this am  - start ativan taper from 1.5 mg per psych recs   - replete folate, thiamine  - psych recs appreciated    Resp  #Dyspnea 2/2 mixed pulmonary edema and COPD  - ABG showed uncompensated Resp alkalosis on Bipap  - hypoxic on NC  - off bipap -> Hiflow for hypoxemia  - h/o smoking x20 years, though no known hx COPD  - pulmonary consulted- less likely COPD  - start on Duoneb neb q6 x 24 hrs, Albuterol neb and Spiriva starting 10/8    #hemoptysis  - episode of hemoptysis w/ BRB overnight on 10/7 pm  - pt has had cough since arrival  - no evidence of consolidation on 10/6 CXR  - s/p IV Protonix 40 x1 & started on IV Protonix gtt  - c/w Protonix gtt 24 hrs  - ctm    CV  #acute CHFrEF /Acute pulmonary edema.   -presents with acute shortness of breath found in pulmonary edema  -chest Xray showed pulmonary edema on 10/6  - Echo ; EF 25%, Severe global LVSD, mod MR, enlarge RV with normal RVF -> new HF diagnosis  -lactate improved  -off nitro as of 10/8 am  -Entresto started  - start coreg today  - c/w lasix drip at 10mg   - strict i/O  - HF following    Elevated trop  - Trop up trending ; 108-> 149->166. CKMB dowm trending- likely Type II  - intial EKG WEN 1mm in V1-V3, STD in inferior leads which improved  - no chest pain  - c/w asa 81mg , increase Lipitor 80mg PO   - echo showed low EF with Severe LVSD  - need  evaluation cath for  ischemic work up once optimized     # HTN (hypertension).   - presents with  HTN emergency   - now off nitro gtt  - c/w isordil 10mg TID-> titrate up as needed  - c/w  on hydralazine 50mg TID      GI  - no issue  -c/w DASH diet    Renal/  # Hyponatremia  -Na 126-> 123  - likely 2/2 hypervolemia vs diuretic vs alcohol use    - fluid restriction 1500cc/24hr  - monitor BMP  - f/u urine studies      Endo  #Hyperlipidemia.   -c/w Lipitor 20mg Po daily  - lipid profile WNL    ID  # Leukocytosis on admission   -no fever  -covid PCR neg  -likely reactive, now downtrending   -will monitor off abx    DVT ppx  heparin SQ   Assessment and Plan:73 yo male, PMH of HTN,HLD,CAD, Hepatitis C ( completed  [facility] 2018) and current smoker  presenting with acute dyspnea, found to have hypoxic respiratory distress, hypertension to , and sinus tachycardia, with nonspecific ST changes on EKG, overall concerning for hypertensive emergency with pulmonary edema. Started on nitro drip and placed on bipap. Pt admitted to CCU for further care.    Neuro  # chronic depression  -c/w sertraline 100mg PO daily  - melatonin as needed for insomnia.  - AxOx3    # EtOH withdrawal  - unclear hx of when last drink was, however pt acknowledges to at least 3 drinks/day (beer 2-3 cans every day and 1 hot of RUM every day)  - tox screen positive for THC, otherwise negative for tested substances  - no known hx withdrawal  - started on CIWA protocol, received total 7 mg Ativan on 10/7 w/ high CIWA scores > 12  - started on precedex on 10/7 pm due to concern for benzo-related hypoxia. Off precedex 10/8 AM  - start ativan taper from 1.5 mg per psych recs   - replete folate, thiamine  - psych recs appreciated    Resp  #Dyspnea 2/2 mixed pulmonary edema and COPD  - ABG showed uncompensated Resp alkalosis on Bipap  - hypoxic on NC  - off bipap -> Hiflow for hypoxemia  - h/o smoking x20 years, though no known hx COPD  - pulmonary consulted- less likely COPD  - start on Duoneb neb q6 x 24 hrs, Albuterol neb and Spiriva starting 10/8  - wean down HFNC to NC as tolerated       #hemoptysis  - episode of hemoptysis w/ BRB overnight on 10/7 pm  - pt has had cough since arrival  - no evidence of consolidation on 10/6 CXR  - s/p IV Protonix 40 x1 & started on IV Protonix gtt  - c/w Protonix gtt 24 hrs  - ctm    CV  #acute CHFrEF /Acute pulmonary edema.   -presents with acute shortness of breath found in pulmonary edema  -chest Xray showed pulmonary edema on 10/6  - Echo ; EF 25%, Severe global LVSD, mod MR, enlarge RV with normal RVF -> new HF diagnosis  -lactate improved  -off nitro as of 10/8 am  -Entresto started  - c/w entresto   - c/w lasix drip at 10mg   - strict i/O  - HF following    Elevated trop  - Trop up trending ; 108-> 149->166. CKMB dowm trending- likely Type II  - intial EKG WEN 1mm in V1-V3, STD in inferior leads which improved  - no chest pain  - c/w asa 81mg , increase Lipitor 80mg PO   - echo showed low EF with Severe LVSD  - need  evaluation cath for  ischemic work up once optimized     # HTN (hypertension).   - presents with  HTN emergency   - now off nitro gtt  - c/w isordil 10mg TID-> titrate up as needed  - c/w  on hydralazine 50mg TID    GI  - no issue  -c/w DASH diet    Renal/  # Hyponatremia  -Na 126-> 123-> 126   - likely 2/2 hypervolemia vs diuretic vs alcohol use    - fluid restriction 1500cc/24hr  - monitor BMP  - f/u urine studies    Endo  #Hyperlipidemia.   -c/w Lipitor 20mg Po daily  - lipid profile WNL    ID  # Leukocytosis on admission   -no fever  -covid PCR neg  -likely reactive, now downtrending   -will monitor off abx    DVT ppx  heparin SQ   Assessment and Plan:71 yo male, PMH of HTN,HLD,CAD, Hepatitis C ( completed  [facility] 2018) and current smoker  presenting with acute dyspnea, found to have hypoxic respiratory distress, hypertension to , and sinus tachycardia, with nonspecific ST changes on EKG, overall concerning for hypertensive emergency with pulmonary edema. Started on nitro drip and placed on bipap. Pt admitted to CCU for further care.    Neuro  # chronic depression  -c/w sertraline 100mg PO daily  - melatonin as needed for insomnia.  - AxOx3    # EtOH withdrawal  - unclear hx of when last drink was, however pt acknowledges to at least 3 drinks/day (beer 2-3 cans every day and 1 hot of RUM every day)  - tox screen positive for THC, otherwise negative for tested substances  - no known hx withdrawal  - started on CIWA protocol, received total 7 mg Ativan on 10/7 w/ high CIWA scores > 12  - started on precedex on 10/7 pm due to concern for benzo-related hypoxia. Off precedex 10/8 AM  - c/w ativan taper from 1.5 mg per psych recs   - replete folate, thiamine  - psych recs appreciated    Resp  #Dyspnea 2/2 mixed pulmonary edema and COPD  - ABG showed uncompensated Resp alkalosis on Bipap  - hypoxic on NC  - off bipap -> Hiflow for hypoxemia  - h/o smoking x20 years, though no known hx COPD  - pulmonary consulted- less likely COPD  - start on Duoneb neb q6 x 24 hrs, Albuterol neb and Spiriva starting 10/8  - wean down HFNC to NC as tolerated       #hemoptysis  - episode of hemoptysis w/ BRB overnight on 10/7 pm  - pt has had cough since arrival  - no evidence of consolidation on 10/6 CXR  - s/p IV Protonix 40 x1 & started on IV Protonix gtt/24 hours  - d/c Protonix gtt   - ctm    CV  #acute CHFrEF /Acute pulmonary edema.   -presents with acute shortness of breath found in pulmonary edema  -chest Xray showed pulmonary edema on 10/6  - Echo ; EF 25%, Severe global LVSD, mod MR, enlarge RV with normal RVF -> new HF diagnosis  -lactate improved  -off nitro as of 10/8 am  -Entresto started  - c/w entresto   - c/w lasix drip at 10mg   - strict i/O  - HF following    Elevated trop  - Trop up trending ; 108-> 149->166. CKMB dowm trending- likely Type II  - intial EKG WEN 1mm in V1-V3, STD in inferior leads which improved  - no chest pain  - c/w asa 81mg , increase Lipitor 80mg PO   - echo showed low EF with Severe LVSD  - need  evaluation cath for  ischemic work up once optimized     # HTN (hypertension).   - presents with  HTN emergency   - now off nitro gtt  - c/w isordil 10mg TID-> titrate up as needed  - c/w  on hydralazine 50mg TID    GI  - no issue  -c/w DASH diet    Renal/  # Hyponatremia  -Na 126-> 123-> 126   - likely 2/2 hypervolemia vs diuretic vs alcohol use    - fluid restriction 1500cc/24hr  - monitor BMP  - f/u urine studies    Endo  #Hyperlipidemia.   -c/w Lipitor 20mg Po daily  - lipid profile WNL    ID  # Leukocytosis on admission   -no fever  -covid PCR neg  -likely reactive, now downtrending   -will monitor off abx    DVT ppx  heparin SQ   Assessment and Plan:71 yo male, PMH of HTN,HLD,CAD, Hepatitis C ( completed  [facility] 2018) and current smoker  presenting with acute dyspnea, found to have hypoxic respiratory distress, hypertension to , and sinus tachycardia, with nonspecific ST changes on EKG, overall concerning for hypertensive emergency with pulmonary edema. Started on nitro drip and placed on bipap. Pt admitted to CCU for further care.    Neuro  # chronic depression  -c/w sertraline 100mg PO daily  - melatonin as needed for insomnia.  - AxOx3    # EtOH withdrawal  - unclear hx of when last drink was, however pt acknowledges to at least 3 drinks/day (beer 2-3 cans every day and 1 hot of RUM every day)  - tox screen positive for THC, otherwise negative for tested substances  - no known hx withdrawal  - started on CIWA protocol, received total 7 mg Ativan on 10/7 w/ high CIWA scores > 12  - started on precedex on 10/7 pm due to concern for benzo-related hypoxia. Off precedex 10/8 AM  - c/w ativan taper from 1.5 mg per psych recs   - replete folate, thiamine  - psych recs appreciated    Resp  #Dyspnea 2/2 mixed pulmonary edema and COPD  - ABG showed uncompensated Resp alkalosis on Bipap-- ABG today 7.44/43/107, improved   - hypoxic on NC  - off bipap -> Hiflow for hypoxemia  - h/o smoking x20 years, though no known hx COPD  - pulmonary consulted- less likely COPD  - start on Duoneb neb q6 x 24 hrs, Albuterol neb and Spiriva starting 10/8  - wean down HFNC to NC as tolerated       #hemoptysis  - episode of hemoptysis w/ BRB overnight on 10/7 pm  - pt has had cough since arrival  - no evidence of consolidation on 10/6 CXR  - s/p IV Protonix 40 x1 & started on IV Protonix gtt/24 hours  - d/c Protonix gtt , continue with PO 40 mg daily   - ctm    CV  #acute CHFrEF /Acute pulmonary edema.   -presents with acute shortness of breath found in pulmonary edema  -chest Xray showed pulmonary edema on 10/6  - Echo ; EF 25%, Severe global LVSD, mod MR, enlarge RV with normal RVF -> new HF diagnosis  -lactate improved  -off nitro as of 10/8 am  -Entresto started  - c/w entresto   - d.c lasix drip  - strict i/O  - HF following    Elevated trop  - Trop up trending ; 108-> 149->166. CKMB dowm trending- likely Type II  - intial EKG WEN 1mm in V1-V3, STD in inferior leads which improved  - no chest pain  - c/w asa 81mg , increase Lipitor 80mg PO   - echo showed low EF with Severe LVSD  - need  evaluation cath for ischemic work up once optimized     # HTN (hypertension).   - presents with  HTN emergency   - now off nitro gtt  - c/w isordil 10mg TID-> titrate up as needed  - c/w  on hydralazine 50mg TID    GI  - no issue  -c/w DASH diet    Renal/  # Hyponatremia  -Na 126-> 123-> 126   - likely 2/2 hypervolemia vs diuretic vs alcohol use    - fluid restriction 1500cc/24hr  - monitor BMP  - f/u urine studies    Endo  #Hyperlipidemia.   -c/w Lipitor 20mg Po daily  - lipid profile WNL    ID  # Leukocytosis on admission   -no fever  -covid PCR neg  -likely reactive, now downtrending   -will monitor off abx    DVT ppx  heparin SQ

## 2022-10-09 NOTE — PROGRESS NOTE ADULT - SUBJECTIVE AND OBJECTIVE BOX
Alcohol withdrawal      Patient is a 72y old  Male who presents with a chief complaint of Acute pulmonary edema     (08 Oct 2022 13:50)    HPI:  66 yo male, PMH of HTN, HLD, CAD, Hepatitis C (completed  [facility] 2018 ) and current smoker  who presents with acute onset SOB since last Saturday 10/1 with no improvement. The shortness of breath occurred both with exertion and at rest, causing him to have poor sleep at the  night as he was unable to lie flat comfortably. His dyspnea worsened on day of presentation, severely limiting his ability to ambulate. He thought he came to hospital on . He does  not remember what happen between Monday to Thursday   In ED /107, . Patient hypoxic and tachypneic. Diffuse B lines on POCUS. ProBNP 12204, lactate  2.5. Started on nitro drip and Bipap. HsTrop 108.He report non adherence to medications. Some time he miss medication for 1 week .Some time eat high salt diet. Last time he saw doctor was >3 years ago.He denies recent chest pain, palpitations, light-headedness/ dizziness syncope, nausea/vomiting, diaphoresis, LE swelling. Initial EKG concerning for WEN in anterior leads, cardiology consult fellow was called for STEMI alert. Discussed with interventional attending, did not meet STEMI criteria. Patient admitted to CCU. (06 Oct 2022 16:53)       INTERVAL HPI/OVERNIGHT EVENTS:   No overnight events   Afebrile, hemodynamically stable     Subjective:    ICU Vital Signs Last 24 Hrs  T(C): 37.1 (09 Oct 2022 04:00), Max: 38.5 (08 Oct 2022 17:00)  T(F): 98.8 (09 Oct 2022 04:00), Max: 101.3 (08 Oct 2022 17:00)  HR: 90 (09 Oct 2022 06:00) (78 - 112)  BP: 107/71 (09 Oct 2022 06:00) (107/71 - 157/85)  BP(mean): 81 (09 Oct 2022 06:00) (81 - 105)  ABP: 139/63 (09 Oct 2022 06:00) (119/73 - 176/83)  ABP(mean): 84 (09 Oct 2022 06:00) (78 - 115)  RR: 32 (09 Oct 2022 06:00) (20 - 40)  SpO2: 98% (09 Oct 2022 06:00) (93% - 100%)    O2 Parameters below as of 09 Oct 2022 06:00  Patient On (Oxygen Delivery Method): nasal cannula, high flow  O2 Flow (L/min): 40  O2 Concentration (%): 40      I&O's Summary    08 Oct 2022 07:01  -  09 Oct 2022 07:00  --------------------------------------------------------  IN: 562.7 mL / OUT: 2300 mL / NET: -1737.3 mL          Daily     Daily Weight in k.9 (09 Oct 2022 05:00)    Adult Advanced Hemodynamics Last 24 Hrs  CVP(mm Hg): --  CVP(cm H2O): --  CO: --  CI: --  PA: --  PA(mean): --  PCWP: --  SVR: --  SVRI: --  PVR: --  PVRI: --    EKG/Telemetry Events:    MEDICATIONS  (STANDING):  ALBUTerol    0.083% 2.5 milliGRAM(s) Nebulizer every 6 hours  aspirin enteric coated 81 milliGRAM(s) Oral daily  atorvastatin 40 milliGRAM(s) Oral at bedtime  carvedilol 3.125 milliGRAM(s) Oral every 12 hours  chlorhexidine 2% Cloths 1 Application(s) Topical <User Schedule>  folic acid 1 milliGRAM(s) Oral daily  furosemide Infusion 5 mG/Hr (2.5 mL/Hr) IV Continuous <Continuous>  heparin   Injectable 5000 Unit(s) SubCutaneous every 12 hours  hydrALAZINE 50 milliGRAM(s) Oral every 8 hours  influenza  Vaccine (HIGH DOSE) 0.7 milliLiter(s) IntraMuscular once  isosorbide   dinitrate Tablet (ISORDIL) 10 milliGRAM(s) Oral three times a day  LORazepam     Tablet 1.5 milliGRAM(s) Oral every 4 hours  LORazepam     Tablet 1 milliGRAM(s) Oral every 4 hours  LORazepam     Tablet   Oral   multivitamin 1 Tablet(s) Oral daily  pantoprazole    Tablet 40 milliGRAM(s) Oral before breakfast  potassium chloride  10 mEq/100 mL IVPB 10 milliEquivalent(s) IV Intermittent every 1 hour  sacubitril 24 mG/valsartan 26 mG 1 Tablet(s) Oral two times a day  sertraline 50 milliGRAM(s) Oral daily  spironolactone 25 milliGRAM(s) Oral daily  thiamine IVPB 500 milliGRAM(s) IV Intermittent daily  tiotropium 18 MICROgram(s) Capsule 1 Capsule(s) Inhalation daily    MEDICATIONS  (PRN):  guaiFENesin Oral Liquid (Sugar-Free) 100 milliGRAM(s) Oral every 6 hours PRN Cough  melatonin 3 milliGRAM(s) Oral at bedtime PRN Insomnia      PHYSICAL EXAM:  GENERAL:   HEAD:  Atraumatic, Normocephalic  EYES: EOMI, PERRLA, conjunctiva and sclera clear  NECK: Supple, No JVD, Normal thyroid, no enlarged nodes  NERVOUS SYSTEM:  Alert & Awake.   CHEST/LUNG: B/L good air entry; No rales, rhonchi, or wheezing  HEART: S1S2 normal, no S3, Regular rate and rhythm; No murmurs  ABDOMEN: Soft, Nontender, Nondistended; Bowel sounds present  EXTREMITIES:  2+ Peripheral Pulses, No clubbing, cyanosis, or edema  LYMPH: No lymphadenopathy noted  SKIN: No rashes or lesions    LABS:                        11.5   9.10  )-----------( 213      ( 09 Oct 2022 03:40 )             34.4     10-09    126<L>  |  87<L>  |  24<H>  ----------------------------<  105<H>  3.4<L>   |  26  |  0.90    Ca    8.5      09 Oct 2022 03:40  Phos  2.6     10-09  Mg     1.60     10-09    TPro  6.6  /  Alb  3.3  /  TBili  0.9  /  DBili  x   /  AST  47<H>  /  ALT  30  /  AlkPhos  89  10-09    LIVER FUNCTIONS - ( 09 Oct 2022 03:40 )  Alb: 3.3 g/dL / Pro: 6.6 g/dL / ALK PHOS: 89 U/L / ALT: 30 U/L / AST: 47 U/L / GGT: x           PT/INR - ( 08 Oct 2022 02:32 )   PT: 13.6 sec;   INR: 1.17 ratio         PTT - ( 08 Oct 2022 02:32 )  PTT:25.0 sec  CAPILLARY BLOOD GLUCOSE        ABG - ( 09 Oct 2022 03:40 )  pH, Arterial: 7.44  pH, Blood: x     /  pCO2: 43    /  pO2: 107   / HCO3: 29    / Base Excess: 4.5   /  SaO2: 98.9                CARDIAC MARKERS ( 07 Oct 2022 11:07 )  x     / x     / 213 U/L / x     / 7.8 ng/mL      Urinalysis Basic - ( 08 Oct 2022 10:36 )    Color: Yellow / Appearance: Clear / S.011 / pH: x  Gluc: x / Ketone: Negative  / Bili: Negative / Urobili: <2 mg/dL   Blood: x / Protein: Negative / Nitrite: Negative   Leuk Esterase: Negative / RBC: x / WBC x   Sq Epi: x / Non Sq Epi: x / Bacteria: x          RADIOLOGY & ADDITIONAL TESTS:  CXR:        Care Discussed with Consultants/Other Providers [ x] YES  [ ] NO         Alcohol withdrawal      Patient is a 72y old  Male who presents with a chief complaint of Acute pulmonary edema     (08 Oct 2022 13:50)    HPI:  68 yo male, PMH of HTN, HLD, CAD, Hepatitis C (completed  [place] 2018 ) and current smoker  who presents with acute onset SOB since last Saturday 10/1 with no improvement. The shortness of breath occurred both with exertion and at rest, causing him to have poor sleep at the  night as he was unable to lie flat comfortably. His dyspnea worsened on day of presentation, severely limiting his ability to ambulate. He thought he came to hospital on . He does  not remember what happen between Monday to Thursday   In ED /107, . Patient hypoxic and tachypneic. Diffuse B lines on POCUS. ProBNP 02700, lactate  2.5. Started on nitro drip and Bipap. HsTrop 108.He report non adherence to medications. Some time he miss medication for 1 week .Some time eat high salt diet. Last time he saw doctor was >3 years ago.He denies recent chest pain, palpitations, light-headedness/ dizziness syncope, nausea/vomiting, diaphoresis, LE swelling. Initial EKG concerning for WEN in anterior leads, cardiology consult fellow was called for STEMI alert. Discussed with interventional attending, did not meet STEMI criteria. Patient admitted to CCU. (06 Oct 2022 16:53)       INTERVAL HPI/OVERNIGHT EVENTS:   No overnight events   Afebrile, hemodynamically stable     Subjective:  Patient examined at bedside. Feels well this AM. Denies CP, palpitations, SOB, abdominal pain, leg pain. All other ROS negative.     ICU Vital Signs Last 24 Hrs  T(C): 37.1 (09 Oct 2022 04:00), Max: 38.5 (08 Oct 2022 17:00)  T(F): 98.8 (09 Oct 2022 04:00), Max: 101.3 (08 Oct 2022 17:00)  HR: 90 (09 Oct 2022 06:00) (78 - 112)  BP: 107/71 (09 Oct 2022 06:00) (107/71 - 157/85)  BP(mean): 81 (09 Oct 2022 06:00) (81 - 105)  ABP: 139/63 (09 Oct 2022 06:00) (119/73 - 176/83)  ABP(mean): 84 (09 Oct 2022 06:00) (78 - 115)  RR: 32 (09 Oct 2022 06:00) (20 - 40)  SpO2: 98% (09 Oct 2022 06:00) (93% - 100%)    O2 Parameters below as of 09 Oct 2022 06:00  Patient On (Oxygen Delivery Method): nasal cannula, high flow  O2 Flow (L/min): 40  O2 Concentration (%): 40      I&O's Summary    08 Oct 2022 07:01  -  09 Oct 2022 07:00  --------------------------------------------------------  IN: 562.7 mL / OUT: 2300 mL / NET: -1737.3 mL      Daily     Daily Weight in k.9 (09 Oct 2022 05:00)      MEDICATIONS  (STANDING):  ALBUTerol    0.083% 2.5 milliGRAM(s) Nebulizer every 6 hours  aspirin enteric coated 81 milliGRAM(s) Oral daily  atorvastatin 40 milliGRAM(s) Oral at bedtime  carvedilol 3.125 milliGRAM(s) Oral every 12 hours  chlorhexidine 2% Cloths 1 Application(s) Topical <User Schedule>  folic acid 1 milliGRAM(s) Oral daily  furosemide Infusion 5 mG/Hr (2.5 mL/Hr) IV Continuous <Continuous>  heparin   Injectable 5000 Unit(s) SubCutaneous every 12 hours  hydrALAZINE 50 milliGRAM(s) Oral every 8 hours  influenza  Vaccine (HIGH DOSE) 0.7 milliLiter(s) IntraMuscular once  isosorbide   dinitrate Tablet (ISORDIL) 10 milliGRAM(s) Oral three times a day  LORazepam     Tablet 1.5 milliGRAM(s) Oral every 4 hours  LORazepam     Tablet 1 milliGRAM(s) Oral every 4 hours  LORazepam     Tablet   Oral   multivitamin 1 Tablet(s) Oral daily  pantoprazole    Tablet 40 milliGRAM(s) Oral before breakfast  potassium chloride  10 mEq/100 mL IVPB 10 milliEquivalent(s) IV Intermittent every 1 hour  sacubitril 24 mG/valsartan 26 mG 1 Tablet(s) Oral two times a day  sertraline 50 milliGRAM(s) Oral daily  spironolactone 25 milliGRAM(s) Oral daily  thiamine IVPB 500 milliGRAM(s) IV Intermittent daily  tiotropium 18 MICROgram(s) Capsule 1 Capsule(s) Inhalation daily    MEDICATIONS  (PRN):  guaiFENesin Oral Liquid (Sugar-Free) 100 milliGRAM(s) Oral every 6 hours PRN Cough  melatonin 3 milliGRAM(s) Oral at bedtime PRN Insomnia      PHYSICAL EXAM:  GENERAL:   HEAD:  Atraumatic, Normocephalic  EYES: EOMI, PERRLA, conjunctiva and sclera clear  NECK: Supple, No JVD, Normal thyroid, no enlarged nodes  NERVOUS SYSTEM:  Alert & Awake.   CHEST/LUNG: B/L good air entry; No rales, rhonchi, or wheezing  HEART: S1S2 normal, no S3, Regular rate and rhythm; No murmurs  ABDOMEN: Soft, Nontender, Nondistended; Bowel sounds present  EXTREMITIES:  2+ Peripheral Pulses, No clubbing, cyanosis, or edema  LYMPH: No lymphadenopathy noted  SKIN: No rashes or lesions    LABS:                        11.5   9.10  )-----------( 213      ( 09 Oct 2022 03:40 )             34.4     10-09    126<L>  |  87<L>  |  24<H>  ----------------------------<  105<H>  3.4<L>   |  26  |  0.90    Ca    8.5      09 Oct 2022 03:40  Phos  2.6     10-09  Mg     1.60     10-09    TPro  6.6  /  Alb  3.3  /  TBili  0.9  /  DBili  x   /  AST  47<H>  /  ALT  30  /  AlkPhos  89  10-09    LIVER FUNCTIONS - ( 09 Oct 2022 03:40 )  Alb: 3.3 g/dL / Pro: 6.6 g/dL / ALK PHOS: 89 U/L / ALT: 30 U/L / AST: 47 U/L / GGT: x           PT/INR - ( 08 Oct 2022 02:32 )   PT: 13.6 sec;   INR: 1.17 ratio         PTT - ( 08 Oct 2022 02:32 )  PTT:25.0 sec  CAPILLARY BLOOD GLUCOSE        ABG - ( 09 Oct 2022 03:40 )  pH, Arterial: 7.44  pH, Blood: x     /  pCO2: 43    /  pO2: 107   / HCO3: 29    / Base Excess: 4.5   /  SaO2: 98.9        CARDIAC MARKERS ( 07 Oct 2022 11:07 )  x     / x     / 213 U/L / x     / 7.8 ng/mL      Urinalysis Basic - ( 08 Oct 2022 10:36 )    Color: Yellow / Appearance: Clear / S.011 / pH: x  Gluc: x / Ketone: Negative  / Bili: Negative / Urobili: <2 mg/dL   Blood: x / Protein: Negative / Nitrite: Negative   Leuk Esterase: Negative / RBC: x / WBC x   Sq Epi: x / Non Sq Epi: x / Bacteria: x      Care Discussed with Consultants/Other Providers [ x] YES  [ ] NO

## 2022-10-10 LAB
ALBUMIN SERPL ELPH-MCNC: 3.1 G/DL — LOW (ref 3.3–5)
ALP SERPL-CCNC: 118 U/L — SIGNIFICANT CHANGE UP (ref 40–120)
ALT FLD-CCNC: 27 U/L — SIGNIFICANT CHANGE UP (ref 4–41)
ANION GAP SERPL CALC-SCNC: 12 MMOL/L — SIGNIFICANT CHANGE UP (ref 7–14)
AST SERPL-CCNC: 37 U/L — SIGNIFICANT CHANGE UP (ref 4–40)
BILIRUB SERPL-MCNC: 0.6 MG/DL — SIGNIFICANT CHANGE UP (ref 0.2–1.2)
BLOOD GAS ARTERIAL COMPREHENSIVE RESULT: SIGNIFICANT CHANGE UP
BUN SERPL-MCNC: 32 MG/DL — HIGH (ref 7–23)
CALCIUM SERPL-MCNC: 8.8 MG/DL — SIGNIFICANT CHANGE UP (ref 8.4–10.5)
CHLORIDE SERPL-SCNC: 89 MMOL/L — LOW (ref 98–107)
CO2 SERPL-SCNC: 25 MMOL/L — SIGNIFICANT CHANGE UP (ref 22–31)
CREAT SERPL-MCNC: 0.91 MG/DL — SIGNIFICANT CHANGE UP (ref 0.5–1.3)
EGFR: 90 ML/MIN/1.73M2 — SIGNIFICANT CHANGE UP
GLUCOSE SERPL-MCNC: 105 MG/DL — HIGH (ref 70–99)
HCT VFR BLD CALC: 32.3 % — LOW (ref 39–50)
HGB BLD-MCNC: 10.7 G/DL — LOW (ref 13–17)
MAGNESIUM SERPL-MCNC: 1.8 MG/DL — SIGNIFICANT CHANGE UP (ref 1.6–2.6)
MCHC RBC-ENTMCNC: 27.2 PG — SIGNIFICANT CHANGE UP (ref 27–34)
MCHC RBC-ENTMCNC: 33.1 GM/DL — SIGNIFICANT CHANGE UP (ref 32–36)
MCV RBC AUTO: 82.2 FL — SIGNIFICANT CHANGE UP (ref 80–100)
NRBC # BLD: 0 /100 WBCS — SIGNIFICANT CHANGE UP (ref 0–0)
NRBC # FLD: 0 K/UL — SIGNIFICANT CHANGE UP (ref 0–0)
PHOSPHATE SERPL-MCNC: 2.4 MG/DL — LOW (ref 2.5–4.5)
PLATELET # BLD AUTO: 240 K/UL — SIGNIFICANT CHANGE UP (ref 150–400)
POTASSIUM SERPL-MCNC: 3.8 MMOL/L — SIGNIFICANT CHANGE UP (ref 3.5–5.3)
POTASSIUM SERPL-SCNC: 3.8 MMOL/L — SIGNIFICANT CHANGE UP (ref 3.5–5.3)
PROT SERPL-MCNC: 6 G/DL — SIGNIFICANT CHANGE UP (ref 6–8.3)
RBC # BLD: 3.93 M/UL — LOW (ref 4.2–5.8)
RBC # FLD: 12.7 % — SIGNIFICANT CHANGE UP (ref 10.3–14.5)
SODIUM SERPL-SCNC: 126 MMOL/L — LOW (ref 135–145)
WBC # BLD: 7.34 K/UL — SIGNIFICANT CHANGE UP (ref 3.8–10.5)
WBC # FLD AUTO: 7.34 K/UL — SIGNIFICANT CHANGE UP (ref 3.8–10.5)

## 2022-10-10 PROCEDURE — 71045 X-RAY EXAM CHEST 1 VIEW: CPT | Mod: 26

## 2022-10-10 PROCEDURE — 99233 SBSQ HOSP IP/OBS HIGH 50: CPT | Mod: GC

## 2022-10-10 PROCEDURE — 99233 SBSQ HOSP IP/OBS HIGH 50: CPT

## 2022-10-10 RX ORDER — THIAMINE MONONITRATE (VIT B1) 100 MG
500 TABLET ORAL THREE TIMES A DAY
Refills: 0 | Status: COMPLETED | OUTPATIENT
Start: 2022-10-10 | End: 2022-10-13

## 2022-10-10 RX ORDER — SODIUM,POTASSIUM PHOSPHATES 278-250MG
1 POWDER IN PACKET (EA) ORAL ONCE
Refills: 0 | Status: COMPLETED | OUTPATIENT
Start: 2022-10-10 | End: 2022-10-10

## 2022-10-10 RX ORDER — FUROSEMIDE 40 MG
80 TABLET ORAL
Refills: 0 | Status: DISCONTINUED | OUTPATIENT
Start: 2022-10-10 | End: 2022-10-11

## 2022-10-10 RX ORDER — MAGNESIUM SULFATE 500 MG/ML
2 VIAL (ML) INJECTION ONCE
Refills: 0 | Status: COMPLETED | OUTPATIENT
Start: 2022-10-10 | End: 2022-10-10

## 2022-10-10 RX ORDER — SACUBITRIL AND VALSARTAN 24; 26 MG/1; MG/1
1 TABLET, FILM COATED ORAL
Refills: 0 | Status: DISCONTINUED | OUTPATIENT
Start: 2022-10-10 | End: 2022-10-12

## 2022-10-10 RX ADMIN — ALBUTEROL 2.5 MILLIGRAM(S): 90 AEROSOL, METERED ORAL at 10:52

## 2022-10-10 RX ADMIN — CARVEDILOL PHOSPHATE 3.12 MILLIGRAM(S): 80 CAPSULE, EXTENDED RELEASE ORAL at 17:54

## 2022-10-10 RX ADMIN — ALBUTEROL 2.5 MILLIGRAM(S): 90 AEROSOL, METERED ORAL at 15:30

## 2022-10-10 RX ADMIN — Medication 0.5 MILLIGRAM(S): at 15:06

## 2022-10-10 RX ADMIN — Medication 50 MILLIGRAM(S): at 15:05

## 2022-10-10 RX ADMIN — ATORVASTATIN CALCIUM 40 MILLIGRAM(S): 80 TABLET, FILM COATED ORAL at 22:38

## 2022-10-10 RX ADMIN — CHLORHEXIDINE GLUCONATE 1 APPLICATION(S): 213 SOLUTION TOPICAL at 04:04

## 2022-10-10 RX ADMIN — Medication 1 PACKET(S): at 09:05

## 2022-10-10 RX ADMIN — Medication 1 MILLIGRAM(S): at 12:37

## 2022-10-10 RX ADMIN — Medication 1 MILLIGRAM(S): at 02:23

## 2022-10-10 RX ADMIN — ISOSORBIDE DINITRATE 10 MILLIGRAM(S): 5 TABLET ORAL at 06:29

## 2022-10-10 RX ADMIN — ISOSORBIDE DINITRATE 10 MILLIGRAM(S): 5 TABLET ORAL at 22:38

## 2022-10-10 RX ADMIN — ALBUTEROL 2.5 MILLIGRAM(S): 90 AEROSOL, METERED ORAL at 23:33

## 2022-10-10 RX ADMIN — Medication 0.5 MILLIGRAM(S): at 10:26

## 2022-10-10 RX ADMIN — Medication 0.5 MILLIGRAM(S): at 18:09

## 2022-10-10 RX ADMIN — ALBUTEROL 2.5 MILLIGRAM(S): 90 AEROSOL, METERED ORAL at 04:20

## 2022-10-10 RX ADMIN — SERTRALINE 50 MILLIGRAM(S): 25 TABLET, FILM COATED ORAL at 12:36

## 2022-10-10 RX ADMIN — SACUBITRIL AND VALSARTAN 1 TABLET(S): 24; 26 TABLET, FILM COATED ORAL at 17:58

## 2022-10-10 RX ADMIN — PANTOPRAZOLE SODIUM 40 MILLIGRAM(S): 20 TABLET, DELAYED RELEASE ORAL at 06:30

## 2022-10-10 RX ADMIN — Medication 40 MILLIGRAM(S): at 06:30

## 2022-10-10 RX ADMIN — HEPARIN SODIUM 5000 UNIT(S): 5000 INJECTION INTRAVENOUS; SUBCUTANEOUS at 17:54

## 2022-10-10 RX ADMIN — Medication 105 MILLIGRAM(S): at 17:54

## 2022-10-10 RX ADMIN — SACUBITRIL AND VALSARTAN 1 TABLET(S): 24; 26 TABLET, FILM COATED ORAL at 09:05

## 2022-10-10 RX ADMIN — ISOSORBIDE DINITRATE 10 MILLIGRAM(S): 5 TABLET ORAL at 15:05

## 2022-10-10 RX ADMIN — Medication 80 MILLIGRAM(S): at 15:04

## 2022-10-10 RX ADMIN — SPIRONOLACTONE 25 MILLIGRAM(S): 25 TABLET, FILM COATED ORAL at 09:05

## 2022-10-10 RX ADMIN — Medication 105 MILLIGRAM(S): at 22:37

## 2022-10-10 RX ADMIN — Medication 50 MILLIGRAM(S): at 06:30

## 2022-10-10 RX ADMIN — CARVEDILOL PHOSPHATE 3.12 MILLIGRAM(S): 80 CAPSULE, EXTENDED RELEASE ORAL at 06:29

## 2022-10-10 RX ADMIN — Medication 0.5 MILLIGRAM(S): at 22:38

## 2022-10-10 RX ADMIN — HEPARIN SODIUM 5000 UNIT(S): 5000 INJECTION INTRAVENOUS; SUBCUTANEOUS at 06:30

## 2022-10-10 RX ADMIN — Medication 105 MILLIGRAM(S): at 13:19

## 2022-10-10 RX ADMIN — Medication 25 GRAM(S): at 10:26

## 2022-10-10 RX ADMIN — Medication 81 MILLIGRAM(S): at 12:36

## 2022-10-10 RX ADMIN — Medication 1 TABLET(S): at 12:36

## 2022-10-10 RX ADMIN — Medication 50 MILLIGRAM(S): at 22:37

## 2022-10-10 RX ADMIN — Medication 1 MILLIGRAM(S): at 06:30

## 2022-10-10 NOTE — PROGRESS NOTE ADULT - SUBJECTIVE AND OBJECTIVE BOX
Patient is a 72y old  Male who presents with a chief complaint of SOB (09 Oct 2022 07:15)    HPI:  68 yo male, PMH of HTN, HLD, CAD, Hepatitis C (completed  Harvoni 2018 ) and current smoker  who presents with acute onset SOB since last Saturday 10/1 with no improvement. The shortness of breath occurred both with exertion and at rest, causing him to have poor sleep at the  night as he was unable to lie flat comfortably. His dyspnea worsened on day of presentation, severely limiting his ability to ambulate. He thought he came to hospital on . He does  not remember what happen between Monday to Thursday   In ED /107, . Patient hypoxic and tachypneic. Diffuse B lines on POCUS. ProBNP 33030, lactate  2.5. Started on nitro drip and Bipap. HsTrop 108.He report non adherence to medications. Some time he miss medication for 1 week .Some time eat high salt diet. Last time he saw doctor was >3 years ago.He denies recent chest pain, palpitations, light-headedness/ dizziness syncope, nausea/vomiting, diaphoresis, LE swelling. Initial EKG concerning for WEN in anterior leads, cardiology consult fellow was called for STEMI alert. Discussed with interventional attending, did not meet STEMI criteria. Patient admitted to CCU. (06 Oct 2022 16:53)       INTERVAL HPI/OVERNIGHT EVENTS:   No overnight events   Afebrile, hemodynamically stable     Subjective: ***    ICU Vital Signs Last 24 Hrs  T(C): 37.1 (10 Oct 2022 06:00), Max: 37.1 (09 Oct 2022 17:30)  T(F): 98.8 (10 Oct 2022 06:00), Max: 98.8 (10 Oct 2022 06:00)  HR: 87 (10 Oct 2022 07:19) (76 - 100)  BP: 163/83 (09 Oct 2022 15:00) (61/39 - 163/83)  BP(mean): 102 (09 Oct 2022 15:00) (44 - 102)  ABP: 138/70 (10 Oct 2022 07:00) (71/43 - 170/79)  ABP(mean): 86 (10 Oct 2022 06:00) (51 - 115)  RR: 32 (10 Oct 2022 07:19) (16 - 33)  SpO2: 98% (10 Oct 2022 07:19) (91% - 100%)    O2 Parameters below as of 10 Oct 2022 07:19  Patient On (Oxygen Delivery Method): nasal cannula, high flow  O2 Flow (L/min): 40  O2 Concentration (%): 40      I&O's Summary    09 Oct 2022 07:01  -  10 Oct 2022 07:00  --------------------------------------------------------  IN: 1055 mL / OUT: 975 mL / NET: 80 mL      Daily     Daily Weight in k.8 (10 Oct 2022 06:00)    EKG/Telemetry Events: NSR    MEDICATIONS  (STANDING):  ALBUTerol    0.083% 2.5 milliGRAM(s) Nebulizer every 6 hours  aspirin enteric coated 81 milliGRAM(s) Oral daily  atorvastatin 40 milliGRAM(s) Oral at bedtime  carvedilol 3.125 milliGRAM(s) Oral every 12 hours  chlorhexidine 2% Cloths 1 Application(s) Topical <User Schedule>  folic acid 1 milliGRAM(s) Oral daily  furosemide   Injectable 40 milliGRAM(s) IV Push two times a day  heparin   Injectable 5000 Unit(s) SubCutaneous every 12 hours  hydrALAZINE 50 milliGRAM(s) Oral every 8 hours  influenza  Vaccine (HIGH DOSE) 0.7 milliLiter(s) IntraMuscular once  isosorbide   dinitrate Tablet (ISORDIL) 10 milliGRAM(s) Oral three times a day  LORazepam     Tablet 0.5 milliGRAM(s) Oral every 4 hours  LORazepam     Tablet   Oral   multivitamin 1 Tablet(s) Oral daily  pantoprazole    Tablet 40 milliGRAM(s) Oral before breakfast  sacubitril 24 mG/valsartan 26 mG 1 Tablet(s) Oral two times a day  sertraline 50 milliGRAM(s) Oral daily  spironolactone 25 milliGRAM(s) Oral daily  thiamine IVPB 500 milliGRAM(s) IV Intermittent daily  tiotropium 18 MICROgram(s) Capsule 1 Capsule(s) Inhalation daily    MEDICATIONS  (PRN):  guaiFENesin Oral Liquid (Sugar-Free) 100 milliGRAM(s) Oral every 6 hours PRN Cough  melatonin 3 milliGRAM(s) Oral at bedtime PRN Insomnia      PHYSICAL EXAM:  GENERAL: Sleeping, cachectic appearing  EYES: EOMI, PERRLA, conjunctiva and sclera clear  NECK: Supple, No JVD  NERVOUS SYSTEM:  A&Ox3  CHEST/LUNG: Bilateral crackles, no wheezing  HEART: Regular rate and rhythm; No murmurs or gallops  ABDOMEN: Soft, Nontender, Nondistended; Bowel sounds present  EXTREMITIES:  2+ Peripheral Pulses, No clubbing, cyanosis, or edema  LYMPH: No lymphadenopathy noted  SKIN: No rashes or lesions    LABS:                        10.7   7.34  )-----------( 240      ( 10 Oct 2022 06:10 )             32.3     10-10    126<L>  |  89<L>  |  32<H>  ----------------------------<  105<H>  3.8   |  25  |  0.91    Ca    8.8      10 Oct 2022 06:10  Phos  2.4     10-10  Mg     1.80     10-10    TPro  6.0  /  Alb  3.1<L>  /  TBili  0.6  /  DBili  x   /  AST  37  /  ALT  27  /  AlkPhos  118  10-10      ABG - ( 10 Oct 2022 06:10 )  pH, Arterial: 7.46  pH, Blood: x     /  pCO2: 41    /  pO2: 134   / HCO3: 29    / Base Excess: 4.9   /  SaO2: 99.3          Urinalysis Basic - ( 08 Oct 2022 10:36 )    Color: Yellow / Appearance: Clear / S.011 / pH: x  Gluc: x / Ketone: Negative  / Bili: Negative / Urobili: <2 mg/dL   Blood: x / Protein: Negative / Nitrite: Negative   Leuk Esterase: Negative / RBC: x / WBC x   Sq Epi: x / Non Sq Epi: x / Bacteria: x          RADIOLOGY & ADDITIONAL TESTS:  Reviewed      Care Discussed with Consultants/Other Providers [ x] YES  [ ] NO         Patient is a 72y old  Male who presents with a chief complaint of SOB (09 Oct 2022 07:15)    HPI:  66 yo male, PMH of HTN, HLD, CAD, Hepatitis C (completed  Harvoni 2018 ) and current smoker  who presents with acute onset SOB since last Saturday 10/1 with no improvement. The shortness of breath occurred both with exertion and at rest, causing him to have poor sleep at the  night as he was unable to lie flat comfortably. His dyspnea worsened on day of presentation, severely limiting his ability to ambulate. He thought he came to hospital on . He does  not remember what happen between Monday to Thursday   In ED /107, . Patient hypoxic and tachypneic. Diffuse B lines on POCUS. ProBNP 03814, lactate  2.5. Started on nitro drip and Bipap. HsTrop 108.He report non adherence to medications. Some time he miss medication for 1 week .Some time eat high salt diet. Last time he saw doctor was >3 years ago.He denies recent chest pain, palpitations, light-headedness/ dizziness syncope, nausea/vomiting, diaphoresis, LE swelling. Initial EKG concerning for WEN in anterior leads, cardiology consult fellow was called for STEMI alert. Discussed with interventional attending, did not meet STEMI criteria. Patient admitted to CCU. (06 Oct 2022 16:53)       INTERVAL HPI/OVERNIGHT EVENTS:   No overnight events   Afebrile, hemodynamically stable     Subjective: No complaints this am. Reports "I want to go home." Denies shortness of breath. A&Ox2    ICU Vital Signs Last 24 Hrs  T(C): 37.1 (10 Oct 2022 06:00), Max: 37.1 (09 Oct 2022 17:30)  T(F): 98.8 (10 Oct 2022 06:00), Max: 98.8 (10 Oct 2022 06:00)  HR: 87 (10 Oct 2022 07:19) (76 - 100)  BP: 163/83 (09 Oct 2022 15:00) (61/39 - 163/83)  BP(mean): 102 (09 Oct 2022 15:00) (44 - 102)  ABP: 138/70 (10 Oct 2022 07:00) (71/43 - 170/79)  ABP(mean): 86 (10 Oct 2022 06:00) (51 - 115)  RR: 32 (10 Oct 2022 07:19) (16 - 33)  SpO2: 98% (10 Oct 2022 07:19) (91% - 100%)    O2 Parameters below as of 10 Oct 2022 07:19  Patient On (Oxygen Delivery Method): nasal cannula, high flow  O2 Flow (L/min): 40  O2 Concentration (%): 40      I&O's Summary    09 Oct 2022 07:01  -  10 Oct 2022 07:00  --------------------------------------------------------  IN: 1055 mL / OUT: 975 mL / NET: 80 mL      Daily     Daily Weight in k.8 (10 Oct 2022 06:00)    EKG/Telemetry Events: NSR    MEDICATIONS  (STANDING):  ALBUTerol    0.083% 2.5 milliGRAM(s) Nebulizer every 6 hours  aspirin enteric coated 81 milliGRAM(s) Oral daily  atorvastatin 40 milliGRAM(s) Oral at bedtime  carvedilol 3.125 milliGRAM(s) Oral every 12 hours  chlorhexidine 2% Cloths 1 Application(s) Topical <User Schedule>  folic acid 1 milliGRAM(s) Oral daily  furosemide   Injectable 40 milliGRAM(s) IV Push two times a day  heparin   Injectable 5000 Unit(s) SubCutaneous every 12 hours  hydrALAZINE 50 milliGRAM(s) Oral every 8 hours  influenza  Vaccine (HIGH DOSE) 0.7 milliLiter(s) IntraMuscular once  isosorbide   dinitrate Tablet (ISORDIL) 10 milliGRAM(s) Oral three times a day  LORazepam     Tablet 0.5 milliGRAM(s) Oral every 4 hours  LORazepam     Tablet   Oral   multivitamin 1 Tablet(s) Oral daily  pantoprazole    Tablet 40 milliGRAM(s) Oral before breakfast  sacubitril 24 mG/valsartan 26 mG 1 Tablet(s) Oral two times a day  sertraline 50 milliGRAM(s) Oral daily  spironolactone 25 milliGRAM(s) Oral daily  thiamine IVPB 500 milliGRAM(s) IV Intermittent daily  tiotropium 18 MICROgram(s) Capsule 1 Capsule(s) Inhalation daily    MEDICATIONS  (PRN):  guaiFENesin Oral Liquid (Sugar-Free) 100 milliGRAM(s) Oral every 6 hours PRN Cough  melatonin 3 milliGRAM(s) Oral at bedtime PRN Insomnia      PHYSICAL EXAM:  GENERAL: Sleeping, easily awakened. Cachectic appearing, temporal wasting  EYES: EOMI, PERRLA, conjunctiva and sclera clear  NECK: Supple, No JVD  NERVOUS SYSTEM:  A&Ox3  CHEST/LUNG: Mild bilateral crackles, no wheezing  HEART: Regular rate and rhythm; No murmurs or gallops  ABDOMEN: Soft, Nontender, Nondistended; Bowel sounds present  EXTREMITIES:  2+ Peripheral Pulses, No clubbing, cyanosis, or edema  LYMPH: No lymphadenopathy noted  SKIN: No rashes or lesions    LABS:                        10.7   7.34  )-----------( 240      ( 10 Oct 2022 06:10 )             32.3     10-10    126<L>  |  89<L>  |  32<H>  ----------------------------<  105<H>  3.8   |  25  |  0.91    Ca    8.8      10 Oct 2022 06:10  Phos  2.4     10-10  Mg     1.80     10-10    TPro  6.0  /  Alb  3.1<L>  /  TBili  0.6  /  DBili  x   /  AST  37  /  ALT  27  /  AlkPhos  118  10-10      ABG - ( 10 Oct 2022 06:10 )  pH, Arterial: 7.46  pH, Blood: x     /  pCO2: 41    /  pO2: 134   / HCO3: 29    / Base Excess: 4.9   /  SaO2: 99.3          Urinalysis Basic - ( 08 Oct 2022 10:36 )    Color: Yellow / Appearance: Clear / S.011 / pH: x  Gluc: x / Ketone: Negative  / Bili: Negative / Urobili: <2 mg/dL   Blood: x / Protein: Negative / Nitrite: Negative   Leuk Esterase: Negative / RBC: x / WBC x   Sq Epi: x / Non Sq Epi: x / Bacteria: x          RADIOLOGY & ADDITIONAL TESTS:  Reviewed      Care Discussed with Consultants/Other Providers [ x] YES  [ ] NO

## 2022-10-10 NOTE — PROGRESS NOTE ADULT - NS ATTEND AMEND GEN_ALL_CORE FT
Feels improved. would like to go home. Denies complaints. Tolerating medications. On exam, thin appearing, JVP approx 8 cm w/ mild HJR, RRR, no m/r/g appreciated, CTAB, nontender abdomen, no pedal edema. Labs reviewed - Hb 8, Na 126, BUn/Cr 32/0.91 (b/l 19/0.75), BNP >56k. TTE reviewed.  - c/w current meds  - increase Entresto as above  - consider switching to oral diuretics tomorrow  - standing [facility] daily  - counseled pt on importance of EtOH cessation and disease process

## 2022-10-10 NOTE — BH CONSULTATION LIAISON PROGRESS NOTE - NSBHFUPINTERVALHXFT_PSY_A_CORE
Chart reviewed. Patient seen this AM. He states that he is feeling better. He says that he came to the hospital because he was short of breath. He does not know the date. He does know the hospital. He seems surprised to hear that he needs to stay in the hospital for a couple more days even though the primary team and myself had both told him this on two separate occasions within 10 min of each other.  He denies SI. Denies AH/VH.  No tremor on exam.

## 2022-10-10 NOTE — PROGRESS NOTE ADULT - PROBLEM SELECTOR PLAN 1
Mildly hypervolemic and hypertensive.   He is net positive 80 ml in 24 hrs. Please get standing weight today, if possible.   New LV systolic dysfunction. Will need to evaluate coronary arteries when he is closer to euvolemic.  Continue Lasix 40 mg IV BID for now.   Increase Entresto to 49/51 mg po BID.   Continue hydralazine 50 mg po TID.   Continue Isordil 10 mg po TID.   Continue spironolactone 25 mg po qd.   Supplement K to keep 4.0-5.0 and mag >2.0.   He is a current smoker and drinks ETOH- counseled patient to stop, he agreed to plan.   Educated patient on low salt diet.

## 2022-10-10 NOTE — PROGRESS NOTE ADULT - NS ATTEND AMEND GEN_ALL_CORE FT
Increase Lasix to 80 mg iv bid.  Increase Entresto to 49/51 mg po bid.  Continue Ativan taper.  Repeat CXR.  Hi-flow O2 was switched to n.c.

## 2022-10-10 NOTE — BH CONSULTATION LIAISON PROGRESS NOTE - NSBHCHARTREVIEWLAB_PSY_A_CORE FT
10.7   7.34  )-----------( 240      ( 10 Oct 2022 06:10 )             32.3   10-10    126<L>  |  89<L>  |  32<H>  ----------------------------<  105<H>  3.8   |  25  |  0.91    Ca    8.8      10 Oct 2022 06:10  Phos  2.4     10-10  Mg     1.80     10-10    TPro  6.0  /  Alb  3.1<L>  /  TBili  0.6  /  DBili  x   /  AST  37  /  ALT  27  /  AlkPhos  118  10-10

## 2022-10-10 NOTE — PROGRESS NOTE ADULT - SUBJECTIVE AND OBJECTIVE BOX
Medications:  ALBUTerol    0.083% 2.5 milliGRAM(s) Nebulizer every 6 hours  aspirin enteric coated 81 milliGRAM(s) Oral daily  atorvastatin 40 milliGRAM(s) Oral at bedtime  carvedilol 3.125 milliGRAM(s) Oral every 12 hours  chlorhexidine 2% Cloths 1 Application(s) Topical <User Schedule>  folic acid 1 milliGRAM(s) Oral daily  furosemide   Injectable 40 milliGRAM(s) IV Push two times a day  guaiFENesin Oral Liquid (Sugar-Free) 100 milliGRAM(s) Oral every 6 hours PRN  heparin   Injectable 5000 Unit(s) SubCutaneous every 12 hours  hydrALAZINE 50 milliGRAM(s) Oral every 8 hours  influenza  Vaccine (HIGH DOSE) 0.7 milliLiter(s) IntraMuscular once  isosorbide   dinitrate Tablet (ISORDIL) 10 milliGRAM(s) Oral three times a day  LORazepam     Tablet 0.5 milliGRAM(s) Oral every 4 hours  LORazepam     Tablet   Oral   magnesium sulfate  IVPB 2 Gram(s) IV Intermittent once  melatonin 3 milliGRAM(s) Oral at bedtime PRN  multivitamin 1 Tablet(s) Oral daily  pantoprazole    Tablet 40 milliGRAM(s) Oral before breakfast  sacubitril 24 mG/valsartan 26 mG 1 Tablet(s) Oral two times a day  sertraline 50 milliGRAM(s) Oral daily  spironolactone 25 milliGRAM(s) Oral daily  thiamine IVPB 500 milliGRAM(s) IV Intermittent daily  tiotropium 18 MICROgram(s) Capsule 1 Capsule(s) Inhalation daily      Vitals:  Vital Signs Last 24 Hrs  T(C): 37.3 (10 Oct 2022 08:16), Max: 37.3 (10 Oct 2022 08:16)  T(F): 99.1 (10 Oct 2022 08:16), Max: 99.1 (10 Oct 2022 08:16)  HR: 87 (10 Oct 2022 07:19) (76 - 100)  BP: 163/83 (09 Oct 2022 15:00) (61/39 - 163/83)  BP(mean): 102 (09 Oct 2022 15:00) (44 - 102)  RR: 32 (10 Oct 2022 07:19) (16 - 33)  SpO2: 98% (10 Oct 2022 07:19) (91% - 100%)    Parameters below as of 10 Oct 2022 07:19  Patient On (Oxygen Delivery Method): nasal cannula, high flow  O2 Flow (L/min): 40  O2 Concentration (%): 40    Daily     Daily Weight in k.8 (10 Oct 2022 06:00)    I&O's Detail    09 Oct 2022 07:01  -  10 Oct 2022 07:00  --------------------------------------------------------  IN:    Furosemide: 5 mL    IV PiggyBack: 200 mL    Oral Fluid: 600 mL    Sodium Chloride 0.9% Bolus: 250 mL  Total IN: 1055 mL    OUT:    Voided (mL): 975 mL  Total OUT: 975 mL    Total NET: 80 mL          Physical Exam:     General: No distress. Comfortable.  HEENT: EOM intact.  Neck: Neck supple. JVP not elevated. No masses  Chest: Clear to auscultation bilaterally  CV: Normal S1 and S2. No murmurs, rub, or gallops. Radial pulses normal.  Abdomen: Soft, non-distended, non-tender  Skin: No rashes or skin breakdown  Neurology: Alert and oriented times three. Sensation intact  Psych: Affect normal    Labs:                        10.7   7.34  )-----------( 240      ( 10 Oct 2022 06:10 )             32.3     10-10    126<L>  |  89<L>  |  32<H>  ----------------------------<  105<H>  3.8   |  25  |  0.91    Ca    8.8      10 Oct 2022 06:10  Phos  2.4     10-10  Mg     1.80     10-10    TPro  6.0  /  Alb  3.1<L>  /  TBili  0.6  /  DBili  x   /  AST  37  /  ALT  27  /  AlkPhos  118  10-10           Patient seen and examined. He states he feels well and wants to go home.   No SOB at rest, CP, palpitations, dizziness.       Medications:  ALBUTerol    0.083% 2.5 milliGRAM(s) Nebulizer every 6 hours  aspirin enteric coated 81 milliGRAM(s) Oral daily  atorvastatin 40 milliGRAM(s) Oral at bedtime  carvedilol 3.125 milliGRAM(s) Oral every 12 hours  chlorhexidine 2% Cloths 1 Application(s) Topical <User Schedule>  folic acid 1 milliGRAM(s) Oral daily  furosemide   Injectable 40 milliGRAM(s) IV Push two times a day  guaiFENesin Oral Liquid (Sugar-Free) 100 milliGRAM(s) Oral every 6 hours PRN  heparin   Injectable 5000 Unit(s) SubCutaneous every 12 hours  hydrALAZINE 50 milliGRAM(s) Oral every 8 hours  influenza  Vaccine (HIGH DOSE) 0.7 milliLiter(s) IntraMuscular once  isosorbide   dinitrate Tablet (ISORDIL) 10 milliGRAM(s) Oral three times a day  LORazepam     Tablet 0.5 milliGRAM(s) Oral every 4 hours  LORazepam     Tablet   Oral   magnesium sulfate  IVPB 2 Gram(s) IV Intermittent once  melatonin 3 milliGRAM(s) Oral at bedtime PRN  multivitamin 1 Tablet(s) Oral daily  pantoprazole    Tablet 40 milliGRAM(s) Oral before breakfast  sacubitril 24 mG/valsartan 26 mG 1 Tablet(s) Oral two times a day  sertraline 50 milliGRAM(s) Oral daily  spironolactone 25 milliGRAM(s) Oral daily  thiamine IVPB 500 milliGRAM(s) IV Intermittent daily  tiotropium 18 MICROgram(s) Capsule 1 Capsule(s) Inhalation daily      Vitals:  Vital Signs Last 24 Hrs  T(C): 37.3 (10 Oct 2022 08:16), Max: 37.3 (10 Oct 2022 08:16)  T(F): 99.1 (10 Oct 2022 08:16), Max: 99.1 (10 Oct 2022 08:16)  HR: 87 (10 Oct 2022 07:19) (76 - 100)  BP: 163/83 (09 Oct 2022 15:00) (61/39 - 163/83)  BP(mean): 102 (09 Oct 2022 15:00) (44 - 102)  RR: 32 (10 Oct 2022 07:19) (16 - 33)  SpO2: 98% (10 Oct 2022 07:19) (91% - 100%)    Parameters below as of 10 Oct 2022 07:19  Patient On (Oxygen Delivery Method): nasal cannula, high flow  O2 Flow (L/min): 40  O2 Concentration (%): 40    Daily     Daily Weight in k.8 (10 Oct 2022 06:00)    I&O's Detail    09 Oct 2022 07:01  -  10 Oct 2022 07:00  --------------------------------------------------------  IN:    Furosemide: 5 mL    IV PiggyBack: 200 mL    Oral Fluid: 600 mL    Sodium Chloride 0.9% Bolus: 250 mL  Total IN: 1055 mL    OUT:    Voided (mL): 975 mL  Total OUT: 975 mL    Total NET: 80 mL          Physical Exam:     General: No distress. Comfortable.  HEENT: EOM intact.  Neck: Neck supple. JVP elevated 8cm. No masses  Chest: Clear to auscultation bilaterally  CV: Normal S1 and S2. No murmurs, rub, or gallops. Radial pulses normal. No LE edema and is warm b/l.   Abdomen: Soft, non-distended, non-tender  Skin: No rashes or skin breakdown  Neurology: Alert and oriented times three. Sensation intact  Psych: Affect normal    Labs:                        10.7   7.34  )-----------( 240      ( 10 Oct 2022 06:10 )             32.3     10-10    126<L>  |  89<L>  |  32<H>  ----------------------------<  105<H>  3.8   |  25  |  0.91    Ca    8.8      10 Oct 2022 06:10  Phos  2.4     10-10  Mg     1.80     10-10    TPro  6.0  /  Alb  3.1<L>  /  TBili  0.6  /  DBili  x   /  AST  37  /  ALT  27  /  AlkPhos  118  10-10

## 2022-10-10 NOTE — PROGRESS NOTE ADULT - ASSESSMENT
71 yo male, PMH of HTN,HLD,CAD, Hepatitis C ( completed  Harv2018) and current smoker  presenting with acute dyspnea, found to have hypoxic respiratory distress, hypertension to , and sinus tachycardia, with nonspecific ST changes on EKG, overall concerning for hypertensive emergency with pulmonary edema. Started on nitro drip and placed on bipap. Pt admitted to CCU for further care. Pulm consulted for persistent hypoxemia    CXR: Bilateral perihilar hazy opacities consistent with pulmonary edema. No focal consolidation.  ECHO: severe global LVSD, mild concentric LVH, RVE with nml RV sysfxn, bilateral pleural effusions, moderate PHTN PASP 56, with moderately dilated LA  AB.51/33/73/26/96.4% while on Fio2 40%    #recommendations  POCUS revealing diffuse B lines with smooth pleura and bilateral trace simple pleural effusion much improved since last POCUS  if patient continues to desaturate then should transition to CPAP 10 otherwise continue NC  at this time favor still fluid overload and should continue aggressive diuresis  moderate PHTN most likely group 2 PHTN   patient with active long standing smoking, with possible hyperinflation of lungs and emphysema of the lungs, continue spiriva and albuteraol q 6    Prior to discharge:  Please email: [email or web address] to setup an appointment prior to discharge. Include the patient's name, , MRN and contact information in the email.      [facility]  [address] [place] [address]  [phone]      Guillermo Morgan MD  [facility] PGY4  [facility] [address], [facility] [phone]

## 2022-10-10 NOTE — BH CONSULTATION LIAISON PROGRESS NOTE - CURRENT MEDICATION
MEDICATIONS  (STANDING):  ALBUTerol    0.083% 2.5 milliGRAM(s) Nebulizer every 6 hours  aspirin enteric coated 81 milliGRAM(s) Oral daily  atorvastatin 40 milliGRAM(s) Oral at bedtime  carvedilol 3.125 milliGRAM(s) Oral every 12 hours  chlorhexidine 2% Cloths 1 Application(s) Topical <User Schedule>  folic acid 1 milliGRAM(s) Oral daily  furosemide   Injectable 80 milliGRAM(s) IV Push two times a day  heparin   Injectable 5000 Unit(s) SubCutaneous every 12 hours  hydrALAZINE 50 milliGRAM(s) Oral every 8 hours  influenza  Vaccine (HIGH DOSE) 0.7 milliLiter(s) IntraMuscular once  isosorbide   dinitrate Tablet (ISORDIL) 10 milliGRAM(s) Oral three times a day  LORazepam     Tablet   Oral   LORazepam     Tablet 0.5 milliGRAM(s) Oral every 4 hours  multivitamin 1 Tablet(s) Oral daily  pantoprazole    Tablet 40 milliGRAM(s) Oral before breakfast  sacubitril 49 mG/valsartan 51 mG 1 Tablet(s) Oral two times a day  sertraline 50 milliGRAM(s) Oral daily  spironolactone 25 milliGRAM(s) Oral daily  thiamine IVPB 500 milliGRAM(s) IV Intermittent daily  tiotropium 18 MICROgram(s) Capsule 1 Capsule(s) Inhalation daily    MEDICATIONS  (PRN):  guaiFENesin Oral Liquid (Sugar-Free) 100 milliGRAM(s) Oral every 6 hours PRN Cough  melatonin 3 milliGRAM(s) Oral at bedtime PRN Insomnia

## 2022-10-10 NOTE — BH CONSULTATION LIAISON PROGRESS NOTE - NSBHASSESSMENTFT_PSY_ALL_CORE
73yo male, domiciled alone, with PMHx HTN, HLD, CAD, Hepatitis C, current smoker  who presents to [facility] with acute onset SOB since last Saturday 10/1 with no improvement, c/o shortness of breath occurred with exertion and at rest, causing poor sleep at the  night, impairing ambulation and dyspnea worsened on day of presentation, severely limiting his ability to ambulate.     Patient's alcohol withdrawal and delirium seem to be improving. He is no longer agitated, though his understanding of the situation, his attention, and his recall seem to remain impaired.    Continue treatment of alcohol withdrawal per primary team.  Continue treatment of HF and pulmonary edema per primary team.  Would switch IV thiamine to q8h for three days  SW and SBIRT consult

## 2022-10-10 NOTE — PROGRESS NOTE ADULT - SUBJECTIVE AND OBJECTIVE BOX
CHIEF COMPLAINT:Patient is a 72y old  Male who presents with a chief complaint of SOB (10 Oct 2022 09:29)      Interval Events: patient with improved SOB, altthough now feeling thirsty. has intermittent cough althouh without wheezing and no increase in mucus. Patient denies fevers, chills, chest pain, nausea, abdominal pain, diarrhea, constipation, dysuria, leg swelling, headache, light headedness    REVIEW OF SYSTEMS:  [x] All other systems negative except per HPI   [ ] Unable to assess ROS because ________    OBJECTIVE:  ICU Vital Signs Last 24 Hrs  T(C): 37.3 (10 Oct 2022 08:16), Max: 37.3 (10 Oct 2022 08:16)  T(F): 99.1 (10 Oct 2022 08:16), Max: 99.1 (10 Oct 2022 08:16)  HR: 83 (10 Oct 2022 11:00) (79 - 100)  BP: 163/83 (09 Oct 2022 15:00) (163/83 - 163/83)  BP(mean): 102 (09 Oct 2022 15:00) (102 - 102)  ABP: 150/70 (10 Oct 2022 11:00) (109/46 - 170/79)  ABP(mean): 90 (10 Oct 2022 11:00) (63 - 115)  RR: 22 (10 Oct 2022 11:00) (15 - 33)  SpO2: 99% (10 Oct 2022 11:00) (96% - 100%)    O2 Parameters below as of 10 Oct 2022 10:59  Patient On (Oxygen Delivery Method): nasal cannula  O2 Flow (L/min): 3            10-09 @ 07:01  -  10-10 @ 07:00  --------------------------------------------------------  IN: 1055 mL / OUT: 975 mL / NET: 80 mL    10-10 @ 07:01  -  10-10 @ 12:39  --------------------------------------------------------  IN: 400 mL / OUT: 600 mL / NET: -200 mL        PHYSICAL EXAM:  GENERAL: NAD, well-groomed, thin  HEAD:  Atraumatic, Normocephalic  EYES: EOMI, PERRLA, conjunctiva and sclera clear  ENMT: No tonsillar erythema, exudates, or enlargement; dry mucous membranes,   NECK: Supple, No JVD, Normal thyroid  CHEST/LUNG: Clear to auscultation bilaterally; No rales, rhonchi, wheezing, or rubs  HEART: Regular rate and rhythm; No murmurs, rubs, or gallops  ABDOMEN: Soft, Nontender, Nondistended; Bowel sounds present  VASCULAR:  2+ Peripheral Pulses, No clubbing, cyanosis, or edema  LYMPH: No lymphadenopathy noted  SKIN: No rashes or lesions  NERVOUS SYSTEM:  Alert & Oriented X3, Good concentration;   HOSPITAL MEDICATIONS:  MEDICATIONS  (STANDING):  ALBUTerol    0.083% 2.5 milliGRAM(s) Nebulizer every 6 hours  aspirin enteric coated 81 milliGRAM(s) Oral daily  atorvastatin 40 milliGRAM(s) Oral at bedtime  carvedilol 3.125 milliGRAM(s) Oral every 12 hours  chlorhexidine 2% Cloths 1 Application(s) Topical <User Schedule>  folic acid 1 milliGRAM(s) Oral daily  furosemide   Injectable 80 milliGRAM(s) IV Push two times a day  heparin   Injectable 5000 Unit(s) SubCutaneous every 12 hours  hydrALAZINE 50 milliGRAM(s) Oral every 8 hours  influenza  Vaccine (HIGH DOSE) 0.7 milliLiter(s) IntraMuscular once  isosorbide   dinitrate Tablet (ISORDIL) 10 milliGRAM(s) Oral three times a day  LORazepam     Tablet   Oral   LORazepam     Tablet 0.5 milliGRAM(s) Oral every 4 hours  multivitamin 1 Tablet(s) Oral daily  pantoprazole    Tablet 40 milliGRAM(s) Oral before breakfast  sacubitril 49 mG/valsartan 51 mG 1 Tablet(s) Oral two times a day  sertraline 50 milliGRAM(s) Oral daily  spironolactone 25 milliGRAM(s) Oral daily  thiamine IVPB 500 milliGRAM(s) IV Intermittent daily  tiotropium 18 MICROgram(s) Capsule 1 Capsule(s) Inhalation daily    MEDICATIONS  (PRN):  guaiFENesin Oral Liquid (Sugar-Free) 100 milliGRAM(s) Oral every 6 hours PRN Cough  melatonin 3 milliGRAM(s) Oral at bedtime PRN Insomnia      LABS:    The Labs were reviewed by me   The Radiology was reviewed by me    EKG tracing reviewed by me    10-10    126<L>  |  89<L>  |  32<H>  ----------------------------<  105<H>  3.8   |  25  |  0.91  10-09    126<L>  |  87<L>  |  24<H>  ----------------------------<  105<H>  3.4<L>   |  26  |  0.90  10-08    123<L>  |  86<L>  |  19  ----------------------------<  124<H>  3.3<L>   |  25  |  0.75    Ca    8.8      10 Oct 2022 06:10  Ca    8.5      09 Oct 2022 03:40  Ca    7.9<L>      08 Oct 2022 02:32  Phos  2.4     10-10  Mg     1.80     10-10    TPro  6.0  /  Alb  3.1<L>  /  TBili  0.6  /  DBili  x   /  AST  37  /  ALT  27  /  AlkPhos  118  10-10  TPro  6.6  /  Alb  3.3  /  TBili  0.9  /  DBili  x   /  AST  47<H>  /  ALT  30  /  AlkPhos  89  10-09  TPro  6.0  /  Alb  3.6  /  TBili  0.8  /  DBili  x   /  AST  51<H>  /  ALT  26  /  AlkPhos  60  10-08    Magnesium, Serum: 1.80 mg/dL (10-10-22 @ 06:10)  Magnesium, Serum: 1.60 mg/dL (10-09-22 @ 03:40)  Magnesium, Serum: 2.00 mg/dL (10-08-22 @ 02:32)  Magnesium, Serum: 1.60 mg/dL (10-07-22 @ 18:13)    Phosphorus Level, Serum: 2.4 mg/dL (10-10-22 @ 06:10)  Phosphorus Level, Serum: 2.6 mg/dL (10-09-22 @ 03:40)  Phosphorus Level, Serum: 3.9 mg/dL (10-08-22 @ 02:32)  Phosphorus Level, Serum: 3.5 mg/dL (10-07-22 @ 18:13)                      ABG - ( 10 Oct 2022 06:10 )  pH, Arterial: 7.46  pH, Blood: x     /  pCO2: 41    /  pO2: 134   / HCO3: 29    / Base Excess: 4.9   /  SaO2: 99.3                                    10.7   7.34  )-----------( 240      ( 10 Oct 2022 06:10 )             32.3                         11.5   9.10  )-----------( 213      ( 09 Oct 2022 03:40 )             34.4                         9.9    9.39  )-----------( 155      ( 08 Oct 2022 02:32 )             28.3     CAPILLARY BLOOD GLUCOSE            MICROBIOLOGY:     RADIOLOGY:  [ ] Reviewed and interpreted by me    Point of Care Ultrasound Findings:    PFT:    EKG:

## 2022-10-10 NOTE — BH CONSULTATION LIAISON PROGRESS NOTE - MSE UNSTRUCTURED FT
Mental Status Exam:  Haley: well groomed, fair hygiene     Behavior: calm, cooperative, no psychomotor retardation/agitation  Motor: no tremors, EPS, or rigidity  Gait: did not assess, pt in bed  Speech: normal rate, rhythm, prosody and volume   Mood: "okay"  Affect: euthymic, congruent  Thought process: goal directed, mostly linear  Thought Content: denies paranoia, delusions, wants to go home   Perception: denies AH/VH  SI: denies  HI: denies  Insight: limited  Judgment: limited    Cognitive Exam:  Orientation: AOx2  Recall: impaired  Attention: impaired

## 2022-10-10 NOTE — PROGRESS NOTE ADULT - ASSESSMENT
73 y/o AA male with PMHX of HTN, HLD, former HFpEF (LVEF 63% on TTE from 8/22/19 on [employer]) now with new reduction in LV function (TTE 10/6/22 LVEF 25% severe global LV dysfunction, LA 4.0, LVIDD 4.5 cm, mod MR, mild AR, enlarged RV with normal systolic function, mod TR, b/l pleural effusions, RVSP 56, mod pulm HTN), HepC (s/p Harvoni treatment in 2018), CAD ([facility] on 9/26/16- 40% stenosis of prox circ), current smoker (smokes 2-3 cigarettes daily for the past 20 years), current ETOH use (2 beers and 1 shot of white run daily) comes in with complaints of acute SOB which started on Sunday (10/2/22). He states it started while resting and continued throughout the day. He found himself feeling better when seated up. In ED patient was found to be hypoxic, hypertensive (SBP 180s), and tachycardic. He was started on nitro gtt and BIPAP and moved to CCU for closer monitoring. Labs significant for proBNP 57526, lactate 2.7, ->233->218, trop 108-->149-->166, H/H 10.4/30.2, K 3.1, na 123, cl 86. He was admitted for acute systolic heart failure with moderate volume overload. He was started on a Lasix gtt 10 mg/hr, transitioned to 5 mg/hr and then bolus 40 IV BID with good response. Hospital course complicated by alcohol withdraw and placement on CIWA protocol.     Currently he is mildly Hypervolemic and hypertensive

## 2022-10-10 NOTE — BH CONSULTATION LIAISON PROGRESS NOTE - NSBHCHARTREVIEWVS_PSY_A_CORE FT
Vital Signs Last 24 Hrs  T(C): 37.3 (10 Oct 2022 08:16), Max: 37.3 (10 Oct 2022 08:16)  T(F): 99.1 (10 Oct 2022 08:16), Max: 99.1 (10 Oct 2022 08:16)  HR: 83 (10 Oct 2022 11:00) (79 - 100)  BP: 163/83 (09 Oct 2022 15:00) (163/83 - 163/83)  BP(mean): 102 (09 Oct 2022 15:00) (102 - 102)  RR: 22 (10 Oct 2022 11:00) (15 - 33)  SpO2: 99% (10 Oct 2022 11:00) (96% - 100%)    Parameters below as of 10 Oct 2022 10:59  Patient On (Oxygen Delivery Method): nasal cannula  O2 Flow (L/min): 3

## 2022-10-10 NOTE — PROGRESS NOTE ADULT - ASSESSMENT
71 yo male, PMH of HTN,HLD,CAD, Hepatitis C ( completed  Harvoni 2018) and current smoker  presenting with acute dyspnea, found to have hypoxic respiratory distress, hypertension to , and sinus tachycardia, with nonspecific ST changes on EKG, overall concerning for hypertensive emergency with pulmonary edema. Started on nitro drip (now d/crow) and placed on bipap, later weaned to HFNC. Started ativan taper for suspected EtOH withdrawal.    Neuro  # chronic depression  -c/w sertraline 50mg PO daily (half dose as can worsen hyponatremia per psych)  - melatonin as needed for insomnia.  - AxOx3    # EtOH withdrawal  - unclear hx of when last drink was, however pt acknowledges to at least 3 drinks/day (beer 2-3 cans every day and 1 hot of RUM every day)  - tox screen positive for THC, otherwise negative for tested substances  - no known hx withdrawal  - started on CIWA protocol, received total 7 mg Ativan on 10/7 w/ high CIWA scores > 12  - started on precedex on 10/7 pm due to concern for benzo-related hypoxia. Off precedex 10/8 AM  - c/w ativan taper from 1.5 mg per psych recs   - replete folate, thiamine  - psych recs appreciated    Resp  #Dyspnea 2/2 mixed pulmonary edema and COPD  - ABG showed uncompensated Resp alkalosis on Bipap-- ABG today 7.44/43/107, improved   - hypoxic on NC  - off bipap -> Hiflow for hypoxemia  - h/o smoking x20 years, though no known hx COPD  - pulmonary consulted- less likely COPD  - start on Duoneb neb q6 x 24 hrs, Albuterol neb and Spiriva starting 10/8  - wean down HFNC to NC as tolerated       #hemoptysis  - episode of hemoptysis w/ BRB overnight on 10/7 pm  - pt has had cough since arrival  - no evidence of consolidation on 10/6 CXR  - s/p IV Protonix 40 x1. On IV Protonix gtt for 24 hours  - c/w PO Protonix 40 mg daily   - ctm    CV  #acute CHFrEF /Acute pulmonary edema.   -presents with acute shortness of breath found in pulmonary edema  -chest Xray showed pulmonary edema on 10/6  - Echo ; EF 25%, Severe global LVSD, mod MR, enlarge RV with normal RVF -> new HF diagnosis  -lactate improved  -off nitro as of 10/8 am  -off Lasix drip since 10/9 am  - c/w Lasix 40 BID  -Entresto started  - c/w entresto   - strict i/O  - HF following    Elevated trop  - Trop up trending ; 108-> 149->166. CKMB dowm trending- likely Type II  - intial EKG WEN 1mm in V1-V3, STD in inferior leads which improved  - no chest pain  - c/w asa 81mg , increase Lipitor 80mg PO   - echo showed low EF with Severe LVSD  - need  evaluation cath for ischemic work up once optimized     # HTN (hypertension).   - presents with  HTN emergency   - now off nitro gtt  - c/w isordil 10mg TID-> titrate up as needed  - c/w  on hydralazine 50mg TID    GI  - no issue  -c/w DASH diet    Renal/  # Hyponatremia  -Na 126-> 123-> 126 -> 126  - likely 2/2 hypervolemia vs diuretic vs alcohol use    - fluid restriction 1500cc/24hr  - monitor BMP    Endo  #Hyperlipidemia.   -c/w Lipitor 20mg Po daily  - lipid profile WNL    ID  # Leukocytosis on admission   -covid PCR neg  -likely reactive, now downtrending   -rectal temp 100.2 on 10/8. RVP negative  -BCx 10.8 ngtd  -will monitor off abx    DVT ppx  heparin SQ 73 yo male, PMH of HTN,HLD,CAD, Hepatitis C ( completed  Harvoni 2018) and current smoker  presenting with acute dyspnea, found to have hypoxic respiratory distress, hypertension to , and sinus tachycardia, with nonspecific ST changes on EKG, overall concerning for hypertensive emergency with pulmonary edema. Started on nitro drip (now d/crow) and placed on bipap, later weaned to HFNC. Started ativan taper for suspected EtOH withdrawal.    Neuro  # chronic depression  -c/w sertraline 50mg PO daily (half dose as can worsen hyponatremia per psych)  - melatonin as needed for insomnia.  - AxOx3    # EtOH withdrawal  - unclear hx of when last drink was, however pt acknowledges to at least 3 drinks/day (beer 2-3 cans every day and 1 hot of RUM every day)  - tox screen positive for THC, otherwise negative for tested substances  - no known hx withdrawal  - started on CIWA protocol, received total 7 mg Ativan on 10/7 w/ high CIWA scores > 12  - started on precedex on 10/7 pm due to concern for benzo-related hypoxia. Off precedex 10/8 AM  - c/w ativan taper starting from 1.5 mg per psych recs -> now on 0.5  - replete folate, thiamine  - psych recs appreciated    Resp  #Dyspnea 2/2 mixed pulmonary edema and COPD  - ABG showed uncompensated Resp alkalosis on Bipap-- ABG today 7.44/43/107, improved   - hypoxic on NC  - off bipap -> Hiflow for hypoxemia  - h/o smoking x20 years, though no known hx COPD  - pulmonary consulted- less likely COPD  - start on Duoneb neb q6 x 24 hrs, Albuterol neb and Spiriva starting 10/8  - desated to 83 when trialing NC 10/9  - persistently tachypneic, EtOH withdrawal may be contributing  - wean down HFNC to NC as tolerated  - repeat CXR this am    #hemoptysis  - episode of hemoptysis w/ BRB overnight on 10/7 pm  - pt has had cough since arrival  - no evidence of consolidation on 10/6 CXR  - s/p IV Protonix 40 x1. On IV Protonix gtt for 24 hours  - c/w PO Protonix 40 mg daily   - ctm    CV  #acute CHFrEF /Acute pulmonary edema.   -presents with acute shortness of breath found in pulmonary edema  -chest Xray showed pulmonary edema on 10/6  - Echo ; EF 25%, Severe global LVSD, mod MR, enlarge RV with normal RVF -> new HF diagnosis  -lactate improved  -off nitro as of 10/8 am  -off Lasix drip since 10/9 am, switched to IV 40 BID. Net neutral over past 24 hrs  - inc Lasix to IV 80 BID, target net negative  - Entresto started  - inc Entresto to 49/51 bid  - strict i/O  - HF following    Elevated trop  - Trop up trending ; 108-> 149->166. CKMB dowm trending- likely Type II  - intial EKG WEN 1mm in V1-V3, STD in inferior leads which improved  - no chest pain  - c/w asa 81mg , c/w Lipitor 80mg PO   - echo showed low EF with Severe LVSD  - need ischemic work up once optimized     # HTN (hypertension).   - presents with HTN emergency   - now off nitro gtt  - pressures improved, SBPs 140s-150s  - c/w isordil 10mg TID-> titrate up as needed  - c/w  on hydralazine 50mg TID    GI  - no issue  -c/w DASH diet    Renal/  # Hyponatremia  -Na 126-> 123-> 126 -> 126  - likely 2/2 hypervolemia vs diuretic vs alcohol use    - fluid restriction 1500cc/24hr  - monitor BMP    Endo  #Hyperlipidemia.   -c/w Lipitor 20mg Po daily  - lipid profile WNL    ID  # Leukocytosis on admission   -covid PCR neg  -likely reactive, now downtrending and wnl  -rectal temp 100.2 on 10/8. RVP negative  -BCx 10/8 ngtd  -will monitor off abx    DVT ppx  heparin SQ

## 2022-10-11 LAB
ALBUMIN SERPL ELPH-MCNC: 3.6 G/DL — SIGNIFICANT CHANGE UP (ref 3.3–5)
ALP SERPL-CCNC: 192 U/L — HIGH (ref 40–120)
ALT FLD-CCNC: 33 U/L — SIGNIFICANT CHANGE UP (ref 4–41)
ANION GAP SERPL CALC-SCNC: 10 MMOL/L — SIGNIFICANT CHANGE UP (ref 7–14)
AST SERPL-CCNC: 48 U/L — HIGH (ref 4–40)
BASE EXCESS BLDV CALC-SCNC: 7.1 MMOL/L — HIGH (ref -2–3)
BILIRUB SERPL-MCNC: 0.5 MG/DL — SIGNIFICANT CHANGE UP (ref 0.2–1.2)
BLOOD GAS VENOUS COMPREHENSIVE RESULT: SIGNIFICANT CHANGE UP
BUN SERPL-MCNC: 34 MG/DL — HIGH (ref 7–23)
CALCIUM SERPL-MCNC: 9.4 MG/DL — SIGNIFICANT CHANGE UP (ref 8.4–10.5)
CHLORIDE BLDV-SCNC: 89 MMOL/L — LOW (ref 96–108)
CHLORIDE SERPL-SCNC: 86 MMOL/L — LOW (ref 98–107)
CO2 BLDV-SCNC: 32.6 MMOL/L — HIGH (ref 22–26)
CO2 SERPL-SCNC: 28 MMOL/L — SIGNIFICANT CHANGE UP (ref 22–31)
CREAT SERPL-MCNC: 0.93 MG/DL — SIGNIFICANT CHANGE UP (ref 0.5–1.3)
EGFR: 87 ML/MIN/1.73M2 — SIGNIFICANT CHANGE UP
GAS PNL BLDV: 123 MMOL/L — LOW (ref 136–145)
GLUCOSE BLDV-MCNC: 127 MG/DL — HIGH (ref 70–99)
GLUCOSE SERPL-MCNC: 124 MG/DL — HIGH (ref 70–99)
HCO3 BLDV-SCNC: 31 MMOL/L — HIGH (ref 22–29)
HCT VFR BLD CALC: 35.2 % — LOW (ref 39–50)
HCT VFR BLDA CALC: 37 % — LOW (ref 39–51)
HGB BLD CALC-MCNC: 12.4 G/DL — LOW (ref 13–17)
HGB BLD-MCNC: 12 G/DL — LOW (ref 13–17)
LACTATE BLDV-MCNC: 0.9 MMOL/L — SIGNIFICANT CHANGE UP (ref 0.5–2)
MAGNESIUM SERPL-MCNC: 1.8 MG/DL — SIGNIFICANT CHANGE UP (ref 1.6–2.6)
MCHC RBC-ENTMCNC: 27.6 PG — SIGNIFICANT CHANGE UP (ref 27–34)
MCHC RBC-ENTMCNC: 34.1 GM/DL — SIGNIFICANT CHANGE UP (ref 32–36)
MCV RBC AUTO: 80.9 FL — SIGNIFICANT CHANGE UP (ref 80–100)
NRBC # BLD: 0 /100 WBCS — SIGNIFICANT CHANGE UP (ref 0–0)
NRBC # FLD: 0 K/UL — SIGNIFICANT CHANGE UP (ref 0–0)
PCO2 BLDV: 42 MMHG — SIGNIFICANT CHANGE UP (ref 42–55)
PH BLDV: 7.48 — HIGH (ref 7.32–7.43)
PHOSPHATE SERPL-MCNC: 2.6 MG/DL — SIGNIFICANT CHANGE UP (ref 2.5–4.5)
PLATELET # BLD AUTO: 279 K/UL — SIGNIFICANT CHANGE UP (ref 150–400)
PO2 BLDV: 64 MMHG — SIGNIFICANT CHANGE UP
POTASSIUM BLDV-SCNC: 3.7 MMOL/L — SIGNIFICANT CHANGE UP (ref 3.5–5.1)
POTASSIUM SERPL-MCNC: 3.8 MMOL/L — SIGNIFICANT CHANGE UP (ref 3.5–5.3)
POTASSIUM SERPL-SCNC: 3.8 MMOL/L — SIGNIFICANT CHANGE UP (ref 3.5–5.3)
PROT SERPL-MCNC: 6.8 G/DL — SIGNIFICANT CHANGE UP (ref 6–8.3)
RBC # BLD: 4.35 M/UL — SIGNIFICANT CHANGE UP (ref 4.2–5.8)
RBC # FLD: 12.5 % — SIGNIFICANT CHANGE UP (ref 10.3–14.5)
SAO2 % BLDV: 94.5 % — SIGNIFICANT CHANGE UP
SODIUM SERPL-SCNC: 124 MMOL/L — LOW (ref 135–145)
WBC # BLD: 6.68 K/UL — SIGNIFICANT CHANGE UP (ref 3.8–10.5)
WBC # FLD AUTO: 6.68 K/UL — SIGNIFICANT CHANGE UP (ref 3.8–10.5)

## 2022-10-11 PROCEDURE — 99233 SBSQ HOSP IP/OBS HIGH 50: CPT | Mod: GC

## 2022-10-11 RX ORDER — POTASSIUM CHLORIDE 20 MEQ
10 PACKET (EA) ORAL
Refills: 0 | Status: DISCONTINUED | OUTPATIENT
Start: 2022-10-11 | End: 2022-10-11

## 2022-10-11 RX ORDER — POTASSIUM CHLORIDE 20 MEQ
20 PACKET (EA) ORAL ONCE
Refills: 0 | Status: COMPLETED | OUTPATIENT
Start: 2022-10-11 | End: 2022-10-11

## 2022-10-11 RX ORDER — FUROSEMIDE 40 MG
60 TABLET ORAL DAILY
Refills: 0 | Status: DISCONTINUED | OUTPATIENT
Start: 2022-10-11 | End: 2022-10-13

## 2022-10-11 RX ORDER — ALBUTEROL 90 UG/1
2.5 AEROSOL, METERED ORAL EVERY 12 HOURS
Refills: 0 | Status: DISCONTINUED | OUTPATIENT
Start: 2022-10-11 | End: 2022-10-13

## 2022-10-11 RX ORDER — SACUBITRIL AND VALSARTAN 24; 26 MG/1; MG/1
1 TABLET, FILM COATED ORAL
Qty: 60 | Refills: 0
Start: 2022-10-11

## 2022-10-11 RX ORDER — MAGNESIUM SULFATE 500 MG/ML
2 VIAL (ML) INJECTION ONCE
Refills: 0 | Status: COMPLETED | OUTPATIENT
Start: 2022-10-11 | End: 2022-10-11

## 2022-10-11 RX ORDER — SODIUM,POTASSIUM PHOSPHATES 278-250MG
1 POWDER IN PACKET (EA) ORAL
Refills: 0 | Status: COMPLETED | OUTPATIENT
Start: 2022-10-11 | End: 2022-10-11

## 2022-10-11 RX ADMIN — SACUBITRIL AND VALSARTAN 1 TABLET(S): 24; 26 TABLET, FILM COATED ORAL at 17:29

## 2022-10-11 RX ADMIN — Medication 25 GRAM(S): at 07:57

## 2022-10-11 RX ADMIN — Medication 105 MILLIGRAM(S): at 22:59

## 2022-10-11 RX ADMIN — Medication 0.5 MILLIGRAM(S): at 06:29

## 2022-10-11 RX ADMIN — Medication 60 MILLIGRAM(S): at 11:50

## 2022-10-11 RX ADMIN — Medication 0.5 MILLIGRAM(S): at 09:29

## 2022-10-11 RX ADMIN — HEPARIN SODIUM 5000 UNIT(S): 5000 INJECTION INTRAVENOUS; SUBCUTANEOUS at 06:28

## 2022-10-11 RX ADMIN — ALBUTEROL 2.5 MILLIGRAM(S): 90 AEROSOL, METERED ORAL at 03:53

## 2022-10-11 RX ADMIN — Medication 1 PACKET(S): at 09:33

## 2022-10-11 RX ADMIN — SPIRONOLACTONE 25 MILLIGRAM(S): 25 TABLET, FILM COATED ORAL at 07:58

## 2022-10-11 RX ADMIN — Medication 50 MILLIGRAM(S): at 22:59

## 2022-10-11 RX ADMIN — Medication 81 MILLIGRAM(S): at 11:50

## 2022-10-11 RX ADMIN — Medication 0.5 MILLIGRAM(S): at 17:29

## 2022-10-11 RX ADMIN — Medication 1 MILLIGRAM(S): at 11:50

## 2022-10-11 RX ADMIN — CARVEDILOL PHOSPHATE 3.12 MILLIGRAM(S): 80 CAPSULE, EXTENDED RELEASE ORAL at 06:28

## 2022-10-11 RX ADMIN — PANTOPRAZOLE SODIUM 40 MILLIGRAM(S): 20 TABLET, DELAYED RELEASE ORAL at 06:29

## 2022-10-11 RX ADMIN — ATORVASTATIN CALCIUM 40 MILLIGRAM(S): 80 TABLET, FILM COATED ORAL at 22:59

## 2022-10-11 RX ADMIN — Medication 1 TABLET(S): at 11:50

## 2022-10-11 RX ADMIN — Medication 0.5 MILLIGRAM(S): at 22:59

## 2022-10-11 RX ADMIN — ISOSORBIDE DINITRATE 10 MILLIGRAM(S): 5 TABLET ORAL at 06:29

## 2022-10-11 RX ADMIN — Medication 105 MILLIGRAM(S): at 09:29

## 2022-10-11 RX ADMIN — Medication 0.5 MILLIGRAM(S): at 13:57

## 2022-10-11 RX ADMIN — ISOSORBIDE DINITRATE 10 MILLIGRAM(S): 5 TABLET ORAL at 13:58

## 2022-10-11 RX ADMIN — ALBUTEROL 2.5 MILLIGRAM(S): 90 AEROSOL, METERED ORAL at 20:16

## 2022-10-11 RX ADMIN — Medication 1 PACKET(S): at 09:29

## 2022-10-11 RX ADMIN — Medication 20 MILLIEQUIVALENT(S): at 06:30

## 2022-10-11 RX ADMIN — SERTRALINE 50 MILLIGRAM(S): 25 TABLET, FILM COATED ORAL at 11:50

## 2022-10-11 RX ADMIN — CARVEDILOL PHOSPHATE 3.12 MILLIGRAM(S): 80 CAPSULE, EXTENDED RELEASE ORAL at 17:29

## 2022-10-11 RX ADMIN — Medication 0.5 MILLIGRAM(S): at 02:58

## 2022-10-11 RX ADMIN — Medication 105 MILLIGRAM(S): at 14:38

## 2022-10-11 RX ADMIN — TIOTROPIUM BROMIDE 1 CAPSULE(S): 18 CAPSULE ORAL; RESPIRATORY (INHALATION) at 09:06

## 2022-10-11 RX ADMIN — HEPARIN SODIUM 5000 UNIT(S): 5000 INJECTION INTRAVENOUS; SUBCUTANEOUS at 17:29

## 2022-10-11 RX ADMIN — Medication 50 MILLIGRAM(S): at 13:58

## 2022-10-11 RX ADMIN — ALBUTEROL 2.5 MILLIGRAM(S): 90 AEROSOL, METERED ORAL at 09:06

## 2022-10-11 RX ADMIN — SACUBITRIL AND VALSARTAN 1 TABLET(S): 24; 26 TABLET, FILM COATED ORAL at 07:57

## 2022-10-11 RX ADMIN — CHLORHEXIDINE GLUCONATE 1 APPLICATION(S): 213 SOLUTION TOPICAL at 07:57

## 2022-10-11 RX ADMIN — Medication 50 MILLIGRAM(S): at 06:29

## 2022-10-11 RX ADMIN — ISOSORBIDE DINITRATE 10 MILLIGRAM(S): 5 TABLET ORAL at 22:59

## 2022-10-11 RX ADMIN — Medication 80 MILLIGRAM(S): at 06:28

## 2022-10-11 NOTE — PROGRESS NOTE ADULT - SUBJECTIVE AND OBJECTIVE BOX
CHIEF COMPLAINT:Patient is a 72y old  Male who presents with a chief complaint of SOB (11 Oct 2022 08:01)      Interval Events: patient tolerating room air, lying flat and thirty seconds of exertion. Patient denies fevers, chills, chest pain, nausea, abdominal pain, diarrhea, constipation, dysuria, leg swelling, headache, light headedness    REVIEW OF SYSTEMS:  [x] All other systems negative except per HPI   [ ] Unable to assess ROS because ________    OBJECTIVE:  ICU Vital Signs Last 24 Hrs  T(C): 36.4 (11 Oct 2022 08:24), Max: 36.9 (10 Oct 2022 16:19)  T(F): 97.6 (11 Oct 2022 08:24), Max: 98.5 (10 Oct 2022 16:19)  HR: 87 (11 Oct 2022 09:00) (79 - 97)  BP: 119/66 (11 Oct 2022 09:00) (96/49 - 170/87)  BP(mean): 78 (11 Oct 2022 09:00) (60 - 107)  ABP: 109/85 (10 Oct 2022 12:00) (109/85 - 150/70)  ABP(mean): 97 (10 Oct 2022 12:00) (90 - 97)  RR: 17 (11 Oct 2022 09:00) (15 - 29)  SpO2: 95% (11 Oct 2022 09:00) (88% - 100%)    O2 Parameters below as of 11 Oct 2022 08:00  Patient On (Oxygen Delivery Method): nasal cannula  O2 Flow (L/min): 2            10-10 @ 07:01  -  10-11 @ 07:00  --------------------------------------------------------  IN: 1140 mL / OUT: 1700 mL / NET: -560 mL    10-11 @ 07:01  -  10-11 @ 10:26  --------------------------------------------------------  IN: 390 mL / OUT: 1000 mL / NET: -610 mL        PHYSICAL EXAM:  GENERAL: NAD, thin  HEAD:  Atraumatic, Normocephalic  EYES: EOMI, PERRLA, conjunctiva and sclera clear  ENMT: No tonsillar erythema, exudates, or enlargement; Moist mucous membranes, NECK: Supple, No JVD, Normal thyroid  CHEST/LUNG: bibasilar crackles; No rales, rhonchi, wheezing, or rubs  HEART: Regular rate and rhythm; No murmurs, rubs, or gallops  ABDOMEN: Soft, Nontender, Nondistended; Bowel sounds present  VASCULAR:  2+ Peripheral Pulses, No clubbing, cyanosis, or edema  LYMPH: No lymphadenopathy noted  SKIN: No rashes or lesions  NERVOUS SYSTEM:  Alert & Oriented X3, Good concentration;   HOSPITAL MEDICATIONS:  MEDICATIONS  (STANDING):  ALBUTerol    0.083% 2.5 milliGRAM(s) Nebulizer every 12 hours  aspirin enteric coated 81 milliGRAM(s) Oral daily  atorvastatin 40 milliGRAM(s) Oral at bedtime  carvedilol 3.125 milliGRAM(s) Oral every 12 hours  chlorhexidine 2% Cloths 1 Application(s) Topical <User Schedule>  folic acid 1 milliGRAM(s) Oral daily  furosemide    Tablet 60 milliGRAM(s) Oral daily  heparin   Injectable 5000 Unit(s) SubCutaneous every 12 hours  hydrALAZINE 50 milliGRAM(s) Oral every 8 hours  influenza  Vaccine (HIGH DOSE) 0.7 milliLiter(s) IntraMuscular once  isosorbide   dinitrate Tablet (ISORDIL) 10 milliGRAM(s) Oral three times a day  LORazepam     Tablet 0.5 milliGRAM(s) Oral every 4 hours  LORazepam     Tablet   Oral   multivitamin 1 Tablet(s) Oral daily  pantoprazole    Tablet 40 milliGRAM(s) Oral before breakfast  sacubitril 49 mG/valsartan 51 mG 1 Tablet(s) Oral two times a day  sertraline 50 milliGRAM(s) Oral daily  spironolactone 25 milliGRAM(s) Oral daily  thiamine IVPB 500 milliGRAM(s) IV Intermittent three times a day  tiotropium 18 MICROgram(s) Capsule 1 Capsule(s) Inhalation daily    MEDICATIONS  (PRN):  guaiFENesin Oral Liquid (Sugar-Free) 100 milliGRAM(s) Oral every 6 hours PRN Cough  melatonin 3 milliGRAM(s) Oral at bedtime PRN Insomnia      LABS:    The Labs were reviewed by me   The Radiology was reviewed by me    EKG tracing reviewed by me    10-11    124<L>  |  86<L>  |  34<H>  ----------------------------<  124<H>  3.8   |  28  |  0.93  10-10    126<L>  |  89<L>  |  32<H>  ----------------------------<  105<H>  3.8   |  25  |  0.91  10-09    126<L>  |  87<L>  |  24<H>  ----------------------------<  105<H>  3.4<L>   |  26  |  0.90    Ca    9.4      11 Oct 2022 03:00  Ca    8.8      10 Oct 2022 06:10  Ca    8.5      09 Oct 2022 03:40  Phos  2.6     10-11  Mg     1.80     10-11    TPro  6.8  /  Alb  3.6  /  TBili  0.5  /  DBili  x   /  AST  48<H>  /  ALT  33  /  AlkPhos  192<H>  10-11  TPro  6.0  /  Alb  3.1<L>  /  TBili  0.6  /  DBili  x   /  AST  37  /  ALT  27  /  AlkPhos  118  10-10  TPro  6.6  /  Alb  3.3  /  TBili  0.9  /  DBili  x   /  AST  47<H>  /  ALT  30  /  AlkPhos  89  10-09    Magnesium, Serum: 1.80 mg/dL (10-11-22 @ 03:00)  Magnesium, Serum: 1.80 mg/dL (10-10-22 @ 06:10)  Magnesium, Serum: 1.60 mg/dL (10-09-22 @ 03:40)    Phosphorus Level, Serum: 2.6 mg/dL (10-11-22 @ 03:00)  Phosphorus Level, Serum: 2.4 mg/dL (10-10-22 @ 06:10)  Phosphorus Level, Serum: 2.6 mg/dL (10-09-22 @ 03:40)                      ABG - ( 10 Oct 2022 06:10 )  pH, Arterial: 7.46  pH, Blood: x     /  pCO2: 41    /  pO2: 134   / HCO3: 29    / Base Excess: 4.9   /  SaO2: 99.3                                    12.0   6.68  )-----------( 279      ( 11 Oct 2022 03:00 )             35.2                         10.7   7.34  )-----------( 240      ( 10 Oct 2022 06:10 )             32.3                         11.5   9.10  )-----------( 213      ( 09 Oct 2022 03:40 )             34.4     CAPILLARY BLOOD GLUCOSE            MICROBIOLOGY:     RADIOLOGY:  [ ] Reviewed and interpreted by me    Point of Care Ultrasound Findings:    PFT:    EKG:

## 2022-10-11 NOTE — SWALLOW BEDSIDE ASSESSMENT ADULT - COMMENTS
CCU 10/11 "73 yo male, PMH of HTN,HLD,CAD, Hepatitis C ( completed  Harvoni 2018) and current smoker  presenting with acute dyspnea, found to have hypoxic respiratory distress, hypertension to , and sinus tachycardia, with nonspecific ST changes on EKG, overall concerning for hypertensive emergency with pulmonary edema. Started on nitro drip (now d/crow) and placed on bipap, later weaned to HFNC. Started ativan taper for suspected EtOH withdrawal."    CXR 10/10 "Interval improvement in pulmonary edema compared to prior x-ray from 10/6/2022."    Patient seen at chairside in CCU this afternoon for an initial assessment of the swallow function, at which time patient was alert. Patient follows simple directives and is able to verbalize wants/needs.

## 2022-10-11 NOTE — PHYSICAL THERAPY INITIAL EVALUATION ADULT - GENERAL OBSERVATIONS, REHAB EVAL
Pt received semi-supine, +telemetry monitor, +pulse oximeter, +[place] catheter, NAD, pt's daughter at bedside. RNMayi, present throughout

## 2022-10-11 NOTE — PHYSICAL THERAPY INITIAL EVALUATION ADULT - LEVEL OF INDEPENDENCE: SIT/SUPINE, REHAB EVAL
not assessed 2/2 pt left in bedside chair
no loss of consciousness, no gait abnormality, no headache, no sensory deficits, and no weakness.

## 2022-10-11 NOTE — PROGRESS NOTE ADULT - ASSESSMENT
71 yo male, PMH of HTN,HLD,CAD, Hepatitis C ( completed  [facility] 2018) and current smoker  presenting with acute dyspnea, found to have hypoxic respiratory distress, hypertension to , and sinus tachycardia, with nonspecific ST changes on EKG, overall concerning for hypertensive emergency with pulmonary edema. Started on nitro drip (now d/crow) and placed on bipap, later weaned to HFNC. Started ativan taper for suspected EtOH withdrawal.    Neuro  # chronic depression  -c/w sertraline 50mg PO daily (half dose as can worsen hyponatremia per psych)  - melatonin as needed for insomnia.  - AxOx3    # EtOH withdrawal  - unclear hx of when last drink was, however pt acknowledges to at least 3 drinks/day (beer 2-3 cans every day and 1 hot of RUM every day)  - tox screen positive for THC, otherwise negative for tested substances  - no known hx withdrawal  - started on CIWA protocol, received total 7 mg Ativan on 10/7 w/ high CIWA scores > 12  - started on precedex on 10/7 pm due to concern for benzo-related hypoxia. Off precedex 10/8 AM  - c/w ativan taper starting from 1.5 mg per psych recs -> now on 0.5  - replete folate, thiamine  - psych recs appreciated    Resp  #Dyspnea 2/2 mixed pulmonary edema and COPD  - ABG showed uncompensated Resp alkalosis on Bipap-- ABG today 7.44/43/107, improved   - off bipap -> Hiflow for hypoxemia  - h/o smoking x20 years, though no known hx COPD  - pulmonary consulted- hypoxia less likely due to COPD, but pt probably has some degree of underlying emphysema  - albuterol, Spiriva started 10/8  - persistently tachypneic, EtOH withdrawal may be contributing  - weaned off HFNC since 10/10, now on 2L. Desated to 88 on RA this am    #hemoptysis  - episode of hemoptysis w/ BRB overnight on 10/7 pm  - pt has had cough since arrival  - no evidence of consolidation on 10/6 CXR  - s/p IV Protonix 40 x1. On IV Protonix gtt for 24 hours  - c/w PO Protonix 40 mg daily   - ctm    CV  #acute CHFrEF /Acute pulmonary edema.   -presents with acute shortness of breath found in pulmonary edema  -chest Xray showed pulmonary edema on 10/6  - Echo ; EF 25%, Severe global LVSD, mod MR, enlarge RV with normal RVF -> new HF diagnosis  -lactate improved  -off nitro as of 10/8 am  -off Lasix drip since 10/9 am, switched to IV 40 BID. Net neutral over past 24 hrs  - inc Lasix to IV 80 BID, Net -500 cc over past 24 hrs  - c/w Entresto to 49/51 bid  - strict i/O  - HF following    Elevated trop  - Trop up trending ; 108-> 149->166. CKMB dowm trending- likely Type II  - intial EKG WEN 1mm in V1-V3, STD in inferior leads which improved  - no chest pain  - c/w asa 81mg , c/w Lipitor 80mg PO   - echo showed low EF with Severe LVSD  - need ischemic work up once optimized     # HTN (hypertension).   - presents with HTN emergency   - now off nitro gtt  - pressures variable but overall better control  - c/w isordil 10mg TID-> titrate up as needed  - c/w  on hydralazine 50mg TID    GI  - no issue  -c/w DASH diet    Renal/  # Hyponatremia  -Na 126-> 123-> 126 -> 126 -> 123  - likely 2/2 hypervolemia vs diuretic vs alcohol use    - fluid restriction 1500cc/24hr  - monitor BMP    Endo  #Hyperlipidemia.   -c/w Lipitor 20mg Po daily  - lipid profile WNL    ID  # Leukocytosis on admission   -covid PCR neg  -likely reactive, now downtrending and wnl  -rectal temp 100.2 on 10/8. RVP negative  -BCx 10/8 ngtd  -will monitor off abx    DVT ppx  heparin SQ 71 yo male, PMH of HTN,HLD,CAD, Hepatitis C ( completed  [facility] 2018) and current smoker  presenting with acute dyspnea, found to have hypoxic respiratory distress, hypertension to , and sinus tachycardia, with nonspecific ST changes on EKG, overall concerning for hypertensive emergency with pulmonary edema. Started on nitro drip (now d/crow) and placed on bipap, later weaned to HFNC. Started ativan taper for suspected EtOH withdrawal.    Neuro  # chronic depression  -c/w sertraline 50mg PO daily (half dose as can worsen hyponatremia per psych)  - melatonin as needed for insomnia.  - AxOx3    # EtOH withdrawal  - unclear hx of when last drink was, however pt acknowledges to at least 3 drinks/day (beer 2-3 cans every day and 1 hot of RUM every day)  - tox screen positive for THC, otherwise negative for tested substances  - no known hx withdrawal  - started on CIWA protocol, received total 7 mg Ativan on 10/7 w/ high CIWA scores > 12  - started on precedex on 10/7 pm due to concern for benzo-related hypoxia. Off precedex 10/8 AM  - c/w ativan taper starting from 1.5 mg per psych recs -> now on 0.5  - replete folate, thiamine  - psych recs appreciated    Resp  #Dyspnea 2/2 mixed pulmonary edema and COPD  - ABG showed uncompensated Resp alkalosis on Bipap-- ABG today 7.44/43/107, improved   - off bipap -> Hiflow for hypoxemia  - h/o smoking x20 years, though no known hx COPD  - pulmonary consulted- hypoxia less likely due to COPD, but pt probably has some degree of underlying emphysema  - albuterol, Spiriva started 10/8  - persistently tachypneic, EtOH withdrawal may be contributing  - weaned off HFNC since 10/10, now on 2L. Desated to 88 on RA this am    #hemoptysis  - episode of hemoptysis w/ BRB overnight on 10/7 pm  - pt has had cough since arrival  - no evidence of consolidation on 10/6 CXR  - s/p IV Protonix 40 x1. On IV Protonix gtt for 24 hours  - c/w PO Protonix 40 mg daily   - ctm    CV  #acute CHFrEF /Acute pulmonary edema.   -presents with acute shortness of breath found in pulmonary edema  -chest Xray showed pulmonary edema on 10/6  - Echo ; EF 25%, Severe global LVSD, mod MR, enlarge RV with normal RVF -> new HF diagnosis  -lactate improved  -off nitro as of 10/8 am  -off Lasix drip since 10/9 am, switched to IV 40 BID. Net neutral over past 24 hrs  - inc Lasix to IV 80 BID, Net -500 cc over past 24 hrs  - c/w Entresto to 49/51 bid  - strict i/O  - HF following    Elevated trop  - Trop up trending ; 108-> 149->166. CKMB dowm trending- likely Type II  - intial EKG WEN 1mm in V1-V3, STD in inferior leads which improved  - no chest pain  - c/w asa 81mg , c/w Lipitor 80mg PO   - echo showed low EF with Severe LVSD  - need ischemic work up once optimized     # HTN (hypertension).   - presents with HTN emergency   - now off nitro gtt  - pressures variable but overall better control  - c/w isordil 10mg TID-> titrate up as needed  - c/w  on hydralazine 50mg TID    GI  - no issue  -c/w DASH diet    Renal/  # Hyponatremia  -Na 126-> 123-> 126 -> 126 -> 124  - likely 2/2 hypervolemia vs diuretic vs alcohol use    - fluid restriction 1500cc/24hr  - monitor BMP    Endo  #Hyperlipidemia.   -c/w Lipitor 20mg Po daily  - lipid profile WNL    ID  # Leukocytosis on admission   -covid PCR neg  -likely reactive, now downtrending and wnl  -rectal temp 100.2 on 10/8. RVP negative  -BCx 10/8 ngtd  -will monitor off abx    DVT ppx  heparin SQ 73 yo male, PMH of HTN,HLD,CAD, Hepatitis C ( completed  [facility] 2018) and current smoker  presenting with acute dyspnea, found to have hypoxic respiratory distress, hypertension to , and sinus tachycardia, with nonspecific ST changes on EKG, overall concerning for hypertensive emergency with pulmonary edema. Started on nitro drip (now d/crow) and placed on bipap, later weaned to HFNC. Started ativan taper for suspected EtOH withdrawal.    Neuro  # chronic depression  -c/w sertraline 50mg PO daily (half dose as can worsen hyponatremia per psych)  - melatonin as needed for insomnia.  - AxOx3    # EtOH withdrawal  - unclear hx of when last drink was, however pt acknowledges to at least 3 drinks/day (beer 2-3 cans every day and 1 hot of RUM every day)  - tox screen positive for THC, otherwise negative for tested substances  - no known hx withdrawal  - started on CIWA protocol, received total 7 mg Ativan on 10/7 w/ high CIWA scores > 12  - started on precedex on 10/7 pm due to concern for benzo-related hypoxia. Off precedex 10/8 AM  - c/w ativan taper starting from 1.5 mg per psych recs -> now on 0.5. Scheduled to finish on 10/14 am  - replete folate, thiamine  - psych recs appreciated    Resp  #Dyspnea 2/2 mixed pulmonary edema and COPD  - ABG showed uncompensated Resp alkalosis on Bipap-- ABG today 7.44/43/107, improved   - off bipap -> Hiflow for hypoxemia  - h/o smoking x20 years, though no known hx COPD  - pulmonary consulted- hypoxia less likely due to COPD, but pt probably has some degree of underlying emphysema  - albuterol, Spiriva started 10/8  - persistently tachypneic, EtOH withdrawal may be contributing  - weaned off HFNC since 10/10  - on RA as of this am, sat in 90s, ctm    #hemoptysis  - episode of hemoptysis w/ BRB overnight on 10/7 pm  - pt has had cough since arrival  - no evidence of consolidation on 10/6 CXR  - s/p IV Protonix 40 x1. On IV Protonix gtt for 24 hours  - c/w PO Protonix 40 mg daily   - ctm    CV  #acute CHFrEF /Acute pulmonary edema.   -presents with acute shortness of breath found in pulmonary edema  - chest Xray showed pulmonary edema on 10/6. Repeat CXR 10/10 showed improvement in pulmonary edema  - Echo ; EF 25%, Severe global LVSD, mod MR, enlarge RV with normal RVF -> new HF diagnosis  -lactate improved  -off nitro as of 10/8 am  -off Lasix drip since 10/9 am, switched to IV 40 BID  - switched Lasix to IV 80 BID on 10/10  - Net -1.4 cc over past 24 hrs  - start PO Lasix 60 daily on 10/11  - c/w Entresto to 49/51 bid  - strict i/O  - PT eval  - HF following  - pt can f/u with Dr. Mendoza outpt    Elevated trop  - Trop up trending ; 108-> 149->166. CKMB dowm trending- likely Type II  - intial EKG WEN 1mm in V1-V3, STD in inferior leads which improved  - no chest pain  - c/w asa 81mg , c/w Lipitor 80mg PO   - echo showed low EF with Severe LVSD  - ischemic workup -> nuclear stress test today    # HTN (hypertension).   - presents with HTN emergency   - now off nitro gtt  - pressures variable but overall better control  - c/w isordil 10mg TID-> titrate up as needed  - c/w hydralazine 50mg TID    GI  - no issue  -c/w DASH diet    Renal/  # Hyponatremia  -Na 126-> 123-> 126 -> 126 -> 124  - likely 2/2 hypervolemia vs diuretic vs alcohol use    - fluid restriction 1500cc/24hr  - monitor BMP    Endo  #Hyperlipidemia.   -c/w Lipitor 20mg Po daily  - lipid profile WNL    ID  # Leukocytosis on admission   -covid PCR neg  -likely reactive, now downtrending and wnl  -rectal temp 100.2 on 10/8. RVP negative  -BCx 10/8 ngtd  -will monitor off abx    DVT ppx  heparin SQ 71 yo male, PMH of HTN,HLD,CAD, Hepatitis C ( completed  [facility] 2018) and current smoker  presenting with acute dyspnea, found to have hypoxic respiratory distress, hypertension to , and sinus tachycardia, with nonspecific ST changes on EKG, overall concerning for hypertensive emergency with pulmonary edema. Started on nitro drip (now d/crow) and placed on bipap, later weaned to HFNC. Started ativan taper for suspected EtOH withdrawal.    Neuro  # chronic depression  -c/w sertraline 50mg PO daily (half dose as can worsen hyponatremia per psych)  - melatonin as needed for insomnia.  - AxOx3    # EtOH withdrawal  - unclear hx of when last drink was, however pt acknowledges to at least 3 drinks/day (beer 2-3 cans every day and 1 hot of RUM every day)  - tox screen positive for THC, otherwise negative for tested substances  - no known hx withdrawal  - started on CIWA protocol, received total 7 mg Ativan on 10/7 w/ high CIWA scores > 12  - started on precedex on 10/7 pm due to concern for benzo-related hypoxia. Off precedex 10/8 AM  - c/w ativan taper starting from 1.5 mg per psych recs -> now on 0.5. Scheduled to finish on 10/14 am  - replete folate, thiamine  - psych recs appreciated    Resp  #Dyspnea 2/2 mixed pulmonary edema and COPD  - ABG showed uncompensated Resp alkalosis on Bipap-- ABG today 7.44/43/107, improved   - off bipap -> Hiflow for hypoxemia  - h/o smoking x20 years, though no known hx COPD  - pulmonary consulted- hypoxia less likely due to COPD, but pt probably has some degree of underlying emphysema  - albuterol, Spiriva started 10/8  - persistently tachypneic, EtOH withdrawal may be contributing  - weaned off HFNC since 10/10  - on RA as of this am, sat in 90s, ctm    #hemoptysis  - episode of hemoptysis w/ BRB overnight on 10/7 pm  - pt has had cough since arrival  - no evidence of consolidation on 10/6 CXR  - s/p IV Protonix 40 x1. On IV Protonix gtt for 24 hours  - c/w PO Protonix 40 mg daily   - ctm    CV  #acute CHFrEF /Acute pulmonary edema.   -presents with acute shortness of breath found in pulmonary edema  - chest Xray showed pulmonary edema on 10/6. Repeat CXR 10/10 showed improvement in pulmonary edema  - Echo ; EF 25%, Severe global LVSD, mod MR, enlarge RV with normal RVF -> new HF diagnosis  -lactate improved  -off nitro as of 10/8 am  -off Lasix drip since 10/9 am, switched to IV 40 BID  - switched Lasix to IV 80 BID on 10/10  - Net -1.4 cc over past 24 hrs  - start PO Lasix 60 daily on 10/11. Pt near euvolemic  - c/w Entresto to 49/51 bid  - strict i/O  - PT eval  - HF following  - pt can f/u with Dr. Mendoza outpt    Elevated trop  - Trop up trending ; 108-> 149->166. CKMB dowm trending- likely Type II  - intial EKG WEN 1mm in V1-V3, STD in inferior leads which improved  - no chest pain  - c/w asa 81mg , c/w Lipitor 80mg PO   - echo showed low EF with Severe LVSD  - ischemic workup -> nuclear stress test 10/12 (no caffeine beforehand)    # HTN (hypertension).   - presents with HTN emergency   - now off nitro gtt  - pressures variable but overall better control  - c/w isordil 10mg TID-> titrate up as needed  - c/w hydralazine 50mg TID    GI  - no issue  -c/w DASH diet    Renal/  # Hyponatremia  -Na 126-> 123-> 126 -> 126 -> 124  - likely 2/2 hypervolemia vs diuretic vs alcohol use    - fluid restriction 1500cc/24hr  - monitor BMP    Endo  #Hyperlipidemia.   -c/w Lipitor 20mg Po daily  - lipid profile WNL    ID  # Leukocytosis on admission   -covid PCR neg  -likely reactive, now downtrending and wnl  -rectal temp 100.2 on 10/8. RVP negative  -BCx 10/8 ngtd  -will monitor off abx    DVT ppx  heparin SQ

## 2022-10-11 NOTE — SWALLOW BEDSIDE ASSESSMENT ADULT - ADDITIONAL RECOMMENDATIONS
Medical team to reconsult this service if there is a change in patients medical status and/or observed change in patient's tolerance to recommended PO diet.

## 2022-10-11 NOTE — PHYSICAL THERAPY INITIAL EVALUATION ADULT - ADDITIONAL COMMENTS
Pt lives alone in a house with 4 stairs to enter and 14 stairs to the bedroom. prior to admission pt was independent with all mobility and ambulated without an assistive device. Pt denies any falls within the last year.     Pt. left comfortable in bedside chair with all tubes/lines intact, call bell in reach and in NAD. RN, present at bedside.

## 2022-10-11 NOTE — SWALLOW BEDSIDE ASSESSMENT ADULT - SWALLOW EVAL: DIAGNOSIS
1. Functional oral stage for puree, regular solids and thin liquids marked by adequate oral acceptance, collection/chewing and transfer. 2. Functional pharyngeal phase for puree, regular solids and thin liquids marked by a present pharyngeal swallow trigger with hyolaryngeal elevation upon palpation without evidence of airway penetration/aspiration.

## 2022-10-11 NOTE — PROGRESS NOTE ADULT - SUBJECTIVE AND OBJECTIVE BOX
Alcohol withdrawal      Patient is a 72y old  Male who presents with a chief complaint of SOB (10 Oct 2022 12:39)    HPI:  66 yo male, PMH of HTN, HLD, CAD, Hepatitis C (completed  Harvoni 2018 ) and current smoker  who presents with acute onset SOB since last Saturday 10/1 with no improvement. The shortness of breath occurred both with exertion and at rest, causing him to have poor sleep at the  night as he was unable to lie flat comfortably. His dyspnea worsened on day of presentation, severely limiting his ability to ambulate. He thought he came to hospital on . He does  not remember what happen between Monday to Thursday   In ED /107, . Patient hypoxic and tachypneic. Diffuse B lines on POCUS. ProBNP 01979, lactate  2.5. Started on nitro drip and Bipap. HsTrop 108.He report non adherence to medications. Some time he miss medication for 1 week .Some time eat high salt diet. Last time he saw doctor was >3 years ago.He denies recent chest pain, palpitations, light-headedness/ dizziness syncope, nausea/vomiting, diaphoresis, LE swelling. Initial EKG concerning for WEN in anterior leads, cardiology consult fellow was called for STEMI alert. Discussed with interventional attending, did not meet STEMI criteria. Patient admitted to CCU. (06 Oct 2022 16:53)       INTERVAL HPI/OVERNIGHT EVENTS:   No overnight events   Afebrile, hemodynamically stable     Subjective: ***    ICU Vital Signs Last 24 Hrs  T(C): 36.8 (11 Oct 2022 06:00), Max: 37.3 (10 Oct 2022 08:16)  T(F): 98.2 (11 Oct 2022 06:00), Max: 99.1 (10 Oct 2022 08:16)  HR: 94 (11 Oct 2022 07:00) (79 - 97)  BP: 107/74 (11 Oct 2022 07:00) (96/49 - 170/87)  BP(mean): 81 (11 Oct 2022 07:00) (60 - 107)  ABP: 109/85 (10 Oct 2022 12:00) (109/85 - 150/70)  ABP(mean): 97 (10 Oct 2022 12:00) (90 - 102)  RR: 22 (11 Oct 2022 07:00) (15 - 29)  SpO2: 92% (11 Oct 2022 07:00) (88% - 100%)    O2 Parameters below as of 11 Oct 2022 07:00  Patient On (Oxygen Delivery Method): nasal cannula  O2 Flow (L/min): 2        I&O's Summary    10 Oct 2022 07:01  -  11 Oct 2022 07:00  --------------------------------------------------------  IN: 1140 mL / OUT: 1700 mL / NET: -560 mL          Daily     Daily Weight in k.7 (11 Oct 2022 03:00)      EKG/Telemetry Events: NSR    MEDICATIONS  (STANDING):  ALBUTerol    0.083% 2.5 milliGRAM(s) Nebulizer every 6 hours  aspirin enteric coated 81 milliGRAM(s) Oral daily  atorvastatin 40 milliGRAM(s) Oral at bedtime  carvedilol 3.125 milliGRAM(s) Oral every 12 hours  chlorhexidine 2% Cloths 1 Application(s) Topical <User Schedule>  folic acid 1 milliGRAM(s) Oral daily  furosemide   Injectable 80 milliGRAM(s) IV Push two times a day  heparin   Injectable 5000 Unit(s) SubCutaneous every 12 hours  hydrALAZINE 50 milliGRAM(s) Oral every 8 hours  influenza  Vaccine (HIGH DOSE) 0.7 milliLiter(s) IntraMuscular once  isosorbide   dinitrate Tablet (ISORDIL) 10 milliGRAM(s) Oral three times a day  LORazepam     Tablet 0.5 milliGRAM(s) Oral every 4 hours  LORazepam     Tablet   Oral   multivitamin 1 Tablet(s) Oral daily  pantoprazole    Tablet 40 milliGRAM(s) Oral before breakfast  potassium phosphate / sodium phosphate Powder (PHOS-NaK) 1 Packet(s) Oral every 2 hours  sacubitril 49 mG/valsartan 51 mG 1 Tablet(s) Oral two times a day  sertraline 50 milliGRAM(s) Oral daily  spironolactone 25 milliGRAM(s) Oral daily  thiamine IVPB 500 milliGRAM(s) IV Intermittent three times a day  tiotropium 18 MICROgram(s) Capsule 1 Capsule(s) Inhalation daily    MEDICATIONS  (PRN):  guaiFENesin Oral Liquid (Sugar-Free) 100 milliGRAM(s) Oral every 6 hours PRN Cough  melatonin 3 milliGRAM(s) Oral at bedtime PRN Insomnia      PHYSICAL EXAM:  GENERAL: Sleeping, easily awakened. Cachectic appearing, temporal wasting  EYES: EOMI, PERRLA, conjunctiva and sclera clear  NECK: Supple, No JVD  NERVOUS SYSTEM:  A&Ox3  CHEST/LUNG: Mild bilateral crackles, no wheezing  HEART: Regular rate and rhythm; No murmurs or gallops  ABDOMEN: Soft, Nontender, Nondistended; Bowel sounds present  EXTREMITIES:  2+ Peripheral Pulses, No clubbing, cyanosis, or edema  LYMPH: No lymphadenopathy noted  SKIN: No rashes or lesions      LABS:                        12.0   6.68  )-----------( 279      ( 11 Oct 2022 03:00 )             35.2     10-11    124<L>  |  86<L>  |  34<H>  ----------------------------<  124<H>  3.8   |  28  |  0.93    Ca    9.4      11 Oct 2022 03:00  Phos  2.6     10-11  Mg     1.80     10-11    TPro  6.8  /  Alb  3.6  /  TBili  0.5  /  DBili  x   /  AST  48<H>  /  ALT  33  /  AlkPhos  192<H>  10-11      CAPILLARY BLOOD GLUCOSE      ABG - ( 10 Oct 2022 06:10 )  pH, Arterial: 7.46  pH, Blood: x     /  pCO2: 41    /  pO2: 134   / HCO3: 29    / Base Excess: 4.9   /  SaO2: 99.3          RADIOLOGY & ADDITIONAL TESTS:  Reviewed    < from: Xray Chest 1 View- PORTABLE-Urgent (10.10.22 @ 10:57) >  FINDINGS:  Heart/Vascular: Heart size cannot be accurately assessed on this   projection.  Pulmonary: Redemonstration of bilateral perihilar hazy opacities, left   greater than right, somewhat improved from prior x-ray from 10/6/2022. No   pneumothorax or pleural effusion.  Bones: No acute bony abnormality.    Impression:  Interval improvement in pulmonary edema compared to prior x-ray from   10/6/2022.    < end of copied text >          Care Discussed with Consultants/Other Providers [ x] YES  [ ] NO         Alcohol withdrawal      Patient is a 72y old  Male who presents with a chief complaint of SOB (10 Oct 2022 12:39)    HPI:  66 yo male, PMH of HTN, HLD, CAD, Hepatitis C (completed  Harvoni 2018 ) and current smoker  who presents with acute onset SOB since last Saturday 10/1 with no improvement. The shortness of breath occurred both with exertion and at rest, causing him to have poor sleep at the  night as he was unable to lie flat comfortably. His dyspnea worsened on day of presentation, severely limiting his ability to ambulate. He thought he came to hospital on . He does  not remember what happen between Monday to Thursday   In ED /107, . Patient hypoxic and tachypneic. Diffuse B lines on POCUS. ProBNP 61766, lactate  2.5. Started on nitro drip and Bipap. HsTrop 108.He report non adherence to medications. Some time he miss medication for 1 week .Some time eat high salt diet. Last time he saw doctor was >3 years ago.He denies recent chest pain, palpitations, light-headedness/ dizziness syncope, nausea/vomiting, diaphoresis, LE swelling. Initial EKG concerning for WEN in anterior leads, cardiology consult fellow was called for STEMI alert. Discussed with interventional attending, did not meet STEMI criteria. Patient admitted to CCU. (06 Oct 2022 16:53)       INTERVAL HPI/OVERNIGHT EVENTS:   No overnight events   Afebrile, hemodynamically stable     Subjective: Feels well, no complaints this am. Continues to have a cough, non-productive, but denies shortness of breath. A&Ox3.    ICU Vital Signs Last 24 Hrs  T(C): 36.8 (11 Oct 2022 06:00), Max: 37.3 (10 Oct 2022 08:16)  T(F): 98.2 (11 Oct 2022 06:00), Max: 99.1 (10 Oct 2022 08:16)  HR: 94 (11 Oct 2022 07:00) (79 - 97)  BP: 107/74 (11 Oct 2022 07:00) (96/49 - 170/87)  BP(mean): 81 (11 Oct 2022 07:00) (60 - 107)  ABP: 109/85 (10 Oct 2022 12:00) (109/85 - 150/70)  ABP(mean): 97 (10 Oct 2022 12:00) (90 - 102)  RR: 22 (11 Oct 2022 07:00) (15 - 29)  SpO2: 92% (11 Oct 2022 07:00) (88% - 100%)    O2 Parameters below as of 11 Oct 2022 07:00  Patient On (Oxygen Delivery Method): nasal cannula  O2 Flow (L/min): 2        I&O's Summary    10 Oct 2022 07:01  -  11 Oct 2022 07:00  --------------------------------------------------------  IN: 1140 mL / OUT: 1700 mL / NET: -560 mL          Daily     Daily Weight in k.7 (11 Oct 2022 03:00)      EKG/Telemetry Events: NSR    MEDICATIONS  (STANDING):  ALBUTerol    0.083% 2.5 milliGRAM(s) Nebulizer every 6 hours  aspirin enteric coated 81 milliGRAM(s) Oral daily  atorvastatin 40 milliGRAM(s) Oral at bedtime  carvedilol 3.125 milliGRAM(s) Oral every 12 hours  chlorhexidine 2% Cloths 1 Application(s) Topical <User Schedule>  folic acid 1 milliGRAM(s) Oral daily  furosemide   Injectable 80 milliGRAM(s) IV Push two times a day  heparin   Injectable 5000 Unit(s) SubCutaneous every 12 hours  hydrALAZINE 50 milliGRAM(s) Oral every 8 hours  influenza  Vaccine (HIGH DOSE) 0.7 milliLiter(s) IntraMuscular once  isosorbide   dinitrate Tablet (ISORDIL) 10 milliGRAM(s) Oral three times a day  LORazepam     Tablet 0.5 milliGRAM(s) Oral every 4 hours  LORazepam     Tablet   Oral   multivitamin 1 Tablet(s) Oral daily  pantoprazole    Tablet 40 milliGRAM(s) Oral before breakfast  potassium phosphate / sodium phosphate Powder (PHOS-NaK) 1 Packet(s) Oral every 2 hours  sacubitril 49 mG/valsartan 51 mG 1 Tablet(s) Oral two times a day  sertraline 50 milliGRAM(s) Oral daily  spironolactone 25 milliGRAM(s) Oral daily  thiamine IVPB 500 milliGRAM(s) IV Intermittent three times a day  tiotropium 18 MICROgram(s) Capsule 1 Capsule(s) Inhalation daily    MEDICATIONS  (PRN):  guaiFENesin Oral Liquid (Sugar-Free) 100 milliGRAM(s) Oral every 6 hours PRN Cough  melatonin 3 milliGRAM(s) Oral at bedtime PRN Insomnia      PHYSICAL EXAM:  GENERAL: Awake, alert, pleasant demeanor. Temporal wasting  EYES: EOMI, PERRLA, conjunctiva and sclera clear  NECK: Supple, No JVD  NERVOUS SYSTEM:  A&Ox3  CHEST/LUNG: Clear to auscultation bilaterally, no crackles, no wheezing  HEART: Regular rate and rhythm; No murmurs or gallops  ABDOMEN: Soft, Nontender, Nondistended; Bowel sounds present  EXTREMITIES:  2+ Peripheral Pulses, No clubbing, cyanosis, or edema  LYMPH: No lymphadenopathy noted  SKIN: No rashes or lesions      LABS:                        12.0   6.68  )-----------( 279      ( 11 Oct 2022 03:00 )             35.2     10-11    124<L>  |  86<L>  |  34<H>  ----------------------------<  124<H>  3.8   |  28  |  0.93    Ca    9.4      11 Oct 2022 03:00  Phos  2.6     10-11  Mg     1.80     10-11    TPro  6.8  /  Alb  3.6  /  TBili  0.5  /  DBili  x   /  AST  48<H>  /  ALT  33  /  AlkPhos  192<H>  10-11      CAPILLARY BLOOD GLUCOSE      ABG - ( 10 Oct 2022 06:10 )  pH, Arterial: 7.46  pH, Blood: x     /  pCO2: 41    /  pO2: 134   / HCO3: 29    / Base Excess: 4.9   /  SaO2: 99.3          RADIOLOGY & ADDITIONAL TESTS:  Reviewed    < from: Xray Chest 1 View- PORTABLE-Urgent (10.10.22 @ 10:57) >  FINDINGS:  Heart/Vascular: Heart size cannot be accurately assessed on this   projection.  Pulmonary: Redemonstration of bilateral perihilar hazy opacities, left   greater than right, somewhat improved from prior x-ray from 10/6/2022. No   pneumothorax or pleural effusion.  Bones: No acute bony abnormality.    Impression:  Interval improvement in pulmonary edema compared to prior x-ray from   10/6/2022.    < end of copied text >          Care Discussed with Consultants/Other Providers [ x] YES  [ ] NO

## 2022-10-11 NOTE — PHYSICAL THERAPY INITIAL EVALUATION ADULT - PERTINENT HX OF CURRENT PROBLEM, REHAB EVAL
Pt is a 72 year old male who presented to [facility] with acute dyspnea, found to have hypoxic respiratory distress, hypertension, and sinus tachycardia.

## 2022-10-12 ENCOUNTER — TRANSCRIPTION ENCOUNTER (OUTPATIENT)
Age: 72
End: 2022-10-12

## 2022-10-12 PROBLEM — F32.9 MAJOR DEPRESSIVE DISORDER, SINGLE EPISODE, UNSPECIFIED: Chronic | Status: ACTIVE | Noted: 2022-10-06

## 2022-10-12 LAB
ALBUMIN SERPL ELPH-MCNC: 3.3 G/DL — SIGNIFICANT CHANGE UP (ref 3.3–5)
ALP SERPL-CCNC: 210 U/L — HIGH (ref 40–120)
ALT FLD-CCNC: 34 U/L — SIGNIFICANT CHANGE UP (ref 4–41)
ANION GAP SERPL CALC-SCNC: 13 MMOL/L — SIGNIFICANT CHANGE UP (ref 7–14)
AST SERPL-CCNC: 48 U/L — HIGH (ref 4–40)
BASE EXCESS BLDV CALC-SCNC: 8.3 MMOL/L — HIGH (ref -2–3)
BILIRUB SERPL-MCNC: 0.4 MG/DL — SIGNIFICANT CHANGE UP (ref 0.2–1.2)
BLOOD GAS VENOUS COMPREHENSIVE RESULT: SIGNIFICANT CHANGE UP
BUN SERPL-MCNC: 42 MG/DL — HIGH (ref 7–23)
CALCIUM SERPL-MCNC: 9.7 MG/DL — SIGNIFICANT CHANGE UP (ref 8.4–10.5)
CHLORIDE BLDV-SCNC: 87 MMOL/L — LOW (ref 96–108)
CHLORIDE SERPL-SCNC: 86 MMOL/L — LOW (ref 98–107)
CO2 BLDV-SCNC: 34.8 MMOL/L — HIGH (ref 22–26)
CO2 SERPL-SCNC: 27 MMOL/L — SIGNIFICANT CHANGE UP (ref 22–31)
CREAT SERPL-MCNC: 0.99 MG/DL — SIGNIFICANT CHANGE UP (ref 0.5–1.3)
EGFR: 81 ML/MIN/1.73M2 — SIGNIFICANT CHANGE UP
GAS PNL BLDV: 122 MMOL/L — LOW (ref 136–145)
GLUCOSE BLDV-MCNC: 114 MG/DL — HIGH (ref 70–99)
GLUCOSE SERPL-MCNC: 111 MG/DL — HIGH (ref 70–99)
HCO3 BLDV-SCNC: 33 MMOL/L — HIGH (ref 22–29)
HCT VFR BLD CALC: 36.8 % — LOW (ref 39–50)
HCT VFR BLDA CALC: 40 % — SIGNIFICANT CHANGE UP (ref 39–51)
HGB BLD CALC-MCNC: 13.4 G/DL — SIGNIFICANT CHANGE UP (ref 13–17)
HGB BLD-MCNC: 12.2 G/DL — LOW (ref 13–17)
LACTATE BLDV-MCNC: 1.2 MMOL/L — SIGNIFICANT CHANGE UP (ref 0.5–2)
MAGNESIUM SERPL-MCNC: 1.8 MG/DL — SIGNIFICANT CHANGE UP (ref 1.6–2.6)
MCHC RBC-ENTMCNC: 27.3 PG — SIGNIFICANT CHANGE UP (ref 27–34)
MCHC RBC-ENTMCNC: 33.2 GM/DL — SIGNIFICANT CHANGE UP (ref 32–36)
MCV RBC AUTO: 82.3 FL — SIGNIFICANT CHANGE UP (ref 80–100)
NRBC # BLD: 0 /100 WBCS — SIGNIFICANT CHANGE UP (ref 0–0)
NRBC # FLD: 0 K/UL — SIGNIFICANT CHANGE UP (ref 0–0)
PCO2 BLDV: 47 MMHG — SIGNIFICANT CHANGE UP (ref 42–55)
PH BLDV: 7.46 — HIGH (ref 7.32–7.43)
PHOSPHATE SERPL-MCNC: 3.4 MG/DL — SIGNIFICANT CHANGE UP (ref 2.5–4.5)
PLATELET # BLD AUTO: 299 K/UL — SIGNIFICANT CHANGE UP (ref 150–400)
PO2 BLDV: 39 MMHG — SIGNIFICANT CHANGE UP
POTASSIUM BLDV-SCNC: 4.2 MMOL/L — SIGNIFICANT CHANGE UP (ref 3.5–5.1)
POTASSIUM SERPL-MCNC: 4.3 MMOL/L — SIGNIFICANT CHANGE UP (ref 3.5–5.3)
POTASSIUM SERPL-SCNC: 4.3 MMOL/L — SIGNIFICANT CHANGE UP (ref 3.5–5.3)
PROT SERPL-MCNC: 6.9 G/DL — SIGNIFICANT CHANGE UP (ref 6–8.3)
RBC # BLD: 4.47 M/UL — SIGNIFICANT CHANGE UP (ref 4.2–5.8)
RBC # FLD: 12.8 % — SIGNIFICANT CHANGE UP (ref 10.3–14.5)
SAO2 % BLDV: 69.7 % — SIGNIFICANT CHANGE UP
SARS-COV-2 RNA SPEC QL NAA+PROBE: SIGNIFICANT CHANGE UP
SODIUM SERPL-SCNC: 126 MMOL/L — LOW (ref 135–145)
VIT B1 SERPL-MCNC: 80.9 NMOL/L — SIGNIFICANT CHANGE UP (ref 66.5–200)
WBC # BLD: 7.05 K/UL — SIGNIFICANT CHANGE UP (ref 3.8–10.5)
WBC # FLD AUTO: 7.05 K/UL — SIGNIFICANT CHANGE UP (ref 3.8–10.5)

## 2022-10-12 PROCEDURE — 99233 SBSQ HOSP IP/OBS HIGH 50: CPT | Mod: GC

## 2022-10-12 RX ORDER — SACUBITRIL AND VALSARTAN 24; 26 MG/1; MG/1
1 TABLET, FILM COATED ORAL
Qty: 60 | Refills: 0
Start: 2022-10-12 | End: 2022-11-10

## 2022-10-12 RX ORDER — MAGNESIUM SULFATE 500 MG/ML
2 VIAL (ML) INJECTION ONCE
Refills: 0 | Status: COMPLETED | OUTPATIENT
Start: 2022-10-12 | End: 2022-10-12

## 2022-10-12 RX ORDER — ACETAMINOPHEN 500 MG
650 TABLET ORAL ONCE
Refills: 0 | Status: COMPLETED | OUTPATIENT
Start: 2022-10-12 | End: 2022-10-12

## 2022-10-12 RX ORDER — CARVEDILOL PHOSPHATE 80 MG/1
6.25 CAPSULE, EXTENDED RELEASE ORAL EVERY 12 HOURS
Refills: 0 | Status: DISCONTINUED | OUTPATIENT
Start: 2022-10-12 | End: 2022-11-02

## 2022-10-12 RX ORDER — HYDRALAZINE HCL 50 MG
25 TABLET ORAL THREE TIMES A DAY
Refills: 0 | Status: DISCONTINUED | OUTPATIENT
Start: 2022-10-12 | End: 2022-11-02

## 2022-10-12 RX ORDER — SACUBITRIL AND VALSARTAN 24; 26 MG/1; MG/1
1 TABLET, FILM COATED ORAL
Refills: 0 | Status: DISCONTINUED | OUTPATIENT
Start: 2022-10-12 | End: 2022-11-02

## 2022-10-12 RX ADMIN — Medication 25 MILLIGRAM(S): at 14:41

## 2022-10-12 RX ADMIN — Medication 0.5 MILLIGRAM(S): at 06:40

## 2022-10-12 RX ADMIN — Medication 650 MILLIGRAM(S): at 20:24

## 2022-10-12 RX ADMIN — ISOSORBIDE DINITRATE 10 MILLIGRAM(S): 5 TABLET ORAL at 22:48

## 2022-10-12 RX ADMIN — Medication 105 MILLIGRAM(S): at 06:43

## 2022-10-12 RX ADMIN — SACUBITRIL AND VALSARTAN 1 TABLET(S): 24; 26 TABLET, FILM COATED ORAL at 18:28

## 2022-10-12 RX ADMIN — Medication 60 MILLIGRAM(S): at 06:39

## 2022-10-12 RX ADMIN — Medication 650 MILLIGRAM(S): at 21:24

## 2022-10-12 RX ADMIN — Medication 105 MILLIGRAM(S): at 14:40

## 2022-10-12 RX ADMIN — Medication 105 MILLIGRAM(S): at 22:50

## 2022-10-12 RX ADMIN — HEPARIN SODIUM 5000 UNIT(S): 5000 INJECTION INTRAVENOUS; SUBCUTANEOUS at 18:25

## 2022-10-12 RX ADMIN — CHLORHEXIDINE GLUCONATE 1 APPLICATION(S): 213 SOLUTION TOPICAL at 12:22

## 2022-10-12 RX ADMIN — Medication 1 TABLET(S): at 14:41

## 2022-10-12 RX ADMIN — ATORVASTATIN CALCIUM 40 MILLIGRAM(S): 80 TABLET, FILM COATED ORAL at 22:48

## 2022-10-12 RX ADMIN — Medication 25 GRAM(S): at 12:00

## 2022-10-12 RX ADMIN — CARVEDILOL PHOSPHATE 6.25 MILLIGRAM(S): 80 CAPSULE, EXTENDED RELEASE ORAL at 18:28

## 2022-10-12 RX ADMIN — HEPARIN SODIUM 5000 UNIT(S): 5000 INJECTION INTRAVENOUS; SUBCUTANEOUS at 06:44

## 2022-10-12 RX ADMIN — ISOSORBIDE DINITRATE 10 MILLIGRAM(S): 5 TABLET ORAL at 06:41

## 2022-10-12 RX ADMIN — Medication 25 MILLIGRAM(S): at 22:48

## 2022-10-12 RX ADMIN — SPIRONOLACTONE 25 MILLIGRAM(S): 25 TABLET, FILM COATED ORAL at 09:54

## 2022-10-12 RX ADMIN — ALBUTEROL 2.5 MILLIGRAM(S): 90 AEROSOL, METERED ORAL at 20:41

## 2022-10-12 RX ADMIN — Medication 81 MILLIGRAM(S): at 14:41

## 2022-10-12 RX ADMIN — PANTOPRAZOLE SODIUM 40 MILLIGRAM(S): 20 TABLET, DELAYED RELEASE ORAL at 06:38

## 2022-10-12 RX ADMIN — Medication 1 MILLIGRAM(S): at 14:41

## 2022-10-12 RX ADMIN — Medication 50 MILLIGRAM(S): at 06:42

## 2022-10-12 RX ADMIN — Medication 0.5 MILLIGRAM(S): at 18:25

## 2022-10-12 RX ADMIN — CARVEDILOL PHOSPHATE 3.12 MILLIGRAM(S): 80 CAPSULE, EXTENDED RELEASE ORAL at 06:41

## 2022-10-12 RX ADMIN — ISOSORBIDE DINITRATE 10 MILLIGRAM(S): 5 TABLET ORAL at 14:42

## 2022-10-12 RX ADMIN — SERTRALINE 50 MILLIGRAM(S): 25 TABLET, FILM COATED ORAL at 14:41

## 2022-10-12 RX ADMIN — SACUBITRIL AND VALSARTAN 1 TABLET(S): 24; 26 TABLET, FILM COATED ORAL at 09:54

## 2022-10-12 NOTE — PROGRESS NOTE ADULT - SUBJECTIVE AND OBJECTIVE BOX
Alcohol withdrawal    Patient is a 72y old  Male who presents with a chief complaint of SOB (11 Oct 2022 10:26)    HPI:  66 yo male, PMH of HTN, HLD, CAD, Hepatitis C (completed  Harvoni 2018 ) and current smoker  who presents with acute onset SOB since last Saturday 10/1 with no improvement. The shortness of breath occurred both with exertion and at rest, causing him to have poor sleep at the  night as he was unable to lie flat comfortably. His dyspnea worsened on day of presentation, severely limiting his ability to ambulate. He thought he came to hospital on . He does  not remember what happen between Monday to Thursday   In ED /107, . Patient hypoxic and tachypneic. Diffuse B lines on POCUS. ProBNP 46249, lactate  2.5. Started on nitro drip and Bipap. HsTrop 108.He report non adherence to medications. Some time he miss medication for 1 week .Some time eat high salt diet. Last time he saw doctor was >3 years ago.He denies recent chest pain, palpitations, light-headedness/ dizziness syncope, nausea/vomiting, diaphoresis, LE swelling. Initial EKG concerning for WEN in anterior leads, cardiology consult fellow was called for STEMI alert. Discussed with interventional attending, did not meet STEMI criteria. Patient admitted to CCU. (06 Oct 2022 16:53)       INTERVAL HPI/OVERNIGHT EVENTS:   No overnight events   Afebrile, hemodynamically stable     Subjective: ***    ICU Vital Signs Last 24 Hrs  T(C): 37 (12 Oct 2022 06:00), Max: 37.7 (11 Oct 2022 16:50)  T(F): 98.6 (12 Oct 2022 06:00), Max: 99.9 (11 Oct 2022 16:50)  HR: 97 (12 Oct 2022 07:00) (83 - 100)  BP: 107/71 (12 Oct 2022 07:00) (81/57 - 192/101)  BP(mean): 79 (12 Oct 2022 07:00) (63 - 124)  RR: 21 (12 Oct 2022 07:00) (16 - 29)  SpO2: 93% (12 Oct 2022 07:00) (91% - 98%)    O2 Parameters below as of 12 Oct 2022 00:00  Patient On (Oxygen Delivery Method): room air        I&O's Summary    11 Oct 2022 07:01  -  12 Oct 2022 07:00  --------------------------------------------------------  IN: 1200 mL / OUT: 3200 mL / NET: -2000 mL        Daily     Daily Weight in k.5 (12 Oct 2022 06:00)    EKG/Telemetry Events: NSR    MEDICATIONS  (STANDING):  ALBUTerol    0.083% 2.5 milliGRAM(s) Nebulizer every 12 hours  aspirin enteric coated 81 milliGRAM(s) Oral daily  atorvastatin 40 milliGRAM(s) Oral at bedtime  carvedilol 3.125 milliGRAM(s) Oral every 12 hours  chlorhexidine 2% Cloths 1 Application(s) Topical <User Schedule>  folic acid 1 milliGRAM(s) Oral daily  furosemide    Tablet 60 milliGRAM(s) Oral daily  heparin   Injectable 5000 Unit(s) SubCutaneous every 12 hours  hydrALAZINE 50 milliGRAM(s) Oral every 8 hours  influenza  Vaccine (HIGH DOSE) 0.7 milliLiter(s) IntraMuscular once  isosorbide   dinitrate Tablet (ISORDIL) 10 milliGRAM(s) Oral three times a day  LORazepam     Tablet 0.5 milliGRAM(s) Oral every 12 hours  LORazepam     Tablet   Oral   multivitamin 1 Tablet(s) Oral daily  pantoprazole    Tablet 40 milliGRAM(s) Oral before breakfast  sacubitril 49 mG/valsartan 51 mG 1 Tablet(s) Oral two times a day  sertraline 50 milliGRAM(s) Oral daily  spironolactone 25 milliGRAM(s) Oral daily  thiamine IVPB 500 milliGRAM(s) IV Intermittent three times a day  tiotropium 18 MICROgram(s) Capsule 1 Capsule(s) Inhalation daily    MEDICATIONS  (PRN):  guaiFENesin Oral Liquid (Sugar-Free) 100 milliGRAM(s) Oral every 6 hours PRN Cough  melatonin 3 milliGRAM(s) Oral at bedtime PRN Insomnia      PHYSICAL EXAM:  GENERAL: Awake, alert, pleasant demeanor. Temporal wasting  EYES: EOMI, PERRLA, conjunctiva and sclera clear  NECK: Supple, No JVD  NERVOUS SYSTEM:  A&Ox3  CHEST/LUNG: Clear to auscultation bilaterally, no crackles, no wheezing  HEART: Regular rate and rhythm; No murmurs or gallops  ABDOMEN: Soft, Nontender, Nondistended; Bowel sounds present  EXTREMITIES:  2+ Peripheral Pulses, No clubbing, cyanosis, or edema  LYMPH: No lymphadenopathy noted  SKIN: No rashes or lesions      LABS:             ***    RADIOLOGY & ADDITIONAL TESTS:  Reviewed        Care Discussed with Consultants/Other Providers [ x] YES  [ ] NO         Alcohol withdrawal    Patient is a 72y old  Male who presents with a chief complaint of SOB (11 Oct 2022 10:26)    HPI:  68 yo male, PMH of HTN, HLD, CAD, Hepatitis C (completed  Harvoni 2018 ) and current smoker  who presents with acute onset SOB since last Saturday 10/1 with no improvement. The shortness of breath occurred both with exertion and at rest, causing him to have poor sleep at the  night as he was unable to lie flat comfortably. His dyspnea worsened on day of presentation, severely limiting his ability to ambulate. He thought he came to hospital on . He does  not remember what happen between Monday to Thursday   In ED /107, . Patient hypoxic and tachypneic. Diffuse B lines on POCUS. ProBNP 44467, lactate  2.5. Started on nitro drip and Bipap. HsTrop 108.He report non adherence to medications. Some time he miss medication for 1 week .Some time eat high salt diet. Last time he saw doctor was >3 years ago.He denies recent chest pain, palpitations, light-headedness/ dizziness syncope, nausea/vomiting, diaphoresis, LE swelling. Initial EKG concerning for WEN in anterior leads, cardiology consult fellow was called for STEMI alert. Discussed with interventional attending, did not meet STEMI criteria. Patient admitted to CCU. (06 Oct 2022 16:53)       INTERVAL HPI/OVERNIGHT EVENTS:   No overnight events   Afebrile, hemodynamically stable     Subjective: Feels well this am. Amenable to going to rehab after discharge. States that he doesn't remember the days preceding his hospitalization and that he is ready to make changes in his life.     ICU Vital Signs Last 24 Hrs  T(C): 37 (12 Oct 2022 06:00), Max: 37.7 (11 Oct 2022 16:50)  T(F): 98.6 (12 Oct 2022 06:00), Max: 99.9 (11 Oct 2022 16:50)  HR: 97 (12 Oct 2022 07:00) (83 - 100)  BP: 107/71 (12 Oct 2022 07:00) (81/57 - 192/101)  BP(mean): 79 (12 Oct 2022 07:00) (63 - 124)  RR: 21 (12 Oct 2022 07:00) (16 - 29)  SpO2: 93% (12 Oct 2022 07:00) (91% - 98%)    O2 Parameters below as of 12 Oct 2022 00:00  Patient On (Oxygen Delivery Method): room air        I&O's Summary    11 Oct 2022 07:01  -  12 Oct 2022 07:00  --------------------------------------------------------  IN: 1200 mL / OUT: 3200 mL / NET: -2000 mL        Daily     Daily Weight in k.5 (12 Oct 2022 06:00)    EKG/Telemetry Events: NSR    MEDICATIONS  (STANDING):  ALBUTerol    0.083% 2.5 milliGRAM(s) Nebulizer every 12 hours  aspirin enteric coated 81 milliGRAM(s) Oral daily  atorvastatin 40 milliGRAM(s) Oral at bedtime  carvedilol 3.125 milliGRAM(s) Oral every 12 hours  chlorhexidine 2% Cloths 1 Application(s) Topical <User Schedule>  folic acid 1 milliGRAM(s) Oral daily  furosemide    Tablet 60 milliGRAM(s) Oral daily  heparin   Injectable 5000 Unit(s) SubCutaneous every 12 hours  hydrALAZINE 50 milliGRAM(s) Oral every 8 hours  influenza  Vaccine (HIGH DOSE) 0.7 milliLiter(s) IntraMuscular once  isosorbide   dinitrate Tablet (ISORDIL) 10 milliGRAM(s) Oral three times a day  LORazepam     Tablet 0.5 milliGRAM(s) Oral every 12 hours  LORazepam     Tablet   Oral   multivitamin 1 Tablet(s) Oral daily  pantoprazole    Tablet 40 milliGRAM(s) Oral before breakfast  sacubitril 49 mG/valsartan 51 mG 1 Tablet(s) Oral two times a day  sertraline 50 milliGRAM(s) Oral daily  spironolactone 25 milliGRAM(s) Oral daily  thiamine IVPB 500 milliGRAM(s) IV Intermittent three times a day  tiotropium 18 MICROgram(s) Capsule 1 Capsule(s) Inhalation daily    MEDICATIONS  (PRN):  guaiFENesin Oral Liquid (Sugar-Free) 100 milliGRAM(s) Oral every 6 hours PRN Cough  melatonin 3 milliGRAM(s) Oral at bedtime PRN Insomnia      PHYSICAL EXAM:  GENERAL: Awake, alert, pleasant demeanor. Temporal wasting  EYES: EOMI, PERRLA, conjunctiva and sclera clear  NECK: Supple, No JVD  NERVOUS SYSTEM:  A&Ox3  CHEST/LUNG: Clear to auscultation bilaterally, no crackles, no wheezing  HEART: Regular rate and rhythm; No murmurs or gallops  ABDOMEN: Soft, Nontender, Nondistended; Bowel sounds present  EXTREMITIES:  2+ Peripheral Pulses, No clubbing, cyanosis, or edema  LYMPH: No lymphadenopathy noted  SKIN: No rashes or lesions      LABS:                          12.2   7.05  )-----------( 299      ( 12 Oct 2022 06:57 )             36.8     10-12    126<L>  |  86<L>  |  42<H>  ----------------------------<  111<H>  4.3   |  27  |  0.99    Ca    9.7      12 Oct 2022 06:57  Phos  3.4     10-  Mg     1.80     10-    TPro  6.9  /  Alb  3.3  /  TBili  0.4  /  DBili  x   /  AST  48<H>  /  ALT  34  /  AlkPhos  210<H>  10-12                 RADIOLOGY & ADDITIONAL TESTS:  Reviewed        Care Discussed with Consultants/Other Providers [ x] YES  [ ] NO         Alcohol withdrawal    Patient is a 72y old  Male who presents with a chief complaint of SOB (11 Oct 2022 10:26)    HPI:  68 yo male, PMH of HTN, HLD, CAD, Hepatitis C (completed  Harvoni 2018 ) and current smoker  who presents with acute onset SOB since last Saturday 10/1 with no improvement. The shortness of breath occurred both with exertion and at rest, causing him to have poor sleep at the  night as he was unable to lie flat comfortably. His dyspnea worsened on day of presentation, severely limiting his ability to ambulate. He thought he came to hospital on . He does  not remember what happen between Monday to Thursday   In ED /107, . Patient hypoxic and tachypneic. Diffuse B lines on POCUS. ProBNP 11430, lactate  2.5. Started on nitro drip and Bipap. HsTrop 108.He report non adherence to medications. Some time he miss medication for 1 week .Some time eat high salt diet. Last time he saw doctor was >3 years ago.He denies recent chest pain, palpitations, light-headedness/ dizziness syncope, nausea/vomiting, diaphoresis, LE swelling. Initial EKG concerning for WEN in anterior leads, cardiology consult fellow was called for STEMI alert. Discussed with interventional attending, did not meet STEMI criteria. Patient admitted to CCU. (06 Oct 2022 16:53)       INTERVAL HPI/OVERNIGHT EVENTS:   No overnight events   Afebrile, hemodynamically stable     Subjective: Feels well this am. Amenable to going to rehab after discharge. States that he doesn't remember the days preceding his hospitalization and that he is ready to stop drinking.    ICU Vital Signs Last 24 Hrs  T(C): 37 (12 Oct 2022 06:00), Max: 37.7 (11 Oct 2022 16:50)  T(F): 98.6 (12 Oct 2022 06:00), Max: 99.9 (11 Oct 2022 16:50)  HR: 97 (12 Oct 2022 07:00) (83 - 100)  BP: 107/71 (12 Oct 2022 07:00) (81/57 - 192/101)  BP(mean): 79 (12 Oct 2022 07:00) (63 - 124)  RR: 21 (12 Oct 2022 07:00) (16 - 29)  SpO2: 93% (12 Oct 2022 07:00) (91% - 98%)    O2 Parameters below as of 12 Oct 2022 00:00  Patient On (Oxygen Delivery Method): room air        I&O's Summary    11 Oct 2022 07:01  -  12 Oct 2022 07:00  --------------------------------------------------------  IN: 1200 mL / OUT: 3200 mL / NET: -2000 mL        Daily     Daily Weight in k.5 (12 Oct 2022 06:00)    EKG/Telemetry Events: NSR    MEDICATIONS  (STANDING):  ALBUTerol    0.083% 2.5 milliGRAM(s) Nebulizer every 12 hours  aspirin enteric coated 81 milliGRAM(s) Oral daily  atorvastatin 40 milliGRAM(s) Oral at bedtime  carvedilol 3.125 milliGRAM(s) Oral every 12 hours  chlorhexidine 2% Cloths 1 Application(s) Topical <User Schedule>  folic acid 1 milliGRAM(s) Oral daily  furosemide    Tablet 60 milliGRAM(s) Oral daily  heparin   Injectable 5000 Unit(s) SubCutaneous every 12 hours  hydrALAZINE 50 milliGRAM(s) Oral every 8 hours  influenza  Vaccine (HIGH DOSE) 0.7 milliLiter(s) IntraMuscular once  isosorbide   dinitrate Tablet (ISORDIL) 10 milliGRAM(s) Oral three times a day  LORazepam     Tablet 0.5 milliGRAM(s) Oral every 12 hours  LORazepam     Tablet   Oral   multivitamin 1 Tablet(s) Oral daily  pantoprazole    Tablet 40 milliGRAM(s) Oral before breakfast  sacubitril 49 mG/valsartan 51 mG 1 Tablet(s) Oral two times a day  sertraline 50 milliGRAM(s) Oral daily  spironolactone 25 milliGRAM(s) Oral daily  thiamine IVPB 500 milliGRAM(s) IV Intermittent three times a day  tiotropium 18 MICROgram(s) Capsule 1 Capsule(s) Inhalation daily    MEDICATIONS  (PRN):  guaiFENesin Oral Liquid (Sugar-Free) 100 milliGRAM(s) Oral every 6 hours PRN Cough  melatonin 3 milliGRAM(s) Oral at bedtime PRN Insomnia      PHYSICAL EXAM:  GENERAL: Awake, alert, pleasant demeanor. Temporal wasting  EYES: EOMI, PERRLA, conjunctiva and sclera clear  NECK: Supple, No JVD  NERVOUS SYSTEM:  A&Ox3  CHEST/LUNG: Clear to auscultation bilaterally, no crackles, no wheezing  HEART: Regular rate and rhythm; No murmurs or gallops  ABDOMEN: Soft, Nontender, Nondistended; Bowel sounds present  EXTREMITIES:  2+ Peripheral Pulses, No clubbing, cyanosis, or edema  LYMPH: No lymphadenopathy noted  SKIN: No rashes or lesions      LABS:                          12.2   7.05  )-----------( 299      ( 12 Oct 2022 06:57 )             36.8     10-12    126<L>  |  86<L>  |  42<H>  ----------------------------<  111<H>  4.3   |  27  |  0.99    Ca    9.7      12 Oct 2022 06:57  Phos  3.4     10-12  Mg     1.80     10-12    TPro  6.9  /  Alb  3.3  /  TBili  0.4  /  DBili  x   /  AST  48<H>  /  ALT  34  /  AlkPhos  210<H>  10-12                 RADIOLOGY & ADDITIONAL TESTS:  Reviewed        Care Discussed with Consultants/Other Providers [ x] YES  [ ] NO         Alcohol withdrawal    Patient is a 72y old  Male who presents with a chief complaint of SOB (11 Oct 2022 10:26)    HPI:  68 yo male, PMH of HTN, HLD, CAD, Hepatitis C (completed  Harvoni  ) and current smoker  who presents with acute onset SOB since last Saturday 10/1 with no improvement. The shortness of breath occurred both with exertion and at rest, causing him to have poor sleep at the  night as he was unable to lie flat comfortably. His dyspnea worsened on day of presentation, severely limiting his ability to ambulate. He thought he came to hospital on . He does  not remember what happen between Monday to Thursday   In ED /107, . Patient hypoxic and tachypneic. Diffuse B lines on POCUS. ProBNP 35913, lactate  2.5. Started on nitro drip and Bipap. HsTrop 108.He report non adherence to medications. Some time he miss medication for 1 week .Some time eat high salt diet. Last time he saw doctor was >3 years ago.He denies recent chest pain, palpitations, light-headedness/ dizziness syncope, nausea/vomiting, diaphoresis, LE swelling. Initial EKG concerning for WEN in anterior leads, cardiology consult fellow was called for STEMI alert. Discussed with interventional attending, did not meet STEMI criteria. Patient admitted to CCU. (06 Oct 2022 16:53)       INTERVAL HPI/OVERNIGHT EVENTS:   No overnight events   Afebrile. Elevated SBPs overnight, max /101    Subjective: Feels well this am. Amenable to going to rehab after discharge. States that he doesn't remember the days preceding his hospitalization and that he is ready to stop drinking.    ICU Vital Signs Last 24 Hrs  T(C): 37 (12 Oct 2022 06:00), Max: 37.7 (11 Oct 2022 16:50)  T(F): 98.6 (12 Oct 2022 06:00), Max: 99.9 (11 Oct 2022 16:50)  HR: 97 (12 Oct 2022 07:00) (83 - 100)  BP: 107/71 (12 Oct 2022 07:00) (81/57 - 192/101)  BP(mean): 79 (12 Oct 2022 07:00) (63 - 124)  RR: 21 (12 Oct 2022 07:00) (16 - 29)  SpO2: 93% (12 Oct 2022 07:00) (91% - 98%)    O2 Parameters below as of 12 Oct 2022 00:00  Patient On (Oxygen Delivery Method): room air        I&O's Summary    11 Oct 2022 07:01  -  12 Oct 2022 07:00  --------------------------------------------------------  IN: 1200 mL / OUT: 3200 mL / NET: -2000 mL        Daily     Daily Weight in k.5 (12 Oct 2022 06:00)    EKG/Telemetry Events: NSR    MEDICATIONS  (STANDING):  ALBUTerol    0.083% 2.5 milliGRAM(s) Nebulizer every 12 hours  aspirin enteric coated 81 milliGRAM(s) Oral daily  atorvastatin 40 milliGRAM(s) Oral at bedtime  carvedilol 3.125 milliGRAM(s) Oral every 12 hours  chlorhexidine 2% Cloths 1 Application(s) Topical <User Schedule>  folic acid 1 milliGRAM(s) Oral daily  furosemide    Tablet 60 milliGRAM(s) Oral daily  heparin   Injectable 5000 Unit(s) SubCutaneous every 12 hours  hydrALAZINE 50 milliGRAM(s) Oral every 8 hours  influenza  Vaccine (HIGH DOSE) 0.7 milliLiter(s) IntraMuscular once  isosorbide   dinitrate Tablet (ISORDIL) 10 milliGRAM(s) Oral three times a day  LORazepam     Tablet 0.5 milliGRAM(s) Oral every 12 hours  LORazepam     Tablet   Oral   multivitamin 1 Tablet(s) Oral daily  pantoprazole    Tablet 40 milliGRAM(s) Oral before breakfast  sacubitril 49 mG/valsartan 51 mG 1 Tablet(s) Oral two times a day  sertraline 50 milliGRAM(s) Oral daily  spironolactone 25 milliGRAM(s) Oral daily  thiamine IVPB 500 milliGRAM(s) IV Intermittent three times a day  tiotropium 18 MICROgram(s) Capsule 1 Capsule(s) Inhalation daily    MEDICATIONS  (PRN):  guaiFENesin Oral Liquid (Sugar-Free) 100 milliGRAM(s) Oral every 6 hours PRN Cough  melatonin 3 milliGRAM(s) Oral at bedtime PRN Insomnia      PHYSICAL EXAM:  GENERAL: Awake, alert, pleasant demeanor. Temporal wasting  EYES: EOMI, PERRLA, conjunctiva and sclera clear  NECK: Supple, No JVD  NERVOUS SYSTEM:  A&Ox3, forgetful. Forgot that he had a stress test today despite being told about it 15 minutes prior  CHEST/LUNG: Clear to auscultation bilaterally, no crackles, no wheezing  HEART: Regular rate and rhythm; No murmurs or gallops  ABDOMEN: Soft, Nontender, Nondistended; Bowel sounds present  EXTREMITIES:  2+ Peripheral Pulses, No clubbing, cyanosis, or edema  LYMPH: No lymphadenopathy noted  SKIN: No rashes or lesions      LABS:                          12.2   7.05  )-----------( 299      ( 12 Oct 2022 06:57 )             36.8     10-    126<L>  |  86<L>  |  42<H>  ----------------------------<  111<H>  4.3   |  27  |  0.99    Ca    9.7      12 Oct 2022 06:57  Phos  3.4     10-  Mg     1.80     10-12    TPro  6.9  /  Alb  3.3  /  TBili  0.4  /  DBili  x   /  AST  48<H>  /  ALT  34  /  AlkPhos  210<H>  10-12                 RADIOLOGY & ADDITIONAL TESTS:  Reviewed        Care Discussed with Consultants/Other Providers [ x] YES  [ ] NO

## 2022-10-12 NOTE — DISCHARGE NOTE PROVIDER - CARE PROVIDER_API CALL
Leonard Mendoza)  Cardiovascular Disease; Interventional Cardiology  [address] [place] [address]  Phone: [phone]  Fax: [phone]  Follow Up Time:

## 2022-10-12 NOTE — CHART NOTE - NSCHARTNOTEFT_GEN_A_CORE
MAR Accept Note  Transfer to:  Medicine  Accepting Attending Physician:  Pako Cloud  Assigned Room:  613A    Patient seen and examined.   Labs and data reviewed.   No findings precluding transfer of service.       HPI/MICU COURSE:   Please refer to MICU transfer note for full details. Briefly, this is a 68 yo male, PMH of HTN, HLD, CAD, Hepatitis C (completed  Harvoni 2018 ) and current smoker  who presents with acute onset SOB since last Saturday 10/1 with no improvement. In ED /107, . Patient hypoxic and tachypneic. Diffuse B lines on POCUS. ProBNP 40994, lactate  2.5. Started on nitro drip and Bipap. Initial EKG concerning for WEN in anterior leads, cardiology consult fellow was called for STEMI alert. Discussed with interventional attending, did not meet STEMI criteria. Patient admitted to CCU on nitro gtt & BIPAP, started also on Lasix gtt. Hypoxia suspected 2/2 hypertensive emergency & pulmonary edema in setting of newly diagnosed heart failure - TTE 10/6 demonstrated EF 25%, severe global LV systolic dysfunction. HF likely due to hypertension vs alcoholic myopathy; underwent nuclear stress test for ischemic evaluation on 10/12. Pulm consulted for persistent hypoxia & concern for COPD - started on albuterol & Spiriva, will need pulm follow-up. Breathing improved with diuresis. Diuresed net -8.2 L, switched to PO Lasix 60 as of 10/11. Weaned to room air as of 10/11. Oral antihypertensives restarted on arrival, nitro d/crow as of 10/8 am. Continued to titrate BP meds for persistently elevated pressures. Course c/b suspected EtOH withdrawal; pt was agitated and disoriented briefly requiring precedex gtt, was then started on standing Ativan taper per psych, to be completed on 10/12.         FOR FOLLOW-UP:  [ ] ativan taper will end after pm dose 10/12  [ ] f/u nuclear stress test 10/12  [ ] f/u COVID test; if negative, pt to remain on droplet precautions until 10/15 per infection control  [ ] pending JAMIE placement.    Leander Estrada, PGY-3

## 2022-10-12 NOTE — CHART NOTE - NSCHARTNOTEFT_GEN_A_CORE
CCU Transfer Note    Transfer from: CCU  Transfer to:  (  ) Medicine    ( x ) Telemetry    (  ) RCU    (  ) Palliative    (  ) Stroke Unit    (  ) _______________  Accepting physician: ***    MEDICATIONS:  STANDING MEDICATIONS  ALBUTerol    0.083% 2.5 milliGRAM(s) Nebulizer every 12 hours  aspirin enteric coated 81 milliGRAM(s) Oral daily  atorvastatin 40 milliGRAM(s) Oral at bedtime  carvedilol 3.125 milliGRAM(s) Oral every 12 hours  chlorhexidine 2% Cloths 1 Application(s) Topical <User Schedule>  folic acid 1 milliGRAM(s) Oral daily  furosemide    Tablet 60 milliGRAM(s) Oral daily  heparin   Injectable 5000 Unit(s) SubCutaneous every 12 hours  hydrALAZINE 50 milliGRAM(s) Oral every 8 hours  influenza  Vaccine (HIGH DOSE) 0.7 milliLiter(s) IntraMuscular once  isosorbide   dinitrate Tablet (ISORDIL) 10 milliGRAM(s) Oral three times a day  LORazepam     Tablet 0.5 milliGRAM(s) Oral every 12 hours  LORazepam     Tablet   Oral   magnesium sulfate  IVPB 2 Gram(s) IV Intermittent once  multivitamin 1 Tablet(s) Oral daily  pantoprazole    Tablet 40 milliGRAM(s) Oral before breakfast  sacubitril 49 mG/valsartan 51 mG 1 Tablet(s) Oral two times a day  sertraline 50 milliGRAM(s) Oral daily  spironolactone 25 milliGRAM(s) Oral daily  thiamine IVPB 500 milliGRAM(s) IV Intermittent three times a day  tiotropium 18 MICROgram(s) Capsule 1 Capsule(s) Inhalation daily    PRN MEDICATIONS  guaiFENesin Oral Liquid (Sugar-Free) 100 milliGRAM(s) Oral every 6 hours PRN  melatonin 3 milliGRAM(s) Oral at bedtime PRN      VITAL SIGNS: Last 24 Hours  T(C): 37 (12 Oct 2022 06:00), Max: 37.7 (11 Oct 2022 16:50)  T(F): 98.6 (12 Oct 2022 06:00), Max: 99.9 (11 Oct 2022 16:50)  HR: 97 (12 Oct 2022 07:00) (83 - 100)  BP: 107/71 (12 Oct 2022 07:00) (81/57 - 192/101)  BP(mean): 79 (12 Oct 2022 07:00) (63 - 124)  RR: 21 (12 Oct 2022 07:00) (16 - 29)  SpO2: 93% (12 Oct 2022 07:00) (91% - 98%)    LABS:                        12.2   7.05  )-----------( 299      ( 12 Oct 2022 06:57 )             36.8     10-12    126<L>  |  86<L>  |  42<H>  ----------------------------<  111<H>  4.3   |  27  |  0.99    Ca    9.7      12 Oct 2022 06:57  Phos  3.4     10-12  Mg     1.80     10-12    TPro  6.9  /  Alb  3.3  /  TBili  0.4  /  DBili  x   /  AST  48<H>  /  ALT  34  /  AlkPhos  210<H>  10-12          RADIOLOGY:    < from: Transthoracic Echocardiogram (10.06.22 @ 18:39) >    DIMENSIONS:  Dimensions:     Normal Values:  LA:     4.0 cm    2.0 - 4.0 cm  Ao:     3.5 cm    2.0 - 3.8 cm  SEPTUM: 1.1 cm    0.6 - 1.2 cm  PWT:  1.2 cm    0.6 - 1.1 cm  LVIDd:  4.5 cm    3.0 - 5.6 cm  LVIDs:    ---     1.8 - 4.0 cm  Derived Variables:  LVMI: 116 g/m2  RWT: 0.53  Ejection Fraction (Visual Estimate): 25 %  ------------------------------------------------------------------------  OBSERVATIONS:  Mitral Valve: Mitral annular calcification, tethered mitral  leaflets with normal diastolic opening. Moderate mitral  regurgitation.  Aortic Root: Normal aortic root.  Aortic Valve: Calcified trileaflet aortic valve with normal  opening. Mild aortic regurgitation.  Left Atrium: Moderately dilated left atrium.  LA volume  index = 43 cc/m2.  Left Ventricle: Severe global left ventricular systolic  dysfunction. Mild concentric left ventricular hypertrophy.  (DT:161 ms)  Right Heart: Normal right atrium. Right ventricular  enlargement with normal right ventricular systolic  function. Normal tricuspid valve.  Moderate tricuspid  regurgitation. Normal pulmonic valve.  Pericardium/PleuraNormal pericardium with no pericardial  effusion. Bilateral pleural effusions.  Hemodynamic: Estimated right ventricular systolic pressure  equals 56 mm Hg, assuming right atrial pressure equals 10  mm Hg, consistent with moderate pulmonary hypertension.  ------------------------------------------------------------------------  CONCLUSIONS:  1. Mitral annular calcification, tethered mitral leaflets  with normal diastolic opening. Moderate mitral  regurgitation.  2. Calcified trileaflet aortic valve with normal opening.  Mild aortic regurgitation.  3. Mild concentric left ventricular hypertrophy.  4. Severe global left ventricular systolic dysfunction.  5. Right ventricular enlargement with normal right  ventricular systolic function.  6. Bilateral pleural effusions.    < end of copied text >      < from: Xray Chest 1 View- PORTABLE-Urgent (10.10.22 @ 10:57) >    COMPARISON: Chest x-ray from 10/6/2022.    FINDINGS:  Heart/Vascular: Heart size cannot be accurately assessed on this   projection.  Pulmonary: Redemonstration of bilateral perihilar hazy opacities, left   greater than right, somewhat improved from prior x-ray from 10/6/2022. No   pneumothorax or pleural effusion.  Bones: No acute bony abnormality.    Impression:  Interval improvement in pulmonary edema compared to prior x-ray from   10/6/2022.    < end of copied text >        CCU COURSE:  ***        ASSESSMENT & PLAN:  71 yo male, PMH of HTN,HLD,CAD, Hepatitis C ( completed  [place] 2018) and current smoker  presenting with acute dyspnea, found to have hypoxic respiratory distress, hypertension to , and sinus tachycardia, with nonspecific ST changes on EKG, overall concerning for hypertensive emergency with pulmonary edema. Started on nitro drip (now d/crow) and placed on bipap, later weaned to HFNC. On RA since 10/11. Started ativan taper for suspected EtOH withdrawal.    Neuro  # chronic depression  -c/w sertraline 50mg PO daily (half dose as can worsen hyponatremia per psych)  - melatonin as needed for insomnia.  - AxOx3    # EtOH withdrawal  - unclear hx of when last drink was, however pt acknowledges to at least 3 drinks/day (beer 2-3 cans every day and 1 hot of RUM every day)  - tox screen positive for THC, otherwise negative for tested substances  - no known hx withdrawal  - started on CIWA protocol, received total 7 mg Ativan on 10/7 w/ high CIWA scores > 12  - started on precedex on 10/7 pm due to concern for benzo-related hypoxia. Off precedex 10/8 AM  - c/w ativan taper starting from 1.5 mg per psych recs -> now on 0.5. Scheduled to finish on 10/14 am  - replete folate, thiamine  - psych recs appreciated    Resp  #Dyspnea 2/2 mixed pulmonary edema and COPD  - ABG showed uncompensated Resp alkalosis on Bipap-- ABG today 7.44/43/107, improved   - off bipap -> [place] for hypoxemia  - h/o smoking x20 years, though no known hx COPD  - pulmonary consulted- hypoxia less likely due to COPD, but pt probably has some degree of underlying emphysema  - albuterol, Spiriva started 10/8  - persistently tachypneic, EtOH withdrawal may be contributing  - weaned off HFNC since 10/10  - on RA as of 10/11 am, sat in 90s, ctm    #hemoptysis  - episode of hemoptysis w/ BRB overnight on 10/7 pm  - pt has had cough since arrival  - no evidence of consolidation on 10/6 CXR  - s/p IV Protonix 40 x1. On IV Protonix gtt for 24 hours  - c/w PO Protonix 40 mg daily   - ctm    CV  #acute CHFrEF /Acute pulmonary edema.   -presents with acute shortness of breath found in pulmonary edema  - chest Xray showed pulmonary edema on 10/6. Repeat CXR 10/10 showed improvement in pulmonary edema  - Echo ; EF 25%, Severe global LVSD, mod MR, enlarge RV with normal RVF -> new HF diagnosis  -lactate improved  -off nitro as of 10/8 am  -off Lasix drip since 10/9 am, switched to IV 40 BID  - switched Lasix to IV 80 BID on 10/10  - Net -1.4 cc over past 24 hrs  - started PO Lasix 60 daily on 10/11. Pt near euvolemic  - c/w Entresto to 49/51 bid  - strict i/O  - skilled for rehab per PT  - HF following  - pt can f/u with Dr. Mendoza outpt    Elevated trop  - Trop up trending ; 108-> 149->166. CKMB dowm trending- likely Type II  - intial EKG WEN 1mm in V1-V3, STD in inferior leads which improved  - no chest pain  - c/w asa 81mg , c/w Lipitor 80mg PO   - echo showed low EF with Severe LVSD  - ischemic workup -> nuclear stress test 10/12 (no caffeine beforehand)    # HTN (hypertension).   - presents with HTN emergency   - now off nitro gtt  - pressures variable but overall better control  - c/w isordil 10mg TID-> titrate up as needed  - c/w hydralazine 50mg TID    GI  - no issue  -c/w DASH diet    Renal/  # Hyponatremia  -Na 126-> 123-> 126 -> 126 -> 124  - likely 2/2 hypervolemia vs diuretic vs alcohol use    - fluid restriction 1500cc/24hr  - monitor BMP    Endo  #Hyperlipidemia.   -c/w Lipitor 20mg Po daily  - lipid profile WNL    ID  # Leukocytosis on admission   -covid PCR neg  -likely reactive, now downtrending and wnl  -rectal temp 100.2 on 10/8. RVP negative  -BCx 10/8 ngtd  -will monitor off abx    DVT ppx  heparin SQ         For Follow-Up:  [ ] complete ativan taper (last dose 0.5 due 10/14 am)  [ ] ***  [ ] skilled for rehab, pending placement

## 2022-10-12 NOTE — DISCHARGE NOTE PROVIDER - NSDCQMERRANDS_GEN_ALL_CORE
[place] [facility]                                                               Office:  [address] [address]                                                              Telephone: [phone]. Fax:[phone]                                                                        CARDIOLOGY CONSULTATION NOTE                                                                                             Consult requested by:  Dr. Soto  Reason for Consultation: CHF  History obtained by: Patient and medical record   obtained: No    Chief complaint:    Patient is a 78y old  Male who presents with a chief complaint of melena and stool incontinence      HPI:  77yo male with PMH pAF no AC d/t hx of anemia, non-obstructive CAD, chronic bradycardia, ILR placed 1/2016, Chronic Diastolic HFpEF (12/2019 EF 60-65%), Gr. II DD, Cardiomems (placed 1/2020) PA diastolic goal of 16, mod to severe AS, CKD 4, HTN, HLD, b/l carotid bruit, hyperkalemia, IDDMII, dementia.  HPI primarily obtained from wife Angela ([phone]), pt extremely soft spoken, able to nod yes or no for subjective input.  Per wife monday she reported pt was having 1week of dark tarry stools (incontinent) which has been new despite ferrous sulfate, abd distention, and a 7lb weight gain (to 175lbs).  Cardiomem was 24. Office had increased Torsemide to 40mg BID and added Metolazone 5mg QD x2days.  Yesterday Took Torsemide 40mg in the am and 60mg in the pm.  Today reports improvement in edema, weight down to 171lbs, cardiomems now 19.  Office sent in for eval of GIB and fluid overload.  has chronic orthopnea requiring 3pillows to sleep. Denies chest pain/pressure, palpitations, irregular and/or rapid heart beat, syncope/near syncope, dizziness, PND, cough, n/v/d, hematuria.       REVIEW OF SYMPTOMS:     CONSTITUTIONAL: No fever, weight loss, or fatigue  ENMT:  No difficulty hearing, tinnitus, vertigo; No sinus or throat pain  NECK: No pain or stiffness  CARDIOVASCULAR: as per HPI  RESPIRATORY: as per HPI  : No dysuria, no hematuria   GI: No dark color stool, no melena, no diarrhea, no constipation, no abdominal pain   NEURO: No headache, no dizziness, no slurred speech   MUSCULOSKELETAL: No joint pain or swelling; No muscle, back, or extremity pain  PSYCH: No agitation, no anxiety.    ALL OTHER REVIEW OF SYSTEMS ARE NEGATIVE.      PREVIOUS DIAGNOSTIC TESTING  ECHO FINDINGS:  < from: TTE Echo Complete w/Doppler (12.10.19 @ 13:21) >  PHYSICIAN INTERPRETATION:  Left Ventricle: The left ventricular internal cavity size is normal. Left   ventricular wall thickness is normal.  Global LV systolic function was normal. Left ventricular ejection   fraction, by visual estimation, is 60 to 65%. Spectral Doppler shows   pseudonormal pattern of left ventricular myocardial filling (Grade II   diastolic dysfunction). Elevated mean left atrial pressure.  Right Ventricle: The right ventricular size is normal. RV systolic   function is normal.  Left Atrium: Normal left atrial size.  Right Atrium: Normal right atrial size.  Pericardium: There is no evidence of pericardial effusion. There is a   significant pericardial fat pad present. There is a moderate pleural   effusion in both left and right lateral regions.  Mitral Valve: Mild thickening and calcification of the anterior and   posterior mitral valve leaflets. There is mild mitral annular   calcification. Mild mitral valve regurgitation is seen.  Tricuspid Valve: The tricuspid valve isnormal in structure. Trivial   tricuspid regurgitation is visualized. The flow in the hepatic veins is   normal during ventricular systole.  Aortic Valve: The aortic valve is trileaflet. Moderate to severe aortic   stenosis is present. The peak aortic velocity was obtained from the   apical view. Mild aortic valve regurgitation is seen.  Pulmonic Valve: The pulmonic valve is normal. Trace pulmonic valve   regurgitation.  Aorta: The aortic root and ascending aorta are structurally normal, with   no evidence of dilitation.  Pulmonary Artery: The main pulmonary artery is normal in size.  Venous: A normal flow pattern is recorded from the right upper pulmonary   vein. The inferior vena cava was dilated, with respiratory size variation   less than50%.       Summary:   1. Technically difficult study.   2. Normal global left ventricular systolic function.   3. Left ventricular ejection fraction, by visual estimation, is 60 to   65%.   4. Spectral Doppler shows pseudonormal pattern of left ventricular   myocardial filling (Grade II diastolic dysfunction).   5. Elevated mean left atrial pressure. Mean LAP estimated at 22 mmHg.   6. Mild mitral annular calcification.   7. Mild thickening and calcification of the anterior and posterior   mitralvalve leaflets.   8. Mild mitral valve regurgitation.   9. Mild aortic regurgitation.  10. Moderate to severe aortic valve stenosis. Recommend SCAR for further   evaluation.  11. Moderate pleural effusion in both left and right lateral regions.  12. There is no evidence of pericardial effusion.    MD Danya Electronically signed on 12/10/2019 at 4:34:42 PM       < end of copied text >      STRESS FINDINGS:  < from: Nuclear Stress Test-Exercise (05.09.16 @ 08:28) >  IMPRESSIONS:Normal Study  * Exercise capacity: 4 METS, Poor for age and gender.  * HR Response: Appropriate.  * BP Response: Appropriate.  * ECG Abnormalities: There were no diagnostic changes.  * Arrhythmia: None.  * Negative ECG evidence of ischemia during exercise stress  test.  * The left ventricle was normal in size. Normal myocardial  perfusion scan, with no evidence of infarction or  inducible ischemia.  * Gated wall motion analysis is performed, and shows  normal wall motion with post stress LVEF of 70%. Normal LV  ejection fraction.      < end of copied text >      CATHETERIZATION FINDINGS:   < from: Cardiac Cath Lab - Adult (01.03.20 @ 13:42) >  COMPLICATIONS: No complications occurred during the cath lab visit.  SUMMARY:  Summary: Addendum 1/10/2020: case reconfirmed to add CardioMem positioned  in Left Pulmonary Artery  DIAGNOSTIC IMPRESSIONS: Mild pulmonary hypertension.  Low to normal wedge pressure.  Left pulmonary artery angiogram performed. (final catheter position)  CardioMem deployed Left Pulmonary Artery  DIAGNOSTIC RECOMMENDATIONS: Further management as per primary cardiology  team.  INTERVENTIONAL IMPRESSIONS: Mild pulmonary hypertension.  Low to normal wedge pressure.  Left pulmonary artery angiogram performed. (final catheter position)  CardioMem deployed Left Pulmonary Artery  INTERVENTIONAL RECOMMENDATIONS: Further management as per primary  cardiology team.  Prepared and signed by  Kody Martinez MD  Signed 01/10/2020 14:26:33    < end of copied text >        ALLERGIES: Allergies    No Known Allergies    Intolerances      PAST MEDICAL HISTORY  Hyperkalemia  ADI (acute kidney injury)  BPH (benign prostatic hyperplasia)  CKD (chronic kidney disease)  Hyperlipemia  Hypertension  Diabetes      PAST SURGICAL HISTORY  Ureteral stent retained      FAMILY HISTORY:  No pertinent family history in first degree relatives      SOCIAL HISTORY:  Denies smoking/alcohol/drugs  CIGARETTES:     ALCOHOL:  DRUGS:      CURRENT MEDICATIONS:           HOME MEDICATIONS:  Home Medications:  acetaminophen 325 mg oral tablet: 2 tab(s) orally every 6 hours, As needed, Temp greater or equal to 38C (100.4F), Mild Pain (1 - 3) (09 Jan 2020 11:54)  amLODIPine 10 mg oral tablet: 1 tab(s) orally once a day (09 Jan 2020 11:54)  aspirin 81 mg oral tablet, chewable: 1 tab(s) orally once a day (09 Jan 2020 11:54)  pravastatin 40 mg oral tablet: 1 tab(s) orally once a day (09 Jan 2020 11:54)  sodium bicarbonate 650 mg oral tablet: 1 tab(s) orally 2 times a day (09 Jan 2020 11:54)  Tresiba: 4 unit(s) subcutaneous once a day (at bedtime) (09 Jan 2020 11:59)      Vital Signs Last 24 Hrs  T(C): --  T(F): --  HR: 44 (21 Feb 2020 11:34) (44 - 44)  BP: 112/53 (21 Feb 2020 11:34) (112/53 - 112/53)  BP(mean): --  RR: 18 (21 Feb 2020 11:34) (18 - 18)  SpO2: 99% (21 Feb 2020 11:34) (99% - 99%)      PHYSICAL EXAM:  Constitutional: Comfortable. No acute distress.   HEENT: Atraumatic and normocephalic, neck is supple. No JVD. b/l Carotid bruit. PEERL   CNS: A&Ox3. No focal deficits. EOMI. Cranial nerves II-IX are intact.   Lymph Nodes: Cervical: Not palpable.  Respiratory: b/l LL rales  Cardiovascular: S1S2 RRR. No murmur/rubs or gallop.  Gastrointestinal: Soft non-tender and non distended. +Bowel sounds. Negative Shirley's sign.  Extremities: 1-2+ edema.   Psychiatric: Calm. No agitation.  Skin: No skin rash/ulcers visualized to face, hands or feet.    Intake and output:     LABS:                        8.8    5.33  )-----------( 112      ( 21 Feb 2020 12:37 )             28.0     02-21    135  |  93<L>  |  136.0<H>  ----------------------------<  76  4.2   |  22.0  |  4.00<H>    Ca    8.6      21 Feb 2020 12:37    TPro  6.7  /  Alb  4.1  /  TBili  <0.2<L>  /  DBili  x   /  AST  26  /  ALT  27  /  AlkPhos  74  02-21    CARDIAC MARKERS ( 21 Feb 2020 12:37 )  x     / 0.04 ng/mL / x     / x     / x        ;p-BNP=  PT/INR - ( 21 Feb 2020 12:37 )   PT: 13.7 sec;   INR: 1.21 ratio         PTT - ( 21 Feb 2020 12:37 )  PTT:35.3 sec      INTERPRETATION OF TELEMETRY: not on cardiac monitor  ECG: Sinus bradycardia 45bpm, inferior Q waves    RADIOLOGY & ADDITIONAL STUDIES:    X-ray:    < from: Xray Chest 1 View- PORTABLE-Urgent (02.21.20 @ 13:04) >   EXAM:  XR CHEST PORTABLE URGENT 1V                          PROCEDURE DATE:  02/21/2020          INTERPRETATION:  CHEST AP PORTABLE:    History: sob.     Date and time of exam: 2/21/2020 12:43 PM.    Technique: A single AP view of the chest was obtained.    Comparison exam: 1/7/2020.    Findings:  There are small bilateral pleural effusions. The heart is enlarged. A loop recorder is noted. No hilar or mediastinal abnormality. No evidence of pulmonary vascular congestion on the current study. No evidence of infiltrate. There are degenerative changes in the spine..    Impression:  Small bilateral pleural effusions..    < end of copied text >      CT scan:   MRI: Yes

## 2022-10-12 NOTE — DISCHARGE NOTE PROVIDER - NSDCHC_MEDRECSTATUS_GEN_ALL_CORE
Multiple ulcers with pigmented bases and some actively bleeding seen on EGD.  -Pt received IV protonix for 36 hours out of the 72 that was recommended by GI.   -PO protonix 40 mg BID sent to pt's pharmacy, and the importance of taking this medication for 8 weeks was emphasized.   -f/u with the priority care clinic scheduled for Wednesday.  -GI f/u referral. Pt will need repeat EGD in 8 weeks.   Admission Reconciliation is Completed  Discharge Reconciliation is Not Complete Admission Reconciliation is Completed  Discharge Reconciliation is Completed

## 2022-10-12 NOTE — PROGRESS NOTE ADULT - ASSESSMENT
71 yo male, PMH of HTN,HLD,CAD, Hepatitis C ( completed  [facility] 2018) and current smoker  presenting with acute dyspnea, found to have hypoxic respiratory distress, hypertension to , and sinus tachycardia, with nonspecific ST changes on EKG, overall concerning for hypertensive emergency with pulmonary edema. Started on nitro drip (now d/crow) and placed on bipap, later weaned to HFNC. Started ativan taper for suspected EtOH withdrawal.    Neuro  # chronic depression  -c/w sertraline 50mg PO daily (half dose as can worsen hyponatremia per psych)  - melatonin as needed for insomnia.  - AxOx3    # EtOH withdrawal  - unclear hx of when last drink was, however pt acknowledges to at least 3 drinks/day (beer 2-3 cans every day and 1 hot of RUM every day)  - tox screen positive for THC, otherwise negative for tested substances  - no known hx withdrawal  - started on CIWA protocol, received total 7 mg Ativan on 10/7 w/ high CIWA scores > 12  - started on precedex on 10/7 pm due to concern for benzo-related hypoxia. Off precedex 10/8 AM  - c/w ativan taper starting from 1.5 mg per psych recs -> now on 0.5. Scheduled to finish on 10/14 am  - replete folate, thiamine  - psych recs appreciated    Resp  #Dyspnea 2/2 mixed pulmonary edema and COPD  - ABG showed uncompensated Resp alkalosis on Bipap-- ABG today 7.44/43/107, improved   - off bipap -> Hiflow for hypoxemia  - h/o smoking x20 years, though no known hx COPD  - pulmonary consulted- hypoxia less likely due to COPD, but pt probably has some degree of underlying emphysema  - albuterol, Spiriva started 10/8  - persistently tachypneic, EtOH withdrawal may be contributing  - weaned off HFNC since 10/10  - on RA as of 10/11 am, sat in 90s, ctm    #hemoptysis  - episode of hemoptysis w/ BRB overnight on 10/7 pm  - pt has had cough since arrival  - no evidence of consolidation on 10/6 CXR  - s/p IV Protonix 40 x1. On IV Protonix gtt for 24 hours  - c/w PO Protonix 40 mg daily   - ctm    CV  #acute CHFrEF /Acute pulmonary edema.   -presents with acute shortness of breath found in pulmonary edema  - chest Xray showed pulmonary edema on 10/6. Repeat CXR 10/10 showed improvement in pulmonary edema  - Echo ; EF 25%, Severe global LVSD, mod MR, enlarge RV with normal RVF -> new HF diagnosis  -lactate improved  -off nitro as of 10/8 am  -off Lasix drip since 10/9 am, switched to IV 40 BID  - switched Lasix to IV 80 BID on 10/10  - Net -1.4 cc over past 24 hrs  - started PO Lasix 60 daily on 10/11. Pt near euvolemic  - c/w Entresto to 49/51 bid  - strict i/O  - skilled for rehab per PT  - HF following  - pt can f/u with Dr. Mendoza outpt    Elevated trop  - Trop up trending ; 108-> 149->166. CKMB dowm trending- likely Type II  - intial EKG WEN 1mm in V1-V3, STD in inferior leads which improved  - no chest pain  - c/w asa 81mg , c/w Lipitor 80mg PO   - echo showed low EF with Severe LVSD  - ischemic workup -> nuclear stress test 10/12 (no caffeine beforehand)    # HTN (hypertension).   - presents with HTN emergency   - now off nitro gtt  - pressures variable but overall better control  - c/w isordil 10mg TID-> titrate up as needed  - c/w hydralazine 50mg TID    GI  - no issue  -c/w DASH diet    Renal/  # Hyponatremia  -Na 126-> 123-> 126 -> 126 -> 124  - likely 2/2 hypervolemia vs diuretic vs alcohol use    - fluid restriction 1500cc/24hr  - monitor BMP    Endo  #Hyperlipidemia.   -c/w Lipitor 20mg Po daily  - lipid profile WNL    ID  # Leukocytosis on admission   -covid PCR neg  -likely reactive, now downtrending and wnl  -rectal temp 100.2 on 10/8. RVP negative  -BCx 10/8 ngtd  -will monitor off abx    DVT ppx  heparin SQ 73 yo male, PMH of HTN,HLD,CAD, Hepatitis C ( completed  [facility] 2018) and current smoker  presenting with acute dyspnea, found to have hypoxic respiratory distress, hypertension to , and sinus tachycardia, with nonspecific ST changes on EKG, overall concerning for hypertensive emergency with pulmonary edema. Started on nitro drip (now d/crow) and placed on bipap, later weaned to HFNC. Started ativan taper for suspected EtOH withdrawal.    Neuro  # chronic depression  -c/w sertraline 50mg PO daily (half dose as can worsen hyponatremia per psych)  - melatonin as needed for insomnia.  - AxOx3    # EtOH withdrawal  - unclear hx of when last drink was, however pt acknowledges to at least 3 drinks/day (beer 2-3 cans every day and 1 hot of RUM every day)  - tox screen positive for THC, otherwise negative for tested substances  - no known hx withdrawal  - started on CIWA protocol, received total 7 mg Ativan on 10/7 w/ high CIWA scores > 12  - started on precedex on 10/7 pm due to concern for benzo-related hypoxia. Off precedex 10/8 AM  - c/w ativan taper starting from 1.5 mg per psych recs -> now on 0.5. Scheduled to finish on 10/14 am. Will touch base w/ psych regarding earlier discontinuation  - replete folate, thiamine  - psych recs appreciated    Resp  #Dyspnea 2/2 mixed pulmonary edema and COPD  - ABG showed uncompensated Resp alkalosis on Bipap-- ABG today 7.44/43/107, improved   - off bipap -> Hiflow for hypoxemia  - h/o smoking x20 years, though no known hx COPD  - pulmonary consulted- hypoxia less likely due to COPD, but pt probably has some degree of underlying emphysema  - albuterol, Spiriva started 10/8  - persistently tachypneic, EtOH withdrawal may be contributing  - weaned off HFNC since 10/10  - on RA as of 10/11 am, sat in 90s, ctm    #hemoptysis  - episode of hemoptysis w/ BRB overnight on 10/7 pm  - pt has had cough since arrival  - no evidence of consolidation on 10/6 CXR  - s/p IV Protonix 40 x1. On IV Protonix gtt for 24 hours  - c/w PO Protonix 40 mg daily   - ctm    CV  #acute CHFrEF /Acute pulmonary edema.   -presents with acute shortness of breath found in pulmonary edema  - chest Xray showed pulmonary edema on 10/6. Repeat CXR 10/10 showed improvement in pulmonary edema  - Echo ; EF 25%, Severe global LVSD, mod MR, enlarge RV with normal RVF -> new HF diagnosis  -lactate improved  -off nitro as of 10/8 am  -off Lasix drip since 10/9 am, switched to IV 40 BID  - switched Lasix to IV 80 BID on 10/10  - Net -1.4 cc over past 24 hrs  - started PO Lasix 60 daily on 10/11. Pt near euvolemic  - c/w Entresto to 49/51 bid  - strict i/O  - skilled for rehab per PT  - HF following  - pt can f/u with Dr. Mendoza outpt    Elevated trop  - Trop up trending ; 108-> 149->166. CKMB dowm trending- likely Type II  - intial EKG WEN 1mm in V1-V3, STD in inferior leads which improved  - no chest pain  - c/w asa 81mg , c/w Lipitor 80mg PO   - echo showed low EF with Severe LVSD  - ischemic workup -> nuclear stress test 10/12 (no caffeine beforehand)    # HTN (hypertension).   - presents with HTN emergency   - now off nitro gtt  - pressures variable but overall better control  - c/w isordil 10mg TID-> titrate up as needed  - c/w hydralazine 50mg TID    GI  - no issue  -c/w DASH diet    Renal/  # Hyponatremia  -Na 126-> 123-> 126 -> 126 -> 124 -> 126  - likely 2/2 hypervolemia vs diuretic vs alcohol use    - fluid restriction 1500cc/24hr  - monitor BMP    Endo  #Hyperlipidemia.   -c/w Lipitor 20mg Po daily  - lipid profile WNL    ID  # Leukocytosis on admission   -covid PCR neg  -likely reactive, now downtrending and wnl  -rectal temp 100.2 on 10/8. RVP negative  -BCx 10/8 ngtd  -will monitor off abx    DVT ppx  heparin SQ 73 yo male, PMH of HTN,HLD,CAD, Hepatitis C ( completed  [facility] 2018) and current smoker  presenting with acute dyspnea, found to have hypoxic respiratory distress, hypertension to , and sinus tachycardia, with nonspecific ST changes on EKG, overall concerning for hypertensive emergency with pulmonary edema. Started on nitro drip (now d/crow) and placed on bipap, later weaned to HFNC. Started ativan taper for suspected EtOH withdrawal.    Neuro  # chronic depression  -c/w sertraline 50mg PO daily (half dose as can worsen hyponatremia per psych)  - melatonin as needed for insomnia.  - AxOx3    # EtOH withdrawal  - unclear hx of when last drink was, however pt acknowledges to at least 3 drinks/day (beer 2-3 cans every day and 1 hot of RUM every day)  - tox screen positive for THC, otherwise negative for tested substances  - no known hx withdrawal  - started on CIWA protocol, received total 7 mg Ativan on 10/7 w/ high CIWA scores > 12  - started on precedex on 10/7 pm due to concern for benzo-related hypoxia. Off precedex 10/8 AM  - c/w ativan taper starting from 1.5 mg per psych recs -> now on 0.5 q12. Will d/c taper after pm dose today  - replete folate, thiamine  - psych recs appreciated    Resp  #Dyspnea 2/2 mixed pulmonary edema and COPD  - ABG showed uncompensated Resp alkalosis on Bipap-- ABG today 7.44/43/107, improved   - off bipap -> Hiflow for hypoxemia  - h/o smoking x20 years, though no known hx COPD  - pulmonary consulted- hypoxia less likely due to COPD, but pt probably has some degree of underlying emphysema  - albuterol, Spiriva started 10/8  - persistently tachypneic, EtOH withdrawal may be contributing  - weaned off HFNC since 10/10  - on RA as of 10/11 am, sat in 90s, ctm    #hemoptysis  - episode of hemoptysis w/ BRB overnight on 10/7 pm  - pt has had cough since arrival  - no evidence of consolidation on 10/6 CXR  - s/p IV Protonix 40 x1. On IV Protonix gtt for 24 hours  - c/w PO Protonix 40 mg daily   - ctm    CV  #acute CHFrEF /Acute pulmonary edema.   -presents with acute shortness of breath found in pulmonary edema  - chest Xray showed pulmonary edema on 10/6. Repeat CXR 10/10 showed improvement in pulmonary edema  - Echo ; EF 25%, Severe global LVSD, mod MR, enlarge RV with normal RVF -> new HF diagnosis  -lactate improved  -off nitro as of 10/8 am  -off Lasix drip since 10/9 am, switched to IV 40 BID  - switched Lasix to IV 80 BID on 10/10  - started PO Lasix 60 daily on 10/11. Pt now euvolemic  - inc Entresto to 97/103 bid  - strict I/O  - skilled for rehab per PT  - HF following  - pt can f/u with Dr. Mendoza outpt    Elevated trop  - Trop up trending ; 108-> 149->166. CKMB dowm trending- likely Type II  - intial EKG WEN 1mm in V1-V3, STD in inferior leads which improved  - no chest pain  - c/w asa 81mg , c/w Lipitor 80mg PO   - echo showed low EF with Severe LVSD  - ischemic workup -> nuclear stress test 10/12 (no caffeine beforehand)    # HTN (hypertension).   - presents with HTN emergency   - now off nitro gtt  - pressures variable but overall better control  - c/w isordil 10mg TID-> titrate up as needed  - reduce hydral from 50 to 25 TID  - inc coreg to 6.25 BID    GI  - no issue  -c/w DASH diet    Renal/  # Hyponatremia  -Na 126-> 123-> 126 -> 126 -> 124 -> 126  - likely 2/2 hypervolemia vs diuretic vs alcohol use    - fluid restriction 1500cc/24hr  - monitor BMP    Endo  #Hyperlipidemia.   -c/w Lipitor 20mg Po daily  - lipid profile WNL    ID  # Leukocytosis on admission   -covid PCR neg  -likely reactive, now downtrending and wnl  -rectal temp 100.2 on 10/8. RVP negative  -BCx 10/8 ngtd  -will monitor off abx    DVT ppx  heparin SQ

## 2022-10-12 NOTE — DISCHARGE NOTE PROVIDER - NSDCFUADDAPPT_GEN_ALL_CORE_FT
Please follow up with your PCP, cardiologist, and pulmonologist within 1-2 weeks of leaving the hospital. These appointments will be scheduled for you.  Please follow up with your PCP, cardiologist, and pulmonologist within 1-2 weeks of leaving the hospital.     Follow up with pulmonology at [address] [place] [address] on 11/7.    Follow up with cardiology on 11/2. Please follow up with your PCP, cardiologist, and pulmonologist within 1-2 weeks of leaving the hospital.     Follow up with pulmonology at [address] [place] [address] on 11/7.    Follow up with cardiology, Dr. Mendoza, on 11/2 at 2pm.

## 2022-10-12 NOTE — DISCHARGE NOTE PROVIDER - NSDCCPCAREPLAN_GEN_ALL_CORE_FT
PRINCIPAL DISCHARGE DIAGNOSIS  Diagnosis: Acute pulmonary edema  Assessment and Plan of Treatment:       SECONDARY DISCHARGE DIAGNOSES  Diagnosis: Hypertension  Assessment and Plan of Treatment:     Diagnosis: Heart failure  Assessment and Plan of Treatment:     Diagnosis: Alcohol dependence with withdrawal  Assessment and Plan of Treatment:      PRINCIPAL DISCHARGE DIAGNOSIS  Diagnosis: Acute pulmonary edema  Assessment and Plan of Treatment: You were admitted to the CCU and treated accordingly for high blood pressure and concern for fluid in the lungs. You were placed on a Nitroglycerin drip and vital signs improved along with optimization of your heart/lung function.   Continue your medications as directed and please follow-up as an outpatient with your primary care provider for further care and recommendations.      SECONDARY DISCHARGE DIAGNOSES  Diagnosis: Heart failure  Assessment and Plan of Treatment: Continue recommended medication regimen, fluid restriction (Less than 1.5 Liters per day). Monitor for signs/symptoms of fluid overload and electrolyte abnormalities, such as, shortness of breath, cough, swelling, chest discomfort, changes in heart rate, dizziness, fainting, or changes in mental status. Keep track of your weight and call your outpatient physician if there are abrupt changes in weight.   Follow-up with your outpatient provider after you've been discharged from the hospital for further care/recommendations.    Diagnosis: Alcohol dependence with withdrawal  Assessment and Plan of Treatment: In order to optimize your overall health and prevent adverse events, please abstain from ingesting alcohol upon discharge from  Continue to supplement with recommended vitamins and follow-up with your primary care provider for further medical care.   It is highly recommended to attend AA meetings to help create a sober lifestyle. If you need immediate assistance with substance abuse you may contact the [facility] by calling [phone].    Diagnosis: Hypertension  Assessment and Plan of Treatment: Continue blood pressure medication regimen as directed. Monitor for any visual changes, headaches or dizziness.  Monitor blood pressure regularly.  Follow up with your primary care provider for further management for high blood pressure.    Diagnosis: Major depression, chronic  Assessment and Plan of Treatment: Continue your medications as directed and follow up with your primary care provider/psychiatrist for further management.   If you are ever in need of immediate psychiatric assistance you may reach out to the [facility] at [facility] ([address] [place] [address]) # [phone] operates M-F 9am-3pm, walk in or call for same-day/next-day appointment.    Diagnosis: COPD with hypoxia  Assessment and Plan of Treatment: Please continue to use your inhalers as prescribed and follow-up with your primary care provider/pulmonologist for further care and annual pulmonary function testings.   Avoid smoking or being exposed to second-hand smoke, as well as, other potenital exacerbating triggers (Dust/pollen/pets).   Monitor for signs/symptoms of COPD exacerbation, such as, shortness of breath, increased sputum production, increased cough, wheezing, difficulty breathing, or fever - Report to the emergency room if symptoms are not relieved by usual regimen.    Diagnosis: Hyponatremia  Assessment and Plan of Treatment: Continue to restrict fluid intake to no more than 1 Liter per day and follow-up with primary care as outpatient to repeat your sodium levels within 1 week.     PRINCIPAL DISCHARGE DIAGNOSIS  Diagnosis: Acute pulmonary edema  Assessment and Plan of Treatment: You were admitted to the CCU and treated accordingly for high blood pressure and concern for fluid in the lungs. You were placed on a Nitroglycerin drip and vital signs improved along with optimization of your heart/lung function.   Continue your medications as directed and please follow-up as an outpatient with your primary care provider for further care and recommendations.      SECONDARY DISCHARGE DIAGNOSES  Diagnosis: Heart failure  Assessment and Plan of Treatment: Continue recommended medication regimen, fluid restriction (Less than 1.5 Liters per day). Monitor for signs/symptoms of fluid overload and electrolyte abnormalities, such as, shortness of breath, cough, swelling, chest discomfort, changes in heart rate, dizziness, fainting, or changes in mental status. Keep track of your weight and call your outpatient physician if there are abrupt changes in weight.   Follow-up with your outpatient provider after you've been discharged from the hospital for further care/recommendations.    Diagnosis: Alcohol dependence with withdrawal  Assessment and Plan of Treatment: In order to optimize your overall health and prevent adverse events, please abstain from ingesting alcohol upon discharge from  Continue to supplement with recommended vitamins and follow-up with your primary care provider for further medical care.   It is highly recommended to attend AA meetings to help create a sober lifestyle. If you need immediate assistance with substance abuse you may contact the [facility] by calling [phone].    Diagnosis: Hypertension  Assessment and Plan of Treatment: Continue blood pressure medication regimen as directed. Monitor for any visual changes, headaches or dizziness.  Monitor blood pressure regularly.  Follow up with your primary care provider for further management for high blood pressure.    Diagnosis: Major depression, chronic  Assessment and Plan of Treatment: Continue your medications as directed and follow up with your primary care provider/psychiatrist for further management.   If you are ever in need of immediate psychiatric assistance you may reach out to the [facility] at [facility] ([address] [place] [address]) # [phone] operates M-F 9am-3pm, walk in or call for same-day/next-day appointment.    Diagnosis: COPD with hypoxia  Assessment and Plan of Treatment: Please continue to use your inhalers as prescribed and follow-up with your primary care provider/pulmonologist for further care and annual pulmonary function testings.   Avoid smoking or being exposed to second-hand smoke, as well as, other potenital exacerbating triggers (Dust/pollen/pets).   Monitor for signs/symptoms of COPD exacerbation, such as, shortness of breath, increased sputum production, increased cough, wheezing, difficulty breathing, or fever - Report to the emergency room if symptoms are not relieved by usual regimen.    Diagnosis: Hyponatremia  Assessment and Plan of Treatment: Continue to restrict fluid intake to no more than 1 Liter per day and follow-up with primary care as outpatient to repeat your sodium levels within 1 week.    Diagnosis: Wound of sacral region  Assessment and Plan of Treatment: Topical Recommendations:  Sacrum: Cleanse with NS, pat dry. Apply Liquid barrier film to periwound skin (allow to dry). Apply silicone foam dressing with borders, change daily and PRN if soiled.

## 2022-10-12 NOTE — DISCHARGE NOTE PROVIDER - NSDCMRMEDTOKEN_GEN_ALL_CORE_FT
Entresto 49 mg-51 mg oral tablet: 1 tab(s) orally 2 times a day   Lipitor 20 mg oral tablet: 1 tab(s) orally once a day  NIFEdipine 60 mg oral tablet, extended release: 1 tab(s) orally once a day  sertraline 100 mg oral tablet: 1 tab(s) orally once a day   Lipitor 20 mg oral tablet: 1 tab(s) orally once a day  NIFEdipine 60 mg oral tablet, extended release: 1 tab(s) orally once a day  sacubitril-valsartan 97 mg-103 mg oral tablet: 1 tab(s) orally 2 times a day  sertraline 100 mg oral tablet: 1 tab(s) orally once a day   albuterol 90 mcg/inh inhalation aerosol: 2 puff(s) inhaled every 6 hours  aspirin 81 mg oral delayed release tablet: 1 tab(s) orally once a day  atorvastatin 40 mg oral tablet: 1 tab(s) orally once a day (at bedtime)  carvedilol 6.25 mg oral tablet: 1 tab(s) orally every 12 hours  folic acid 1 mg oral tablet: 1 tab(s) orally once a day  furosemide 20 mg oral tablet: 1 tab(s) orally once a day  guaiFENesin 100 mg/5 mL oral liquid: 5 milliliter(s) orally every 6 hours, As needed, Cough  hydrALAZINE 25 mg oral tablet: 1 tab(s) orally 3 times a day  isosorbide dinitrate 10 mg oral tablet: 1 tab(s) orally 3 times a day  Multiple Vitamins oral tablet: 1 tab(s) orally once a day  pantoprazole 40 mg oral delayed release tablet: 1 tab(s) orally once a day (before a meal)  sacubitril-valsartan 97 mg-103 mg oral tablet: 1 tab(s) orally 2 times a day  sertraline 50 mg oral tablet: 1 tab(s) orally once a day  spironolactone 25 mg oral tablet: 1 tab(s) orally once a day  tiotropium 18 mcg inhalation capsule: 1 cap(s) inhaled once a day

## 2022-10-12 NOTE — DISCHARGE NOTE PROVIDER - NSDCCPTREATMENT_GEN_ALL_CORE_FT
PRINCIPAL PROCEDURE  Procedure: Transthoracic echocardiogram  Findings and Treatment: 10/06/2022  DIMENSIONS:  Dimensions:     Normal Values:  LA:     4.0 cm    2.0 - 4.0 cm  Ao:     3.5 cm    2.0 - 3.8 cm  SEPTUM: 1.1 cm    0.6 - 1.2 cm  PWT:  1.2 cm    0.6 - 1.1 cm  LVIDd:  4.5 cm    3.0 - 5.6 cm  LVIDs:    ---     1.8 - 4.0 cm  Derived Variables:  LVMI: 116 g/m2  RWT: 0.53  Ejection Fraction (Visual Estimate): 25 %  ------------------------------------------------------------------------  OBSERVATIONS:  Mitral Valve: Mitral annular calcification, tethered mitral  leaflets with normal diastolic opening. Moderate mitral  regurgitation.  Aortic Root: Normal aortic root.  Aortic Valve: Calcified trileaflet aortic valve with normal  opening. Mild aortic regurgitation.  Left Atrium: Moderately dilated left atrium.  LA volume  index = 43 cc/m2.  Left Ventricle: Severe global left ventricular systolic  dysfunction. Mild concentric left ventricular hypertrophy.  (DT:161 ms)  Right Heart: Normal right atrium. Right ventricular  enlargement with normal right ventricular systolic  function. Normal tricuspid valve.  Moderate tricuspid  regurgitation. Normal pulmonic valve.  Pericardium/PleuraNormal pericardium with no pericardial  effusion. Bilateral pleural effusions.  Hemodynamic: Estimated right ventricular systolic pressure  equals 56 mm Hg, assuming right atrial pressure equals 10  mm Hg, consistent with moderate pulmonary hypertension.  ------------------------------------------------------------------------  CONCLUSIONS:  1. Mitral annular calcification, tethered mitral leaflets  with normal diastolic opening. Moderate mitral  regurgitation.  2. Calcified trileaflet aortic valve with normal opening.  Mild aortic regurgitation.  3. Mild concentric left ventricular hypertrophy.  4. Severe global left ventricular systolic dysfunction.  5. Right ventricular enlargement with normal right  ventricular systolic function.  6. Bilateral pleural effusions.      SECONDARY PROCEDURE  Procedure: Nuclear medicine cardiopulmonary stress test  Findings and Treatment: 10/12/2022      Procedure: XR chest, 1 view  Findings and Treatment: 10/10/2022  FINDINGS:  Heart/Vascular: Heart size cannot be accurately assessed on this   projection.  Pulmonary: Redemonstration of bilateral perihilar hazy opacities, left   greater than right, somewhat improved from prior x-ray from 10/6/2022. No   pneumothorax or pleural effusion.  Bones: No acute bony abnormality.  Impression:  Interval improvement in pulmonary edema compared to prior x-ray from   10/6/2022.    Procedure: XR chest, 1 view  Findings and Treatment: 10/06/2022  FINDINGS:  The cardiomediastinal silhouette size cannot be accurately assessed on   this projection.  Bilateral perihilar hazy opacities consistent with pulmonary edema.  No focal consolidation.  There is no pneumothorax or pleural effusion.  No acute bony abnormality.  IMPRESSION: Pulmonary edema.     PRINCIPAL PROCEDURE  Procedure: Transthoracic echocardiogram  Findings and Treatment:       SECONDARY PROCEDURE  Procedure: Nuclear medicine cardiopulmonary stress test  Findings and Treatment: 10/12/2022      Procedure: XR chest, 1 view  Findings and Treatment:     Procedure: XR chest, 1 view  Findings and Treatment:

## 2022-10-12 NOTE — CHART NOTE - NSCHARTNOTEFT_GEN_A_CORE
CCU Transfer Note    Transfer from: CCU  Transfer to:  (  ) Medicine    ( x ) Telemetry    (  ) RCU    (  ) Palliative    (  ) Stroke Unit    (  ) _______________  Accepting physician: ***      HPI:  66 yo male, PMH of HTN, HLD, CAD, Hepatitis C (completed  Harvoni 2018 ) and current smoker  who presents with acute onset SOB since last Saturday 10/1 with no improvement. The shortness of breath occurred both with exertion and at rest, causing him to have poor sleep at the  night as he was unable to lie flat comfortably. His dyspnea worsened on day of presentation, severely limiting his ability to ambulate. He thought he came to hospital on Sunday. He does  not remember what happen between Monday to Thursday   In ED /107, . Patient hypoxic and tachypneic. Diffuse B lines on POCUS. ProBNP 16395, lactate  2.5. Started on nitro drip and Bipap. HsTrop 108.He report non adherence to medications. Some time he miss medication for 1 week .Some time eat high salt diet. Last time he saw doctor was >3 years ago.He denies recent chest pain, palpitations, light-headedness/ dizziness syncope, nausea/vomiting, diaphoresis, LE swelling. Initial EKG concerning for WEN in anterior leads, cardiology consult fellow was called for STEMI alert. Discussed with interventional attending, did not meet STEMI criteria. Patient admitted to CCU. (06 Oct 2022 16:53)    CCU COURSE:  Admitted to CCU on nitro gtt & BIPAP, started also on Lasix gtt. Hypoxia suspected 2/2 hypertensive emergency & pulmonary edema in setting of newly diagnosed heart failure - TTE 10/6 demonstrated EF 25%, severe global LV systolic dysfunction. Diuresed net -8.2 L, switched to PO Lasix 60 as of 10/11. Oral antihypertensives restarted, nitro d/crow as of 10/8 am. Continued to titrate BP meds for persistently elevated pressures.     ***    MEDICATIONS:  STANDING MEDICATIONS  ALBUTerol    0.083% 2.5 milliGRAM(s) Nebulizer every 12 hours  aspirin enteric coated 81 milliGRAM(s) Oral daily  atorvastatin 40 milliGRAM(s) Oral at bedtime  carvedilol 6.25 milliGRAM(s) Oral every 12 hours  chlorhexidine 2% Cloths 1 Application(s) Topical <User Schedule>  folic acid 1 milliGRAM(s) Oral daily  furosemide    Tablet 60 milliGRAM(s) Oral daily  heparin   Injectable 5000 Unit(s) SubCutaneous every 12 hours  hydrALAZINE 25 milliGRAM(s) Oral three times a day  influenza  Vaccine (HIGH DOSE) 0.7 milliLiter(s) IntraMuscular once  isosorbide   dinitrate Tablet (ISORDIL) 10 milliGRAM(s) Oral three times a day  LORazepam     Tablet 0.5 milliGRAM(s) Oral every 12 hours  LORazepam     Tablet   Oral   multivitamin 1 Tablet(s) Oral daily  pantoprazole    Tablet 40 milliGRAM(s) Oral before breakfast  sacubitril 97 mG/valsartan 103 mG 1 Tablet(s) Oral two times a day  sertraline 50 milliGRAM(s) Oral daily  spironolactone 25 milliGRAM(s) Oral daily  thiamine IVPB 500 milliGRAM(s) IV Intermittent three times a day  tiotropium 18 MICROgram(s) Capsule 1 Capsule(s) Inhalation daily    PRN MEDICATIONS  guaiFENesin Oral Liquid (Sugar-Free) 100 milliGRAM(s) Oral every 6 hours PRN  melatonin 3 milliGRAM(s) Oral at bedtime PRN      VITAL SIGNS: Last 24 Hours  T(C): 37.4 (12 Oct 2022 14:00), Max: 37.7 (11 Oct 2022 16:50)  T(F): 99.4 (12 Oct 2022 14:00), Max: 99.9 (11 Oct 2022 16:50)  HR: 94 (12 Oct 2022 14:00) (75 - 100)  BP: 153/94 (12 Oct 2022 14:00) (81/57 - 192/101)  BP(mean): 106 (12 Oct 2022 14:00) (57 - 124)  RR: 21 (12 Oct 2022 12:00) (16 - 29)  SpO2: 95% (12 Oct 2022 14:00) (91% - 98%)    LABS:                        12.2   7.05  )-----------( 299      ( 12 Oct 2022 06:57 )             36.8     10-12    126<L>  |  86<L>  |  42<H>  ----------------------------<  111<H>  4.3   |  27  |  0.99    Ca    9.7      12 Oct 2022 06:57  Phos  3.4     10-12  Mg     1.80     10-12    TPro  6.9  /  Alb  3.3  /  TBili  0.4  /  DBili  x   /  AST  48<H>  /  ALT  34  /  AlkPhos  210<H>  10-12        RADIOLOGY:  < from: Transthoracic Echocardiogram (10.06.22 @ 18:39) >  DIMENSIONS:  Dimensions:     Normal Values:  LA:     4.0 cm    2.0 - 4.0 cm  Ao:     3.5 cm    2.0 - 3.8 cm  SEPTUM: 1.1 cm    0.6 - 1.2 cm  PWT:  1.2 cm    0.6 - 1.1 cm  LVIDd:  4.5 cm    3.0 - 5.6 cm  LVIDs:    ---     1.8 - 4.0 cm  Derived Variables:  LVMI: 116 g/m2  RWT: 0.53  Ejection Fraction (Visual Estimate): 25 %  ------------------------------------------------------------------------  OBSERVATIONS:  Mitral Valve: Mitral annular calcification, tethered mitral  leaflets with normal diastolic opening. Moderate mitral  regurgitation.  Aortic Root: Normal aortic root.  Aortic Valve: Calcified trileaflet aortic valve with normal  opening. Mild aortic regurgitation.  Left Atrium: Moderately dilated left atrium.  LA volume  index = 43 cc/m2.  Left Ventricle: Severe global left ventricular systolic  dysfunction. Mild concentric left ventricular hypertrophy.  (DT:161 ms)  Right Heart: Normal right atrium. Right ventricular  enlargement with normal right ventricular systolic  function. Normal tricuspid valve.  Moderate tricuspid  regurgitation. Normal pulmonic valve.  Pericardium/PleuraNormal pericardium with no pericardial  effusion. Bilateral pleural effusions.  Hemodynamic: Estimated right ventricular systolic pressure  equals 56 mm Hg, assuming right atrial pressure equals 10  mm Hg, consistent with moderate pulmonary hypertension.  ------------------------------------------------------------------------  CONCLUSIONS:  1. Mitral annular calcification, tethered mitral leaflets  with normal diastolic opening. Moderate mitral  regurgitation.  2. Calcified trileaflet aortic valve with normal opening.  Mild aortic regurgitation.  3. Mild concentric left ventricular hypertrophy.  4. Severe global left ventricular systolic dysfunction.  5. Right ventricular enlargement with normal right  ventricular systolic function.  6. Bilateral pleural effusions.  < end of copied text >      < from: Xray Chest 1 View- PORTABLE-Urgent (10.10.22 @ 10:57) >  FINDINGS:  Heart/Vascular: Heart size cannot be accurately assessed on this   projection.  Pulmonary: Redemonstration of bilateral perihilar hazy opacities, left   greater than right, somewhat improved from prior x-ray from 10/6/2022. No   pneumothorax or pleural effusion.  Bones: No acute bony abnormality.    Impression:  Interval improvement in pulmonary edema compared to prior x-ray from   10/6/2022.  < end of copied text >        ASSESSMENT & PLAN:   71 yo male, PMH of HTN,HLD,CAD, Hepatitis C ( completed  Harvoni 2018) and current smoker  presenting with acute dyspnea, found to have hypoxic respiratory distress, hypertension to , and sinus tachycardia, with nonspecific ST changes on EKG, overall concerning for hypertensive emergency with pulmonary edema. Started on nitro drip (now d/crow) and placed on bipap, later weaned to HFNC. Started ativan taper for suspected EtOH withdrawal.    Neuro  # chronic depression  -c/w sertraline 50mg PO daily (half dose as can worsen hyponatremia per psych)  - melatonin as needed for insomnia.  - AxOx3    # EtOH withdrawal  - unclear hx of when last drink was, however pt acknowledges to at least 3 drinks/day (beer 2-3 cans every day and 1 hot of RUM every day)  - tox screen positive for THC, otherwise negative for tested substances  - no known hx withdrawal  - started on CIWA protocol, received total 7 mg Ativan on 10/7 w/ high CIWA scores > 12  - started on precedex on 10/7 pm due to concern for benzo-related hypoxia. Off precedex 10/8 AM  - c/w ativan taper starting from 1.5 mg per psych recs -> now on 0.5 q12. Will d/c taper after pm dose today  - replete folate, thiamine  - psych recs appreciated    Resp  #Dyspnea 2/2 mixed pulmonary edema and COPD  - ABG showed uncompensated Resp alkalosis on Bipap-- ABG today 7.44/43/107, improved   - off bipap -> Hiflow for hypoxemia  - h/o smoking x20 years, though no known hx COPD  - pulmonary consulted- hypoxia less likely due to COPD, but pt probably has some degree of underlying emphysema  - albuterol, Spiriva started 10/8  - persistently tachypneic, EtOH withdrawal may be contributing  - weaned off HFNC since 10/10  - on RA as of 10/11 am, sat in 90s, ctm    #hemoptysis  - episode of hemoptysis w/ BRB overnight on 10/7 pm  - pt has had cough since arrival  - no evidence of consolidation on 10/6 CXR  - s/p IV Protonix 40 x1. On IV Protonix gtt for 24 hours  - c/w PO Protonix 40 mg daily   - ctm    CV  #acute CHFrEF /Acute pulmonary edema.   -presents with acute shortness of breath found in pulmonary edema  - chest Xray showed pulmonary edema on 10/6. Repeat CXR 10/10 showed improvement in pulmonary edema  - Echo ; EF 25%, Severe global LVSD, mod MR, enlarge RV with normal RVF -> new HF diagnosis  -lactate improved  -off nitro as of 10/8 am  -off Lasix drip since 10/9 am, switched to IV 40 BID  - switched Lasix to IV 80 BID on 10/10  - started PO Lasix 60 daily on 10/11. Pt now euvolemic  - inc Entresto to 97/103 bid  - strict I/O  - skilled for rehab per PT  - HF following  - pt can f/u with Dr. Mendoza outpt    Elevated trop  - Trop up trending ; 108-> 149->166. CKMB dowm trending- likely Type II  - intial EKG WEN 1mm in V1-V3, STD in inferior leads which improved  - no chest pain  - c/w asa 81mg , c/w Lipitor 80mg PO   - echo showed low EF with Severe LVSD  - ischemic workup -> nuclear stress test 10/12 (no caffeine beforehand)    # HTN (hypertension).   - presents with HTN emergency   - now off nitro gtt  - pressures variable but overall better control  - c/w isordil 10mg TID-> titrate up as needed  - reduce hydral from 50 to 25 TID  - inc coreg to 6.25 BID    GI  - no issue  -c/w DASH diet    Renal/  # Hyponatremia  -Na 126-> 123-> 126 -> 126 -> 124 -> 126  - likely 2/2 hypervolemia vs diuretic vs alcohol use    - fluid restriction 1500cc/24hr  - monitor BMP    Endo  #Hyperlipidemia.   -c/w Lipitor 20mg Po daily  - lipid profile WNL    ID  # Leukocytosis on admission   -covid PCR neg  -likely reactive, now downtrending and wnl  -rectal temp 100.2 on 10/8. RVP negative  -BCx 10/8 ngtd  -will monitor off abx    DVT ppx  heparin SQ        For Follow-Up:  [ ] ativan taper will end after pm dose 10/12  [ ] f/u nuclear stress test 10/12  [ ] pending JAMIE placement CCU Transfer Note    Transfer from: CCU  Transfer to:  (  ) Medicine    ( x ) Telemetry    (  ) RCU    (  ) Palliative    (  ) Stroke Unit    (  ) _______________  Accepting physician: ***      HPI:  68 yo male, PMH of HTN, HLD, CAD, Hepatitis C (completed  Harvoni 2018 ) and current smoker  who presents with acute onset SOB since last Saturday 10/1 with no improvement. The shortness of breath occurred both with exertion and at rest, causing him to have poor sleep at the  night as he was unable to lie flat comfortably. His dyspnea worsened on day of presentation, severely limiting his ability to ambulate. He thought he came to hospital on Sunday. He does  not remember what happen between Monday to Thursday   In ED /107, . Patient hypoxic and tachypneic. Diffuse B lines on POCUS. ProBNP 70780, lactate  2.5. Started on nitro drip and Bipap. HsTrop 108.He report non adherence to medications. Some time he miss medication for 1 week .Some time eat high salt diet. Last time he saw doctor was >3 years ago.He denies recent chest pain, palpitations, light-headedness/ dizziness syncope, nausea/vomiting, diaphoresis, LE swelling. Initial EKG concerning for WEN in anterior leads, cardiology consult fellow was called for STEMI alert. Discussed with interventional attending, did not meet STEMI criteria. Patient admitted to CCU. (06 Oct 2022 16:53)    CCU COURSE:  Admitted to CCU on nitro gtt & BIPAP, started also on Lasix gtt. Hypoxia suspected 2/2 hypertensive emergency & pulmonary edema in setting of newly diagnosed heart failure - TTE 10/6 demonstrated EF 25%, severe global LV systolic dysfunction. HF likely due to hypertension vs alcoholic myopathy; underwent nuclear stress test for ischemic evaluation on 10/12. Pulm consulted for persistent hypoxia & concern for COPD - started on albuterol & Spiriva, will need pulm follow-up. Breathing improved with diuresis. Diuresed net -8.2 L, switched to PO Lasix 60 as of 10/11. Weaned to room air as of 10/11. Oral antihypertensives restarted on arrival, nitro d/crow as of 10/8 am. Continued to titrate BP meds for persistently elevated pressures. Course c/b suspected EtOH withdrawal; pt was agitated and disoriented briefly requiring precedex gtt, was then started on standing Ativan taper per psych, to be completed on 10/12.     MEDICATIONS:  STANDING MEDICATIONS  ALBUTerol    0.083% 2.5 milliGRAM(s) Nebulizer every 12 hours  aspirin enteric coated 81 milliGRAM(s) Oral daily  atorvastatin 40 milliGRAM(s) Oral at bedtime  carvedilol 6.25 milliGRAM(s) Oral every 12 hours  chlorhexidine 2% Cloths 1 Application(s) Topical <User Schedule>  folic acid 1 milliGRAM(s) Oral daily  furosemide    Tablet 60 milliGRAM(s) Oral daily  heparin   Injectable 5000 Unit(s) SubCutaneous every 12 hours  hydrALAZINE 25 milliGRAM(s) Oral three times a day  influenza  Vaccine (HIGH DOSE) 0.7 milliLiter(s) IntraMuscular once  isosorbide   dinitrate Tablet (ISORDIL) 10 milliGRAM(s) Oral three times a day  LORazepam     Tablet 0.5 milliGRAM(s) Oral every 12 hours  LORazepam     Tablet   Oral   multivitamin 1 Tablet(s) Oral daily  pantoprazole    Tablet 40 milliGRAM(s) Oral before breakfast  sacubitril 97 mG/valsartan 103 mG 1 Tablet(s) Oral two times a day  sertraline 50 milliGRAM(s) Oral daily  spironolactone 25 milliGRAM(s) Oral daily  thiamine IVPB 500 milliGRAM(s) IV Intermittent three times a day  tiotropium 18 MICROgram(s) Capsule 1 Capsule(s) Inhalation daily    PRN MEDICATIONS  guaiFENesin Oral Liquid (Sugar-Free) 100 milliGRAM(s) Oral every 6 hours PRN  melatonin 3 milliGRAM(s) Oral at bedtime PRN      VITAL SIGNS: Last 24 Hours  T(C): 37.4 (12 Oct 2022 14:00), Max: 37.7 (11 Oct 2022 16:50)  T(F): 99.4 (12 Oct 2022 14:00), Max: 99.9 (11 Oct 2022 16:50)  HR: 94 (12 Oct 2022 14:00) (75 - 100)  BP: 153/94 (12 Oct 2022 14:00) (81/57 - 192/101)  BP(mean): 106 (12 Oct 2022 14:00) (57 - 124)  RR: 21 (12 Oct 2022 12:00) (16 - 29)  SpO2: 95% (12 Oct 2022 14:00) (91% - 98%)    LABS:                        12.2   7.05  )-----------( 299      ( 12 Oct 2022 06:57 )             36.8     10-12    126<L>  |  86<L>  |  42<H>  ----------------------------<  111<H>  4.3   |  27  |  0.99    Ca    9.7      12 Oct 2022 06:57  Phos  3.4     10-12  Mg     1.80     10-12    TPro  6.9  /  Alb  3.3  /  TBili  0.4  /  DBili  x   /  AST  48<H>  /  ALT  34  /  AlkPhos  210<H>  10-12        RADIOLOGY:  < from: Transthoracic Echocardiogram (10.06.22 @ 18:39) >  DIMENSIONS:  Dimensions:     Normal Values:  LA:     4.0 cm    2.0 - 4.0 cm  Ao:     3.5 cm    2.0 - 3.8 cm  SEPTUM: 1.1 cm    0.6 - 1.2 cm  PWT:  1.2 cm    0.6 - 1.1 cm  LVIDd:  4.5 cm    3.0 - 5.6 cm  LVIDs:    ---     1.8 - 4.0 cm  Derived Variables:  LVMI: 116 g/m2  RWT: 0.53  Ejection Fraction (Visual Estimate): 25 %  ------------------------------------------------------------------------  OBSERVATIONS:  Mitral Valve: Mitral annular calcification, tethered mitral  leaflets with normal diastolic opening. Moderate mitral  regurgitation.  Aortic Root: Normal aortic root.  Aortic Valve: Calcified trileaflet aortic valve with normal  opening. Mild aortic regurgitation.  Left Atrium: Moderately dilated left atrium.  LA volume  index = 43 cc/m2.  Left Ventricle: Severe global left ventricular systolic  dysfunction. Mild concentric left ventricular hypertrophy.  (DT:161 ms)  Right Heart: Normal right atrium. Right ventricular  enlargement with normal right ventricular systolic  function. Normal tricuspid valve.  Moderate tricuspid  regurgitation. Normal pulmonic valve.  Pericardium/PleuraNormal pericardium with no pericardial  effusion. Bilateral pleural effusions.  Hemodynamic: Estimated right ventricular systolic pressure  equals 56 mm Hg, assuming right atrial pressure equals 10  mm Hg, consistent with moderate pulmonary hypertension.  ------------------------------------------------------------------------  CONCLUSIONS:  1. Mitral annular calcification, tethered mitral leaflets  with normal diastolic opening. Moderate mitral  regurgitation.  2. Calcified trileaflet aortic valve with normal opening.  Mild aortic regurgitation.  3. Mild concentric left ventricular hypertrophy.  4. Severe global left ventricular systolic dysfunction.  5. Right ventricular enlargement with normal right  ventricular systolic function.  6. Bilateral pleural effusions.  < end of copied text >      < from: Xray Chest 1 View- PORTABLE-Urgent (10.10.22 @ 10:57) >  FINDINGS:  Heart/Vascular: Heart size cannot be accurately assessed on this   projection.  Pulmonary: Redemonstration of bilateral perihilar hazy opacities, left   greater than right, somewhat improved from prior x-ray from 10/6/2022. No   pneumothorax or pleural effusion.  Bones: No acute bony abnormality.    Impression:  Interval improvement in pulmonary edema compared to prior x-ray from   10/6/2022.  < end of copied text >        ASSESSMENT & PLAN:   73 yo male, PMH of HTN,HLD,CAD, Hepatitis C ( completed  Harvoni 2018) and current smoker  presenting with acute dyspnea, found to have hypoxic respiratory distress, hypertension to , and sinus tachycardia, with nonspecific ST changes on EKG, overall concerning for hypertensive emergency with pulmonary edema. Started on nitro drip (now d/crow) and placed on bipap, later weaned to HFNC. Started ativan taper for suspected EtOH withdrawal.    Neuro  # chronic depression  -c/w sertraline 50mg PO daily (half dose as can worsen hyponatremia per psych)  - melatonin as needed for insomnia.  - AxOx3    # EtOH withdrawal  - unclear hx of when last drink was, however pt acknowledges to at least 3 drinks/day (beer 2-3 cans every day and 1 hot of RUM every day)  - tox screen positive for THC, otherwise negative for tested substances  - no known hx withdrawal  - started on CIWA protocol, received total 7 mg Ativan on 10/7 w/ high CIWA scores > 12  - started on precedex on 10/7 pm due to concern for benzo-related hypoxia. Off precedex 10/8 AM  - c/w ativan taper starting from 1.5 mg per psych recs -> now on 0.5 q12. Will d/c taper after pm dose today  - replete folate, thiamine  - psych recs appreciated    Resp  #Dyspnea 2/2 mixed pulmonary edema and COPD  - ABG showed uncompensated Resp alkalosis on Bipap-- ABG today 7.44/43/107, improved   - off bipap -> Hiflow for hypoxemia  - h/o smoking x20 years, though no known hx COPD  - pulmonary consulted- hypoxia less likely due to COPD, but pt probably has some degree of underlying emphysema  - albuterol, Spiriva started 10/8  - persistently tachypneic, EtOH withdrawal may be contributing  - weaned off HFNC since 10/10  - on RA as of 10/11 am, sat in 90s, ctm    #hemoptysis  - episode of hemoptysis w/ BRB overnight on 10/7 pm  - pt has had cough since arrival  - no evidence of consolidation on 10/6 CXR  - s/p IV Protonix 40 x1. On IV Protonix gtt for 24 hours  - c/w PO Protonix 40 mg daily   - ctm    CV  #acute CHFrEF /Acute pulmonary edema.   -presents with acute shortness of breath found in pulmonary edema  - chest Xray showed pulmonary edema on 10/6. Repeat CXR 10/10 showed improvement in pulmonary edema  - Echo ; EF 25%, Severe global LVSD, mod MR, enlarge RV with normal RVF -> new HF diagnosis  -lactate improved  -off nitro as of 10/8 am  -off Lasix drip since 10/9 am, switched to IV 40 BID  - switched Lasix to IV 80 BID on 10/10  - started PO Lasix 60 daily on 10/11. Pt now euvolemic  - inc Entresto to 97/103 bid  - strict I/O  - skilled for rehab per PT  - HF following  - pt can f/u with Dr. Jauhar outpt    Elevated trop  - Trop up trending ; 108-> 149->166. CKMB dowm trending- likely Type II  - intial EKG WEN 1mm in V1-V3, STD in inferior leads which improved  - no chest pain  - c/w asa 81mg , c/w Lipitor 80mg PO   - echo showed low EF with Severe LVSD  - ischemic workup -> nuclear stress test 10/12 (no caffeine beforehand)    # HTN (hypertension).   - presents with HTN emergency   - now off nitro gtt  - pressures variable but overall better control  - c/w isordil 10mg TID-> titrate up as needed  - reduce hydral from 50 to 25 TID  - inc coreg to 6.25 BID    GI  - no issue  -c/w DASH diet    Renal/  # Hyponatremia  -Na 126-> 123-> 126 -> 126 -> 124 -> 126  - likely 2/2 hypervolemia vs diuretic vs alcohol use    - fluid restriction 1500cc/24hr  - monitor BMP    Endo  #Hyperlipidemia.   -c/w Lipitor 20mg Po daily  - lipid profile WNL    ID  # Leukocytosis on admission   -covid PCR neg  -likely reactive, now downtrending and wnl  -rectal temp 100.2 on 10/8. RVP negative  -BCx 10/8 ngtd  -will monitor off abx  - f/u COVID swab sent 10/12, pt currently on droplet precautions due to confirmed exposure    DVT ppx  heparin SQ        For Follow-Up:  [ ] ativan taper will end after pm dose 10/12  [ ] f/u nuclear stress test 10/12  [ ] f/u COVID test; if negative, pt to remain on droplet precautions until 10/15 per infection control  [ ] pending JAMIE placement CCU Transfer Note    Transfer from: CCU  Transfer to:  (  ) Medicine    ( x ) Telemetry    (  ) RCU    (  ) Palliative    (  ) Stroke Unit    (  ) _______________  Accepting physician: Dr. Pako Cloud    Report given to MAR [id number]. [facility] [id number]      HPI:  68 yo male, PMH of HTN, HLD, CAD, Hepatitis C (completed  Harvoni 2018 ) and current smoker  who presents with acute onset SOB since last Saturday 10/1 with no improvement. The shortness of breath occurred both with exertion and at rest, causing him to have poor sleep at the  night as he was unable to lie flat comfortably. His dyspnea worsened on day of presentation, severely limiting his ability to ambulate. He thought he came to hospital on Sunday. He does  not remember what happen between Monday to Thursday   In ED /107, . Patient hypoxic and tachypneic. Diffuse B lines on POCUS. ProBNP 74640, lactate  2.5. Started on nitro drip and Bipap. HsTrop 108.He report non adherence to medications. Some time he miss medication for 1 week .Some time eat high salt diet. Last time he saw doctor was >3 years ago.He denies recent chest pain, palpitations, light-headedness/ dizziness syncope, nausea/vomiting, diaphoresis, LE swelling. Initial EKG concerning for WEN in anterior leads, cardiology consult fellow was called for STEMI alert. Discussed with interventional attending, did not meet STEMI criteria. Patient admitted to CCU. (06 Oct 2022 16:53)    CCU COURSE:  Admitted to CCU on nitro gtt & BIPAP, started also on Lasix gtt. Hypoxia suspected 2/2 hypertensive emergency & pulmonary edema in setting of newly diagnosed heart failure - TTE 10/6 demonstrated EF 25%, severe global LV systolic dysfunction. HF likely due to hypertension vs alcoholic myopathy; underwent nuclear stress test for ischemic evaluation on 10/12. Pulm consulted for persistent hypoxia & concern for COPD - started on albuterol & Spiriva, will need pulm follow-up. Breathing improved with diuresis. Diuresed net -8.2 L, switched to PO Lasix 60 as of 10/11. Weaned to room air as of 10/11. Oral antihypertensives restarted on arrival, nitro d/crow as of 10/8 am. Continued to titrate BP meds for persistently elevated pressures. Course c/b suspected EtOH withdrawal; pt was agitated and disoriented briefly requiring precedex gtt, was then started on standing Ativan taper per psych, to be completed on 10/12.     MEDICATIONS:  STANDING MEDICATIONS  ALBUTerol    0.083% 2.5 milliGRAM(s) Nebulizer every 12 hours  aspirin enteric coated 81 milliGRAM(s) Oral daily  atorvastatin 40 milliGRAM(s) Oral at bedtime  carvedilol 6.25 milliGRAM(s) Oral every 12 hours  chlorhexidine 2% Cloths 1 Application(s) Topical <User Schedule>  folic acid 1 milliGRAM(s) Oral daily  furosemide    Tablet 60 milliGRAM(s) Oral daily  heparin   Injectable 5000 Unit(s) SubCutaneous every 12 hours  hydrALAZINE 25 milliGRAM(s) Oral three times a day  influenza  Vaccine (HIGH DOSE) 0.7 milliLiter(s) IntraMuscular once  isosorbide   dinitrate Tablet (ISORDIL) 10 milliGRAM(s) Oral three times a day  LORazepam     Tablet 0.5 milliGRAM(s) Oral every 12 hours  LORazepam     Tablet   Oral   multivitamin 1 Tablet(s) Oral daily  pantoprazole    Tablet 40 milliGRAM(s) Oral before breakfast  sacubitril 97 mG/valsartan 103 mG 1 Tablet(s) Oral two times a day  sertraline 50 milliGRAM(s) Oral daily  spironolactone 25 milliGRAM(s) Oral daily  thiamine IVPB 500 milliGRAM(s) IV Intermittent three times a day  tiotropium 18 MICROgram(s) Capsule 1 Capsule(s) Inhalation daily    PRN MEDICATIONS  guaiFENesin Oral Liquid (Sugar-Free) 100 milliGRAM(s) Oral every 6 hours PRN  melatonin 3 milliGRAM(s) Oral at bedtime PRN      VITAL SIGNS: Last 24 Hours  T(C): 37.4 (12 Oct 2022 14:00), Max: 37.7 (11 Oct 2022 16:50)  T(F): 99.4 (12 Oct 2022 14:00), Max: 99.9 (11 Oct 2022 16:50)  HR: 94 (12 Oct 2022 14:00) (75 - 100)  BP: 153/94 (12 Oct 2022 14:00) (81/57 - 192/101)  BP(mean): 106 (12 Oct 2022 14:00) (57 - 124)  RR: 21 (12 Oct 2022 12:00) (16 - 29)  SpO2: 95% (12 Oct 2022 14:00) (91% - 98%)    LABS:                        12.2   7.05  )-----------( 299      ( 12 Oct 2022 06:57 )             36.8     10-12    126<L>  |  86<L>  |  42<H>  ----------------------------<  111<H>  4.3   |  27  |  0.99    Ca    9.7      12 Oct 2022 06:57  Phos  3.4     10-12  Mg     1.80     10-12    TPro  6.9  /  Alb  3.3  /  TBili  0.4  /  DBili  x   /  AST  48<H>  /  ALT  34  /  AlkPhos  210<H>  10-12        RADIOLOGY:  < from: Transthoracic Echocardiogram (10.06.22 @ 18:39) >  DIMENSIONS:  Dimensions:     Normal Values:  LA:     4.0 cm    2.0 - 4.0 cm  Ao:     3.5 cm    2.0 - 3.8 cm  SEPTUM: 1.1 cm    0.6 - 1.2 cm  PWT:  1.2 cm    0.6 - 1.1 cm  LVIDd:  4.5 cm    3.0 - 5.6 cm  LVIDs:    ---     1.8 - 4.0 cm  Derived Variables:  LVMI: 116 g/m2  RWT: 0.53  Ejection Fraction (Visual Estimate): 25 %  ------------------------------------------------------------------------  OBSERVATIONS:  Mitral Valve: Mitral annular calcification, tethered mitral  leaflets with normal diastolic opening. Moderate mitral  regurgitation.  Aortic Root: Normal aortic root.  Aortic Valve: Calcified trileaflet aortic valve with normal  opening. Mild aortic regurgitation.  Left Atrium: Moderately dilated left atrium.  LA volume  index = 43 cc/m2.  Left Ventricle: Severe global left ventricular systolic  dysfunction. Mild concentric left ventricular hypertrophy.  (DT:161 ms)  Right Heart: Normal right atrium. Right ventricular  enlargement with normal right ventricular systolic  function. Normal tricuspid valve.  Moderate tricuspid  regurgitation. Normal pulmonic valve.  Pericardium/PleuraNormal pericardium with no pericardial  effusion. Bilateral pleural effusions.  Hemodynamic: Estimated right ventricular systolic pressure  equals 56 mm Hg, assuming right atrial pressure equals 10  mm Hg, consistent with moderate pulmonary hypertension.  ------------------------------------------------------------------------  CONCLUSIONS:  1. Mitral annular calcification, tethered mitral leaflets  with normal diastolic opening. Moderate mitral  regurgitation.  2. Calcified trileaflet aortic valve with normal opening.  Mild aortic regurgitation.  3. Mild concentric left ventricular hypertrophy.  4. Severe global left ventricular systolic dysfunction.  5. Right ventricular enlargement with normal right  ventricular systolic function.  6. Bilateral pleural effusions.  < end of copied text >      < from: Xray Chest 1 View- PORTABLE-Urgent (10.10.22 @ 10:57) >  FINDINGS:  Heart/Vascular: Heart size cannot be accurately assessed on this   projection.  Pulmonary: Redemonstration of bilateral perihilar hazy opacities, left   greater than right, somewhat improved from prior x-ray from 10/6/2022. No   pneumothorax or pleural effusion.  Bones: No acute bony abnormality.    Impression:  Interval improvement in pulmonary edema compared to prior x-ray from   10/6/2022.  < end of copied text >        ASSESSMENT & PLAN:   71 yo male, PMH of HTN,HLD,CAD, Hepatitis C ( completed  Harvoni 2018) and current smoker  presenting with acute dyspnea, found to have hypoxic respiratory distress, hypertension to , and sinus tachycardia, with nonspecific ST changes on EKG, overall concerning for hypertensive emergency with pulmonary edema. Started on nitro drip (now d/crow) and placed on bipap, later weaned to HFNC. Started ativan taper for suspected EtOH withdrawal.    Neuro  # chronic depression  -c/w sertraline 50mg PO daily (half dose as can worsen hyponatremia per psych)  - melatonin as needed for insomnia.  - AxOx3    # EtOH withdrawal  - unclear hx of when last drink was, however pt acknowledges to at least 3 drinks/day (beer 2-3 cans every day and 1 hot of RUM every day)  - tox screen positive for THC, otherwise negative for tested substances  - no known hx withdrawal  - started on CIWA protocol, received total 7 mg Ativan on 10/7 w/ high CIWA scores > 12  - started on precedex on 10/7 pm due to concern for benzo-related hypoxia. Off precedex 10/8 AM  - c/w ativan taper starting from 1.5 mg per psych recs -> now on 0.5 q12. Will d/c taper after pm dose today  - replete folate, thiamine  - psych recs appreciated    Resp  #Dyspnea 2/2 mixed pulmonary edema and COPD  - ABG showed uncompensated Resp alkalosis on Bipap-- ABG today 7.44/43/107, improved   - off bipap -> [place] for hypoxemia  - h/o smoking x20 years, though no known hx COPD  - pulmonary consulted- hypoxia less likely due to COPD, but pt probably has some degree of underlying emphysema  - albuterol, Spiriva started 10/8  - persistently tachypneic, EtOH withdrawal may be contributing  - weaned off HFNC since 10/10  - on RA as of 10/11 am, sat in 90s, ctm    #hemoptysis  - episode of hemoptysis w/ BRB overnight on 10/7 pm  - pt has had cough since arrival  - no evidence of consolidation on 10/6 CXR  - s/p IV Protonix 40 x1. On IV Protonix gtt for 24 hours  - c/w PO Protonix 40 mg daily   - ctm    CV  #acute CHFrEF /Acute pulmonary edema.   -presents with acute shortness of breath found in pulmonary edema  - chest Xray showed pulmonary edema on 10/6. Repeat CXR 10/10 showed improvement in pulmonary edema  - Echo ; EF 25%, Severe global LVSD, mod MR, enlarge RV with normal RVF -> new HF diagnosis  -lactate improved  -off nitro as of 10/8 am  -off Lasix drip since 10/9 am, switched to IV 40 BID  - switched Lasix to IV 80 BID on 10/10  - started PO Lasix 60 daily on 10/11. Pt now euvolemic  - inc Entresto to 97/103 bid  - strict I/O  - skilled for rehab per PT  - HF following  - pt can f/u with Dr. Mendoza outpt    Elevated trop  - Trop up trending ; 108-> 149->166. CKMB dowm trending- likely Type II  - intial EKG WEN 1mm in V1-V3, STD in inferior leads which improved  - no chest pain  - c/w asa 81mg , c/w Lipitor 80mg PO   - echo showed low EF with Severe LVSD  - ischemic workup -> nuclear stress test 10/12 (no caffeine beforehand)    # HTN (hypertension).   - presents with HTN emergency   - now off nitro gtt  - pressures variable but overall better control  - c/w isordil 10mg TID-> titrate up as needed  - reduce hydral from 50 to 25 TID  - inc coreg to 6.25 BID    GI  - no issue  -c/w DASH diet    Renal/  # Hyponatremia  -Na 126-> 123-> 126 -> 126 -> 124 -> 126  - likely 2/2 hypervolemia vs diuretic vs alcohol use    - fluid restriction 1500cc/24hr  - monitor BMP    Endo  #Hyperlipidemia.   -c/w Lipitor 20mg Po daily  - lipid profile WNL    ID  # Leukocytosis on admission   -covid PCR neg  -likely reactive, now downtrending and wnl  -rectal temp 100.2 on 10/8. RVP negative  -BCx 10/8 ngtd  -will monitor off abx  - f/u COVID swab sent 10/12, pt currently on droplet precautions due to confirmed exposure    DVT ppx  heparin SQ        For Follow-Up:  [ ] ativan taper will end after pm dose 10/12  [ ] f/u nuclear stress test 10/12  [ ] f/u COVID test; if negative, pt to remain on droplet precautions until 10/15 per infection control  [ ] pending JAMIE placement

## 2022-10-12 NOTE — DISCHARGE NOTE PROVIDER - NSDCFUSCHEDAPPT_GEN_ALL_CORE_FT
Palak Tabor  [facility]  INTMED [address]  Scheduled Appointment: 10/18/2022     [facility]  PULMMED [address]  Scheduled Appointment: 10/17/2022    Palak Tabor  [facility]  INTMED [address]  Scheduled Appointment: 10/18/2022     [facility]  CARDIOLOGY [id number]  Scheduled Appointment: 11/02/2022    [facility]  PULMMED [address]  Scheduled Appointment: 11/07/2022     [facility]  [address]  Scheduled Appointment: 11/07/2022

## 2022-10-12 NOTE — DISCHARGE NOTE PROVIDER - HOSPITAL COURSE
Discharge Summary     Admission diagnoses:   Hypertensive emergency    Discharge diagnoses:   Systolic heart failure    Hospital Course:   For full details, please see H&P, progress notes, consult notes and ancillary notes. Briefly, Mr. Shelton Cortes is a 72yMale with a history of HTN, HLD, CAD, hepatitis C (s/p Harvoni 2018), current smoker, EtOH use, admitted for hypoxic respiratory distress in the setting of hypertensive emergency & pulmonary edema, found to have new systolic heart failure. The patient's hospital course will be summarized in a problem based format.    #hypertensive emergency  #pulmonary edema  ***    #systolic heart failure  ***    #EtOH withdrawal  ***      On day of discharge, patient is clinically stable with no new exam findings or acute symptoms compared to prior. The patient was seen by the attending physician on the date of discharge and deemed stable and acceptable for discharge. The patient's chronic medical conditions were treated accordingly per the patient's home medication regimen. The patient's medication reconciliation (with changes made to chronic medications), follow up appointments, discharge orders, instructions, and significant lab and diagnostic studies are as noted.     Discharge follow up action items:     1. Follow up with PCP in 1-2 weeks.   2. Follow up labs, path, & imaging ***  3. Medication changes ***    Patient's ordered code status: Full    Patient disposition: JAMIE   Discharge Summary     Admission diagnoses:   Hypertensive emergency    Discharge diagnoses:   Systolic heart failure    Hospital Course:   For full details, please see H&P, progress notes, consult notes and ancillary notes. Briefly, Mr. Shelton Cortes is a 72yMale with a history of HTN, HLD, CAD, hepatitis C (s/p Harvoni 2018), current smoker, EtOH use, admitted for hypoxic respiratory distress in the setting of hypertensive emergency & pulmonary edema, found to have new systolic heart failure. The patient's hospital course will be summarized in a problem based format.    #hypertensive emergency  #pulmonary edema  Pt admitted with hypoxia, dyspnea, elevated SBPs to 210s. CXR demonstrated pulmonary edema, likely 2/2 hypertensive emergency in the setting of previously undiagnosed heart failure. Trops elevated and peaked at 166, no ST elevation noted, no chest pain. Pt started on nitro gtt & Lasix gtt, placed on BIPAP, admitted to CCU. Pulm consulted for prolonged hypoxia & oxygen requirements, concern for underlying emphysema and started Spiriva & albuterol, however per pulm pt's respiratory symptoms primarily due to volume overload. Diuresed net >8 L with improvement in breathing, eventually weaned down to room air. Blood pressure meds restarted on arrival, nitro discontinued shortly afterward. Continued to titrate BP meds during CCU stay. ***    #systolic heart failure  TTE 10/6 demonstrated EF 25%, severe global LV systolic dysfunction, mild LV concentric hypertrophy, moderate mitral regurg, mild aortic regurg, RV enlargement with normal systolic function. Possibly hypertensive vs alcoholic cardiomyopathy. Nuclear stress test performed for ischemic evaluation on 10/12 ***. Pt started on spironolactone, Entresto, coreg, aspirin.    #EtOH withdrawal  Pt became agitated and disoriented 1 day after admission to CCU, likely due to EtOH withdrawal. Initially on CIWA protocol, briefly required precedex gtt for agitation. Subsequently started on ativan taper per psych, completed on 10/12. Pt counseled on alcohol cessation. Family concerned about pt's living situation and ability to care for himself living alone. Skilled for JAMIE by PT.      On day of discharge, patient is clinically stable with no new exam findings or acute symptoms compared to prior. The patient was seen by the attending physician on the date of discharge and deemed stable and acceptable for discharge. The patient's chronic medical conditions were treated accordingly per the patient's home medication regimen. The patient's medication reconciliation (with changes made to chronic medications), follow up appointments, discharge orders, instructions, and significant lab and diagnostic studies are as noted.     Discharge follow up action items:     1. Follow up with PCP (Dr. Cid), cardiology (Dr. DAVID Mendoza), pulmonology (Dr. Pacheco) in 1-2 weeks.   2. Follow up labs, path, & imaging ***  3. Medication changes ***    Patient's ordered code status: Full    Patient disposition: JAMIE   72M PMH of HTN, HLD, CAD, Hepatitis C (completed Harvoni 2018) and current smoker,+ETOH presenting with acute dyspnea, found to have hypoxic respiratory distress, hypertension to , and sinus tachycardia 125, with nonspecific ST changes on EKG, overall concerning for hypertensive emergency with pulmonary edema. Started on nitro drip and placed on bipap. Now off nitro, resumed oral antihypertensives.     Hyponatremia  - improving  - elevated UOsm and Arvind suggestive of SIADH  - c/w fluid restriction to 1L qd  - nephro consulted, was on salt tabs, but d/c'd 10/17 due to improvement in Na    Acute systolic congestive heart failure  - echo with global LVDF, EF 25%, new LV systolic dysfunction  - clinically euvolemic at this time  - c/w entresto, carvedilol, hydralazine, isordil, spironolactone, lasix  - c/w ASA, statin  - NST showed infarct, but no ischemia, no further work up per cardiology  - HF/cards consulted: outpatient follow up with HF, Dr. Mendoza, on d/c    COPD with hypoxia  - evaluated by pulm in CCU  - c/w spiriva and albuterol   - outpatient f/u with pulm for further evaluation and PFTs    Alcohol use with withdrawal  - s/p course of ativan taper  - no current withdrawal symptoms    Major depression, chronic  - c/w Zoloft    Case discussed with  ___ on ___. Patient is medically stable and optimized for discharge as per attending. All medications were reviewed and prescriptions were sent to a mutually agreed upon pharmacy. Discharge plan reviewed with patient and/or family. 72M PMH of HTN, HLD, CAD, Hepatitis C (completed Harvoni 2018) and current smoker,+ETOH presenting with acute dyspnea, found to have hypoxic respiratory distress, hypertension to , and sinus tachycardia 125, with nonspecific ST changes on EKG, overall concerning for hypertensive emergency with pulmonary edema. Started on nitro drip and placed on bipap. Now off nitro, resumed oral antihypertensives.     Hyponatremia  - improving  - elevated UOsm and Arvind suggestive of SIADH  - c/w fluid restriction to 1L qd  - nephro consulted, was on salt tabs, but d/c'd 10/17 due to improvement in Na    Encephalopathy   - Per patient's daughter, patient has appeared more confused over the past few days, patient also appearing forgetful which is not his baseline  - patient currently A&Ox3 (oriented to self, place, year and month, but not exact date), however mental status has remained stable over past week  - etiology unclear, patient may have encephalopathy from chronic alcohol use vs hyponatremia vs microvascular ischemia vs hospital delirium  - CTH non-con WNL  - B12/folate WNL, syphillis screen negative    Acute systolic congestive heart failure  - echo with global LVDF, EF 25%, new LV systolic dysfunction  - clinically euvolemic at this time  - c/w entresto, carvedilol, hydralazine, isordil, spironolactone, lasix  - c/w ASA, statin  - NST showed infarct, but no ischemia, no further work up per cardiology  - HF/cards consulted: outpatient follow up with HF, Dr. Mendoza, on d/c    COPD with hypoxia  - evaluated by pulm in CCU  - c/w spiriva and albuterol   - outpatient f/u with pulm for further evaluation and PFTs    Alcohol use with withdrawal  - s/p course of ativan taper  - no current withdrawal symptoms    Major depression, chronic  - c/w Zoloft    Case discussed with  ___ on ___. Patient is medically stable and optimized for discharge as per attending. All medications were reviewed and prescriptions were sent to a mutually agreed upon pharmacy. Discharge plan reviewed with patient and/or family. 72M h/o HTN, HLD, CAD, HCV s/p Harvoni 2018, current smoker, and ETOH abuse p/w acute dyspnea found to have acute hypoxic respiratory failure concern noted for hypertensive emergency with flash pulmonary edema s/p Nitro drip and placed on BIPAP admitted to CCU 10/6 noted w/ new acute HFrEF 25% severe LVSD downgraded to Medicine 10/12 additionally w/ COVID exposure (10/7)    Encephalopathy  - Per patient's daughter, patient has appeared more confused patient also appearing forgetful which is not his baseline currently A&Ox3 (oriented to self, place, year and month, but not exact date), however mental status has remained stable over past week etiology unclear, patient may have encephalopathy from chronic alcohol use vs hyponatremia vs microvascular ischemia vs hospital delirium  - CTH non-con WNL; B12/folate WNL, syphillis screen negative; Patient w/o concerns, states that he feels fine and looks forward to being discharged.    Hyponatremia 2/2 SIADH   - Monitor BMP and continue fluid restriction to 1L daily; Renal followed and was on salt tabs now d/c'd    Acute systolic congestive heart failure.   - ECHO with global LVDF, EF 25% clinically euvolemic at this time  - C/w Entresto, however, cost is too high for family will discuss w/ cardiology   - C/w carvedilol, hydralazine, Isordil, spironolactone. ASA, statin  - HF/Cardiology followed course  - NST showed infarct, but no ischemia, no further workup per cards  - Lasix reduced to 20mg QD monitor BMP and I/Os    COPD with hypoxia evaluated by pulm in CCU c/w spiriva and albuterol; No distress; On RA    Alcohol use with withdrawal s/p course of Ativan taper and CIWA monitoring     Major depression c/w Zoloft    Dispo - JAMIE

## 2022-10-13 DIAGNOSIS — E87.1 HYPO-OSMOLALITY AND HYPONATREMIA: ICD-10-CM

## 2022-10-13 DIAGNOSIS — Z29.9 ENCOUNTER FOR PROPHYLACTIC MEASURES, UNSPECIFIED: ICD-10-CM

## 2022-10-13 DIAGNOSIS — J44.9 CHRONIC OBSTRUCTIVE PULMONARY DISEASE, UNSPECIFIED: ICD-10-CM

## 2022-10-13 LAB
ALBUMIN SERPL ELPH-MCNC: 3.5 G/DL — SIGNIFICANT CHANGE UP (ref 3.3–5)
ALP SERPL-CCNC: 190 U/L — HIGH (ref 40–120)
ALT FLD-CCNC: 34 U/L — SIGNIFICANT CHANGE UP (ref 4–41)
ANION GAP SERPL CALC-SCNC: 13 MMOL/L — SIGNIFICANT CHANGE UP (ref 7–14)
AST SERPL-CCNC: 56 U/L — HIGH (ref 4–40)
BILIRUB SERPL-MCNC: 0.4 MG/DL — SIGNIFICANT CHANGE UP (ref 0.2–1.2)
BUN SERPL-MCNC: 40 MG/DL — HIGH (ref 7–23)
CALCIUM SERPL-MCNC: 9.5 MG/DL — SIGNIFICANT CHANGE UP (ref 8.4–10.5)
CHLORIDE SERPL-SCNC: 83 MMOL/L — LOW (ref 98–107)
CHLORIDE UR-SCNC: 45 MMOL/L — SIGNIFICANT CHANGE UP
CO2 SERPL-SCNC: 26 MMOL/L — SIGNIFICANT CHANGE UP (ref 22–31)
CREAT ?TM UR-MCNC: 43 MG/DL — SIGNIFICANT CHANGE UP
CREAT SERPL-MCNC: 1.2 MG/DL — SIGNIFICANT CHANGE UP (ref 0.5–1.3)
CULTURE RESULTS: SIGNIFICANT CHANGE UP
EGFR: 64 ML/MIN/1.73M2 — SIGNIFICANT CHANGE UP
GLUCOSE SERPL-MCNC: 105 MG/DL — HIGH (ref 70–99)
HCT VFR BLD CALC: 34.6 % — LOW (ref 39–50)
HGB BLD-MCNC: 11.8 G/DL — LOW (ref 13–17)
MAGNESIUM SERPL-MCNC: 2.1 MG/DL — SIGNIFICANT CHANGE UP (ref 1.6–2.6)
MCHC RBC-ENTMCNC: 27.3 PG — SIGNIFICANT CHANGE UP (ref 27–34)
MCHC RBC-ENTMCNC: 34.1 GM/DL — SIGNIFICANT CHANGE UP (ref 32–36)
MCV RBC AUTO: 79.9 FL — LOW (ref 80–100)
NRBC # BLD: 0 /100 WBCS — SIGNIFICANT CHANGE UP (ref 0–0)
NRBC # FLD: 0 K/UL — SIGNIFICANT CHANGE UP (ref 0–0)
OSMOLALITY SERPL: 279 MOSM/KG — SIGNIFICANT CHANGE UP (ref 275–295)
OSMOLALITY UR: 407 MOSM/KG — SIGNIFICANT CHANGE UP (ref 50–1200)
PHOSPHATE SERPL-MCNC: 4.1 MG/DL — SIGNIFICANT CHANGE UP (ref 2.5–4.5)
PLATELET # BLD AUTO: 318 K/UL — SIGNIFICANT CHANGE UP (ref 150–400)
POTASSIUM SERPL-MCNC: 3.8 MMOL/L — SIGNIFICANT CHANGE UP (ref 3.5–5.3)
POTASSIUM SERPL-SCNC: 3.8 MMOL/L — SIGNIFICANT CHANGE UP (ref 3.5–5.3)
POTASSIUM UR-SCNC: 35.9 MMOL/L — SIGNIFICANT CHANGE UP
PROT SERPL-MCNC: 6.7 G/DL — SIGNIFICANT CHANGE UP (ref 6–8.3)
RBC # BLD: 4.33 M/UL — SIGNIFICANT CHANGE UP (ref 4.2–5.8)
RBC # FLD: 12.7 % — SIGNIFICANT CHANGE UP (ref 10.3–14.5)
SODIUM SERPL-SCNC: 122 MMOL/L — LOW (ref 135–145)
SODIUM UR-SCNC: 50 MMOL/L — SIGNIFICANT CHANGE UP
SODIUM UR-SCNC: 61 MMOL/L — SIGNIFICANT CHANGE UP
SPECIMEN SOURCE: SIGNIFICANT CHANGE UP
UUN UR-MCNC: 537 MG/DL — SIGNIFICANT CHANGE UP
WBC # BLD: 6.44 K/UL — SIGNIFICANT CHANGE UP (ref 3.8–10.5)
WBC # FLD AUTO: 6.44 K/UL — SIGNIFICANT CHANGE UP (ref 3.8–10.5)

## 2022-10-13 PROCEDURE — 99233 SBSQ HOSP IP/OBS HIGH 50: CPT

## 2022-10-13 RX ORDER — SODIUM CHLORIDE 9 MG/ML
1 INJECTION INTRAMUSCULAR; INTRAVENOUS; SUBCUTANEOUS THREE TIMES A DAY
Refills: 0 | Status: DISCONTINUED | OUTPATIENT
Start: 2022-10-13 | End: 2022-10-13

## 2022-10-13 RX ORDER — SODIUM CHLORIDE 9 MG/ML
1000 INJECTION INTRAMUSCULAR; INTRAVENOUS; SUBCUTANEOUS
Refills: 0 | Status: DISCONTINUED | OUTPATIENT
Start: 2022-10-13 | End: 2022-10-14

## 2022-10-13 RX ORDER — ALBUTEROL 90 UG/1
2 AEROSOL, METERED ORAL EVERY 6 HOURS
Refills: 0 | Status: DISCONTINUED | OUTPATIENT
Start: 2022-10-13 | End: 2022-11-02

## 2022-10-13 RX ADMIN — HEPARIN SODIUM 5000 UNIT(S): 5000 INJECTION INTRAVENOUS; SUBCUTANEOUS at 17:17

## 2022-10-13 RX ADMIN — SERTRALINE 50 MILLIGRAM(S): 25 TABLET, FILM COATED ORAL at 11:44

## 2022-10-13 RX ADMIN — Medication 1 MILLIGRAM(S): at 11:44

## 2022-10-13 RX ADMIN — ISOSORBIDE DINITRATE 10 MILLIGRAM(S): 5 TABLET ORAL at 22:23

## 2022-10-13 RX ADMIN — SACUBITRIL AND VALSARTAN 1 TABLET(S): 24; 26 TABLET, FILM COATED ORAL at 17:17

## 2022-10-13 RX ADMIN — ATORVASTATIN CALCIUM 40 MILLIGRAM(S): 80 TABLET, FILM COATED ORAL at 22:23

## 2022-10-13 RX ADMIN — Medication 81 MILLIGRAM(S): at 11:44

## 2022-10-13 RX ADMIN — SPIRONOLACTONE 25 MILLIGRAM(S): 25 TABLET, FILM COATED ORAL at 10:36

## 2022-10-13 RX ADMIN — Medication 25 MILLIGRAM(S): at 05:49

## 2022-10-13 RX ADMIN — Medication 105 MILLIGRAM(S): at 05:50

## 2022-10-13 RX ADMIN — PANTOPRAZOLE SODIUM 40 MILLIGRAM(S): 20 TABLET, DELAYED RELEASE ORAL at 05:49

## 2022-10-13 RX ADMIN — Medication 25 MILLIGRAM(S): at 13:21

## 2022-10-13 RX ADMIN — CARVEDILOL PHOSPHATE 6.25 MILLIGRAM(S): 80 CAPSULE, EXTENDED RELEASE ORAL at 05:49

## 2022-10-13 RX ADMIN — ALBUTEROL 2.5 MILLIGRAM(S): 90 AEROSOL, METERED ORAL at 11:45

## 2022-10-13 RX ADMIN — CARVEDILOL PHOSPHATE 6.25 MILLIGRAM(S): 80 CAPSULE, EXTENDED RELEASE ORAL at 17:17

## 2022-10-13 RX ADMIN — SACUBITRIL AND VALSARTAN 1 TABLET(S): 24; 26 TABLET, FILM COATED ORAL at 05:48

## 2022-10-13 RX ADMIN — ISOSORBIDE DINITRATE 10 MILLIGRAM(S): 5 TABLET ORAL at 13:21

## 2022-10-13 RX ADMIN — Medication 60 MILLIGRAM(S): at 05:49

## 2022-10-13 RX ADMIN — Medication 3 MILLIGRAM(S): at 22:23

## 2022-10-13 RX ADMIN — HEPARIN SODIUM 5000 UNIT(S): 5000 INJECTION INTRAVENOUS; SUBCUTANEOUS at 05:49

## 2022-10-13 RX ADMIN — ISOSORBIDE DINITRATE 10 MILLIGRAM(S): 5 TABLET ORAL at 05:49

## 2022-10-13 RX ADMIN — SODIUM CHLORIDE 50 MILLILITER(S): 9 INJECTION INTRAMUSCULAR; INTRAVENOUS; SUBCUTANEOUS at 15:48

## 2022-10-13 RX ADMIN — Medication 1 TABLET(S): at 11:44

## 2022-10-13 RX ADMIN — CHLORHEXIDINE GLUCONATE 1 APPLICATION(S): 213 SOLUTION TOPICAL at 05:52

## 2022-10-13 RX ADMIN — Medication 25 MILLIGRAM(S): at 22:23

## 2022-10-13 NOTE — PROGRESS NOTE ADULT - SUBJECTIVE AND OBJECTIVE BOX
Subjective/Objective: Patient denies chest pain ,sob, dizziness    Tele event: NSR 70    MEDICATIONS    aspirin enteric coated 81 milliGRAM(s) Oral daily  carvedilol 6.25 milliGRAM(s) Oral every 12 hours  furosemide    Tablet 60 milliGRAM(s) Oral daily  heparin   Injectable 5000 Unit(s) SubCutaneous every 12 hours  hydrALAZINE 25 milliGRAM(s) Oral three times a day  isosorbide   dinitrate Tablet (ISORDIL) 10 milliGRAM(s) Oral three times a day  sacubitril 97 mG/valsartan 103 mG 1 Tablet(s) Oral two times a day  spironolactone 25 milliGRAM(s) Oral daily  ALBUTerol    0.083% 2.5 milliGRAM(s) Nebulizer every 12 hours  guaiFENesin Oral Liquid (Sugar-Free) 100 milliGRAM(s) Oral every 6 hours PRN  tiotropium 18 MICROgram(s) Capsule 1 Capsule(s) Inhalation daily  melatonin 3 milliGRAM(s) Oral at bedtime PRN  sertraline 50 milliGRAM(s) Oral daily  pantoprazole    Tablet 40 milliGRAM(s) Oral before breakfast  atorvastatin 40 milliGRAM(s) Oral at bedtime  chlorhexidine 2% Cloths 1 Application(s) Topical <User Schedule>  folic acid 1 milliGRAM(s) Oral daily  influenza  Vaccine (HIGH DOSE) 0.7 milliLiter(s) IntraMuscular once  multivitamin 1 Tablet(s) Oral daily          REVIEW OF SYSTEMS    General: no fatigue/malaise, weight loss/gain.  Skin: no rashes.  Ophthalmologic: no blurred vision, no loss of vision. 	  ENT: no sore throat, rhinorrhea, sinus congestion.  Cardiovascular:no chest pain ,no palpitation,no dizziness,no diaphoresis,no edema  Respiratory: no SOB, cough or wheeze.  Gastrointestinal:  no N/V/D, no melena/hematemesis/hematochezia.  Genitourinary: no dysuria/hesitancy or hematuria.  Musculoskeletal: no myalgias or arthralgias.  Neurological: no changes in vision or hearing, no lightheadedness/dizziness, no syncope/near syncope	  Psychiatric: no unusual stress/anxiety      	    ICU Vital Signs Last 24 Hrs  T(C): 36.9 (13 Oct 2022 09:00), Max: 38.1 (12 Oct 2022 20:00)  T(F): 98.4 (13 Oct 2022 09:00), Max: 100.5 (12 Oct 2022 20:00)  HR: 74 (13 Oct 2022 10:36) (70 - 102)  BP: 101/62 (13 Oct 2022 10:36) (79/57 - 153/94)  BP(mean): 71 (12 Oct 2022 21:00) (62 - 106)  RR: 18 (13 Oct 2022 09:00) (17 - 22)  SpO2: 100% (13 Oct 2022 09:00) (94% - 100%)    O2 Parameters below as of 13 Oct 2022 09:00  Patient On (Oxygen Delivery Method): room air            PHYSICAL EXAMINATION  Appearance: NAD, no distress  HEENT: Moist Mucous Membranes, Anicteric, PERRL, EOMI  Cardiovascular: Regular rate and rhythm, Normal S1 S2, No JVD, No murmurs  Respiratory: Lungs clear to auscultation. No rales, No rhonchi, No wheezing. No tenderness to palpation  Gastrointestinal:  Soft, Non-tender, + BS  Neurologic: Non-focal, A&Ox3  Skin: Warm and dry, No rashes, No ecchymosis, No cyanosis  Musculoskeletal: No clubbing, No cyanosis, No edema  Vascular: Peripheral pulses palpable 2+ bilaterally  Psychiatry: Mood & affect appropriate      	    		      I&O's Summary    12 Oct 2022 07:01  -  13 Oct 2022 07:00  --------------------------------------------------------  IN: 500 mL / OUT: 500 mL / NET: 0 mL    	 	  LABORATORY VALUES	 	                          11.8   6.44  )-----------( 318      ( 13 Oct 2022 06:00 )             34.6       10-13    122<L>  |  83<L>  |  40<H>  ----------------------------<  105<H>  3.8   |  26  |  1.20  10-12    126<L>  |  86<L>  |  42<H>  ----------------------------<  111<H>  4.3   |  27  |  0.99    Ca    9.5      13 Oct 2022 06:00  Ca    9.7      12 Oct 2022 06:57  Phos  4.1     10-13  Phos  3.4     10-12  Mg     2.10     10-13  Mg     1.80     10-12    TPro  6.7  /  Alb  3.5  /  TBili  0.4  /  DBili  x   /  AST  56<H>  /  ALT  34  /  AlkPhos  190<H>  10-13  TPro  6.9  /  Alb  3.3  /  TBili  0.4  /  DBili  x   /  AST  48<H>  /  ALT  34  /  AlkPhos  210<H>  10-12    LIVER FUNCTIONS - ( 13 Oct 2022 06:00 )  Alb: 3.5 g/dL / Pro: 6.7 g/dL / ALK PHOS: 190 U/L / ALT: 34 U/L / AST: 56 U/L / GGT: x               Serum Pro-Brain Natriuretic Peptide: >31504 pg/mL (10-06 @ 15:07)    Blood Gas Venous - Lactate: 1.2 mmol/L (10-12 @ 06:57)  Blood Gas Venous - Lactate: 0.9 mmol/L (10-11 @ 03:00)    10-07 @ 05:45  Cholesterol, Serum - 140  Direct LDL- --  HDL Cholesterol, Serum- 68  Triglycerides, Serum- 62      Thyroid Stimulating Hormone, Serum: 3.15 uIU/mL (10-07 @ 05:45)        CAPILLARY BLOOD GLUCOSE          10-08 @ 17:23  229E Corona Virus --  Adenovirus NotDetec  Bordetella pertussis NotDetec  Chlamydia pneumoniae NotDete  Entero/Rhino Virus NotDete  HKU1 Coronavirus --  hMPV NotDetec  Influenza A NotDete  Influenza AH1 --  Influenza AH1 2009 --  Influenza AH3 --  Influenza B NotDete  Mycoplasma pneumoniae NotDete  NL63 Coronavirus --  OC43 Corornavirus --  Parainfluenza 1 NotDete  Parainfluenza 2 NotDete    Diagnostic testing:  cath:< from: Cardiac Cath Lab - Adult (09.29.16 @ 12:08) >  VENTRICLES: Global left ventricular function was normal. EF estimated was  65 %.  CORONARY VESSELS: The coronary circulation is right dominant.  LM:   --  LM: Normal.  LAD:   --  LAD: Normal.  CX:   --  Proximal circumflex: There was a 40 % stenosis.  RCA:   --  RCA:Normal.  COMPLICATIONS: There were no complications.    < end of copied text >    echo:  < from: Transthoracic Echocardiogram (10.06.22 @ 18:39) >  LA:     4.0 cm    2.0 - 4.0 cm  Ao:     3.5 cm    2.0 - 3.8 cm  SEPTUM: 1.1 cm    0.6 - 1.2 cm  PWT:  1.2 cm    0.6 - 1.1 cm  LVIDd:  4.5 cm    3.0 - 5.6 cm  LVIDs:    ---     1.8 - 4.0 cm  Derived Variables:  LVMI: 116 g/m2  RWT: 0.53  Ejection Fraction (Visual Estimate): 25 %  ------------------------------------------------------------------------  OBSERVATIONS:  Mitral Valve: Mitral annular calcification, tethered mitralleaflets with normal diastolic opening. Moderate mitralregurgitation.  Aortic Root: Normal aortic root.  Aortic Valve: Calcified trileaflet aortic valve with normalopening. Mild aortic regurgitation.  Left Atrium: Moderately dilated left atrium.  LA volumeindex = 43 cc/m2.  Left Ventricle: Severe global left ventricular systolicdysfunction. Mild concentric left ventricular hypertrophy.(DT:161 ms)  Right Heart: Normal right atrium. Right ventricularenlargement with normal right ventricular systolicfunction. Normal tricuspid valve.  Moderate tricuspidregurgitation. Normal pulmonic valve.  Pericardium/PleuraNormal pericardium with no pericardialeffusion. Bilateral pleural effusions.Hemodynamic: Estimated right ventricular systolic pressureequals 56 mm Hg, assuming right atrial pressure equals 10  mm Hg, consistent with moderate pulmonary hypertension.    < end of copied text >      Assessment and Plan:73 yo male, PMH of HTN,HLD,CAD, Hepatitis C ( completed  [place] 2018) and current smoker  presenting with acute dyspnea, found to have hypoxic respiratory distress, hypertension to , and sinus tachycardia, with nonspecific ST changes on EKG, overall concerning for hypertensive emergency with pulmonary edema. Started on nitro drip (now d/crow) and placed on bipap,  weaned to HFNC, later to NC . Hospital c/b ETOH withdrawal .Completed  ativan taper for suspected EtOH withdrawal.    CV  #acute CHFrEF /Acute pulmonary edema.   -presents with acute shortness of breath found in pulmonary edema  - chest Xray showed pulmonary edema on 10/6. Repeat CXR 10/10 showed improvement in pulmonary edema  - Echo ; EF 25%, Severe global LVSD, mod MR, enlarge RV with normal RVF -> new HF diagnosis  -lactate improved  -off nitro as of 10/8 am  -off Lasix drip on 10/9  - switched Lasix to IV 80 BID on 10/10  - started PO Lasix 60 daily on 10/11.   -c/w  Entresto to 97/103 bid, spirolactone 25mg PO daily  - strict I/O  - skilled for rehab per PT  - HF following  - pt can f/u with Dr. Mendoza outpt          Elevated trop  - Trop up trending ; 108-> 149->166. CKMB dowm trending- likely Type II  - intial EKG WEN 1mm in V1-V3, STD in inferior leads which improved  - no chest pain  - c/w asa 81mg , c/w Lipitor 80mg PO   - echo showed low EF with Severe LVSD  -  nuclear stress test 10/12 - pending result    # HTN (hypertension).   - presents with HTN emergency   -  nitro gtt titrated off   - c/w isordil 10mg TID-> titrate up as needed  - reduce hydral from  25 TID  - inc coreg to 6.25 BID      Dispo  Rehab Subjective/Objective: Patient denies chest pain ,sob, dizziness    Tele event: NSR 70    MEDICATIONS    aspirin enteric coated 81 milliGRAM(s) Oral daily  carvedilol 6.25 milliGRAM(s) Oral every 12 hours  furosemide    Tablet 60 milliGRAM(s) Oral daily  heparin   Injectable 5000 Unit(s) SubCutaneous every 12 hours  hydrALAZINE 25 milliGRAM(s) Oral three times a day  isosorbide   dinitrate Tablet (ISORDIL) 10 milliGRAM(s) Oral three times a day  sacubitril 97 mG/valsartan 103 mG 1 Tablet(s) Oral two times a day  spironolactone 25 milliGRAM(s) Oral daily  ALBUTerol    0.083% 2.5 milliGRAM(s) Nebulizer every 12 hours  guaiFENesin Oral Liquid (Sugar-Free) 100 milliGRAM(s) Oral every 6 hours PRN  tiotropium 18 MICROgram(s) Capsule 1 Capsule(s) Inhalation daily  melatonin 3 milliGRAM(s) Oral at bedtime PRN  sertraline 50 milliGRAM(s) Oral daily  pantoprazole    Tablet 40 milliGRAM(s) Oral before breakfast  atorvastatin 40 milliGRAM(s) Oral at bedtime  chlorhexidine 2% Cloths 1 Application(s) Topical <User Schedule>  folic acid 1 milliGRAM(s) Oral daily  influenza  Vaccine (HIGH DOSE) 0.7 milliLiter(s) IntraMuscular once  multivitamin 1 Tablet(s) Oral daily          REVIEW OF SYSTEMS    General: no fatigue/malaise, weight loss/gain.  Skin: no rashes.  Ophthalmologic: no blurred vision, no loss of vision. 	  ENT: no sore throat, rhinorrhea, sinus congestion.  Cardiovascular:no chest pain ,no palpitation,no dizziness,no diaphoresis,no edema  Respiratory: no SOB, cough or wheeze.  Gastrointestinal:  no N/V/D, no melena/hematemesis/hematochezia.  Genitourinary: no dysuria/hesitancy or hematuria.  Musculoskeletal: no myalgias or arthralgias.  Neurological: no changes in vision or hearing, no lightheadedness/dizziness, no syncope/near syncope	  Psychiatric: no unusual stress/anxiety      	    ICU Vital Signs Last 24 Hrs  T(C): 36.9 (13 Oct 2022 09:00), Max: 38.1 (12 Oct 2022 20:00)  T(F): 98.4 (13 Oct 2022 09:00), Max: 100.5 (12 Oct 2022 20:00)  HR: 74 (13 Oct 2022 10:36) (70 - 102)  BP: 101/62 (13 Oct 2022 10:36) (79/57 - 153/94)  BP(mean): 71 (12 Oct 2022 21:00) (62 - 106)  RR: 18 (13 Oct 2022 09:00) (17 - 22)  SpO2: 100% (13 Oct 2022 09:00) (94% - 100%)    O2 Parameters below as of 13 Oct 2022 09:00  Patient On (Oxygen Delivery Method): room air            PHYSICAL EXAMINATION  Appearance: NAD, no distress  HEENT: Moist Mucous Membranes, Anicteric, PERRL, EOMI  Cardiovascular: Regular rate and rhythm, Normal S1 S2, No JVD, No murmurs  Respiratory: Lungs clear to auscultation. No rales, No rhonchi, No wheezing. No tenderness to palpation  Gastrointestinal:  Soft, Non-tender, + BS  Neurologic: Non-focal, A&Ox3  Skin: Warm and dry, No rashes, No ecchymosis, No cyanosis  Musculoskeletal: No clubbing, No cyanosis, No edema  Vascular: Peripheral pulses palpable 2+ bilaterally  Psychiatry: Mood & affect appropriate      	    		      I&O's Summary    12 Oct 2022 07:01  -  13 Oct 2022 07:00  --------------------------------------------------------  IN: 500 mL / OUT: 500 mL / NET: 0 mL    	 	  LABORATORY VALUES	 	                          11.8   6.44  )-----------( 318      ( 13 Oct 2022 06:00 )             34.6       10-13    122<L>  |  83<L>  |  40<H>  ----------------------------<  105<H>  3.8   |  26  |  1.20  10-12    126<L>  |  86<L>  |  42<H>  ----------------------------<  111<H>  4.3   |  27  |  0.99    Ca    9.5      13 Oct 2022 06:00  Ca    9.7      12 Oct 2022 06:57  Phos  4.1     10-13  Phos  3.4     10-12  Mg     2.10     10-13  Mg     1.80     10-12    TPro  6.7  /  Alb  3.5  /  TBili  0.4  /  DBili  x   /  AST  56<H>  /  ALT  34  /  AlkPhos  190<H>  10-13  TPro  6.9  /  Alb  3.3  /  TBili  0.4  /  DBili  x   /  AST  48<H>  /  ALT  34  /  AlkPhos  210<H>  10-12    LIVER FUNCTIONS - ( 13 Oct 2022 06:00 )  Alb: 3.5 g/dL / Pro: 6.7 g/dL / ALK PHOS: 190 U/L / ALT: 34 U/L / AST: 56 U/L / GGT: x               Serum Pro-Brain Natriuretic Peptide: >83346 pg/mL (10-06 @ 15:07)    Blood Gas Venous - Lactate: 1.2 mmol/L (10-12 @ 06:57)  Blood Gas Venous - Lactate: 0.9 mmol/L (10-11 @ 03:00)    10-07 @ 05:45  Cholesterol, Serum - 140  Direct LDL- --  HDL Cholesterol, Serum- 68  Triglycerides, Serum- 62      Thyroid Stimulating Hormone, Serum: 3.15 uIU/mL (10-07 @ 05:45)        CAPILLARY BLOOD GLUCOSE          10-08 @ 17:23  229E Corona Virus --  Adenovirus NotDetec  Bordetella pertussis NotDetec  Chlamydia pneumoniae NotDete  Entero/Rhino Virus NotDete  HKU1 Coronavirus --  hMPV NotDetec  Influenza A NotDete  Influenza AH1 --  Influenza AH1 2009 --  Influenza AH3 --  Influenza B NotDete  Mycoplasma pneumoniae NotDete  NL63 Coronavirus --  OC43 Corornavirus --  Parainfluenza 1 NotDete  Parainfluenza 2 NotDete    Diagnostic testing:  cath:< from: Cardiac Cath Lab - Adult (09.29.16 @ 12:08) >  VENTRICLES: Global left ventricular function was normal. EF estimated was  65 %.  CORONARY VESSELS: The coronary circulation is right dominant.  LM:   --  LM: Normal.  LAD:   --  LAD: Normal.  CX:   --  Proximal circumflex: There was a 40 % stenosis.  RCA:   --  RCA:Normal.  COMPLICATIONS: There were no complications.    < end of copied text >    echo:  < from: Transthoracic Echocardiogram (10.06.22 @ 18:39) >  LA:     4.0 cm    2.0 - 4.0 cm  Ao:     3.5 cm    2.0 - 3.8 cm  SEPTUM: 1.1 cm    0.6 - 1.2 cm  PWT:  1.2 cm    0.6 - 1.1 cm  LVIDd:  4.5 cm    3.0 - 5.6 cm  LVIDs:    ---     1.8 - 4.0 cm  Derived Variables:  LVMI: 116 g/m2  RWT: 0.53  Ejection Fraction (Visual Estimate): 25 %  ------------------------------------------------------------------------  OBSERVATIONS:  Mitral Valve: Mitral annular calcification, tethered mitralleaflets with normal diastolic opening. Moderate mitralregurgitation.  Aortic Root: Normal aortic root.  Aortic Valve: Calcified trileaflet aortic valve with normalopening. Mild aortic regurgitation.  Left Atrium: Moderately dilated left atrium.  LA volumeindex = 43 cc/m2.  Left Ventricle: Severe global left ventricular systolicdysfunction. Mild concentric left ventricular hypertrophy.(DT:161 ms)  Right Heart: Normal right atrium. Right ventricularenlargement with normal right ventricular systolicfunction. Normal tricuspid valve.  Moderate tricuspidregurgitation. Normal pulmonic valve.  Pericardium/PleuraNormal pericardium with no pericardialeffusion. Bilateral pleural effusions.Hemodynamic: Estimated right ventricular systolic pressureequals 56 mm Hg, assuming right atrial pressure equals 10  mm Hg, consistent with moderate pulmonary hypertension.    < end of copied text >      Assessment and Plan:73 yo male, PMH of HTN,HLD,CAD, Hepatitis C ( completed  [place] 2018) and current smoker  presenting with acute dyspnea, found to have hypoxic respiratory distress, hypertension to , and sinus tachycardia, with nonspecific ST changes on EKG, overall concerning for hypertensive emergency with pulmonary edema. Started on nitro drip (now d/crow) and placed on bipap,  weaned to HFNC, later to NC . Hospital c/b ETOH withdrawal .Completed  ativan taper for suspected EtOH withdrawal.    Pertinent labs today : Na 126-> 122; BUN 42-> 40; S creat 0.99-> 1.20; AST 48-> 56    CV  #acute CHFrEF /Acute pulmonary edema.   -presents with acute shortness of breath found in pulmonary edema  - chest Xray showed pulmonary edema on 10/6. Repeat CXR 10/10 showed improvement in pulmonary edema  - Echo ; EF 25%, Severe global LVSD, mod MR, enlarge RV with normal RVF -> new HF diagnosis  -lactate improved  -off nitro as of 10/8 am  -off Lasix drip on 10/9  - switched Lasix to IV 80 BID on 10/10  - started PO Lasix 60 daily on 10/11.   -c/w  Entresto to 97/103 bid, spirolactone 25mg PO daily  - strict I/O  - skilled for rehab per PT  - HF following  - pt can f/u with Dr. Mendoza outpt          Elevated trop  - Trop up trending ; 108-> 149->166. CKMB dowm trending- likely Type II  - intial EKG WEN 1mm in V1-V3, STD in inferior leads which improved  - no chest pain  - c/w asa 81mg , c/w Lipitor 80mg PO   - echo showed low EF with Severe LVSD  -  nuclear stress test 10/12 - pending result    # HTN (hypertension).   - presents with HTN emergency   -  nitro gtt titrated off   - c/w isordil 10mg TID-> titrate up as needed  - reduce hydral from  25 TID  - inc coreg to 6.25 BID      Dispo  Rehab Subjective/Objective: Patient denies chest pain ,sob, dizziness    Tele event: NSR 70    MEDICATIONS    aspirin enteric coated 81 milliGRAM(s) Oral daily  carvedilol 6.25 milliGRAM(s) Oral every 12 hours  furosemide    Tablet 60 milliGRAM(s) Oral daily  heparin   Injectable 5000 Unit(s) SubCutaneous every 12 hours  hydrALAZINE 25 milliGRAM(s) Oral three times a day  isosorbide   dinitrate Tablet (ISORDIL) 10 milliGRAM(s) Oral three times a day  sacubitril 97 mG/valsartan 103 mG 1 Tablet(s) Oral two times a day  spironolactone 25 milliGRAM(s) Oral daily  ALBUTerol    0.083% 2.5 milliGRAM(s) Nebulizer every 12 hours  guaiFENesin Oral Liquid (Sugar-Free) 100 milliGRAM(s) Oral every 6 hours PRN  tiotropium 18 MICROgram(s) Capsule 1 Capsule(s) Inhalation daily  melatonin 3 milliGRAM(s) Oral at bedtime PRN  sertraline 50 milliGRAM(s) Oral daily  pantoprazole    Tablet 40 milliGRAM(s) Oral before breakfast  atorvastatin 40 milliGRAM(s) Oral at bedtime  chlorhexidine 2% Cloths 1 Application(s) Topical <User Schedule>  folic acid 1 milliGRAM(s) Oral daily  influenza  Vaccine (HIGH DOSE) 0.7 milliLiter(s) IntraMuscular once  multivitamin 1 Tablet(s) Oral daily          REVIEW OF SYSTEMS    General: no fatigue/malaise, weight loss/gain.  Skin: no rashes.  Ophthalmologic: no blurred vision, no loss of vision. 	  ENT: no sore throat, rhinorrhea, sinus congestion.  Cardiovascular:no chest pain ,no palpitation,no dizziness,no diaphoresis,no edema  Respiratory: no SOB, cough or wheeze.  Gastrointestinal:  no N/V/D, no melena/hematemesis/hematochezia.  Genitourinary: no dysuria/hesitancy or hematuria.  Musculoskeletal: no myalgias or arthralgias.  Neurological: no changes in vision or hearing, no lightheadedness/dizziness, no syncope/near syncope	  Psychiatric: no unusual stress/anxiety      	    ICU Vital Signs Last 24 Hrs  T(C): 36.9 (13 Oct 2022 09:00), Max: 38.1 (12 Oct 2022 20:00)  T(F): 98.4 (13 Oct 2022 09:00), Max: 100.5 (12 Oct 2022 20:00)  HR: 74 (13 Oct 2022 10:36) (70 - 102)  BP: 101/62 (13 Oct 2022 10:36) (79/57 - 153/94)  BP(mean): 71 (12 Oct 2022 21:00) (62 - 106)  RR: 18 (13 Oct 2022 09:00) (17 - 22)  SpO2: 100% (13 Oct 2022 09:00) (94% - 100%)    O2 Parameters below as of 13 Oct 2022 09:00  Patient On (Oxygen Delivery Method): room air            PHYSICAL EXAMINATION  Appearance: NAD, no distress  HEENT: Moist Mucous Membranes, Anicteric, PERRL, EOMI  Cardiovascular: Regular rate and rhythm, Normal S1 S2, No JVD, No murmurs  Respiratory: Lungs clear to auscultation. No rales, No rhonchi, No wheezing. No tenderness to palpation  Gastrointestinal:  Soft, Non-tender, + BS  Neurologic: Non-focal, A&Ox3  Skin: Warm and dry, No rashes, No ecchymosis, No cyanosis  Musculoskeletal: No clubbing, No cyanosis, No edema  Vascular: Peripheral pulses palpable 2+ bilaterally  Psychiatry: Mood & affect appropriate      	    		      I&O's Summary    12 Oct 2022 07:01  -  13 Oct 2022 07:00  --------------------------------------------------------  IN: 500 mL / OUT: 500 mL / NET: 0 mL    	 	  LABORATORY VALUES	 	                          11.8   6.44  )-----------( 318      ( 13 Oct 2022 06:00 )             34.6       10-13    122<L>  |  83<L>  |  40<H>  ----------------------------<  105<H>  3.8   |  26  |  1.20  10-12    126<L>  |  86<L>  |  42<H>  ----------------------------<  111<H>  4.3   |  27  |  0.99    Ca    9.5      13 Oct 2022 06:00  Ca    9.7      12 Oct 2022 06:57  Phos  4.1     10-13  Phos  3.4     10-12  Mg     2.10     10-13  Mg     1.80     10-12    TPro  6.7  /  Alb  3.5  /  TBili  0.4  /  DBili  x   /  AST  56<H>  /  ALT  34  /  AlkPhos  190<H>  10-13  TPro  6.9  /  Alb  3.3  /  TBili  0.4  /  DBili  x   /  AST  48<H>  /  ALT  34  /  AlkPhos  210<H>  10-12    LIVER FUNCTIONS - ( 13 Oct 2022 06:00 )  Alb: 3.5 g/dL / Pro: 6.7 g/dL / ALK PHOS: 190 U/L / ALT: 34 U/L / AST: 56 U/L / GGT: x               Serum Pro-Brain Natriuretic Peptide: >40095 pg/mL (10-06 @ 15:07)    Blood Gas Venous - Lactate: 1.2 mmol/L (10-12 @ 06:57)  Blood Gas Venous - Lactate: 0.9 mmol/L (10-11 @ 03:00)    10-07 @ 05:45  Cholesterol, Serum - 140  Direct LDL- --  HDL Cholesterol, Serum- 68  Triglycerides, Serum- 62      Thyroid Stimulating Hormone, Serum: 3.15 uIU/mL (10-07 @ 05:45)        CAPILLARY BLOOD GLUCOSE          10-08 @ 17:23  229E Corona Virus --  Adenovirus NotDetec  Bordetella pertussis NotDetec  Chlamydia pneumoniae NotDete  Entero/Rhino Virus NotDete  HKU1 Coronavirus --  hMPV NotDetec  Influenza A NotDete  Influenza AH1 --  Influenza AH1 2009 --  Influenza AH3 --  Influenza B NotDete  Mycoplasma pneumoniae NotDete  NL63 Coronavirus --  OC43 Corornavirus --  Parainfluenza 1 NotDete  Parainfluenza 2 NotDete    Diagnostic testing:  cath:< from: Cardiac Cath Lab - Adult (09.29.16 @ 12:08) >  VENTRICLES: Global left ventricular function was normal. EF estimated was  65 %.  CORONARY VESSELS: The coronary circulation is right dominant.  LM:   --  LM: Normal.  LAD:   --  LAD: Normal.  CX:   --  Proximal circumflex: There was a 40 % stenosis.  RCA:   --  RCA:Normal.  COMPLICATIONS: There were no complications.    < end of copied text >    echo:  < from: Transthoracic Echocardiogram (10.06.22 @ 18:39) >  LA:     4.0 cm    2.0 - 4.0 cm  Ao:     3.5 cm    2.0 - 3.8 cm  SEPTUM: 1.1 cm    0.6 - 1.2 cm  PWT:  1.2 cm    0.6 - 1.1 cm  LVIDd:  4.5 cm    3.0 - 5.6 cm  LVIDs:    ---     1.8 - 4.0 cm  Derived Variables:  LVMI: 116 g/m2  RWT: 0.53  Ejection Fraction (Visual Estimate): 25 %  ------------------------------------------------------------------------  OBSERVATIONS:  Mitral Valve: Mitral annular calcification, tethered mitralleaflets with normal diastolic opening. Moderate mitralregurgitation.  Aortic Root: Normal aortic root.  Aortic Valve: Calcified trileaflet aortic valve with normalopening. Mild aortic regurgitation.  Left Atrium: Moderately dilated left atrium.  LA volumeindex = 43 cc/m2.  Left Ventricle: Severe global left ventricular systolicdysfunction. Mild concentric left ventricular hypertrophy.(DT:161 ms)  Right Heart: Normal right atrium. Right ventricularenlargement with normal right ventricular systolicfunction. Normal tricuspid valve.  Moderate tricuspidregurgitation. Normal pulmonic valve.  Pericardium/PleuraNormal pericardium with no pericardialeffusion. Bilateral pleural effusions.Hemodynamic: Estimated right ventricular systolic pressureequals 56 mm Hg, assuming right atrial pressure equals 10  mm Hg, consistent with moderate pulmonary hypertension.    < end of copied text >      Assessment and Plan:71 yo male, PMH of HTN,HLD,CAD, Hepatitis C ( completed  [place] 2018) and current smoker  presenting with acute dyspnea, found to have hypoxic respiratory distress, hypertension to , and sinus tachycardia, with nonspecific ST changes on EKG, overall concerning for hypertensive emergency with pulmonary edema. Started on nitro drip (now d/crow) and placed on bipap,  weaned to HFNC, later to NC . Hospital c/b ETOH withdrawal .Completed  ativan taper for suspected EtOH withdrawal.    Pertinent labs today : Na 126-> 122; BUN 42-> 40; S creat 0.99-> 1.20; AST 48-> 56    CV  #acute CHFrEF /Acute pulmonary edema.   -presents with acute shortness of breath found in pulmonary edema  - chest Xray showed pulmonary edema on 10/6. Repeat CXR 10/10 showed improvement in pulmonary edema  - Echo ; EF 25%, Severe global LVSD, mod MR, enlarge RV with normal RVF -> new HF diagnosis  -lactate improved  -off nitro as of 10/8 am  -off Lasix drip on 10/9  - switched Lasix to IV 80 BID on 10/10  - started PO Lasix 60 daily on 10/11.   -c/w  Entresto to 97/103 bid, spirolactone 25mg PO daily  - will consider d/c sodium chloride given low EF with Severe global LVSD,   - strict I/O  - skilled for rehab per PT  - HF following  - pt can f/u with Dr. Mendoza outpt          Elevated trop  - Trop up trending ; 108-> 149->166. CKMB dowm trending- likely Type II  - intial EKG WEN 1mm in V1-V3, STD in inferior leads which improved  - no chest pain  - c/w asa 81mg , c/w Lipitor 80mg PO   - echo showed low EF with Severe global LVSD,  -  nuclear stress test 10/12 - pending result    # HTN (hypertension).   - presents with HTN emergency   -  nitro gtt titrated off   - c/w isordil 10mg TID-> titrate up as needed  - reduce hydral from  25 TID  - inc coreg to 6.25 BID          Dispo  Rehab Subjective/Objective: Patient denies chest pain ,sob, dizziness    Tele event: NSR 70    MEDICATIONS    aspirin enteric coated 81 milliGRAM(s) Oral daily  carvedilol 6.25 milliGRAM(s) Oral every 12 hours  furosemide    Tablet 60 milliGRAM(s) Oral daily  heparin   Injectable 5000 Unit(s) SubCutaneous every 12 hours  hydrALAZINE 25 milliGRAM(s) Oral three times a day  isosorbide   dinitrate Tablet (ISORDIL) 10 milliGRAM(s) Oral three times a day  sacubitril 97 mG/valsartan 103 mG 1 Tablet(s) Oral two times a day  spironolactone 25 milliGRAM(s) Oral daily  ALBUTerol    0.083% 2.5 milliGRAM(s) Nebulizer every 12 hours  guaiFENesin Oral Liquid (Sugar-Free) 100 milliGRAM(s) Oral every 6 hours PRN  tiotropium 18 MICROgram(s) Capsule 1 Capsule(s) Inhalation daily  melatonin 3 milliGRAM(s) Oral at bedtime PRN  sertraline 50 milliGRAM(s) Oral daily  pantoprazole    Tablet 40 milliGRAM(s) Oral before breakfast  atorvastatin 40 milliGRAM(s) Oral at bedtime  chlorhexidine 2% Cloths 1 Application(s) Topical <User Schedule>  folic acid 1 milliGRAM(s) Oral daily  influenza  Vaccine (HIGH DOSE) 0.7 milliLiter(s) IntraMuscular once  multivitamin 1 Tablet(s) Oral daily          REVIEW OF SYSTEMS    General: no fatigue/malaise, weight loss/gain.  Skin: no rashes.  Ophthalmologic: no blurred vision, no loss of vision. 	  ENT: no sore throat, rhinorrhea, sinus congestion.  Cardiovascular:no chest pain ,no palpitation,no dizziness,no diaphoresis,no edema  Respiratory: no SOB, cough or wheeze.  Gastrointestinal:  no N/V/D, no melena/hematemesis/hematochezia.  Genitourinary: no dysuria/hesitancy or hematuria.  Musculoskeletal: no myalgias or arthralgias.  Neurological: no changes in vision or hearing, no lightheadedness/dizziness, no syncope/near syncope	  Psychiatric: no unusual stress/anxiety      	    ICU Vital Signs Last 24 Hrs  T(C): 36.9 (13 Oct 2022 09:00), Max: 38.1 (12 Oct 2022 20:00)  T(F): 98.4 (13 Oct 2022 09:00), Max: 100.5 (12 Oct 2022 20:00)  HR: 74 (13 Oct 2022 10:36) (70 - 102)  BP: 101/62 (13 Oct 2022 10:36) (79/57 - 153/94)  BP(mean): 71 (12 Oct 2022 21:00) (62 - 106)  RR: 18 (13 Oct 2022 09:00) (17 - 22)  SpO2: 100% (13 Oct 2022 09:00) (94% - 100%)    O2 Parameters below as of 13 Oct 2022 09:00  Patient On (Oxygen Delivery Method): room air            PHYSICAL EXAMINATION  Appearance: NAD, no distress  HEENT: Moist Mucous Membranes, Anicteric, PERRL, EOMI  Cardiovascular: Regular rate and rhythm, Normal S1 S2, No JVD, No murmurs  Respiratory: Lungs clear to auscultation. No rales, No rhonchi, No wheezing. No tenderness to palpation  Gastrointestinal:  Soft, Non-tender, + BS  Neurologic: Non-focal, A&Ox3  Skin: Warm and dry, No rashes, No ecchymosis, No cyanosis  Musculoskeletal: No clubbing, No cyanosis, No edema  Vascular: Peripheral pulses palpable 2+ bilaterally  Psychiatry: Mood & affect appropriate      	    		      I&O's Summary    12 Oct 2022 07:01  -  13 Oct 2022 07:00  --------------------------------------------------------  IN: 500 mL / OUT: 500 mL / NET: 0 mL    	 	  LABORATORY VALUES	 	                          11.8   6.44  )-----------( 318      ( 13 Oct 2022 06:00 )             34.6       10-13    122<L>  |  83<L>  |  40<H>  ----------------------------<  105<H>  3.8   |  26  |  1.20  10-12    126<L>  |  86<L>  |  42<H>  ----------------------------<  111<H>  4.3   |  27  |  0.99    Ca    9.5      13 Oct 2022 06:00  Ca    9.7      12 Oct 2022 06:57  Phos  4.1     10-13  Phos  3.4     10-12  Mg     2.10     10-13  Mg     1.80     10-12    TPro  6.7  /  Alb  3.5  /  TBili  0.4  /  DBili  x   /  AST  56<H>  /  ALT  34  /  AlkPhos  190<H>  10-13  TPro  6.9  /  Alb  3.3  /  TBili  0.4  /  DBili  x   /  AST  48<H>  /  ALT  34  /  AlkPhos  210<H>  10-12    LIVER FUNCTIONS - ( 13 Oct 2022 06:00 )  Alb: 3.5 g/dL / Pro: 6.7 g/dL / ALK PHOS: 190 U/L / ALT: 34 U/L / AST: 56 U/L / GGT: x               Serum Pro-Brain Natriuretic Peptide: >57481 pg/mL (10-06 @ 15:07)    Blood Gas Venous - Lactate: 1.2 mmol/L (10-12 @ 06:57)  Blood Gas Venous - Lactate: 0.9 mmol/L (10-11 @ 03:00)    10-07 @ 05:45  Cholesterol, Serum - 140  Direct LDL- --  HDL Cholesterol, Serum- 68  Triglycerides, Serum- 62      Thyroid Stimulating Hormone, Serum: 3.15 uIU/mL (10-07 @ 05:45)        CAPILLARY BLOOD GLUCOSE          10-08 @ 17:23  229E Corona Virus --  Adenovirus NotDetec  Bordetella pertussis NotDetec  Chlamydia pneumoniae NotDete  Entero/Rhino Virus NotDete  HKU1 Coronavirus --  hMPV NotDetec  Influenza A NotDete  Influenza AH1 --  Influenza AH1 2009 --  Influenza AH3 --  Influenza B NotDete  Mycoplasma pneumoniae NotDete  NL63 Coronavirus --  OC43 Corornavirus --  Parainfluenza 1 NotDete  Parainfluenza 2 NotDete    Diagnostic testing:  cath:< from: Cardiac Cath Lab - Adult (09.29.16 @ 12:08) >  VENTRICLES: Global left ventricular function was normal. EF estimated was  65 %.  CORONARY VESSELS: The coronary circulation is right dominant.  LM:   --  LM: Normal.  LAD:   --  LAD: Normal.  CX:   --  Proximal circumflex: There was a 40 % stenosis.  RCA:   --  RCA:Normal.  COMPLICATIONS: There were no complications.    < end of copied text >    echo:  < from: Transthoracic Echocardiogram (10.06.22 @ 18:39) >  LA:     4.0 cm    2.0 - 4.0 cm  Ao:     3.5 cm    2.0 - 3.8 cm  SEPTUM: 1.1 cm    0.6 - 1.2 cm  PWT:  1.2 cm    0.6 - 1.1 cm  LVIDd:  4.5 cm    3.0 - 5.6 cm  LVIDs:    ---     1.8 - 4.0 cm  Derived Variables:  LVMI: 116 g/m2  RWT: 0.53  Ejection Fraction (Visual Estimate): 25 %  ------------------------------------------------------------------------  OBSERVATIONS:  Mitral Valve: Mitral annular calcification, tethered mitralleaflets with normal diastolic opening. Moderate mitralregurgitation.  Aortic Root: Normal aortic root.  Aortic Valve: Calcified trileaflet aortic valve with normalopening. Mild aortic regurgitation.  Left Atrium: Moderately dilated left atrium.  LA volumeindex = 43 cc/m2.  Left Ventricle: Severe global left ventricular systolicdysfunction. Mild concentric left ventricular hypertrophy.(DT:161 ms)  Right Heart: Normal right atrium. Right ventricularenlargement with normal right ventricular systolicfunction. Normal tricuspid valve.  Moderate tricuspidregurgitation. Normal pulmonic valve.  Pericardium/PleuraNormal pericardium with no pericardialeffusion. Bilateral pleural effusions.Hemodynamic: Estimated right ventricular systolic pressureequals 56 mm Hg, assuming right atrial pressure equals 10  mm Hg, consistent with moderate pulmonary hypertension.    < end of copied text >      Assessment and Plan:71 yo male, PMH of HTN,HLD,CAD, Hepatitis C ( completed  Harvoni 2018) and current smoker  presenting with acute dyspnea, found to have hypoxic respiratory distress, hypertension to , and sinus tachycardia, with nonspecific ST changes on EKG, overall concerning for hypertensive emergency with pulmonary edema. Started on nitro drip (now d/crow) and placed on bipap,  weaned to HFNC, later to NC . Hospital c/b ETOH withdrawal .Completed  ativan taper for suspected EtOH withdrawal.    Pertinent labs today : Na 126-> 122; BUN 42-> 40; S creat 0.99-> 1.20; AST 48-> 56    CV  #acute CHFrEF /Acute pulmonary edema.   -presents with acute shortness of breath found in pulmonary edema  - chest Xray showed pulmonary edema on 10/6. Repeat CXR 10/10 showed improvement in pulmonary edema  - Echo ; EF 25%, Severe global LVSD, mod MR, enlarge RV with normal RVF -> new HF diagnosis  -lactate improved  -off nitro as of 10/8 am  -off Lasix drip on 10/9  - switched Lasix to IV 80 BID on 10/10  - started PO Lasix 60 daily on 10/11.   -c/w  Entresto to 97/103 bid, spirolactone 25mg PO daily   - strict I/O  - skilled for rehab per PT  - HF following  - pt can f/u with Dr. Mendoza outpt          Elevated trop  - Trop up trending ; 108-> 149->166. CKMB dowm trending- likely Type II  - intial EKG WEN 1mm in V1-V3, STD in inferior leads which improved  - no chest pain  - c/w asa 81mg , c/w Lipitor 80mg PO   - echo showed low EF with Severe global LVSD,  -  nuclear stress test 10/12 - pending result    # HTN (hypertension).   - presents with HTN emergency   -  nitro gtt titrated off   - c/w isordil 10mg TID-> titrate up as needed  - reduce hydral from  25 TID  - inc coreg to 6.25 BID    #Chronic hyponatremia   - likely fluid overload vs  over diuresis vs ETOH abuse  - Patient asymptomatic  -  consider d/c sodium chloride given low EF with Severe global LVSD,          Dispo  Rehab Subjective/Objective: Patient denies chest pain ,sob, dizziness    Tele event: NSR 70    MEDICATIONS    aspirin enteric coated 81 milliGRAM(s) Oral daily  carvedilol 6.25 milliGRAM(s) Oral every 12 hours  furosemide    Tablet 60 milliGRAM(s) Oral daily  heparin   Injectable 5000 Unit(s) SubCutaneous every 12 hours  hydrALAZINE 25 milliGRAM(s) Oral three times a day  isosorbide   dinitrate Tablet (ISORDIL) 10 milliGRAM(s) Oral three times a day  sacubitril 97 mG/valsartan 103 mG 1 Tablet(s) Oral two times a day  spironolactone 25 milliGRAM(s) Oral daily  ALBUTerol    0.083% 2.5 milliGRAM(s) Nebulizer every 12 hours  guaiFENesin Oral Liquid (Sugar-Free) 100 milliGRAM(s) Oral every 6 hours PRN  tiotropium 18 MICROgram(s) Capsule 1 Capsule(s) Inhalation daily  melatonin 3 milliGRAM(s) Oral at bedtime PRN  sertraline 50 milliGRAM(s) Oral daily  pantoprazole    Tablet 40 milliGRAM(s) Oral before breakfast  atorvastatin 40 milliGRAM(s) Oral at bedtime  chlorhexidine 2% Cloths 1 Application(s) Topical <User Schedule>  folic acid 1 milliGRAM(s) Oral daily  influenza  Vaccine (HIGH DOSE) 0.7 milliLiter(s) IntraMuscular once  multivitamin 1 Tablet(s) Oral daily          REVIEW OF SYSTEMS    General: no fatigue/malaise, weight loss/gain.  Skin: no rashes.  Ophthalmologic: no blurred vision, no loss of vision. 	  ENT: no sore throat, rhinorrhea, sinus congestion.  Cardiovascular:no chest pain ,no palpitation,no dizziness,no diaphoresis,no edema  Respiratory: no SOB, cough or wheeze.  Gastrointestinal:  no N/V/D, no melena/hematemesis/hematochezia.  Genitourinary: no dysuria/hesitancy or hematuria.  Musculoskeletal: no myalgias or arthralgias.  Neurological: no changes in vision or hearing, no lightheadedness/dizziness, no syncope/near syncope	  Psychiatric: no unusual stress/anxiety      	    ICU Vital Signs Last 24 Hrs  T(C): 36.9 (13 Oct 2022 09:00), Max: 38.1 (12 Oct 2022 20:00)  T(F): 98.4 (13 Oct 2022 09:00), Max: 100.5 (12 Oct 2022 20:00)  HR: 74 (13 Oct 2022 10:36) (70 - 102)  BP: 101/62 (13 Oct 2022 10:36) (79/57 - 153/94)  BP(mean): 71 (12 Oct 2022 21:00) (62 - 106)  RR: 18 (13 Oct 2022 09:00) (17 - 22)  SpO2: 100% (13 Oct 2022 09:00) (94% - 100%)    O2 Parameters below as of 13 Oct 2022 09:00  Patient On (Oxygen Delivery Method): room air            PHYSICAL EXAMINATION  Appearance: NAD, no distress  HEENT: Moist Mucous Membranes, Anicteric, PERRL, EOMI  Cardiovascular: Regular rate and rhythm, Normal S1 S2, No JVD, No murmurs  Respiratory: Lungs clear to auscultation. No rales, No rhonchi, No wheezing. No tenderness to palpation  Gastrointestinal:  Soft, Non-tender, + BS  Neurologic: Non-focal, A&Ox3  Skin: Warm and dry, No rashes, No ecchymosis, No cyanosis  Musculoskeletal: No clubbing, No cyanosis, No edema  Vascular: Peripheral pulses palpable 2+ bilaterally  Psychiatry: Mood & affect appropriate      	    		      I&O's Summary    12 Oct 2022 07:01  -  13 Oct 2022 07:00  --------------------------------------------------------  IN: 500 mL / OUT: 500 mL / NET: 0 mL    	 	  LABORATORY VALUES	 	                          11.8   6.44  )-----------( 318      ( 13 Oct 2022 06:00 )             34.6       10-13    122<L>  |  83<L>  |  40<H>  ----------------------------<  105<H>  3.8   |  26  |  1.20  10-12    126<L>  |  86<L>  |  42<H>  ----------------------------<  111<H>  4.3   |  27  |  0.99    Ca    9.5      13 Oct 2022 06:00  Ca    9.7      12 Oct 2022 06:57  Phos  4.1     10-13  Phos  3.4     10-12  Mg     2.10     10-13  Mg     1.80     10-12    TPro  6.7  /  Alb  3.5  /  TBili  0.4  /  DBili  x   /  AST  56<H>  /  ALT  34  /  AlkPhos  190<H>  10-13  TPro  6.9  /  Alb  3.3  /  TBili  0.4  /  DBili  x   /  AST  48<H>  /  ALT  34  /  AlkPhos  210<H>  10-12    LIVER FUNCTIONS - ( 13 Oct 2022 06:00 )  Alb: 3.5 g/dL / Pro: 6.7 g/dL / ALK PHOS: 190 U/L / ALT: 34 U/L / AST: 56 U/L / GGT: x               Serum Pro-Brain Natriuretic Peptide: >81810 pg/mL (10-06 @ 15:07)    Blood Gas Venous - Lactate: 1.2 mmol/L (10-12 @ 06:57)  Blood Gas Venous - Lactate: 0.9 mmol/L (10-11 @ 03:00)    10-07 @ 05:45  Cholesterol, Serum - 140  Direct LDL- --  HDL Cholesterol, Serum- 68  Triglycerides, Serum- 62      Thyroid Stimulating Hormone, Serum: 3.15 uIU/mL (10-07 @ 05:45)        CAPILLARY BLOOD GLUCOSE          10-08 @ 17:23  229E Corona Virus --  Adenovirus NotDetec  Bordetella pertussis NotDetec  Chlamydia pneumoniae NotDete  Entero/Rhino Virus NotDete  HKU1 Coronavirus --  hMPV NotDetec  Influenza A NotDete  Influenza AH1 --  Influenza AH1 2009 --  Influenza AH3 --  Influenza B NotDete  Mycoplasma pneumoniae NotDete  NL63 Coronavirus --  OC43 Corornavirus --  Parainfluenza 1 NotDete  Parainfluenza 2 NotDete    Diagnostic testing:  cath:< from: Cardiac Cath Lab - Adult (09.29.16 @ 12:08) >  VENTRICLES: Global left ventricular function was normal. EF estimated was  65 %.  CORONARY VESSELS: The coronary circulation is right dominant.  LM:   --  LM: Normal.  LAD:   --  LAD: Normal.  CX:   --  Proximal circumflex: There was a 40 % stenosis.  RCA:   --  RCA:Normal.  COMPLICATIONS: There were no complications.    < end of copied text >    echo:  < from: Transthoracic Echocardiogram (10.06.22 @ 18:39) >  LA:     4.0 cm    2.0 - 4.0 cm  Ao:     3.5 cm    2.0 - 3.8 cm  SEPTUM: 1.1 cm    0.6 - 1.2 cm  PWT:  1.2 cm    0.6 - 1.1 cm  LVIDd:  4.5 cm    3.0 - 5.6 cm  LVIDs:    ---     1.8 - 4.0 cm  Derived Variables:  LVMI: 116 g/m2  RWT: 0.53  Ejection Fraction (Visual Estimate): 25 %  ------------------------------------------------------------------------  OBSERVATIONS:  Mitral Valve: Mitral annular calcification, tethered mitralleaflets with normal diastolic opening. Moderate mitralregurgitation.  Aortic Root: Normal aortic root.  Aortic Valve: Calcified trileaflet aortic valve with normalopening. Mild aortic regurgitation.  Left Atrium: Moderately dilated left atrium.  LA volumeindex = 43 cc/m2.  Left Ventricle: Severe global left ventricular systolicdysfunction. Mild concentric left ventricular hypertrophy.(DT:161 ms)  Right Heart: Normal right atrium. Right ventricularenlargement with normal right ventricular systolicfunction. Normal tricuspid valve.  Moderate tricuspidregurgitation. Normal pulmonic valve.  Pericardium/PleuraNormal pericardium with no pericardialeffusion. Bilateral pleural effusions.Hemodynamic: Estimated right ventricular systolic pressureequals 56 mm Hg, assuming right atrial pressure equals 10  mm Hg, consistent with moderate pulmonary hypertension.    < end of copied text >      Assessment and Plan:71 yo male, PMH of HTN,HLD,CAD, Hepatitis C ( completed  Harvoni 2018) and current smoker  presenting with acute dyspnea, found to have hypoxic respiratory distress, hypertension to , and sinus tachycardia, with nonspecific ST changes on EKG, overall concerning for hypertensive emergency with pulmonary edema. Started on nitro drip (now d/crow) and placed on bipap,  weaned to HFNC, later to NC . Hospital c/b ETOH withdrawal .Completed  ativan taper for suspected EtOH withdrawal.    Pertinent labs today : Na 126-> 122; BUN 42-> 40; S creat 0.99-> 1.20; AST 48-> 56    CV  #acute CHFrEF /Acute pulmonary edema.   -presents with acute shortness of breath found in pulmonary edema  - chest Xray showed pulmonary edema on 10/6. Repeat CXR 10/10 showed improvement in pulmonary edema  - Echo ; EF 25%, Severe global LVSD, mod MR, enlarge RV with normal RVF -> new HF diagnosis  -lactate improved  -off nitro as of 10/8 am  -off Lasix drip on 10/9  - switched Lasix to IV 80 BID on 10/10  - started PO Lasix 60 daily on 10/11. - >  d/c lasix today- > please  give 500cc NS IV  -c/w  Entresto to 97/103 bid, spirolactone 25mg PO daily   - strict I/O  - skilled for rehab per PT  - HF following  - pt can f/u with Dr. Mendoza outpt          Elevated trop  - Trop up trending ; 108-> 149->166. CKMB dowm trending- likely Type II  - intial EKG WEN 1mm in V1-V3, STD in inferior leads which improved  - no chest pain  - c/w asa 81mg , c/w Lipitor 80mg PO   - echo showed low EF with Severe global LVSD,  -  nuclear stress test 10/12 - pending result    # HTN (hypertension).   - presents with HTN emergency   -  nitro gtt titrated off   - c/w isordil 10mg TID-> titrate up as needed  - reduce hydral from  25 TID  - inc coreg to 6.25 BID    #Chronic hyponatremia   - likely over diuresis vs ETOH abuse  - Patient asymptomatic  -  consider d/c sodium chloride given low EF with Severe global LVSD,  -d/c lasix           Dispo  Rehab

## 2022-10-13 NOTE — CHART NOTE - NSCHARTNOTEFT_GEN_A_CORE
Spoke to infectious disease 3093, Pt still on droplet precautions until 10/15 for covid exposure. Last swab 10/10 neg. Pt asymptomatic. Pt okay to remain on 6N 613a.    Lila Ruiz NP  [id number]

## 2022-10-13 NOTE — PROGRESS NOTE ADULT - PROBLEM SELECTOR PLAN 2
echo with global LVDF, EF25%  clinically euvolemic at this time  c/w Lasix 60mg qd  c/w entresto - send for insurance coverage.   c/w carvedilol, hydralazine, Isordil, spironolactone.   c/w ASA, statin.   f/u HF/Cards rec. echo with global LVDF, EF25%  clinically euvolemic at this time  c/w Lasix 60mg qd  c/w entresto - send for insurance coverage.   c/w carvedilol, hydralazine, Isordil, spironolactone.   c/w ASA, statin.   f/u HF/Cards rec.  - NST per cardiology

## 2022-10-13 NOTE — PROGRESS NOTE ADULT - ASSESSMENT
72M with PMH HTN, HLD, CAD, HCV (s/p Harvoni), EtOH initially presented with SOB and acute hypoxic resp failure secondary to hypertensive emergency and acute decompensated HF. patient was managed in CCU with nitro gtt, lasix gtt. echo showed poor EF, global LVSD, mod MR, large RV. patient is now transition to PO treatment for HF and transfer to [facility] for dispo planning to [facility]. also new diagnosis of COPD on inhalers. patient noted to be persistently hyponatremia, which was not addressed in CCU.

## 2022-10-13 NOTE — PROGRESS NOTE ADULT - PROBLEM SELECTOR PLAN 1
elevated UOsmo and Isaura  suggestive of SIADH  fluid restrict to 1L qd  start Salt tabs 1g tid.   monitor BMP closely.

## 2022-10-13 NOTE — PROGRESS NOTE ADULT - SUBJECTIVE AND OBJECTIVE BOX
[facility] Division of Hospital Medicine  Canelo Rae MD  In House Pager [phone]    Patient is a 72y old  Male who presents with a chief complaint of SOB (13 Oct 2022 12:17)      SUBJECTIVE / OVERNIGHT EVENTS:  NAEO, Patient seen and examined at bedside, no acute complaints today. feeling well on RA. labs noted to have hyponatremia.   No fever, no chills, no SOB, no CP, no n/v/d, no abd pain, no dysuria      MEDICATIONS  (STANDING):  ALBUTerol    0.083% 2.5 milliGRAM(s) Nebulizer every 12 hours  aspirin enteric coated 81 milliGRAM(s) Oral daily  atorvastatin 40 milliGRAM(s) Oral at bedtime  carvedilol 6.25 milliGRAM(s) Oral every 12 hours  chlorhexidine 2% Cloths 1 Application(s) Topical <User Schedule>  folic acid 1 milliGRAM(s) Oral daily  furosemide    Tablet 60 milliGRAM(s) Oral daily  heparin   Injectable 5000 Unit(s) SubCutaneous every 12 hours  hydrALAZINE 25 milliGRAM(s) Oral three times a day  influenza  Vaccine (HIGH DOSE) 0.7 milliLiter(s) IntraMuscular once  isosorbide   dinitrate Tablet (ISORDIL) 10 milliGRAM(s) Oral three times a day  multivitamin 1 Tablet(s) Oral daily  pantoprazole    Tablet 40 milliGRAM(s) Oral before breakfast  sacubitril 97 mG/valsartan 103 mG 1 Tablet(s) Oral two times a day  sertraline 50 milliGRAM(s) Oral daily  sodium chloride 1 Gram(s) Oral three times a day  spironolactone 25 milliGRAM(s) Oral daily  tiotropium 18 MICROgram(s) Capsule 1 Capsule(s) Inhalation daily    MEDICATIONS  (PRN):  guaiFENesin Oral Liquid (Sugar-Free) 100 milliGRAM(s) Oral every 6 hours PRN Cough  melatonin 3 milliGRAM(s) Oral at bedtime PRN Insomnia    CAPILLARY BLOOD GLUCOSE        I&O's Summary    12 Oct 2022 07:01  -  13 Oct 2022 07:00  --------------------------------------------------------  IN: 500 mL / OUT: 500 mL / NET: 0 mL        PHYSICAL EXAM:  Vital Signs Last 24 Hrs  T(C): 36.9 (13 Oct 2022 09:00), Max: 38.1 (12 Oct 2022 20:00)  T(F): 98.4 (13 Oct 2022 09:00), Max: 100.5 (12 Oct 2022 20:00)  HR: 79 (13 Oct 2022 13:16) (70 - 102)  BP: 136/79 (13 Oct 2022 13:16) (79/57 - 144/77)  BP(mean): 71 (12 Oct 2022 21:00) (62 - 95)  RR: 18 (13 Oct 2022 09:00) (17 - 22)  SpO2: 100% (13 Oct 2022 09:00) (94% - 100%)    Parameters below as of 13 Oct 2022 09:00  Patient On (Oxygen Delivery Method): room air        Gen: NAD; sitting in bed comfortably   Pulm: no accessory muscle use; lungs clear on auscultation bilaterally; no wheezing or crackles.   Cards: RRR, nl S1/S2; no LE edema; no JVD  Abd: non-distended; soft and NT on exam; +bs  Ext: DONNA; no joint effusion or tenderness in upper and lower extremities; no cyanosis  Neuro: Awake and Alert; non-focal; moving all extremities.   Skin: no new rashes; warm to touch;     LABS:                        11.8   6.44  )-----------( 318      ( 13 Oct 2022 06:00 )             34.6     10-13    122<L>  |  83<L>  |  40<H>  ----------------------------<  105<H>  3.8   |  26  |  1.20    Ca    9.5      13 Oct 2022 06:00  Phos  4.1     10-13  Mg     2.10     10-13    TPro  6.7  /  Alb  3.5  /  TBili  0.4  /  DBili  x   /  AST  56<H>  /  ALT  34  /  AlkPhos  190<H>  10-13                RADIOLOGY & ADDITIONAL TESTS:  Results Reviewed: Y  Imaging Personally Reviewed: Y  Electrocardiogram Personally Reviewed: Y    COORDINATION OF CARE:  Care Discussed with Consultants/Other Providers [Y/N]: Y  Prior or Outpatient Records Reviewed [Y/N]: Y

## 2022-10-14 LAB
ANION GAP SERPL CALC-SCNC: 12 MMOL/L — SIGNIFICANT CHANGE UP (ref 7–14)
BUN SERPL-MCNC: 39 MG/DL — HIGH (ref 7–23)
CALCIUM SERPL-MCNC: 9.1 MG/DL — SIGNIFICANT CHANGE UP (ref 8.4–10.5)
CHLORIDE SERPL-SCNC: 88 MMOL/L — LOW (ref 98–107)
CO2 SERPL-SCNC: 24 MMOL/L — SIGNIFICANT CHANGE UP (ref 22–31)
CREAT SERPL-MCNC: 1.14 MG/DL — SIGNIFICANT CHANGE UP (ref 0.5–1.3)
CULTURE RESULTS: SIGNIFICANT CHANGE UP
EGFR: 68 ML/MIN/1.73M2 — SIGNIFICANT CHANGE UP
GLUCOSE SERPL-MCNC: 103 MG/DL — HIGH (ref 70–99)
HCT VFR BLD CALC: 31.8 % — LOW (ref 39–50)
HGB BLD-MCNC: 11 G/DL — LOW (ref 13–17)
MAGNESIUM SERPL-MCNC: 1.9 MG/DL — SIGNIFICANT CHANGE UP (ref 1.6–2.6)
MCHC RBC-ENTMCNC: 27.7 PG — SIGNIFICANT CHANGE UP (ref 27–34)
MCHC RBC-ENTMCNC: 34.6 GM/DL — SIGNIFICANT CHANGE UP (ref 32–36)
MCV RBC AUTO: 80.1 FL — SIGNIFICANT CHANGE UP (ref 80–100)
NRBC # BLD: 0 /100 WBCS — SIGNIFICANT CHANGE UP (ref 0–0)
NRBC # FLD: 0 K/UL — SIGNIFICANT CHANGE UP (ref 0–0)
PHOSPHATE SERPL-MCNC: 3.5 MG/DL — SIGNIFICANT CHANGE UP (ref 2.5–4.5)
PLATELET # BLD AUTO: 316 K/UL — SIGNIFICANT CHANGE UP (ref 150–400)
POTASSIUM SERPL-MCNC: 4 MMOL/L — SIGNIFICANT CHANGE UP (ref 3.5–5.3)
POTASSIUM SERPL-SCNC: 4 MMOL/L — SIGNIFICANT CHANGE UP (ref 3.5–5.3)
RBC # BLD: 3.97 M/UL — LOW (ref 4.2–5.8)
RBC # FLD: 12.5 % — SIGNIFICANT CHANGE UP (ref 10.3–14.5)
SODIUM SERPL-SCNC: 124 MMOL/L — LOW (ref 135–145)
SPECIMEN SOURCE: SIGNIFICANT CHANGE UP
WBC # BLD: 6.87 K/UL — SIGNIFICANT CHANGE UP (ref 3.8–10.5)
WBC # FLD AUTO: 6.87 K/UL — SIGNIFICANT CHANGE UP (ref 3.8–10.5)

## 2022-10-14 PROCEDURE — 99233 SBSQ HOSP IP/OBS HIGH 50: CPT

## 2022-10-14 PROCEDURE — 93016 CV STRESS TEST SUPVJ ONLY: CPT | Mod: GC

## 2022-10-14 PROCEDURE — 99222 1ST HOSP IP/OBS MODERATE 55: CPT | Mod: GC

## 2022-10-14 PROCEDURE — 93018 CV STRESS TEST I&R ONLY: CPT | Mod: GC

## 2022-10-14 PROCEDURE — 78452 HT MUSCLE IMAGE SPECT MULT: CPT | Mod: 26

## 2022-10-14 PROCEDURE — 99232 SBSQ HOSP IP/OBS MODERATE 35: CPT

## 2022-10-14 RX ADMIN — ATORVASTATIN CALCIUM 40 MILLIGRAM(S): 80 TABLET, FILM COATED ORAL at 21:31

## 2022-10-14 RX ADMIN — ISOSORBIDE DINITRATE 10 MILLIGRAM(S): 5 TABLET ORAL at 06:12

## 2022-10-14 RX ADMIN — HEPARIN SODIUM 5000 UNIT(S): 5000 INJECTION INTRAVENOUS; SUBCUTANEOUS at 06:12

## 2022-10-14 RX ADMIN — CARVEDILOL PHOSPHATE 6.25 MILLIGRAM(S): 80 CAPSULE, EXTENDED RELEASE ORAL at 06:12

## 2022-10-14 RX ADMIN — Medication 25 MILLIGRAM(S): at 21:31

## 2022-10-14 RX ADMIN — Medication 1 MILLIGRAM(S): at 13:03

## 2022-10-14 RX ADMIN — CARVEDILOL PHOSPHATE 6.25 MILLIGRAM(S): 80 CAPSULE, EXTENDED RELEASE ORAL at 18:16

## 2022-10-14 RX ADMIN — Medication 25 MILLIGRAM(S): at 06:12

## 2022-10-14 RX ADMIN — ISOSORBIDE DINITRATE 10 MILLIGRAM(S): 5 TABLET ORAL at 13:04

## 2022-10-14 RX ADMIN — SACUBITRIL AND VALSARTAN 1 TABLET(S): 24; 26 TABLET, FILM COATED ORAL at 06:12

## 2022-10-14 RX ADMIN — ISOSORBIDE DINITRATE 10 MILLIGRAM(S): 5 TABLET ORAL at 21:31

## 2022-10-14 RX ADMIN — Medication 25 MILLIGRAM(S): at 13:04

## 2022-10-14 RX ADMIN — Medication 3 MILLIGRAM(S): at 21:31

## 2022-10-14 RX ADMIN — PANTOPRAZOLE SODIUM 40 MILLIGRAM(S): 20 TABLET, DELAYED RELEASE ORAL at 06:12

## 2022-10-14 RX ADMIN — Medication 81 MILLIGRAM(S): at 13:03

## 2022-10-14 RX ADMIN — SPIRONOLACTONE 25 MILLIGRAM(S): 25 TABLET, FILM COATED ORAL at 06:12

## 2022-10-14 RX ADMIN — CHLORHEXIDINE GLUCONATE 1 APPLICATION(S): 213 SOLUTION TOPICAL at 06:18

## 2022-10-14 RX ADMIN — HEPARIN SODIUM 5000 UNIT(S): 5000 INJECTION INTRAVENOUS; SUBCUTANEOUS at 18:17

## 2022-10-14 RX ADMIN — SERTRALINE 50 MILLIGRAM(S): 25 TABLET, FILM COATED ORAL at 13:04

## 2022-10-14 RX ADMIN — Medication 1 TABLET(S): at 13:03

## 2022-10-14 RX ADMIN — SACUBITRIL AND VALSARTAN 1 TABLET(S): 24; 26 TABLET, FILM COATED ORAL at 18:16

## 2022-10-14 RX ADMIN — TIOTROPIUM BROMIDE 1 CAPSULE(S): 18 CAPSULE ORAL; RESPIRATORY (INHALATION) at 11:00

## 2022-10-14 NOTE — CONSULT NOTE ADULT - PROBLEM SELECTOR RECOMMENDATION 9
Pt with hyponatremia in setting of CHF. Pt with no prior hx of hyponatremia. Upon review of labs on [facility] HIE/[facility], SNa was WNL (139) on 10/3/2019. SNa on admission low at 126 on 10/6/22. Pt admitted with worsening SOB and was treated with diuretics for acute on chronic CHF. SNa progressively decreased and reached mazin of 122 on 10/7/22. PO lasix was discontinued on 10/13/22 by primary team with concerns for downtrending SNa levels. Arvind of 61 and Uosm of 407 were noted on 10/13/22. SNa today low at 124. Recommend salt tabs 1 gm BID. Fluid restriction 1L. Avoid hypotonic fluids. Monitor BMP daily. Pt. with hyponatremia in setting of CHF. Pt. denies previous history of hyponatremia. Upon review of labs on [facility] HIE/[place], SNa was WNL (139) on 10/3/2019. SNa on admission was low at 126 on 10/6/22. Pt. admitted with worsening SOB and was treated with diuretics for acute on chronic CHF. SNa progressively decreased and reached mazin of 122 on 10/7/22. Oral Lasix was discontinued on 10/13/22 by primary team with concerns for downtrending SNa levels. Arvind of 61 and UOsm of 407 were noted on 10/13/22. SOsm WNL at 279 on 10/13/22. SNa was low at 122 on 10/13/22, improved to 124 today. No clinical evidence of hypervolemia on exam today. Recommend salt tabs 1 gm BID. Fluid restriction (<1L/day). Avoid hypotonic fluids. Monitor SNa daily.

## 2022-10-14 NOTE — PROGRESS NOTE ADULT - NS ATTEND AMEND GEN_ALL_CORE FT
Feels improved. would like to go home. Denies complaints. Tolerating medications. On exam, thin appearing, JVP approx <6 cm w/ mild HJR, RRR, no m/r/g appreciated, CTAB, nontender abdomen, no pedal edema. Labs reviewed - Hb 8, Na 124, BUn/Cr 39/1.14 (b/l 19/0.75), BNP >56k. TTE reviewed.  - c/w current meds  - appears hypovolemic which may be driving hyponatremia  - check cortisol level   - standing weights daily  - counseled pt on importance of EtOH cessation and disease process

## 2022-10-14 NOTE — CONSULT NOTE ADULT - ATTENDING COMMENTS
72M with admitted to CCU with hypertensive emergency.  Pulmonary team consulted acute hypoxemic respiratory failure.  POCUS shows bilateral b-lines and pleural effusions consistent with pulmonary edema which is likely etiology of hypoxemia.   He does have known emphysema which can be contributing but the primary etiology currently is fluid overload.  Recommend continued diuresis.  Given respiratory alkalosis on ABG, can hold off on bilevel but can use CPAP for positive pressure.
Pt. with hyponatremia. SNa was low at 122 on 10/13/22, improved to 124 today. Assessment and plan for hyponatremia as outlined above. Monitor SNa daily. Avoid overcorrection of SNa.

## 2022-10-14 NOTE — PROGRESS NOTE ADULT - SUBJECTIVE AND OBJECTIVE BOX
FOLLOW UP:    SUBJECTIVE/OBSERVATIONS:  OVERNIGHT EVENTS:  TELE EVENTS:     Vital Signs Last 24 Hrs  T(C): 36.7 (14 Oct 2022 06:00), Max: 36.9 (13 Oct 2022 09:00)  T(F): 98 (14 Oct 2022 06:00), Max: 98.4 (13 Oct 2022 09:00)  HR: 70 (14 Oct 2022 06:00) (70 - 80)  BP: 117/73 (14 Oct 2022 06:00) (101/62 - 136/79)  BP(mean): --  RR: 18 (14 Oct 2022 06:00) (18 - 18)  SpO2: 98% (14 Oct 2022 06:00) (98% - 100%)    Parameters below as of 14 Oct 2022 06:00  Patient On (Oxygen Delivery Method): room air      I&O's Summary      MEDICATIONS:  aspirin enteric coated 81 milliGRAM(s) Oral daily  carvedilol 6.25 milliGRAM(s) Oral every 12 hours  heparin   Injectable 5000 Unit(s) SubCutaneous every 12 hours  hydrALAZINE 25 milliGRAM(s) Oral three times a day  isosorbide   dinitrate Tablet (ISORDIL) 10 milliGRAM(s) Oral three times a day  sacubitril 97 mG/valsartan 103 mG 1 Tablet(s) Oral two times a day  spironolactone 25 milliGRAM(s) Oral daily      ALBUTerol    90 MICROgram(s) HFA Inhaler 2 Puff(s) Inhalation every 6 hours  guaiFENesin Oral Liquid (Sugar-Free) 100 milliGRAM(s) Oral every 6 hours PRN  tiotropium 18 MICROgram(s) Capsule 1 Capsule(s) Inhalation daily    melatonin 3 milliGRAM(s) Oral at bedtime PRN  sertraline 50 milliGRAM(s) Oral daily    pantoprazole    Tablet 40 milliGRAM(s) Oral before breakfast    atorvastatin 40 milliGRAM(s) Oral at bedtime    chlorhexidine 2% Cloths 1 Application(s) Topical <User Schedule>  folic acid 1 milliGRAM(s) Oral daily  influenza  Vaccine (HIGH DOSE) 0.7 milliLiter(s) IntraMuscular once  multivitamin 1 Tablet(s) Oral daily  sodium chloride 0.9%. 1000 milliLiter(s) IV Continuous <Continuous>      REVIEW OF SYSTEMS:  Complete 10point ROS negative.    PHYSICAL EXAM:  General: NAD  Cardiovascular: Normal S1 S2, No JVD, No murmurs, No edema  Respiratory: Lungs clear to auscultation	  Gastrointestinal:  Soft, Non-tender, + BS	  Skin: warm and dry, No rashes, No ecchymoses, No cyanosis	  Extremities: Normal range of motion, No clubbing, cyanosis or edema  Vascular: Peripheral pulses palpable 2+ bilaterally    LABS:	 	    CBC Full  -  ( 14 Oct 2022 05:50 )  WBC Count : 6.87 K/uL  Hemoglobin : 11.0 g/dL  Hematocrit : 31.8 %  Platelet Count - Automated : 316 K/uL  Mean Cell Volume : 80.1 fL  Mean Cell Hemoglobin : 27.7 pg  Mean Cell Hemoglobin Concentration : 34.6 gm/dL  Auto Neutrophil # : x  Auto Lymphocyte # : x  Auto Monocyte # : x  Auto Eosinophil # : x  Auto Basophil # : x  Auto Neutrophil % : x  Auto Lymphocyte % : x  Auto Monocyte % : x  Auto Eosinophil % : x  Auto Basophil % : x    10-14    124<L>  |  88<L>  |  39<H>  ----------------------------<  103<H>  4.0   |  24  |  1.14  10-13    122<L>  |  83<L>  |  40<H>  ----------------------------<  105<H>  3.8   |  26  |  1.20    Ca    9.1      14 Oct 2022 05:50  Ca    9.5      13 Oct 2022 06:00  Phos  3.5     10-14  Phos  4.1     10-13  Mg     1.90     10-14  Mg     2.10     10-13    TPro  6.7  /  Alb  3.5  /  TBili  0.4  /  DBili  x   /  AST  56<H>  /  ALT  34  /  AlkPhos  190<H>  10-13      proBNP:   Lipid Profile:   HgA1c:   TSH:       CARDIAC MARKERS:

## 2022-10-14 NOTE — PROGRESS NOTE ADULT - ASSESSMENT
71 y/o AA male with PMHX of HTN, HLD, former HFpEF (LVEF 63% on TTE from 8/22/19 on [employer]) now with new reduction in LV function (TTE 10/6/22 LVEF 25% severe global LV dysfunction, LA 4.0, LVIDD 4.5 cm, mod MR, mild AR, enlarged RV with normal systolic function, mod TR, b/l pleural effusions, RVSP 56, mod pulm HTN), HepC (s/p Harvoni treatment in 2018), CAD ([facility] on 9/26/16- 40% stenosis of prox circ), current smoker (smokes 2-3 cigarettes daily for the past 20 years), current ETOH use (2 beers and 1 shot of white run daily) comes in with complaints of acute SOB which started on Sunday (10/2/22). He states it started while resting and continued throughout the day. He found himself feeling better when seated up. In ED patient was found to be hypoxic, hypertensive (SBP 180s), and tachycardic. He was started on nitro gtt and BIPAP and moved to CCU for closer monitoring. Labs significant for proBNP 95050, lactate 2.7, ->233->218, trop 108-->149-->166, H/H 10.4/30.2, K 3.1, na 123, cl 86. He was admitted for acute systolic heart failure with moderate volume overload. He was started on a Lasix gtt 10 mg/hr, transitioned to 5 mg/hr and then bolus 40 IV BID with good response. Hospital course complicated by alcohol withdraw and placement on CIWA protocol. Patient tolerated uptitration of HF GDMT and was transitioned out of CCU to tele floor.     Currently euvolemic and normotensive.

## 2022-10-14 NOTE — PROGRESS NOTE ADULT - PROBLEM SELECTOR PLAN 1
elevated UOsmo and Isaura  suggestive of SIADH  fluid restrict to 1L qd  monitor BMP closely.  cardiology recommended against salt tabs. recommended NS 500cc.   slight improvement from 122> 124.   will consult nephro to assistant with management.

## 2022-10-14 NOTE — PROGRESS NOTE ADULT - ASSESSMENT
Assessment and Plan:73 yo male, PMH of HTN,HLD,CAD, Hepatitis C ( completed  Harvoni 2018) and current smoker  presenting with acute dyspnea, found to have hypoxic respiratory distress, hypertension to , and sinus tachycardia, with nonspecific ST changes on EKG, overall concerning for hypertensive emergency with pulmonary edema. Started on nitro drip (now d/crow) and placed on bipap,  weaned to HFNC, later to NC . Hospital c/b ETOH withdrawal .Completed  ativan taper for suspected EtOH withdrawal.    CV  #acute CHFrEF /Acute pulmonary edema/HTN with pulm edema:  -appears euvolemic  -continue GDMT with entresto ,hydral 25 tid, isordil 10 tid, benjamín 25, coreg 6.25 bid  -BP better controlled  -ETOH withdrawal/CIWA protocol discontinued, no further signs of agitation    ELEVATED TROP  -  nuclear stress test today-will follow up with results    #Chronic hyponatremia   - likely over diuresis vs ETOH abuse    Dispo  Rehab after testing

## 2022-10-14 NOTE — CONSULT NOTE ADULT - SUBJECTIVE AND OBJECTIVE BOX
[facility] DIVISION OF KIDNEY DISEASES AND HYPERTENSION -- [phone]  -- INITIAL CONSULT NOTE  --------------------------------------------------------------------------------  HPI: 67-year-old male with PMHx DM2, HTN, HLD admitted to [facility] for worsening SOB. Pt was initially admitted to CCU for acute on chronic CHF exacerbation and later transferred to floors. SNa was noted to be low on admission and during current hospital stay. Nephrology consulted for hyponatremia.      Pt seen and examined today. SNa on admission low at 126 on 10/6/22 and progressively decreased and reached mazin of 122 on 10/7/22. SNa today low at 124. Pt was receiving PO diuretics which was stopped yesterday (10/13/22) due to down trending SNa levels. Arvind of 61 and Uosm of 407 were noted on 10/13/22. Serum osmolality of 279 on 10/13/22.     Pt says he feels better. Pt denies fever, chills, nausea, vomiting, CP and HA during our rounds today.     PAST HISTORY  --------------------------------------------------------------------------------  PAST MEDICAL & SURGICAL HISTORY:  HTN (hypertension)  Hepatitis C virus infection, unspecified chronicity  (was tx with Harvoni)  Major depression, chronic  S/P hernia repair      FAMILY HISTORY:  Family history of heart disease (Father)    Family history of cancer (Mother)      PAST SOCIAL HISTORY:    ALLERGIES & MEDICATIONS  --------------------------------------------------------------------------------  Allergies  No Known Allergies    Intolerances    Standing Inpatient Medications  ALBUTerol    90 MICROgram(s) HFA Inhaler 2 Puff(s) Inhalation every 6 hours  aspirin enteric coated 81 milliGRAM(s) Oral daily  atorvastatin 40 milliGRAM(s) Oral at bedtime  carvedilol 6.25 milliGRAM(s) Oral every 12 hours  folic acid 1 milliGRAM(s) Oral daily  heparin   Injectable 5000 Unit(s) SubCutaneous every 12 hours  hydrALAZINE 25 milliGRAM(s) Oral three times a day  influenza  Vaccine (HIGH DOSE) 0.7 milliLiter(s) IntraMuscular once  isosorbide   dinitrate Tablet (ISORDIL) 10 milliGRAM(s) Oral three times a day  multivitamin 1 Tablet(s) Oral daily  pantoprazole    Tablet 40 milliGRAM(s) Oral before breakfast  sacubitril 97 mG/valsartan 103 mG 1 Tablet(s) Oral two times a day  sertraline 50 milliGRAM(s) Oral daily  spironolactone 25 milliGRAM(s) Oral daily  tiotropium 18 MICROgram(s) Capsule 1 Capsule(s) Inhalation daily    PRN Inpatient Medications  guaiFENesin Oral Liquid (Sugar-Free) 100 milliGRAM(s) Oral every 6 hours PRN  melatonin 3 milliGRAM(s) Oral at bedtime PRN      REVIEW OF SYSTEMS  --------------------------------------------------------------------------------  Gen: No fevers/chills,  Head/Eyes/Ears: No HA  Respiratory: see HPI  CV: No chest pain  GI: No abdominal pain, diarrhea  : No dysuria, hematuria  MSK: No edema  Skin: No rashes  Heme: No easy bruising or bleeding    All other systems were reviewed and are negative, except as noted.    VITALS/PHYSICAL EXAM  --------------------------------------------------------------------------------  T(C): 36.7 (10-14-22 @ 13:00), Max: 36.9 (10-13-22 @ 17:17)  HR: 80 (10-14-22 @ 13:00) (70 - 80)  BP: 115/73 (10-14-22 @ 13:00) (114/69 - 125/72)  RR: 18 (10-14-22 @ 13:00) (18 - 18)  SpO2: 99% (10-14-22 @ 13:00) (98% - 100%)  Wt(kg): --      Physical Exam:  	Gen: elderly male resting, NAD  	HEENT: Anicteric  	Pulm: Fair entry B/L  	CV: S1S2+  	Abd: Soft, +BS    	Ext: No LE edema B/L  	Neuro: Awake  	Skin: Warm and dry    LABS/STUDIES  --------------------------------------------------------------------------------              11.0   6.87  >-----------<  316      [10-14-22 @ 05:50]              31.8     124  |  88  |  39  ----------------------------<  103      [10-14-22 @ 05:50]  4.0   |  24  |  1.14        Ca     9.1     [10-14-22 @ 05:50]      Mg     1.90     [10-14-22 @ 05:50]      Phos  3.5     [10-14-22 @ 05:50]    Serum Osmolality 279      [10-13-22 @ 06:00]    Creatinine Trend:  SCr 1.14 [10-14 @ 05:50]  SCr 1.20 [10-13 @ 06:00]  SCr 0.99 [10-12 @ 06:57]  SCr 0.93 [10-11 @ 03:00]  SCr 0.91 [10-10 @ 06:10]    Urinalysis - [10-08-22 @ 10:36]      Color Yellow / Appearance Clear / SG 1.011 / pH 6.0      Gluc Negative / Ketone Negative  / Bili Negative / Urobili <2 mg/dL       Blood Negative / Protein Negative / Leuk Est Negative / Nitrite Negative      RBC  / WBC  / Hyaline  / Gran  / Sq Epi  / Non Sq Epi  / Bacteria     Urine Creatinine 43      [10-13-22 @ 11:57]  Urine Sodium 61      [10-13-22 @ 11:57]  Urine Urea Nitrogen 537.0      [10-13-22 @ 11:57]  Urine Potassium 35.9      [10-13-22 @ 10:40]  Urine Chloride 45      [10-13-22 @ 10:40]  Urine Osmolality 407      [10-13-22 @ 11:57]    HCV 6.64, Reactive      [10-07-22 @ 05:45]   [facility] DIVISION OF KIDNEY DISEASES AND HYPERTENSION -- [phone]  -- INITIAL CONSULT NOTE  --------------------------------------------------------------------------------  HPI: 67-year-old male with PMHx DM2, HTN, HLD admitted to [facility] for worsening SOB. Pt was initially admitted to CCU for acute on chronic CHF exacerbation and later transferred to floors. SNa was noted to be low on admission and during current hospital stay. Nephrology consulted for hyponatremia.      Pt seen and examined today. SNa on admission low at 126 on 10/6/22 and progressively decreased and reached mazin of 122 on 10/7/22. SNa today low at 124. Pt was receiving PO diuretics which was stopped yesterday (10/13/22) due to down trending SNa levels. Arvind of 61 and Uosm of 407 were noted on 10/13/22. Serum osmolality WNL at 279 on 10/13/22.     Pt says he feels better. Pt denies fever, chills, nausea, vomiting, CP and HA during our rounds today.     PAST HISTORY  --------------------------------------------------------------------------------  PAST MEDICAL & SURGICAL HISTORY:  HTN (hypertension)  Hepatitis C virus infection, unspecified chronicity  (was tx with Harvoni)  Major depression, chronic  S/P hernia repair    FAMILY HISTORY:  Family history of heart disease (Father)    Family history of cancer (Mother)    PAST SOCIAL HISTORY:    ALLERGIES & MEDICATIONS  --------------------------------------------------------------------------------  Allergies  No Known Allergies    Intolerances    Standing Inpatient Medications  ALBUTerol    90 MICROgram(s) HFA Inhaler 2 Puff(s) Inhalation every 6 hours  aspirin enteric coated 81 milliGRAM(s) Oral daily  atorvastatin 40 milliGRAM(s) Oral at bedtime  carvedilol 6.25 milliGRAM(s) Oral every 12 hours  folic acid 1 milliGRAM(s) Oral daily  heparin   Injectable 5000 Unit(s) SubCutaneous every 12 hours  hydrALAZINE 25 milliGRAM(s) Oral three times a day  influenza  Vaccine (HIGH DOSE) 0.7 milliLiter(s) IntraMuscular once  isosorbide   dinitrate Tablet (ISORDIL) 10 milliGRAM(s) Oral three times a day  multivitamin 1 Tablet(s) Oral daily  pantoprazole    Tablet 40 milliGRAM(s) Oral before breakfast  sacubitril 97 mG/valsartan 103 mG 1 Tablet(s) Oral two times a day  sertraline 50 milliGRAM(s) Oral daily  spironolactone 25 milliGRAM(s) Oral daily  tiotropium 18 MICROgram(s) Capsule 1 Capsule(s) Inhalation daily    PRN Inpatient Medications  guaiFENesin Oral Liquid (Sugar-Free) 100 milliGRAM(s) Oral every 6 hours PRN  melatonin 3 milliGRAM(s) Oral at bedtime PRN    REVIEW OF SYSTEMS  --------------------------------------------------------------------------------  Gen: No fevers  Head/Eyes/Ears: No HA  Respiratory: see HPI  CV: No chest pain  GI: No abdominal pain, diarrhea  : No dysuria, hematuria  MSK: No edema  Skin: No rash  Heme: No easy bruising or bleeding    All other systems were reviewed and are negative, except as noted.    VITALS/PHYSICAL EXAM  --------------------------------------------------------------------------------  T(C): 36.7 (10-14-22 @ 13:00), Max: 36.9 (10-13-22 @ 17:17)  HR: 80 (10-14-22 @ 13:00) (70 - 80)  BP: 115/73 (10-14-22 @ 13:00) (114/69 - 125/72)  RR: 18 (10-14-22 @ 13:00) (18 - 18)  SpO2: 99% (10-14-22 @ 13:00) (98% - 100%)  Wt(kg): --    Physical Exam:  	Gen: elderly male resting, NAD  	HEENT: Anicteric  	Pulm: Fair entry B/L  	CV: S1S2+  	Abd: Soft, +BS    	Ext: No LE edema B/L  	Neuro: Awake, alert  	Skin: Warm and dry    LABS/STUDIES  --------------------------------------------------------------------------------              11.0   6.87  >-----------<  316      [10-14-22 @ 05:50]              31.8     124  |  88  |  39  ----------------------------<  103      [10-14-22 @ 05:50]  4.0   |  24  |  1.14        Ca     9.1     [10-14-22 @ 05:50]      Mg     1.90     [10-14-22 @ 05:50]      Phos  3.5     [10-14-22 @ 05:50]    Serum Osmolality 279      [10-13-22 @ 06:00]    Creatinine Trend:  SCr 1.14 [10-14 @ 05:50]  SCr 1.20 [10-13 @ 06:00]  SCr 0.99 [10-12 @ 06:57]  SCr 0.93 [10-11 @ 03:00]  SCr 0.91 [10-10 @ 06:10]    Urinalysis - [10-08-22 @ 10:36]      Color Yellow / Appearance Clear / SG 1.011 / pH 6.0      Gluc Negative / Ketone Negative  / Bili Negative / Urobili <2 mg/dL       Blood Negative / Protein Negative / Leuk Est Negative / Nitrite Negative      RBC  / WBC  / Hyaline  / Gran  / Sq Epi  / Non Sq Epi  / Bacteria     Urine Creatinine 43      [10-13-22 @ 11:57]  Urine Sodium 61      [10-13-22 @ 11:57]  Urine Urea Nitrogen 537.0      [10-13-22 @ 11:57]  Urine Potassium 35.9      [10-13-22 @ 10:40]  Urine Chloride 45      [10-13-22 @ 10:40]  Urine Osmolality 407      [10-13-22 @ 11:57]

## 2022-10-14 NOTE — PROGRESS NOTE ADULT - SUBJECTIVE AND OBJECTIVE BOX
[facility] Division of Hospital Medicine  Canelo Rae MD  In House Pager [phone]    Patient is a 72y old  Male who presents with a chief complaint of SOB (14 Oct 2022 11:40)      SUBJECTIVE / OVERNIGHT EVENTS:  NAEO, Patient seen and examined at bedside, no acute complaints today.   No fever, no chills, no SOB, no CP, no n/v/d, no abd pain, no dysuria      MEDICATIONS  (STANDING):  ALBUTerol    90 MICROgram(s) HFA Inhaler 2 Puff(s) Inhalation every 6 hours  aspirin enteric coated 81 milliGRAM(s) Oral daily  atorvastatin 40 milliGRAM(s) Oral at bedtime  carvedilol 6.25 milliGRAM(s) Oral every 12 hours  folic acid 1 milliGRAM(s) Oral daily  heparin   Injectable 5000 Unit(s) SubCutaneous every 12 hours  hydrALAZINE 25 milliGRAM(s) Oral three times a day  influenza  Vaccine (HIGH DOSE) 0.7 milliLiter(s) IntraMuscular once  isosorbide   dinitrate Tablet (ISORDIL) 10 milliGRAM(s) Oral three times a day  multivitamin 1 Tablet(s) Oral daily  pantoprazole    Tablet 40 milliGRAM(s) Oral before breakfast  sacubitril 97 mG/valsartan 103 mG 1 Tablet(s) Oral two times a day  sertraline 50 milliGRAM(s) Oral daily  spironolactone 25 milliGRAM(s) Oral daily  tiotropium 18 MICROgram(s) Capsule 1 Capsule(s) Inhalation daily    MEDICATIONS  (PRN):  guaiFENesin Oral Liquid (Sugar-Free) 100 milliGRAM(s) Oral every 6 hours PRN Cough  melatonin 3 milliGRAM(s) Oral at bedtime PRN Insomnia    CAPILLARY BLOOD GLUCOSE        I&O's Summary      PHYSICAL EXAM:  Vital Signs Last 24 Hrs  T(C): 36.7 (14 Oct 2022 13:00), Max: 36.9 (13 Oct 2022 17:17)  T(F): 98.1 (14 Oct 2022 13:00), Max: 98.4 (13 Oct 2022 17:17)  HR: 80 (14 Oct 2022 13:00) (70 - 80)  BP: 115/73 (14 Oct 2022 13:00) (114/69 - 125/72)  BP(mean): --  RR: 18 (14 Oct 2022 13:00) (18 - 18)  SpO2: 99% (14 Oct 2022 13:00) (98% - 100%)    Parameters below as of 14 Oct 2022 13:00  Patient On (Oxygen Delivery Method): room air        Gen: NAD; sitting in bed comfortably   Pulm: no accessory muscle use; lungs clear on auscultation bilaterally; no wheezing or crackles.   Cards: RRR, nl S1/S2; no LE edema; no JVD  Abd: non-distended; soft and NT on exam; +bs  Ext: DONNA; no joint effusion or tenderness in upper and lower extremities; no cyanosis  Neuro: Awake and Alert; non-focal; moving all extremities.   Skin: no new rashes; warm to touch;     LABS:                        11.0   6.87  )-----------( 316      ( 14 Oct 2022 05:50 )             31.8     10-14    124<L>  |  88<L>  |  39<H>  ----------------------------<  103<H>  4.0   |  24  |  1.14    Ca    9.1      14 Oct 2022 05:50  Phos  3.5     10-14  Mg     1.90     10-14    TPro  6.7  /  Alb  3.5  /  TBili  0.4  /  DBili  x   /  AST  56<H>  /  ALT  34  /  AlkPhos  190<H>  10-13                RADIOLOGY & ADDITIONAL TESTS:  Results Reviewed: Y  Imaging Personally Reviewed: Y  Electrocardiogram Personally Reviewed: Y    COORDINATION OF CARE:  Care Discussed with Consultants/Other Providers [Y/N]: Y  Prior or Outpatient Records Reviewed [Y/N]: Y

## 2022-10-14 NOTE — PROGRESS NOTE ADULT - PROBLEM SELECTOR PLAN 1
Euvolemic to slightly hypovolemic.   New LV systolic dysfunction. Has evidence of infarct on NST, but no clear evidence of ischemia.   Continue Entresto 97/103 mg po BID.   Continue hydralazine 25 mg po TID.   Continue Isordil 10 mg po TID.   Continue spironolactone 25 mg po qd.   Supplement K to keep 4.0-5.0 and mag >2.0.   Daily standing weights and strict I/O-informed nurse. Euvolemic to slightly hypovolemic.   Keep off diuretics for now. Will need some on d/c.   New LV systolic dysfunction. Has evidence of infarct on NST, but no clear evidence of ischemia.   Continue Entresto 97/103 mg po BID.   Continue hydralazine 25 mg po TID.   Continue Isordil 10 mg po TID.   Continue spironolactone 25 mg po qd.   Supplement K to keep 4.0-5.0 and mag >2.0.   Daily standing weights and strict I/O-informed nurse.  Please check ProBNP and cortisol level (as part of hyponatremia w/u)  w/ next blood draw. TSH wnl.

## 2022-10-14 NOTE — PROGRESS NOTE ADULT - ASSESSMENT
72M with PMH HTN, HLD, CAD, HCV (s/p Harvoni), EtOH initially presented with SOB and acute hypoxic resp failure secondary to hypertensive emergency and acute decompensated HF. patient was managed in CCU with nitro gtt, lasix gtt. echo showed poor EF, global LVSD, mod MR, large RV. patient is now transition to PO treatment for HF and transfer to [facility] for dispo planning to [facility]. also new diagnosis of COPD on inhalers. patient noted to be persistently hyponatremia, which was not addressed in CCU.

## 2022-10-14 NOTE — CHART NOTE - NSCHARTNOTEFT_GEN_A_CORE
Source: Patient [x ]    Family [ ]     other [x ]Chart review    Current Diet : Diet, Regular:   Consistent Carbohydrate {Evening Snack} (CSTCHOSN)  DASH/TLC {Sodium & Cholesterol Restricted} (DASH)  1000mL Fluid Restriction ([id number])  No Caffeine  No Carbonated Beverages  No Chocolate (10-13-22 @ 14:32)    PO intake:  50-75% [ x]    Height (cm): 167.6 (10/7/2022)  Weight:  47.5kg/104.7lbs (10/12/2022, per flow sheet)  48.6kg/107.1lbs (10/8/2022, per flow sheet)  51.2kg/112.8lbs (10/7/2022, per flow sheet)  BMI (kg/m2): 18.2 (10/12/2022)    Nutrition Note: 72M with PMH HTN, HLD, CAD, HCV (s/p Harvoni), EtOH initially presented with SOB and acute hypoxic resp failure secondary to hypertensive emergency and acute decompensated HF, per chart.   Patient reports ~75% of meal consumption. Patient reports weight loss, denies any GI distress, any difficulty chewing or swallowing during visit. Reports last bowel movement 10/14/2022. Patient was evaluated by speech on 10/11/2022, recommended regular with thin liquids consistency. As per flow sheet, no edema, no pressure injury noted at this time. Noted with weight loss of 2.4lbs/-2.2% BW x4 days and weight loss of 8.1lbs/-7% BW x 5days, might related to fluid shift, patient is on spironolactone. Labs reviewed: HgA1c- 5.0% (10/7/2022), WNL. Patient is on folic acid, MVI for micronutrient support. As per reported po intake, patient is not meeting his estimated energy needs, patient is amendable to oral nutritional supplement.     __________________ Pertinent Medications__________________   MEDICATIONS  (STANDING):  ALBUTerol    90 MICROgram(s) HFA Inhaler 2 Puff(s) Inhalation every 6 hours  aspirin enteric coated 81 milliGRAM(s) Oral daily  atorvastatin 40 milliGRAM(s) Oral at bedtime  carvedilol 6.25 milliGRAM(s) Oral every 12 hours  folic acid 1 milliGRAM(s) Oral daily  heparin   Injectable 5000 Unit(s) SubCutaneous every 12 hours  hydrALAZINE 25 milliGRAM(s) Oral three times a day  influenza  Vaccine (HIGH DOSE) 0.7 milliLiter(s) IntraMuscular once  isosorbide   dinitrate Tablet (ISORDIL) 10 milliGRAM(s) Oral three times a day  multivitamin 1 Tablet(s) Oral daily  pantoprazole    Tablet 40 milliGRAM(s) Oral before breakfast  sacubitril 97 mG/valsartan 103 mG 1 Tablet(s) Oral two times a day  sertraline 50 milliGRAM(s) Oral daily  spironolactone 25 milliGRAM(s) Oral daily  tiotropium 18 MICROgram(s) Capsule 1 Capsule(s) Inhalation daily    MEDICATIONS  (PRN):  guaiFENesin Oral Liquid (Sugar-Free) 100 milliGRAM(s) Oral every 6 hours PRN Cough  melatonin 3 milliGRAM(s) Oral at bedtime PRN Insomnia      __________________ Pertinent Labs__________________   10-14 Na124 mmol/L<L> Glu 103 mg/dL<H> K+ 4.0 mmol/L Cr  1.14 mg/dL BUN 39 mg/dL<H> 10-14 Phos 3.5 mg/dL 10-13 Alb 3.5 g/dL 10-07 Chol 140 mg/dL LDL --    HDL 68 mg/dL Trig 62 mg/dL        Estimated Needs:   [x ] no change since previous assessment      Previous Nutrition Diagnosis:     [x ] Malnutrition     Nutrition Diagnosis is [x ] ongoing     New Nutrition Diagnosis: [ x] not applicable    Interventions:     Recommend    [x ] Change Diet To: DASH diet. Fluid needs per MD discretion.     [x ] Nutrition Supplement: Ensure Compact 4oz 2x/day(440kcal, 18gm protein), for nutrient support.     [x] Encourage PO intake and honor food preferences as able.      Monitoring and Evaluation:     [x ] PO intake [x ] Tolerance to diet prescription [x ] weights [x ] follow up per protocol  [x ] other: Bowel movement, labs

## 2022-10-14 NOTE — PROGRESS NOTE ADULT - PROBLEM SELECTOR PLAN 2
echo with global LVDF, EF25%  clinically euvolemic at this time  c/w entresto - send for insurance coverage.   c/w carvedilol, hydralazine, Isordil, spironolactone.   c/w ASA, statin.   f/u HF/Cards rec.  - NST showed infarct, but no ischemia  - hold lasix.

## 2022-10-14 NOTE — PROGRESS NOTE ADULT - SUBJECTIVE AND OBJECTIVE BOX
Medications:  ALBUTerol    90 MICROgram(s) HFA Inhaler 2 Puff(s) Inhalation every 6 hours  aspirin enteric coated 81 milliGRAM(s) Oral daily  atorvastatin 40 milliGRAM(s) Oral at bedtime  carvedilol 6.25 milliGRAM(s) Oral every 12 hours  chlorhexidine 2% Cloths 1 Application(s) Topical <User Schedule>  folic acid 1 milliGRAM(s) Oral daily  guaiFENesin Oral Liquid (Sugar-Free) 100 milliGRAM(s) Oral every 6 hours PRN  heparin   Injectable 5000 Unit(s) SubCutaneous every 12 hours  hydrALAZINE 25 milliGRAM(s) Oral three times a day  influenza  Vaccine (HIGH DOSE) 0.7 milliLiter(s) IntraMuscular once  isosorbide   dinitrate Tablet (ISORDIL) 10 milliGRAM(s) Oral three times a day  melatonin 3 milliGRAM(s) Oral at bedtime PRN  multivitamin 1 Tablet(s) Oral daily  pantoprazole    Tablet 40 milliGRAM(s) Oral before breakfast  sacubitril 97 mG/valsartan 103 mG 1 Tablet(s) Oral two times a day  sertraline 50 milliGRAM(s) Oral daily  spironolactone 25 milliGRAM(s) Oral daily  tiotropium 18 MICROgram(s) Capsule 1 Capsule(s) Inhalation daily      Vitals:  Vital Signs Last 24 Hrs  T(C): 36.7 (14 Oct 2022 06:00), Max: 36.9 (13 Oct 2022 17:17)  T(F): 98 (14 Oct 2022 06:00), Max: 98.4 (13 Oct 2022 17:17)  HR: 70 (14 Oct 2022 06:00) (70 - 80)  BP: 117/73 (14 Oct 2022 06:00) (114/69 - 136/79)  BP(mean): --  RR: 18 (14 Oct 2022 06:00) (18 - 18)  SpO2: 98% (14 Oct 2022 06:00) (98% - 100%)    Parameters below as of 14 Oct 2022 06:00  Patient On (Oxygen Delivery Method): room air        Daily     Daily     I&O's Detail      Physical Exam:     General: No distress. Comfortable.  HEENT: EOM intact.  Neck: Neck supple. JVP not elevated. No masses  Chest: Clear to auscultation bilaterally  CV: Normal S1 and S2. No murmurs, rub, or gallops. Radial pulses normal.  Abdomen: Soft, non-distended, non-tender  Skin: No rashes or skin breakdown  Neurology: Alert and oriented times three. Sensation intact  Psych: Affect normal    Labs:                        11.0   6.87  )-----------( 316      ( 14 Oct 2022 05:50 )             31.8     10-14    124<L>  |  88<L>  |  39<H>  ----------------------------<  103<H>  4.0   |  24  |  1.14    Ca    9.1      14 Oct 2022 05:50  Phos  3.5     10-14  Mg     1.90     10-14    TPro  6.7  /  Alb  3.5  /  TBili  0.4  /  DBili  x   /  AST  56<H>  /  ALT  34  /  AlkPhos  190<H>  10-13           Patient seen and examined. He feels great, states he is ready to go to rehab.   No SOB at rest, CP, palpitations, dizziness.           Medications:  ALBUTerol    90 MICROgram(s) HFA Inhaler 2 Puff(s) Inhalation every 6 hours  aspirin enteric coated 81 milliGRAM(s) Oral daily  atorvastatin 40 milliGRAM(s) Oral at bedtime  carvedilol 6.25 milliGRAM(s) Oral every 12 hours  chlorhexidine 2% Cloths 1 Application(s) Topical <User Schedule>  folic acid 1 milliGRAM(s) Oral daily  guaiFENesin Oral Liquid (Sugar-Free) 100 milliGRAM(s) Oral every 6 hours PRN  heparin   Injectable 5000 Unit(s) SubCutaneous every 12 hours  hydrALAZINE 25 milliGRAM(s) Oral three times a day  influenza  Vaccine (HIGH DOSE) 0.7 milliLiter(s) IntraMuscular once  isosorbide   dinitrate Tablet (ISORDIL) 10 milliGRAM(s) Oral three times a day  melatonin 3 milliGRAM(s) Oral at bedtime PRN  multivitamin 1 Tablet(s) Oral daily  pantoprazole    Tablet 40 milliGRAM(s) Oral before breakfast  sacubitril 97 mG/valsartan 103 mG 1 Tablet(s) Oral two times a day  sertraline 50 milliGRAM(s) Oral daily  spironolactone 25 milliGRAM(s) Oral daily  tiotropium 18 MICROgram(s) Capsule 1 Capsule(s) Inhalation daily      Vitals:  Vital Signs Last 24 Hrs  T(C): 36.7 (14 Oct 2022 06:00), Max: 36.9 (13 Oct 2022 17:17)  T(F): 98 (14 Oct 2022 06:00), Max: 98.4 (13 Oct 2022 17:17)  HR: 70 (14 Oct 2022 06:00) (70 - 80)  BP: 117/73 (14 Oct 2022 06:00) (114/69 - 136/79)  BP(mean): --  RR: 18 (14 Oct 2022 06:00) (18 - 18)  SpO2: 98% (14 Oct 2022 06:00) (98% - 100%)    Parameters below as of 14 Oct 2022 06:00  Patient On (Oxygen Delivery Method): room air        Daily     Daily     I&O's Detail      Physical Exam:     General: No distress. Comfortable.  HEENT: EOM intact.  Neck: Neck supple. JVP appears low. No masses  Chest: Clear to auscultation bilaterally  CV: Normal S1 and S2. No murmurs, rub, or gallops. Radial pulses normal. No LE edema and is warm b/l.   Abdomen: Soft, non-distended, non-tender  Skin: No rashes or skin breakdown  Neurology: Alert and oriented times three. Sensation intact  Psych: Affect normal    Labs:                        11.0   6.87  )-----------( 316      ( 14 Oct 2022 05:50 )             31.8     10-14    124<L>  |  88<L>  |  39<H>  ----------------------------<  103<H>  4.0   |  24  |  1.14    Ca    9.1      14 Oct 2022 05:50  Phos  3.5     10-14  Mg     1.90     10-14    TPro  6.7  /  Alb  3.5  /  TBili  0.4  /  DBili  x   /  AST  56<H>  /  ALT  34  /  AlkPhos  190<H>  10-13

## 2022-10-15 LAB
ANION GAP SERPL CALC-SCNC: 12 MMOL/L — SIGNIFICANT CHANGE UP (ref 7–14)
BUN SERPL-MCNC: 37 MG/DL — HIGH (ref 7–23)
CALCIUM SERPL-MCNC: 9.3 MG/DL — SIGNIFICANT CHANGE UP (ref 8.4–10.5)
CHLORIDE SERPL-SCNC: 90 MMOL/L — LOW (ref 98–107)
CO2 SERPL-SCNC: 25 MMOL/L — SIGNIFICANT CHANGE UP (ref 22–31)
CREAT SERPL-MCNC: 1.16 MG/DL — SIGNIFICANT CHANGE UP (ref 0.5–1.3)
EGFR: 67 ML/MIN/1.73M2 — SIGNIFICANT CHANGE UP
GLUCOSE SERPL-MCNC: 105 MG/DL — HIGH (ref 70–99)
HCT VFR BLD CALC: 34.6 % — LOW (ref 39–50)
HGB BLD-MCNC: 11.5 G/DL — LOW (ref 13–17)
MAGNESIUM SERPL-MCNC: 2 MG/DL — SIGNIFICANT CHANGE UP (ref 1.6–2.6)
MCHC RBC-ENTMCNC: 27.1 PG — SIGNIFICANT CHANGE UP (ref 27–34)
MCHC RBC-ENTMCNC: 33.2 GM/DL — SIGNIFICANT CHANGE UP (ref 32–36)
MCV RBC AUTO: 81.4 FL — SIGNIFICANT CHANGE UP (ref 80–100)
NRBC # BLD: 0 /100 WBCS — SIGNIFICANT CHANGE UP (ref 0–0)
NRBC # FLD: 0 K/UL — SIGNIFICANT CHANGE UP (ref 0–0)
PHOSPHATE SERPL-MCNC: 3.1 MG/DL — SIGNIFICANT CHANGE UP (ref 2.5–4.5)
PLATELET # BLD AUTO: 370 K/UL — SIGNIFICANT CHANGE UP (ref 150–400)
POTASSIUM SERPL-MCNC: 4.2 MMOL/L — SIGNIFICANT CHANGE UP (ref 3.5–5.3)
POTASSIUM SERPL-SCNC: 4.2 MMOL/L — SIGNIFICANT CHANGE UP (ref 3.5–5.3)
RBC # BLD: 4.25 M/UL — SIGNIFICANT CHANGE UP (ref 4.2–5.8)
RBC # FLD: 12.7 % — SIGNIFICANT CHANGE UP (ref 10.3–14.5)
SODIUM SERPL-SCNC: 127 MMOL/L — LOW (ref 135–145)
WBC # BLD: 6.02 K/UL — SIGNIFICANT CHANGE UP (ref 3.8–10.5)
WBC # FLD AUTO: 6.02 K/UL — SIGNIFICANT CHANGE UP (ref 3.8–10.5)

## 2022-10-15 PROCEDURE — 99232 SBSQ HOSP IP/OBS MODERATE 35: CPT | Mod: GC

## 2022-10-15 PROCEDURE — 99233 SBSQ HOSP IP/OBS HIGH 50: CPT

## 2022-10-15 RX ORDER — SODIUM CHLORIDE 9 MG/ML
1 INJECTION INTRAMUSCULAR; INTRAVENOUS; SUBCUTANEOUS
Refills: 0 | Status: DISCONTINUED | OUTPATIENT
Start: 2022-10-15 | End: 2022-10-17

## 2022-10-15 RX ADMIN — TIOTROPIUM BROMIDE 1 CAPSULE(S): 18 CAPSULE ORAL; RESPIRATORY (INHALATION) at 11:24

## 2022-10-15 RX ADMIN — Medication 25 MILLIGRAM(S): at 13:51

## 2022-10-15 RX ADMIN — ALBUTEROL 2 PUFF(S): 90 AEROSOL, METERED ORAL at 11:23

## 2022-10-15 RX ADMIN — Medication 81 MILLIGRAM(S): at 11:24

## 2022-10-15 RX ADMIN — SERTRALINE 50 MILLIGRAM(S): 25 TABLET, FILM COATED ORAL at 11:24

## 2022-10-15 RX ADMIN — SODIUM CHLORIDE 1 GRAM(S): 9 INJECTION INTRAMUSCULAR; INTRAVENOUS; SUBCUTANEOUS at 06:30

## 2022-10-15 RX ADMIN — HEPARIN SODIUM 5000 UNIT(S): 5000 INJECTION INTRAVENOUS; SUBCUTANEOUS at 17:44

## 2022-10-15 RX ADMIN — SODIUM CHLORIDE 1 GRAM(S): 9 INJECTION INTRAMUSCULAR; INTRAVENOUS; SUBCUTANEOUS at 17:44

## 2022-10-15 RX ADMIN — Medication 1 TABLET(S): at 11:24

## 2022-10-15 RX ADMIN — ISOSORBIDE DINITRATE 10 MILLIGRAM(S): 5 TABLET ORAL at 13:51

## 2022-10-15 RX ADMIN — ISOSORBIDE DINITRATE 10 MILLIGRAM(S): 5 TABLET ORAL at 21:30

## 2022-10-15 RX ADMIN — Medication 3 MILLIGRAM(S): at 21:30

## 2022-10-15 RX ADMIN — Medication 25 MILLIGRAM(S): at 06:24

## 2022-10-15 RX ADMIN — ISOSORBIDE DINITRATE 10 MILLIGRAM(S): 5 TABLET ORAL at 06:24

## 2022-10-15 RX ADMIN — ALBUTEROL 2 PUFF(S): 90 AEROSOL, METERED ORAL at 17:43

## 2022-10-15 RX ADMIN — CARVEDILOL PHOSPHATE 6.25 MILLIGRAM(S): 80 CAPSULE, EXTENDED RELEASE ORAL at 17:43

## 2022-10-15 RX ADMIN — HEPARIN SODIUM 5000 UNIT(S): 5000 INJECTION INTRAVENOUS; SUBCUTANEOUS at 06:25

## 2022-10-15 RX ADMIN — Medication 25 MILLIGRAM(S): at 21:30

## 2022-10-15 RX ADMIN — ATORVASTATIN CALCIUM 40 MILLIGRAM(S): 80 TABLET, FILM COATED ORAL at 21:30

## 2022-10-15 RX ADMIN — SPIRONOLACTONE 25 MILLIGRAM(S): 25 TABLET, FILM COATED ORAL at 08:51

## 2022-10-15 RX ADMIN — SACUBITRIL AND VALSARTAN 1 TABLET(S): 24; 26 TABLET, FILM COATED ORAL at 06:23

## 2022-10-15 RX ADMIN — PANTOPRAZOLE SODIUM 40 MILLIGRAM(S): 20 TABLET, DELAYED RELEASE ORAL at 06:24

## 2022-10-15 RX ADMIN — Medication 1 MILLIGRAM(S): at 11:25

## 2022-10-15 RX ADMIN — ALBUTEROL 2 PUFF(S): 90 AEROSOL, METERED ORAL at 21:29

## 2022-10-15 RX ADMIN — SACUBITRIL AND VALSARTAN 1 TABLET(S): 24; 26 TABLET, FILM COATED ORAL at 17:44

## 2022-10-15 RX ADMIN — CARVEDILOL PHOSPHATE 6.25 MILLIGRAM(S): 80 CAPSULE, EXTENDED RELEASE ORAL at 06:24

## 2022-10-15 NOTE — PROGRESS NOTE ADULT - SUBJECTIVE AND OBJECTIVE BOX
[facility] Division of Kidney Diseases & Hypertension  FOLLOW UP NOTE  [phone]--------------------------------------------------------------------------------  HPI: 67-year-old male with PMHx DM2, HTN, HLD admitted to [facility] for worsening SOB. Pt was initially admitted to CCU for acute on chronic CHF exacerbation and later transferred to floors. SNa was noted to be low on admission and during current hospital stay. Pt being seen for hyponatremia.  SNa on admission low at 126 on 10/6/22 and progressively decreased and reached mazin of 122 on 10/7/22. Pt was receiving PO diuretics which was stopped on 10/13/22 due to down trending SNa levels. Arvind of 61 and Uosm of 407 were noted on 10/13/22. Serum osmolality WNL at 279 on 10/13/22. Pt started on salt tabs 1 gm BID today. SNa low but improved to 127 today.      24 hour events/subjective: Pt seen and evaluated bedside this morning. Reports feeling well, endorses no complaints. Denies any headaches, nausea, vomiting, fevers/chills, chest pain, palpitations, SOB, abdominal pain, and leg swelling.       PAST HISTORY  --------------------------------------------------------------------------------  No significant changes to PMH, PSH, FHx, SHx, unless otherwise noted    ALLERGIES & MEDICATIONS  --------------------------------------------------------------------------------  Allergies    No Known Allergies    Intolerances      Standing Inpatient Medications  ALBUTerol    90 MICROgram(s) HFA Inhaler 2 Puff(s) Inhalation every 6 hours  aspirin enteric coated 81 milliGRAM(s) Oral daily  atorvastatin 40 milliGRAM(s) Oral at bedtime  carvedilol 6.25 milliGRAM(s) Oral every 12 hours  folic acid 1 milliGRAM(s) Oral daily  heparin   Injectable 5000 Unit(s) SubCutaneous every 12 hours  hydrALAZINE 25 milliGRAM(s) Oral three times a day  influenza  Vaccine (HIGH DOSE) 0.7 milliLiter(s) IntraMuscular once  isosorbide   dinitrate Tablet (ISORDIL) 10 milliGRAM(s) Oral three times a day  multivitamin 1 Tablet(s) Oral daily  pantoprazole    Tablet 40 milliGRAM(s) Oral before breakfast  sacubitril 97 mG/valsartan 103 mG 1 Tablet(s) Oral two times a day  sertraline 50 milliGRAM(s) Oral daily  sodium chloride 1 Gram(s) Oral two times a day  spironolactone 25 milliGRAM(s) Oral daily  tiotropium 18 MICROgram(s) Capsule 1 Capsule(s) Inhalation daily    PRN Inpatient Medications  guaiFENesin Oral Liquid (Sugar-Free) 100 milliGRAM(s) Oral every 6 hours PRN  melatonin 3 milliGRAM(s) Oral at bedtime PRN      REVIEW OF SYSTEMS  --------------------------------------------------------------------------------  Gen: No fevers  Head/Eyes/Ears: No HA  Respiratory: No SOB  CV: No chest pain  GI: No abdominal pain, diarrhea  : No dysuria, hematuria  MSK: No edema  Skin: No rash  Heme: No easy bruising or bleeding    All other systems were reviewed and are negative, except as noted.    VITALS/PHYSICAL EXAM  --------------------------------------------------------------------------------  T(C): 37 (10-15-22 @ 09:05), Max: 37 (10-15-22 @ 09:05)  HR: 71 (10-15-22 @ 09:05) (63 - 80)  BP: 92/59 (10-15-22 @ 09:05) (92/59 - 149/84)  RR: 18 (10-15-22 @ 09:05) (17 - 18)  SpO2: 100% (10-15-22 @ 09:05) (98% - 100%)  Wt(kg): --        10-15-22 @ 07:01  -  10-15-22 @ 11:37  --------------------------------------------------------  IN: 0 mL / OUT: 2 mL / NET: -2 mL      Physical Exam:  Gen: elderly male resting, NAD  HEENT: Anicteric  Pulm: Fair entry B/L  CV: S1S2+  Abd: Soft, +BS    Ext: No LE edema B/L  Neuro: Awake, alert  Skin: Warm and dry    LABS/STUDIES  --------------------------------------------------------------------------------              11.5   6.02  >-----------<  370      [10-15-22 @ 06:21]              34.6     127  |  90  |  37  ----------------------------<  105      [10-15-22 @ 06:21]  4.2   |  25  |  1.16        Ca     9.3     [10-15-22 @ 06:21]      Mg     2.00     [10-15-22 @ 06:21]      Phos  3.1     [10-15-22 @ 06:21]    Creatinine Trend:  SCr 1.16 [10-15 @ 06:21]  SCr 1.14 [10-14 @ 05:50]  SCr 1.20 [10-13 @ 06:00]  SCr 0.99 [10-12 @ 06:57]  SCr 0.93 [10-11 @ 03:00]      Urine Creatinine 43      [10-13-22 @ 11:57]  Urine Sodium 61      [10-13-22 @ 11:57]  Urine Urea Nitrogen 537.0      [10-13-22 @ 11:57]  Urine Potassium 35.9      [10-13-22 @ 10:40]  Urine Chloride 45      [10-13-22 @ 10:40]  Urine Osmolality 407      [10-13-22 @ 11:57]

## 2022-10-15 NOTE — PROGRESS NOTE ADULT - PROBLEM SELECTOR PLAN 1
Pt. with hyponatremia in setting of CHF. Pt. denies previous history of hyponatremia. Upon review of labs on [facility]/[place], SNa was WNL (139) on 10/3/2019. SNa on admission was low at 126 on 10/6/22. Pt. admitted with worsening SOB and was treated with diuretics for acute on chronic CHF. SNa progressively decreased and reached mazin of 122 on 10/7/22. Oral Lasix was discontinued on 10/13/22 by primary team with concerns for downtrending SNa levels. Arvind of 61 and UOsm of 407 were noted on 10/13/22. SOsm WNL at 279 on 10/13/22. SNa was low at 124 on 10/14/22. Sodium chloride tabs were initiated on 10/15/22, and SNa improved to 127 today. No clinical evidence of hypervolemia on exam today. Recommend to continue salt tabs 1 gm BID. Fluid restriction (<1L/day). Avoid hypotonic fluids. Monitor SNa daily.    If you have any questions, please feel free to contact me  Anival Escobar  Nephrology Fellow  [phone] / [employer] Teams(Preferred)  (After 5pm or on weekends please page the on-call fellow)

## 2022-10-15 NOTE — PROGRESS NOTE ADULT - SUBJECTIVE AND OBJECTIVE BOX
[facility] Division of Hospital Medicine  Canelo Rae MD  In House Pager [phone]    Patient is a 72y old  Male who presents with a chief complaint of SOB (15 Oct 2022 11:36)      SUBJECTIVE / OVERNIGHT EVENTS:  NAEO, Patient seen and examined at bedside, no acute complaints today. feeling well.   No fever, no chills, no SOB, no CP, no n/v/d, no abd pain, no dysuria      MEDICATIONS  (STANDING):  ALBUTerol    90 MICROgram(s) HFA Inhaler 2 Puff(s) Inhalation every 6 hours  aspirin enteric coated 81 milliGRAM(s) Oral daily  atorvastatin 40 milliGRAM(s) Oral at bedtime  carvedilol 6.25 milliGRAM(s) Oral every 12 hours  folic acid 1 milliGRAM(s) Oral daily  heparin   Injectable 5000 Unit(s) SubCutaneous every 12 hours  hydrALAZINE 25 milliGRAM(s) Oral three times a day  influenza  Vaccine (HIGH DOSE) 0.7 milliLiter(s) IntraMuscular once  isosorbide   dinitrate Tablet (ISORDIL) 10 milliGRAM(s) Oral three times a day  multivitamin 1 Tablet(s) Oral daily  pantoprazole    Tablet 40 milliGRAM(s) Oral before breakfast  sacubitril 97 mG/valsartan 103 mG 1 Tablet(s) Oral two times a day  sertraline 50 milliGRAM(s) Oral daily  sodium chloride 1 Gram(s) Oral two times a day  spironolactone 25 milliGRAM(s) Oral daily  tiotropium 18 MICROgram(s) Capsule 1 Capsule(s) Inhalation daily    MEDICATIONS  (PRN):  guaiFENesin Oral Liquid (Sugar-Free) 100 milliGRAM(s) Oral every 6 hours PRN Cough  melatonin 3 milliGRAM(s) Oral at bedtime PRN Insomnia    CAPILLARY BLOOD GLUCOSE        I&O's Summary    15 Oct 2022 07:01  -  15 Oct 2022 12:37  --------------------------------------------------------  IN: 0 mL / OUT: 2 mL / NET: -2 mL        PHYSICAL EXAM:  Vital Signs Last 24 Hrs  T(C): 37 (15 Oct 2022 09:05), Max: 37 (15 Oct 2022 09:05)  T(F): 98.6 (15 Oct 2022 09:05), Max: 98.6 (15 Oct 2022 09:05)  HR: 71 (15 Oct 2022 09:05) (63 - 80)  BP: 92/59 (15 Oct 2022 09:05) (92/59 - 149/84)  BP(mean): --  RR: 18 (15 Oct 2022 09:05) (17 - 18)  SpO2: 100% (15 Oct 2022 09:05) (98% - 100%)    Parameters below as of 15 Oct 2022 09:05  Patient On (Oxygen Delivery Method): room air        Gen: NAD; sitting in bed comfortably   Pulm: no accessory muscle use; lungs clear on auscultation bilaterally; no wheezing or crackles.   Cards: RRR, nl S1/S2; no LE edema; no JVD  Abd: non-distended; soft and NT on exam; +bs  Ext: DONNA; no joint effusion or tenderness in upper and lower extremities; no cyanosis  Neuro: Awake and Alert; non-focal; moving all extremities.   Skin: no new rashes; warm to touch;     LABS:                        11.5   6.02  )-----------( 370      ( 15 Oct 2022 06:21 )             34.6     10-15    127<L>  |  90<L>  |  37<H>  ----------------------------<  105<H>  4.2   |  25  |  1.16    Ca    9.3      15 Oct 2022 06:21  Phos  3.1     10-15  Mg     2.00     10-15                  RADIOLOGY & ADDITIONAL TESTS:  Results Reviewed: Y  Imaging Personally Reviewed: Y  Electrocardiogram Personally Reviewed: Y    COORDINATION OF CARE:  Care Discussed with Consultants/Other Providers [Y/N]: Y  Prior or Outpatient Records Reviewed [Y/N]: AMY

## 2022-10-15 NOTE — PROGRESS NOTE ADULT - PROBLEM SELECTOR PLAN 2
echo with global LVDF, EF25%  clinically euvolemic at this time  c/w entresto - send for insurance coverage.   c/w carvedilol, hydralazine, Isordil, spironolactone.   c/w ASA, statin.   f/u HF/Cards rec.  - NST showed infarct, but no ischemia  - hold lasix. patient remain hypovolemic/euvolemic.

## 2022-10-15 NOTE — PROGRESS NOTE ADULT - ASSESSMENT
72M with PMH HTN, HLD, CAD, HCV (s/p Harvoni), EtOH initially presented with SOB and acute hypoxic resp failure secondary to hypertensive emergency and acute decompensated HF. patient was managed in CCU with nitro gtt, lasix gtt. echo showed poor EF, global LVSD, mod MR, large RV. patient is now transition to PO treatment for HF and transfer to [facility] for dispo planning to [facility]. also new diagnosis of COPD on inhalers. patient noted to be persistently hyponatremia, which was not addressed in CCU.

## 2022-10-15 NOTE — PROGRESS NOTE ADULT - PROBLEM SELECTOR PLAN 1
elevated UOsmo and Isaura  suggestive of SIADH  fluid restrict to 1L qd  monitor BMP closely.  cardiology recommended against salt tabs. recommended NS 500cc.   slight improvement from 122> 124 >127  appreciate nephro rec. started on salt tabs.

## 2022-10-15 NOTE — PROGRESS NOTE ADULT - ATTENDING COMMENTS
This is a 72M with CAD, Hepatitis C ( completed  Harvoni 2018) and current smoker  admitted to CCU with hypertensive emergency. Pulmonary team consulted acute hypoxemic respiratory failure.    Patient is off oxygen at this time with continued diuresis. Continues not to have wheezing on exam so not likely COPD exacerbation.       Thank you for your consult. Please call back if you have further questions regarding the care of this patient.
This is a 72M with CAD, Hepatitis C ( completed  Harvoni 2018) and current smoker  admitted to CCU with hypertensive emergency. Pulmonary team consulted acute hypoxemic respiratory failure.    POCUS shows bilateral b-lines and pleural effusions consistent with pulmonary edema which is likely etiology of hypoxemia. Effusions today are small and not the likely cause of her continued hypoxemia. Still with pulmonary edema on ultrasound.      He does have known emphysema but not wheezing on exam and not having significant cough with sputum production so unlikely to be COPD exacerbation.   Would recommend to continue diuresis.      Will continue to follow
Hyponatremia    Na improved today, continue current management. Monitor serum sodium levels.
Decrease hydralazine to 25 mg po tid.  Increase Entresto to 97/103 mg po bid.  Increase Coreg to 6.25 mg po bid.  Nuclear stress test today.  D/c Ativan after PM dose.
Taper off Ativan.  PT consult for rehab placement.  Change Lasix to 60 mg po qd.  Nuclear stress test tomorrow.
Increase Lasix to 80 mg iv bid.  Increase Entresto to 49/51 mg po bid.  Continue Ativan taper.  Repeat CXR.  Hi-flow O2 was switched to n.c.

## 2022-10-16 DIAGNOSIS — F10.939 ALCOHOL USE, UNSPECIFIED WITH WITHDRAWAL, UNSPECIFIED: ICD-10-CM

## 2022-10-16 LAB
ANION GAP SERPL CALC-SCNC: 10 MMOL/L — SIGNIFICANT CHANGE UP (ref 7–14)
BUN SERPL-MCNC: 32 MG/DL — HIGH (ref 7–23)
CALCIUM SERPL-MCNC: 9 MG/DL — SIGNIFICANT CHANGE UP (ref 8.4–10.5)
CHLORIDE SERPL-SCNC: 96 MMOL/L — LOW (ref 98–107)
CO2 SERPL-SCNC: 23 MMOL/L — SIGNIFICANT CHANGE UP (ref 22–31)
CREAT SERPL-MCNC: 1.02 MG/DL — SIGNIFICANT CHANGE UP (ref 0.5–1.3)
EGFR: 78 ML/MIN/1.73M2 — SIGNIFICANT CHANGE UP
GLUCOSE SERPL-MCNC: 90 MG/DL — SIGNIFICANT CHANGE UP (ref 70–99)
HCT VFR BLD CALC: 32.9 % — LOW (ref 39–50)
HGB BLD-MCNC: 11.2 G/DL — LOW (ref 13–17)
MAGNESIUM SERPL-MCNC: 1.9 MG/DL — SIGNIFICANT CHANGE UP (ref 1.6–2.6)
MCHC RBC-ENTMCNC: 27.2 PG — SIGNIFICANT CHANGE UP (ref 27–34)
MCHC RBC-ENTMCNC: 34 GM/DL — SIGNIFICANT CHANGE UP (ref 32–36)
MCV RBC AUTO: 79.9 FL — LOW (ref 80–100)
NRBC # BLD: 0 /100 WBCS — SIGNIFICANT CHANGE UP (ref 0–0)
NRBC # FLD: 0 K/UL — SIGNIFICANT CHANGE UP (ref 0–0)
PHOSPHATE SERPL-MCNC: 3.2 MG/DL — SIGNIFICANT CHANGE UP (ref 2.5–4.5)
PLATELET # BLD AUTO: 327 K/UL — SIGNIFICANT CHANGE UP (ref 150–400)
POTASSIUM SERPL-MCNC: 4 MMOL/L — SIGNIFICANT CHANGE UP (ref 3.5–5.3)
POTASSIUM SERPL-SCNC: 4 MMOL/L — SIGNIFICANT CHANGE UP (ref 3.5–5.3)
RBC # BLD: 4.12 M/UL — LOW (ref 4.2–5.8)
RBC # FLD: 12.7 % — SIGNIFICANT CHANGE UP (ref 10.3–14.5)
SODIUM SERPL-SCNC: 129 MMOL/L — LOW (ref 135–145)
WBC # BLD: 6.51 K/UL — SIGNIFICANT CHANGE UP (ref 3.8–10.5)
WBC # FLD AUTO: 6.51 K/UL — SIGNIFICANT CHANGE UP (ref 3.8–10.5)

## 2022-10-16 PROCEDURE — 99233 SBSQ HOSP IP/OBS HIGH 50: CPT

## 2022-10-16 RX ORDER — FUROSEMIDE 40 MG
60 TABLET ORAL DAILY
Refills: 0 | Status: DISCONTINUED | OUTPATIENT
Start: 2022-10-17 | End: 2022-10-17

## 2022-10-16 RX ORDER — ACETAMINOPHEN 500 MG
650 TABLET ORAL ONCE
Refills: 0 | Status: COMPLETED | OUTPATIENT
Start: 2022-10-16 | End: 2022-10-16

## 2022-10-16 RX ADMIN — Medication 650 MILLIGRAM(S): at 19:15

## 2022-10-16 RX ADMIN — Medication 25 MILLIGRAM(S): at 15:08

## 2022-10-16 RX ADMIN — SPIRONOLACTONE 25 MILLIGRAM(S): 25 TABLET, FILM COATED ORAL at 10:04

## 2022-10-16 RX ADMIN — ISOSORBIDE DINITRATE 10 MILLIGRAM(S): 5 TABLET ORAL at 15:08

## 2022-10-16 RX ADMIN — Medication 81 MILLIGRAM(S): at 12:34

## 2022-10-16 RX ADMIN — SERTRALINE 50 MILLIGRAM(S): 25 TABLET, FILM COATED ORAL at 12:34

## 2022-10-16 RX ADMIN — ALBUTEROL 2 PUFF(S): 90 AEROSOL, METERED ORAL at 06:24

## 2022-10-16 RX ADMIN — ISOSORBIDE DINITRATE 10 MILLIGRAM(S): 5 TABLET ORAL at 06:24

## 2022-10-16 RX ADMIN — Medication 25 MILLIGRAM(S): at 21:17

## 2022-10-16 RX ADMIN — Medication 650 MILLIGRAM(S): at 18:45

## 2022-10-16 RX ADMIN — SACUBITRIL AND VALSARTAN 1 TABLET(S): 24; 26 TABLET, FILM COATED ORAL at 06:24

## 2022-10-16 RX ADMIN — ISOSORBIDE DINITRATE 10 MILLIGRAM(S): 5 TABLET ORAL at 21:17

## 2022-10-16 RX ADMIN — Medication 1 TABLET(S): at 12:34

## 2022-10-16 RX ADMIN — SODIUM CHLORIDE 1 GRAM(S): 9 INJECTION INTRAMUSCULAR; INTRAVENOUS; SUBCUTANEOUS at 18:19

## 2022-10-16 RX ADMIN — PANTOPRAZOLE SODIUM 40 MILLIGRAM(S): 20 TABLET, DELAYED RELEASE ORAL at 06:24

## 2022-10-16 RX ADMIN — Medication 1 MILLIGRAM(S): at 12:34

## 2022-10-16 RX ADMIN — HEPARIN SODIUM 5000 UNIT(S): 5000 INJECTION INTRAVENOUS; SUBCUTANEOUS at 06:25

## 2022-10-16 RX ADMIN — HEPARIN SODIUM 5000 UNIT(S): 5000 INJECTION INTRAVENOUS; SUBCUTANEOUS at 18:19

## 2022-10-16 RX ADMIN — SACUBITRIL AND VALSARTAN 1 TABLET(S): 24; 26 TABLET, FILM COATED ORAL at 18:20

## 2022-10-16 RX ADMIN — Medication 25 MILLIGRAM(S): at 06:25

## 2022-10-16 RX ADMIN — SODIUM CHLORIDE 1 GRAM(S): 9 INJECTION INTRAMUSCULAR; INTRAVENOUS; SUBCUTANEOUS at 06:24

## 2022-10-16 RX ADMIN — ATORVASTATIN CALCIUM 40 MILLIGRAM(S): 80 TABLET, FILM COATED ORAL at 21:17

## 2022-10-16 RX ADMIN — CARVEDILOL PHOSPHATE 6.25 MILLIGRAM(S): 80 CAPSULE, EXTENDED RELEASE ORAL at 06:25

## 2022-10-16 RX ADMIN — CARVEDILOL PHOSPHATE 6.25 MILLIGRAM(S): 80 CAPSULE, EXTENDED RELEASE ORAL at 18:20

## 2022-10-16 NOTE — PROGRESS NOTE ADULT - SUBJECTIVE AND OBJECTIVE BOX
[facility] Division [facility]  Canelo Rae MD  In House Pager [phone]    Patient is a 72y old  Male who presents with a chief complaint of SOB (15 Oct 2022 12:37)      SUBJECTIVE / OVERNIGHT EVENTS:  NAEO, Patient seen and examined at bedside, no acute complaints today.   No fever, no chills, no SOB, no CP, no n/v/d, no abd pain, no dysuria      MEDICATIONS  (STANDING):  ALBUTerol    90 MICROgram(s) HFA Inhaler 2 Puff(s) Inhalation every 6 hours  aspirin enteric coated 81 milliGRAM(s) Oral daily  atorvastatin 40 milliGRAM(s) Oral at bedtime  carvedilol 6.25 milliGRAM(s) Oral every 12 hours  folic acid 1 milliGRAM(s) Oral daily  heparin   Injectable 5000 Unit(s) SubCutaneous every 12 hours  hydrALAZINE 25 milliGRAM(s) Oral three times a day  influenza  Vaccine (HIGH DOSE) 0.7 milliLiter(s) IntraMuscular once  isosorbide   dinitrate Tablet (ISORDIL) 10 milliGRAM(s) Oral three times a day  multivitamin 1 Tablet(s) Oral daily  pantoprazole    Tablet 40 milliGRAM(s) Oral before breakfast  sacubitril 97 mG/valsartan 103 mG 1 Tablet(s) Oral two times a day  sertraline 50 milliGRAM(s) Oral daily  sodium chloride 1 Gram(s) Oral two times a day  spironolactone 25 milliGRAM(s) Oral daily  tiotropium 18 MICROgram(s) Capsule 1 Capsule(s) Inhalation daily    MEDICATIONS  (PRN):  guaiFENesin Oral Liquid (Sugar-Free) 100 milliGRAM(s) Oral every 6 hours PRN Cough  melatonin 3 milliGRAM(s) Oral at bedtime PRN Insomnia    CAPILLARY BLOOD GLUCOSE        I&O's Summary    15 Oct 2022 07:01  -  16 Oct 2022 07:00  --------------------------------------------------------  IN: 0 mL / OUT: 2 mL / NET: -2 mL        PHYSICAL EXAM:  Vital Signs Last 24 Hrs  T(C): 36.8 (16 Oct 2022 10:00), Max: 37.2 (15 Oct 2022 14:00)  T(F): 98.3 (16 Oct 2022 10:00), Max: 98.9 (15 Oct 2022 14:00)  HR: 68 (16 Oct 2022 10:00) (65 - 79)  BP: 114/78 (16 Oct 2022 10:00) (114/78 - 159/64)  BP(mean): --  RR: 17 (16 Oct 2022 10:00) (17 - 20)  SpO2: 100% (16 Oct 2022 10:00) (98% - 100%)    Parameters below as of 16 Oct 2022 10:00  Patient On (Oxygen Delivery Method): room air        Gen: NAD; sitting in bed comfortably   Pulm: no accessory muscle use; lungs clear on auscultation bilaterally; no wheezing or crackles.   Cards: RRR, nl S1/S2; no LE edema; no JVD  Abd: non-distended; soft and NT on exam; +bs  Ext: DONNA; no joint effusion or tenderness in upper and lower extremities; no cyanosis  Neuro: Awake and Alert; non-focal; moving all extremities.   Skin: no new rashes; warm to touch;     LABS:                        11.2   6.51  )-----------( 327      ( 16 Oct 2022 05:19 )             32.9     10-16    129<L>  |  96<L>  |  32<H>  ----------------------------<  90  4.0   |  23  |  1.02    Ca    9.0      16 Oct 2022 05:19  Phos  3.2     10-16  Mg     1.90     10-16                  RADIOLOGY & ADDITIONAL TESTS:  Results Reviewed: Y  Imaging Personally Reviewed: Y  Electrocardiogram Personally Reviewed: Y    COORDINATION OF CARE:  Care Discussed with Consultants/Other Providers [Y/N]: Y  Prior or Outpatient Records Reviewed [Y/N]: AMY

## 2022-10-16 NOTE — PROGRESS NOTE ADULT - ASSESSMENT
72M with PMH HTN, HLD, CAD, HCV (s/p Harvoni), EtOH initially presented with SOB and acute hypoxic resp failure secondary to hypertensive emergency and acute decompensated HF. patient was managed in CCU with nitro gtt, lasix gtt. echo showed poor EF, global LVSD, mod MR, large RV. patient is now transition to PO treatment for HF and transfer to [facility] for dispo planning to [facility]. also new diagnosis of COPD on inhalers. patient noted to be persistently hyponatremia, now improving with fluid restriction and salt tabs.

## 2022-10-16 NOTE — PROGRESS NOTE ADULT - PROBLEM SELECTOR PLAN 1
elevated UOsmo and Isaura suggestive of SIADH  fluid restrict to 1L qd  monitor BMP closely.  slight improvement from 122> 124 >127 >129  appreciate nephro rec. started on salt tabs.  ctm bmp daily.

## 2022-10-16 NOTE — PROGRESS NOTE ADULT - PROBLEM SELECTOR PLAN 2
echo with global LVDF, EF25%  clinically euvolemic at this time  c/w entresto - send for insurance coverage.   c/w carvedilol, hydralazine, Isordil, spironolactone.   c/w ASA, statin.   f/u HF/Cards rec.  - NST showed infarct, but no ischemia  - hold lasix. patient remain hypovolemic/euvolemic.  - restart lasix in AM.

## 2022-10-17 ENCOUNTER — APPOINTMENT (OUTPATIENT)
Dept: PULMONOLOGY | Facility: CLINIC | Age: 72
End: 2022-10-17

## 2022-10-17 LAB
ANION GAP SERPL CALC-SCNC: 9 MMOL/L — SIGNIFICANT CHANGE UP (ref 7–14)
BUN SERPL-MCNC: 28 MG/DL — HIGH (ref 7–23)
CALCIUM SERPL-MCNC: 9.2 MG/DL — SIGNIFICANT CHANGE UP (ref 8.4–10.5)
CHLORIDE SERPL-SCNC: 96 MMOL/L — LOW (ref 98–107)
CO2 SERPL-SCNC: 25 MMOL/L — SIGNIFICANT CHANGE UP (ref 22–31)
CREAT SERPL-MCNC: 0.99 MG/DL — SIGNIFICANT CHANGE UP (ref 0.5–1.3)
EGFR: 81 ML/MIN/1.73M2 — SIGNIFICANT CHANGE UP
GLUCOSE SERPL-MCNC: 95 MG/DL — SIGNIFICANT CHANGE UP (ref 70–99)
HCT VFR BLD CALC: 33.2 % — LOW (ref 39–50)
HGB BLD-MCNC: 11.3 G/DL — LOW (ref 13–17)
MAGNESIUM SERPL-MCNC: 1.9 MG/DL — SIGNIFICANT CHANGE UP (ref 1.6–2.6)
MCHC RBC-ENTMCNC: 27.9 PG — SIGNIFICANT CHANGE UP (ref 27–34)
MCHC RBC-ENTMCNC: 34 GM/DL — SIGNIFICANT CHANGE UP (ref 32–36)
MCV RBC AUTO: 82 FL — SIGNIFICANT CHANGE UP (ref 80–100)
NRBC # BLD: 0 /100 WBCS — SIGNIFICANT CHANGE UP (ref 0–0)
NRBC # FLD: 0 K/UL — SIGNIFICANT CHANGE UP (ref 0–0)
PHOSPHATE SERPL-MCNC: 2.4 MG/DL — LOW (ref 2.5–4.5)
PLATELET # BLD AUTO: 326 K/UL — SIGNIFICANT CHANGE UP (ref 150–400)
POTASSIUM SERPL-MCNC: 3.9 MMOL/L — SIGNIFICANT CHANGE UP (ref 3.5–5.3)
POTASSIUM SERPL-SCNC: 3.9 MMOL/L — SIGNIFICANT CHANGE UP (ref 3.5–5.3)
RBC # BLD: 4.05 M/UL — LOW (ref 4.2–5.8)
RBC # FLD: 13 % — SIGNIFICANT CHANGE UP (ref 10.3–14.5)
SODIUM SERPL-SCNC: 130 MMOL/L — LOW (ref 135–145)
WBC # BLD: 7.31 K/UL — SIGNIFICANT CHANGE UP (ref 3.8–10.5)
WBC # FLD AUTO: 7.31 K/UL — SIGNIFICANT CHANGE UP (ref 3.8–10.5)

## 2022-10-17 PROCEDURE — 99232 SBSQ HOSP IP/OBS MODERATE 35: CPT

## 2022-10-17 RX ORDER — FUROSEMIDE 40 MG
20 TABLET ORAL DAILY
Refills: 0 | Status: DISCONTINUED | OUTPATIENT
Start: 2022-10-18 | End: 2022-11-02

## 2022-10-17 RX ORDER — SODIUM,POTASSIUM PHOSPHATES 278-250MG
1 POWDER IN PACKET (EA) ORAL
Refills: 0 | Status: COMPLETED | OUTPATIENT
Start: 2022-10-17 | End: 2022-10-18

## 2022-10-17 RX ADMIN — Medication 25 MILLIGRAM(S): at 06:12

## 2022-10-17 RX ADMIN — ISOSORBIDE DINITRATE 10 MILLIGRAM(S): 5 TABLET ORAL at 13:35

## 2022-10-17 RX ADMIN — Medication 1 TABLET(S): at 13:35

## 2022-10-17 RX ADMIN — Medication 3 MILLIGRAM(S): at 22:16

## 2022-10-17 RX ADMIN — ALBUTEROL 2 PUFF(S): 90 AEROSOL, METERED ORAL at 22:17

## 2022-10-17 RX ADMIN — ATORVASTATIN CALCIUM 40 MILLIGRAM(S): 80 TABLET, FILM COATED ORAL at 22:16

## 2022-10-17 RX ADMIN — CARVEDILOL PHOSPHATE 6.25 MILLIGRAM(S): 80 CAPSULE, EXTENDED RELEASE ORAL at 17:11

## 2022-10-17 RX ADMIN — SACUBITRIL AND VALSARTAN 1 TABLET(S): 24; 26 TABLET, FILM COATED ORAL at 17:12

## 2022-10-17 RX ADMIN — Medication 25 MILLIGRAM(S): at 13:35

## 2022-10-17 RX ADMIN — Medication 1 PACKET(S): at 13:34

## 2022-10-17 RX ADMIN — HEPARIN SODIUM 5000 UNIT(S): 5000 INJECTION INTRAVENOUS; SUBCUTANEOUS at 17:12

## 2022-10-17 RX ADMIN — SERTRALINE 50 MILLIGRAM(S): 25 TABLET, FILM COATED ORAL at 13:35

## 2022-10-17 RX ADMIN — Medication 60 MILLIGRAM(S): at 06:15

## 2022-10-17 RX ADMIN — ISOSORBIDE DINITRATE 10 MILLIGRAM(S): 5 TABLET ORAL at 22:16

## 2022-10-17 RX ADMIN — Medication 81 MILLIGRAM(S): at 13:34

## 2022-10-17 RX ADMIN — PANTOPRAZOLE SODIUM 40 MILLIGRAM(S): 20 TABLET, DELAYED RELEASE ORAL at 06:12

## 2022-10-17 RX ADMIN — Medication 1 PACKET(S): at 17:11

## 2022-10-17 RX ADMIN — CARVEDILOL PHOSPHATE 6.25 MILLIGRAM(S): 80 CAPSULE, EXTENDED RELEASE ORAL at 06:12

## 2022-10-17 RX ADMIN — HEPARIN SODIUM 5000 UNIT(S): 5000 INJECTION INTRAVENOUS; SUBCUTANEOUS at 06:12

## 2022-10-17 RX ADMIN — SPIRONOLACTONE 25 MILLIGRAM(S): 25 TABLET, FILM COATED ORAL at 07:51

## 2022-10-17 RX ADMIN — Medication 1 MILLIGRAM(S): at 13:34

## 2022-10-17 RX ADMIN — SODIUM CHLORIDE 1 GRAM(S): 9 INJECTION INTRAMUSCULAR; INTRAVENOUS; SUBCUTANEOUS at 06:12

## 2022-10-17 RX ADMIN — Medication 25 MILLIGRAM(S): at 22:16

## 2022-10-17 RX ADMIN — ISOSORBIDE DINITRATE 10 MILLIGRAM(S): 5 TABLET ORAL at 06:11

## 2022-10-17 RX ADMIN — SACUBITRIL AND VALSARTAN 1 TABLET(S): 24; 26 TABLET, FILM COATED ORAL at 06:11

## 2022-10-17 NOTE — PROGRESS NOTE ADULT - SUBJECTIVE AND OBJECTIVE BOX
Medications:  ALBUTerol    90 MICROgram(s) HFA Inhaler 2 Puff(s) Inhalation every 6 hours  aspirin enteric coated 81 milliGRAM(s) Oral daily  atorvastatin 40 milliGRAM(s) Oral at bedtime  carvedilol 6.25 milliGRAM(s) Oral every 12 hours  folic acid 1 milliGRAM(s) Oral daily  furosemide    Tablet 60 milliGRAM(s) Oral daily  guaiFENesin Oral Liquid (Sugar-Free) 100 milliGRAM(s) Oral every 6 hours PRN  heparin   Injectable 5000 Unit(s) SubCutaneous every 12 hours  hydrALAZINE 25 milliGRAM(s) Oral three times a day  influenza  Vaccine (HIGH DOSE) 0.7 milliLiter(s) IntraMuscular once  isosorbide   dinitrate Tablet (ISORDIL) 10 milliGRAM(s) Oral three times a day  melatonin 3 milliGRAM(s) Oral at bedtime PRN  multivitamin 1 Tablet(s) Oral daily  pantoprazole    Tablet 40 milliGRAM(s) Oral before breakfast  potassium phosphate / sodium phosphate Powder (PHOS-NaK) 1 Packet(s) Oral three times a day with meals  sacubitril 97 mG/valsartan 103 mG 1 Tablet(s) Oral two times a day  sertraline 50 milliGRAM(s) Oral daily  sodium chloride 1 Gram(s) Oral two times a day  spironolactone 25 milliGRAM(s) Oral daily  tiotropium 18 MICROgram(s) Capsule 1 Capsule(s) Inhalation daily      Vitals:  Vital Signs Last 24 Hrs  T(C): 36.7 (17 Oct 2022 06:11), Max: 36.8 (16 Oct 2022 21:16)  T(F): 98.1 (17 Oct 2022 06:11), Max: 98.3 (16 Oct 2022 21:16)  HR: 71 (17 Oct 2022 07:50) (65 - 75)  BP: 118/78 (17 Oct 2022 07:50) (112/83 - 165/88)  BP(mean): --  RR: 18 (17 Oct 2022 07:50) (16 - 18)  SpO2: 100% (17 Oct 2022 07:50) (97% - 100%)    Parameters below as of 17 Oct 2022 07:50  Patient On (Oxygen Delivery Method): room air        Daily     Daily Weight in k.6 (17 Oct 2022 06:11)    I&O's Detail    16 Oct 2022 07:01  -  17 Oct 2022 07:00  --------------------------------------------------------  IN:  Total IN: 0 mL    OUT:    Voided (mL): 600 mL  Total OUT: 600 mL    Total NET: -600 mL          Physical Exam:     General: No distress. Comfortable.  HEENT: EOM intact.  Neck: Neck supple. JVP not elevated. No masses  Chest: Clear to auscultation bilaterally  CV: Normal S1 and S2. No murmurs, rub, or gallops. Radial pulses normal.  Abdomen: Soft, non-distended, non-tender  Skin: No rashes or skin breakdown  Neurology: Alert and oriented times three. Sensation intact  Psych: Affect normal    Labs:                        11.3   7.31  )-----------( 326      ( 17 Oct 2022 06:43 )             33.2     10-17    130<L>  |  96<L>  |  28<H>  ----------------------------<  95  3.9   |  25  |  0.99    Ca    9.2      17 Oct 2022 06:43  Phos  2.4     10-17  Mg     1.90     1017             Patient seen and examined. He states he feels great, no SOB at rest, FELDER. No CP, palpitations, dizziness.   He appreciates an increase in appetite.   Daughter present in room, and happy with his progress.              Medications:  ALBUTerol    90 MICROgram(s) HFA Inhaler 2 Puff(s) Inhalation every 6 hours  aspirin enteric coated 81 milliGRAM(s) Oral daily  atorvastatin 40 milliGRAM(s) Oral at bedtime  carvedilol 6.25 milliGRAM(s) Oral every 12 hours  folic acid 1 milliGRAM(s) Oral daily  furosemide    Tablet 60 milliGRAM(s) Oral daily  guaiFENesin Oral Liquid (Sugar-Free) 100 milliGRAM(s) Oral every 6 hours PRN  heparin   Injectable 5000 Unit(s) SubCutaneous every 12 hours  hydrALAZINE 25 milliGRAM(s) Oral three times a day  influenza  Vaccine (HIGH DOSE) 0.7 milliLiter(s) IntraMuscular once  isosorbide   dinitrate Tablet (ISORDIL) 10 milliGRAM(s) Oral three times a day  melatonin 3 milliGRAM(s) Oral at bedtime PRN  multivitamin 1 Tablet(s) Oral daily  pantoprazole    Tablet 40 milliGRAM(s) Oral before breakfast  potassium phosphate / sodium phosphate Powder (PHOS-NaK) 1 Packet(s) Oral three times a day with meals  sacubitril 97 mG/valsartan 103 mG 1 Tablet(s) Oral two times a day  sertraline 50 milliGRAM(s) Oral daily  sodium chloride 1 Gram(s) Oral two times a day  spironolactone 25 milliGRAM(s) Oral daily  tiotropium 18 MICROgram(s) Capsule 1 Capsule(s) Inhalation daily      Vitals:  Vital Signs Last 24 Hrs  T(C): 36.7 (17 Oct 2022 06:11), Max: 36.8 (16 Oct 2022 21:16)  T(F): 98.1 (17 Oct 2022 06:11), Max: 98.3 (16 Oct 2022 21:16)  HR: 71 (17 Oct 2022 07:50) (65 - 75)  BP: 118/78 (17 Oct 2022 07:50) (112/83 - 165/88)  BP(mean): --  RR: 18 (17 Oct 2022 07:50) (16 - 18)  SpO2: 100% (17 Oct 2022 07:50) (97% - 100%)    Parameters below as of 17 Oct 2022 07:50  Patient On (Oxygen Delivery Method): room air        Daily     Daily Weight in k.6 (17 Oct 2022 06:11)    I&O's Detail    16 Oct 2022 07:01  -  17 Oct 2022 07:00  --------------------------------------------------------  IN:  Total IN: 0 mL    OUT:    Voided (mL): 600 mL  Total OUT: 600 mL    Total NET: -600 mL          Physical Exam:     General: No distress. Comfortable.  HEENT: EOM intact.  Neck: Neck supple. JVP low.  No masses  Chest: Clear to auscultation bilaterally  CV: Normal S1 and S2. No murmurs, rub, or gallops. Radial pulses normal. No LE edema b/l. Warm b/l.   Abdomen: Soft, non-distended, non-tender  Skin: No rashes or skin breakdown  Neurology: Alert and oriented times three. Sensation intact  Psych: Affect normal    Labs:                        11.3   7.31  )-----------( 326      ( 17 Oct 2022 06:43 )             33.2     10-17    130<L>  |  96<L>  |  28<H>  ----------------------------<  95  3.9   |  25  |  0.99    Ca    9.2      17 Oct 2022 06:43  Phos  2.4     10-17  Mg     1.90     10-17

## 2022-10-17 NOTE — PROGRESS NOTE ADULT - NS ATTEND BILL GEN_ALL_CORE
Attending to bill
(4) no impairment

## 2022-10-17 NOTE — PROGRESS NOTE ADULT - ASSESSMENT
71 y/o AA male with PMHX of HTN, HLD, former HFpEF (LVEF 63% on TTE from 8/22/19 on [employer]) now with new reduction in LV function (TTE 10/6/22 LVEF 25% severe global LV dysfunction, LA 4.0, LVIDD 4.5 cm, mod MR, mild AR, enlarged RV with normal systolic function, mod TR, b/l pleural effusions, RVSP 56, mod pulm HTN), HepC (s/p Harvoni treatment in 2018), CAD ([facility] on 9/26/16- 40% stenosis of prox circ), current smoker (smokes 2-3 cigarettes daily for the past 20 years), current ETOH use (2 beers and 1 shot of white run daily) comes in with complaints of acute SOB which started on Sunday (10/2/22). He states it started while resting and continued throughout the day. He found himself feeling better when seated up. In ED patient was found to be hypoxic, hypertensive (SBP 180s), and tachycardic. He was started on nitro gtt and BIPAP and moved to CCU for closer monitoring. Labs significant for proBNP 77403, lactate 2.7, ->233->218, trop 108-->149-->166, H/H 10.4/30.2, K 3.1, na 123, cl 86. He was admitted for acute systolic heart failure with moderate volume overload. He was started on a Lasix gtt 10 mg/hr, transitioned to 5 mg/hr and then bolus 40 IV BID with good response. Hospital course complicated by alcohol withdraw and placement on CIWA protocol. Patient tolerated uptitration of HF GDMT and was transitioned out of CCU to tele floor.     Currently euvolemic.

## 2022-10-17 NOTE — PROGRESS NOTE ADULT - NS ATTEND AMEND GEN_ALL_CORE FT
Na now back to 130. D/c NaCl tablets.  Decrease Lasix to 20 mg po qd. Na now back to 130. D/c NaCl tablets.  Decrease Lasix to 20 mg po qd.  Ready for d/c from a cardiac perspective.

## 2022-10-17 NOTE — PROGRESS NOTE ADULT - SUBJECTIVE AND OBJECTIVE BOX
[facility] Department of Hospital Medicine  Ed Ramos MD  Available on MS Teams  Pager: [phone]    Patient is a 72y old  Male who presents with a chief complaint of SOB (17 Oct 2022 14:09)    OVERNIGHT EVENTS: No acute events overnight.    SUBJECTIVE: Pt seen and examined at bedside this morning. Patient's daughter also present. Reports feeling very well this morning. States that his appetite is good and he has been eating well. Denies any difficulty with breathing and able to lay down flat without issue. Denies any dizziness, palpitations, CP, nausea, vomiting or abdominal pain.    ADDITIONAL REVIEW OF SYSTEMS:    MEDICATIONS  (STANDING):  ALBUTerol    90 MICROgram(s) HFA Inhaler 2 Puff(s) Inhalation every 6 hours  aspirin enteric coated 81 milliGRAM(s) Oral daily  atorvastatin 40 milliGRAM(s) Oral at bedtime  carvedilol 6.25 milliGRAM(s) Oral every 12 hours  folic acid 1 milliGRAM(s) Oral daily  heparin   Injectable 5000 Unit(s) SubCutaneous every 12 hours  hydrALAZINE 25 milliGRAM(s) Oral three times a day  influenza  Vaccine (HIGH DOSE) 0.7 milliLiter(s) IntraMuscular once  isosorbide   dinitrate Tablet (ISORDIL) 10 milliGRAM(s) Oral three times a day  multivitamin 1 Tablet(s) Oral daily  pantoprazole    Tablet 40 milliGRAM(s) Oral before breakfast  potassium phosphate / sodium phosphate Powder (PHOS-NaK) 1 Packet(s) Oral three times a day with meals  sacubitril 97 mG/valsartan 103 mG 1 Tablet(s) Oral two times a day  sertraline 50 milliGRAM(s) Oral daily  spironolactone 25 milliGRAM(s) Oral daily  tiotropium 18 MICROgram(s) Capsule 1 Capsule(s) Inhalation daily    MEDICATIONS  (PRN):  guaiFENesin Oral Liquid (Sugar-Free) 100 milliGRAM(s) Oral every 6 hours PRN Cough  melatonin 3 milliGRAM(s) Oral at bedtime PRN Insomnia    CAPILLARY BLOOD GLUCOSE    I&O's Summary    16 Oct 2022 07:01  -  17 Oct 2022 07:00  --------------------------------------------------------  IN: 0 mL / OUT: 600 mL / NET: -600 mL    PHYSICAL EXAM:  Vital Signs Last 24 Hrs  T(C): 37.1 (17 Oct 2022 16:50), Max: 37.1 (17 Oct 2022 16:50)  T(F): 98.7 (17 Oct 2022 16:50), Max: 98.7 (17 Oct 2022 16:50)  HR: 80 (17 Oct 2022 16:50) (65 - 80)  BP: 117/78 (17 Oct 2022 16:50) (112/83 - 165/88)  BP(mean): --  RR: 18 (17 Oct 2022 16:50) (16 - 18)  SpO2: 100% (17 Oct 2022 16:50) (99% - 100%)    Parameters below as of 17 Oct 2022 16:50  Patient On (Oxygen Delivery Method): room air    CONSTITUTIONAL: NAD, thin male  HEAD: Normocephalic, atraumatic  EYES: EOMI, PERRL  ENT: no rhinorrhea, no pharyngeal erythema  RESPIRATORY: No increased work of breathing, CTAB, no wheezes or crackles appreciated  CARDIOVASCULAR: RRR, S1 and S2 present, no m/r/g  ABDOMEN: soft, NT, ND, bowel sounds present  EXTREMITIES: No LE edema  MUSCULOSKELETAL: no joint swelling, no tenderness to palpation  NEURO: A&Ox3, moving all extremities    LABS:                        11.3   7.31  )-----------( 326      ( 17 Oct 2022 06:43 )             33.2     10-17    130<L>  |  96<L>  |  28<H>  ----------------------------<  95  3.9   |  25  |  0.99    Ca    9.2      17 Oct 2022 06:43  Phos  2.4     10-17  Mg     1.90     10-17    RADIOLOGY & ADDITIONAL TESTS:    Results Reviewed:   Imaging Personally Reviewed:  Electrocardiogram Personally Reviewed:    COORDINATION OF CARE:  Care Discussed with Consultants/Other Providers [Y/N]:  Prior or Outpatient Records Reviewed [Y/N]:

## 2022-10-17 NOTE — PROGRESS NOTE ADULT - PROBLEM SELECTOR PLAN 1
elevated UOsmo and Isaura suggestive of SIADH  fluid restrict to 1L qd  monitor BMP closely.  slight improvement from 122> 124 >127 >129 > 130  appreciate nephro recs, was on salt tabs but d/c'd on 10/17  ctm bmp daily

## 2022-10-17 NOTE — PROGRESS NOTE ADULT - PROBLEM SELECTOR PLAN 2
echo with global LVDF, EF 25%  clinically euvolemic at this time  c/w entresto - send for insurance coverage.   c/w carvedilol, hydralazine, Isordil, spironolactone.   c/w ASA, statin.   f/u HF/Cards rec.  NST showed infarct, but no ischemia, no further workup per cards  lasix reduced to 20mg qd

## 2022-10-17 NOTE — PROGRESS NOTE ADULT - PROBLEM SELECTOR PLAN 1
Euvolemic to slightly hypovolemic.   Has been getting salt tabs since 10/15- suggest holding further doses.   Would reduce Lasix to 20 mg daily starting on d/c.   New LV systolic dysfunction. Has evidence of infarct on NST, but no clear evidence of ischemia.   Continue Entresto 97/103 mg po BID.   Continue hydralazine 25 mg po TID.   Continue Isordil 10 mg po TID.   Continue spironolactone 25 mg po qd.   Supplement K to keep 4.0-5.0 and mag >2.0.   Daily standing weights and strict I/O.  We will make a follow up appointment w/ us prior to d/c.

## 2022-10-17 NOTE — PROGRESS NOTE ADULT - ASSESSMENT
72M with PMH HTN, HLD, CAD, HCV (s/p Harvoni), EtOH initially presented with SOB and acute hypoxic resp failure secondary to hypertensive emergency and acute decompensated HF. patient was managed in CCU with nitro gtt, lasix gtt. echo showed poor EF, global LVSD, mod MR, large RV. Patient is now transitioned to PO treatment for HF. Patient noted to be persistently hyponatremic, now improving with fluid restriction and salt tabs. Currently pending dispo planning to [facility].

## 2022-10-18 ENCOUNTER — APPOINTMENT (OUTPATIENT)
Dept: INTERNAL MEDICINE | Facility: CLINIC | Age: 72
End: 2022-10-18

## 2022-10-18 LAB
ANION GAP SERPL CALC-SCNC: 12 MMOL/L — SIGNIFICANT CHANGE UP (ref 7–14)
BUN SERPL-MCNC: 31 MG/DL — HIGH (ref 7–23)
CALCIUM SERPL-MCNC: 9.5 MG/DL — SIGNIFICANT CHANGE UP (ref 8.4–10.5)
CHLORIDE SERPL-SCNC: 98 MMOL/L — SIGNIFICANT CHANGE UP (ref 98–107)
CO2 SERPL-SCNC: 23 MMOL/L — SIGNIFICANT CHANGE UP (ref 22–31)
CREAT SERPL-MCNC: 1.07 MG/DL — SIGNIFICANT CHANGE UP (ref 0.5–1.3)
EGFR: 74 ML/MIN/1.73M2 — SIGNIFICANT CHANGE UP
GLUCOSE SERPL-MCNC: 80 MG/DL — SIGNIFICANT CHANGE UP (ref 70–99)
HCT VFR BLD CALC: 35.9 % — LOW (ref 39–50)
HGB BLD-MCNC: 12 G/DL — LOW (ref 13–17)
MAGNESIUM SERPL-MCNC: 1.8 MG/DL — SIGNIFICANT CHANGE UP (ref 1.6–2.6)
MCHC RBC-ENTMCNC: 27.4 PG — SIGNIFICANT CHANGE UP (ref 27–34)
MCHC RBC-ENTMCNC: 33.4 GM/DL — SIGNIFICANT CHANGE UP (ref 32–36)
MCV RBC AUTO: 82 FL — SIGNIFICANT CHANGE UP (ref 80–100)
NRBC # BLD: 0 /100 WBCS — SIGNIFICANT CHANGE UP (ref 0–0)
NRBC # FLD: 0 K/UL — SIGNIFICANT CHANGE UP (ref 0–0)
PHOSPHATE SERPL-MCNC: 3 MG/DL — SIGNIFICANT CHANGE UP (ref 2.5–4.5)
PLATELET # BLD AUTO: 320 K/UL — SIGNIFICANT CHANGE UP (ref 150–400)
POTASSIUM SERPL-MCNC: 4.1 MMOL/L — SIGNIFICANT CHANGE UP (ref 3.5–5.3)
POTASSIUM SERPL-SCNC: 4.1 MMOL/L — SIGNIFICANT CHANGE UP (ref 3.5–5.3)
RBC # BLD: 4.38 M/UL — SIGNIFICANT CHANGE UP (ref 4.2–5.8)
RBC # FLD: 12.9 % — SIGNIFICANT CHANGE UP (ref 10.3–14.5)
SODIUM SERPL-SCNC: 133 MMOL/L — LOW (ref 135–145)
WBC # BLD: 8.89 K/UL — SIGNIFICANT CHANGE UP (ref 3.8–10.5)
WBC # FLD AUTO: 8.89 K/UL — SIGNIFICANT CHANGE UP (ref 3.8–10.5)

## 2022-10-18 PROCEDURE — 99232 SBSQ HOSP IP/OBS MODERATE 35: CPT

## 2022-10-18 RX ADMIN — CARVEDILOL PHOSPHATE 6.25 MILLIGRAM(S): 80 CAPSULE, EXTENDED RELEASE ORAL at 05:48

## 2022-10-18 RX ADMIN — Medication 25 MILLIGRAM(S): at 13:03

## 2022-10-18 RX ADMIN — ATORVASTATIN CALCIUM 40 MILLIGRAM(S): 80 TABLET, FILM COATED ORAL at 22:32

## 2022-10-18 RX ADMIN — Medication 25 MILLIGRAM(S): at 22:32

## 2022-10-18 RX ADMIN — Medication 20 MILLIGRAM(S): at 05:48

## 2022-10-18 RX ADMIN — ISOSORBIDE DINITRATE 10 MILLIGRAM(S): 5 TABLET ORAL at 22:31

## 2022-10-18 RX ADMIN — Medication 81 MILLIGRAM(S): at 13:04

## 2022-10-18 RX ADMIN — ALBUTEROL 2 PUFF(S): 90 AEROSOL, METERED ORAL at 22:32

## 2022-10-18 RX ADMIN — SACUBITRIL AND VALSARTAN 1 TABLET(S): 24; 26 TABLET, FILM COATED ORAL at 18:45

## 2022-10-18 RX ADMIN — HEPARIN SODIUM 5000 UNIT(S): 5000 INJECTION INTRAVENOUS; SUBCUTANEOUS at 05:48

## 2022-10-18 RX ADMIN — CARVEDILOL PHOSPHATE 6.25 MILLIGRAM(S): 80 CAPSULE, EXTENDED RELEASE ORAL at 18:44

## 2022-10-18 RX ADMIN — SERTRALINE 50 MILLIGRAM(S): 25 TABLET, FILM COATED ORAL at 13:04

## 2022-10-18 RX ADMIN — ISOSORBIDE DINITRATE 10 MILLIGRAM(S): 5 TABLET ORAL at 13:03

## 2022-10-18 RX ADMIN — ISOSORBIDE DINITRATE 10 MILLIGRAM(S): 5 TABLET ORAL at 05:48

## 2022-10-18 RX ADMIN — Medication 1 MILLIGRAM(S): at 13:03

## 2022-10-18 RX ADMIN — SPIRONOLACTONE 25 MILLIGRAM(S): 25 TABLET, FILM COATED ORAL at 08:22

## 2022-10-18 RX ADMIN — PANTOPRAZOLE SODIUM 40 MILLIGRAM(S): 20 TABLET, DELAYED RELEASE ORAL at 05:50

## 2022-10-18 RX ADMIN — Medication 1 TABLET(S): at 13:04

## 2022-10-18 RX ADMIN — Medication 1 PACKET(S): at 08:22

## 2022-10-18 RX ADMIN — Medication 25 MILLIGRAM(S): at 05:48

## 2022-10-18 RX ADMIN — SACUBITRIL AND VALSARTAN 1 TABLET(S): 24; 26 TABLET, FILM COATED ORAL at 05:49

## 2022-10-18 RX ADMIN — HEPARIN SODIUM 5000 UNIT(S): 5000 INJECTION INTRAVENOUS; SUBCUTANEOUS at 18:45

## 2022-10-18 NOTE — PROGRESS NOTE ADULT - SUBJECTIVE AND OBJECTIVE BOX
[facility] Department of Hospital Medicine  Ed Ramos MD  Available on MS Teams  Pager: [phone]    Patient is a 72y old  Male who presents with a chief complaint of SOB (17 Oct 2022 19:21)    OVERNIGHT EVENTS: Overnight patient noted with brief episode of 2nd degree heart block on telemetry. Remained asymptomatic with stable vitals.    SUBJECTIVE: Pt seen and examined at bedside this morning. Denies any complaints this morning. Patient is eager to leave the hospital so that he can get back to his hobbies of cooking and fishing. Denies any dizziness, fever, palpitations, CP, SOB, nausea, vomiting or abdominal pain. Tolerating regular diet, having regular BMs.    ADDITIONAL REVIEW OF SYSTEMS: As above    MEDICATIONS  (STANDING):  ALBUTerol    90 MICROgram(s) HFA Inhaler 2 Puff(s) Inhalation every 6 hours  aspirin enteric coated 81 milliGRAM(s) Oral daily  atorvastatin 40 milliGRAM(s) Oral at bedtime  carvedilol 6.25 milliGRAM(s) Oral every 12 hours  folic acid 1 milliGRAM(s) Oral daily  furosemide    Tablet 20 milliGRAM(s) Oral daily  heparin   Injectable 5000 Unit(s) SubCutaneous every 12 hours  hydrALAZINE 25 milliGRAM(s) Oral three times a day  influenza  Vaccine (HIGH DOSE) 0.7 milliLiter(s) IntraMuscular once  isosorbide   dinitrate Tablet (ISORDIL) 10 milliGRAM(s) Oral three times a day  multivitamin 1 Tablet(s) Oral daily  pantoprazole    Tablet 40 milliGRAM(s) Oral before breakfast  sacubitril 97 mG/valsartan 103 mG 1 Tablet(s) Oral two times a day  sertraline 50 milliGRAM(s) Oral daily  spironolactone 25 milliGRAM(s) Oral daily  tiotropium 18 MICROgram(s) Capsule 1 Capsule(s) Inhalation daily    MEDICATIONS  (PRN):  guaiFENesin Oral Liquid (Sugar-Free) 100 milliGRAM(s) Oral every 6 hours PRN Cough  melatonin 3 milliGRAM(s) Oral at bedtime PRN Insomnia    CAPILLARY BLOOD GLUCOSE    I&O's Summary    17 Oct 2022 07:01  -  18 Oct 2022 07:00  --------------------------------------------------------  IN: 200 mL / OUT: 650 mL / NET: -450 mL    PHYSICAL EXAM:  Vital Signs Last 24 Hrs  T(C): 36.9 (18 Oct 2022 13:00), Max: 37.1 (17 Oct 2022 16:50)  T(F): 98.5 (18 Oct 2022 13:00), Max: 98.7 (17 Oct 2022 16:50)  HR: 73 (18 Oct 2022 13:00) (64 - 80)  BP: 128/78 (18 Oct 2022 13:00) (113/66 - 160/90)  BP(mean): --  RR: 19 (18 Oct 2022 13:00) (18 - 19)  SpO2: 100% (18 Oct 2022 13:00) (99% - 100%)    Parameters below as of 18 Oct 2022 13:00  Patient On (Oxygen Delivery Method): room air    CONSTITUTIONAL: NAD, thin male  HEAD: Normocephalic, atraumatic  EYES: EOMI, PERRL  ENT: no rhinorrhea, no pharyngeal erythema  RESPIRATORY: No increased work of breathing, CTAB, no wheezes or crackles appreciated  CARDIOVASCULAR: RRR, S1 and S2 present, no m/r/g  ABDOMEN: soft, NT, ND, bowel sounds present  EXTREMITIES: No LE edema  MUSCULOSKELETAL: no joint swelling, no tenderness to palpation  NEURO: A&Ox3, moving all extremities    LABS:                        12.0   8.89  )-----------( 320      ( 18 Oct 2022 06:11 )             35.9     10-18    133<L>  |  98  |  31<H>  ----------------------------<  80  4.1   |  23  |  1.07    Ca    9.5      18 Oct 2022 06:11  Phos  3.0     10-18  Mg     1.80     10-18    RADIOLOGY & ADDITIONAL TESTS:    Results Reviewed:   Imaging Personally Reviewed:  Electrocardiogram Personally Reviewed:    COORDINATION OF CARE:  Care Discussed with Consultants/Other Providers [Y/N]:  Prior or Outpatient Records Reviewed [Y/N]:

## 2022-10-18 NOTE — PROGRESS NOTE ADULT - ASSESSMENT
72M with PMH HTN, HLD, CAD, HCV (s/p Harvoni), EtOH initially presented with SOB and acute hypoxic resp failure secondary to hypertensive emergency and acute decompensated HF. patient was managed in CCU with nitro gtt, lasix gtt. echo showed poor EF, global LVSD, mod MR, large RV. Patient is now transitioned to PO treatment for HF. Patient noted to be persistently hyponatremic, now improving with fluid restriction and salt tabs. Currently pending dispo planning to [facility].

## 2022-10-18 NOTE — PROVIDER CONTACT NOTE (OTHER) - BACKGROUND
pt admitted for pulmonary edema. PMH Hep C, COPD with hypoxia, heart failiure, hyponatremia, and HTN.

## 2022-10-18 NOTE — CHART NOTE - NSCHARTNOTEFT_GEN_A_CORE
Provider notified by RN that patient was noted by tele tech to have 2nd type 1 AV block overnight 10/17. No provider contact note was documented at that time. Patient now in normal sinus rhythm. Provider discussed with heart failure team. Heart failure to see patient today. Provider notified by RN that patient was noted by tele tech to have 2nd type 1 AV block overnight 10/17. No provider contact note was documented at that time. Patient now in normal sinus rhythm. Provider discussed with heart failure team. Heart failure to see patient today.    Addendum: per heart failure team, ok to continue with current carvedilol dose of 6.25mg. If patient with further episodes of 2nd degree heart block, will likely decrease to 3.125mg. Cleared for discharge per heart failure. Patient to follow up with Dr. Mendoza on 11/2 at 2pm.

## 2022-10-18 NOTE — PROGRESS NOTE ADULT - PROBLEM SELECTOR PLAN 2
echo with global LVDF, EF 25%  clinically euvolemic at this time  c/w entresto - send for insurance coverage.   c/w carvedilol, hydralazine, Isordil, spironolactone.   c/w ASA, statin.   f/u HF/Cards rec.  NST showed infarct, but no ischemia, no further workup per cards  lasix reduced to 20mg qd  d/w HF team regarding new 2nd degree heart block noted on telemetry, awaiting further recs

## 2022-10-19 LAB
ANION GAP SERPL CALC-SCNC: 13 MMOL/L — SIGNIFICANT CHANGE UP (ref 7–14)
BUN SERPL-MCNC: 30 MG/DL — HIGH (ref 7–23)
CALCIUM SERPL-MCNC: 9.2 MG/DL — SIGNIFICANT CHANGE UP (ref 8.4–10.5)
CHLORIDE SERPL-SCNC: 98 MMOL/L — SIGNIFICANT CHANGE UP (ref 98–107)
CO2 SERPL-SCNC: 21 MMOL/L — LOW (ref 22–31)
CREAT SERPL-MCNC: 1.03 MG/DL — SIGNIFICANT CHANGE UP (ref 0.5–1.3)
EGFR: 77 ML/MIN/1.73M2 — SIGNIFICANT CHANGE UP
GLUCOSE SERPL-MCNC: 103 MG/DL — HIGH (ref 70–99)
HCT VFR BLD CALC: 32.8 % — LOW (ref 39–50)
HGB BLD-MCNC: 11.1 G/DL — LOW (ref 13–17)
MAGNESIUM SERPL-MCNC: 1.7 MG/DL — SIGNIFICANT CHANGE UP (ref 1.6–2.6)
MCHC RBC-ENTMCNC: 27.8 PG — SIGNIFICANT CHANGE UP (ref 27–34)
MCHC RBC-ENTMCNC: 33.8 GM/DL — SIGNIFICANT CHANGE UP (ref 32–36)
MCV RBC AUTO: 82.2 FL — SIGNIFICANT CHANGE UP (ref 80–100)
NRBC # BLD: 0 /100 WBCS — SIGNIFICANT CHANGE UP (ref 0–0)
NRBC # FLD: 0 K/UL — SIGNIFICANT CHANGE UP (ref 0–0)
PHOSPHATE SERPL-MCNC: 3.1 MG/DL — SIGNIFICANT CHANGE UP (ref 2.5–4.5)
PLATELET # BLD AUTO: 285 K/UL — SIGNIFICANT CHANGE UP (ref 150–400)
POTASSIUM SERPL-MCNC: 4.2 MMOL/L — SIGNIFICANT CHANGE UP (ref 3.5–5.3)
POTASSIUM SERPL-SCNC: 4.2 MMOL/L — SIGNIFICANT CHANGE UP (ref 3.5–5.3)
RBC # BLD: 3.99 M/UL — LOW (ref 4.2–5.8)
RBC # FLD: 13 % — SIGNIFICANT CHANGE UP (ref 10.3–14.5)
SARS-COV-2 RNA SPEC QL NAA+PROBE: SIGNIFICANT CHANGE UP
SODIUM SERPL-SCNC: 132 MMOL/L — LOW (ref 135–145)
WBC # BLD: 8.15 K/UL — SIGNIFICANT CHANGE UP (ref 3.8–10.5)
WBC # FLD AUTO: 8.15 K/UL — SIGNIFICANT CHANGE UP (ref 3.8–10.5)

## 2022-10-19 PROCEDURE — 99232 SBSQ HOSP IP/OBS MODERATE 35: CPT

## 2022-10-19 RX ADMIN — Medication 20 MILLIGRAM(S): at 06:45

## 2022-10-19 RX ADMIN — Medication 3 MILLIGRAM(S): at 22:41

## 2022-10-19 RX ADMIN — HEPARIN SODIUM 5000 UNIT(S): 5000 INJECTION INTRAVENOUS; SUBCUTANEOUS at 17:38

## 2022-10-19 RX ADMIN — ALBUTEROL 2 PUFF(S): 90 AEROSOL, METERED ORAL at 22:37

## 2022-10-19 RX ADMIN — ALBUTEROL 2 PUFF(S): 90 AEROSOL, METERED ORAL at 17:37

## 2022-10-19 RX ADMIN — Medication 25 MILLIGRAM(S): at 06:44

## 2022-10-19 RX ADMIN — TIOTROPIUM BROMIDE 1 CAPSULE(S): 18 CAPSULE ORAL; RESPIRATORY (INHALATION) at 11:31

## 2022-10-19 RX ADMIN — Medication 25 MILLIGRAM(S): at 22:37

## 2022-10-19 RX ADMIN — ALBUTEROL 2 PUFF(S): 90 AEROSOL, METERED ORAL at 11:31

## 2022-10-19 RX ADMIN — Medication 1 MILLIGRAM(S): at 11:32

## 2022-10-19 RX ADMIN — ATORVASTATIN CALCIUM 40 MILLIGRAM(S): 80 TABLET, FILM COATED ORAL at 22:37

## 2022-10-19 RX ADMIN — Medication 25 MILLIGRAM(S): at 15:08

## 2022-10-19 RX ADMIN — ISOSORBIDE DINITRATE 10 MILLIGRAM(S): 5 TABLET ORAL at 06:45

## 2022-10-19 RX ADMIN — HEPARIN SODIUM 5000 UNIT(S): 5000 INJECTION INTRAVENOUS; SUBCUTANEOUS at 06:44

## 2022-10-19 RX ADMIN — SERTRALINE 50 MILLIGRAM(S): 25 TABLET, FILM COATED ORAL at 11:33

## 2022-10-19 RX ADMIN — SPIRONOLACTONE 25 MILLIGRAM(S): 25 TABLET, FILM COATED ORAL at 11:31

## 2022-10-19 RX ADMIN — SACUBITRIL AND VALSARTAN 1 TABLET(S): 24; 26 TABLET, FILM COATED ORAL at 17:38

## 2022-10-19 RX ADMIN — PANTOPRAZOLE SODIUM 40 MILLIGRAM(S): 20 TABLET, DELAYED RELEASE ORAL at 06:44

## 2022-10-19 RX ADMIN — SACUBITRIL AND VALSARTAN 1 TABLET(S): 24; 26 TABLET, FILM COATED ORAL at 06:44

## 2022-10-19 RX ADMIN — ISOSORBIDE DINITRATE 10 MILLIGRAM(S): 5 TABLET ORAL at 22:37

## 2022-10-19 RX ADMIN — Medication 81 MILLIGRAM(S): at 11:32

## 2022-10-19 RX ADMIN — CARVEDILOL PHOSPHATE 6.25 MILLIGRAM(S): 80 CAPSULE, EXTENDED RELEASE ORAL at 06:44

## 2022-10-19 RX ADMIN — CARVEDILOL PHOSPHATE 6.25 MILLIGRAM(S): 80 CAPSULE, EXTENDED RELEASE ORAL at 17:39

## 2022-10-19 RX ADMIN — ISOSORBIDE DINITRATE 10 MILLIGRAM(S): 5 TABLET ORAL at 15:08

## 2022-10-19 RX ADMIN — Medication 1 TABLET(S): at 11:32

## 2022-10-19 NOTE — CHART NOTE - NSCHARTNOTEFT_GEN_A_CORE
Pt seen for severe malnutrition follow up     Medical Course: 72 year old male with PMH HTN, HLD, CAD, HCV (s/p Harvoni), EtOH initially presented with SOB and acute hypoxic resp failure secondary to hypertensive emergency and acute decompensated HF. patient was managed in CCU with nitro gtt, lasix gtt. echo showed poor EF, global LVSD, mod MR, large RV. Patient is now transitioned to PO treatment for HF. Patient noted to be persistently hyponatremic, now improving with fluid restriction and salt tabs. Currently pending dispo planning to [facility].    Nutrition Course: Patient A&Ox4. Per patient "appetite is roaring". Intake per RN flowsheets is % and % of Ensure compact.. No reported GI distress. Patient now on PO diuretics therefore risk for weight fluctuations. Hyponatremia improving with 1000ml fluid restricition and salt tabs per medical team. Patient requesting additional soup, explained fluid restriction to patient, agreeable. Patient receiving folic acid, MVI for micronutrient support. Heart failure nutrition therapy provided. Discussed daily weights, nutrition label reading, no added salt to foods, limiting fluid intake, and limiting eating out. Patient reports he uses Mrs[employer] Welch seasoning when cooking at home and does not eat out much because he loves to cook.     Diet Prescription: Diet, Regular:   Consistent Carbohydrate {Evening Snack} (CSTCHOSN)  DASH/TLC {Sodium & Cholesterol Restricted} (DASH)  1000mL Fluid Restriction ([id number])  No Caffeine  No Carbonated Beverages  No Chocolate  Supplement Feeding Modality:  Oral  Ensure Compact Cans or Servings Per Day:  1       Frequency:  Two Times a day (10-14-22 @ 18:23)    Pertinent Medications: MEDICATIONS  (STANDING):  ALBUTerol    90 MICROgram(s) HFA Inhaler 2 Puff(s) Inhalation every 6 hours  aspirin enteric coated 81 milliGRAM(s) Oral daily  atorvastatin 40 milliGRAM(s) Oral at bedtime  carvedilol 6.25 milliGRAM(s) Oral every 12 hours  folic acid 1 milliGRAM(s) Oral daily  furosemide    Tablet 20 milliGRAM(s) Oral daily  heparin   Injectable 5000 Unit(s) SubCutaneous every 12 hours  hydrALAZINE 25 milliGRAM(s) Oral three times a day  influenza  Vaccine (HIGH DOSE) 0.7 milliLiter(s) IntraMuscular once  isosorbide   dinitrate Tablet (ISORDIL) 10 milliGRAM(s) Oral three times a day  multivitamin 1 Tablet(s) Oral daily  pantoprazole    Tablet 40 milliGRAM(s) Oral before breakfast  sacubitril 97 mG/valsartan 103 mG 1 Tablet(s) Oral two times a day  sertraline 50 milliGRAM(s) Oral daily  spironolactone 25 milliGRAM(s) Oral daily  tiotropium 18 MICROgram(s) Capsule 1 Capsule(s) Inhalation daily    MEDICATIONS  (PRN):  guaiFENesin Oral Liquid (Sugar-Free) 100 milliGRAM(s) Oral every 6 hours PRN Cough  melatonin 3 milliGRAM(s) Oral at bedtime PRN Insomnia    Pertinent Labs: 10-19 Na132 mmol/L<L> Glu 103 mg/dL<H> K+ 4.2 mmol/L Cr  1.03 mg/dL BUN 30 mg/dL<H> 10-19 Phos 3.1 mg/dL 10-13 Alb 3.5 g/dL 10-07 Chol 140 mg/dL LDL --    HDL 68 mg/dL Trig 62 mg/dL    Weight: Height (cm): 167.6 (10-07 @ 06:00)  Weight (kg): 51.2 (10-07 @ 06:00)  BMI (kg/m2): 18.2 (10-07 @ 06:00)  Weight Assessment: 10/7: 51.2kg, 10/12: 47.5kg, 10/18: 43.7kg. weight loss trend noted, possibly due to fluid shifts as patient is taking multiple PO diuretics. Intake has improved, and good intake of nutrition supplement.      Physical Assessment, per flowsheets:  Edema: No edema noted per RN flowsheets   Skin: Intact w/ no pressure injuries noted per RN flowsheets       Estimated Needs:   [X] No change since previous assessment    Previous Nutrition Diagnosis: severe protein calorie malnutrition   Nutrition Diagnosis is [ ] ongoing  [ ] resolved [ ] not applicable   New Nutrition Diagnosis: [ ] not applicable     Interventions:   1) Recommend DASH/TLC diet (d/c CSTCHO diet restriction)  2) Continue multivitamin and folic acid supplement for micronutrient coverage.   3) RD available prn     Monitor & Evaluate:  PO intake, tolerance to diet/supplement, nutrition related lab values, weight trends, BMs/GI distress, hydration status, skin integrity.    Jimena Isaac MS, RD| [id number] (Also available on TEAMS) Pt seen for severe malnutrition follow up     Medical Course: 72 year old male with PMH HTN, HLD, CAD, HCV (s/p Harvoni), EtOH initially presented with SOB and acute hypoxic resp failure secondary to hypertensive emergency and acute decompensated HF. patient was managed in CCU with nitro gtt, lasix gtt. echo showed poor EF, global LVSD, mod MR, large RV. Patient is now transitioned to PO treatment for HF. Patient noted to be persistently hyponatremic, now improving with fluid restriction and salt tabs. Currently pending dispo planning to [facility].    Nutrition Course: Patient A&Ox4. Per patient "appetite is roaring". Intake per RN flowsheets is % and % of Ensure compact.. No reported GI distress. Patient now on PO diuretics therefore risk for weight fluctuations. Hyponatremia improving with 1000ml fluid restriction and salt tabs per medical team. Patient requesting additional soup, explained fluid restriction to patient, agreeable. Patient receiving folic acid, MVI for micronutrient support. Heart failure nutrition therapy provided. Discussed daily weights, nutrition label reading, no added salt to foods, limiting fluid intake, and limiting eating out. Patient reports he uses Mrs[employer] Welch seasoning when cooking at home and does not eat out much because he loves to cook. ?no carbonated beverages, ?no chocolate- communicated with PA.     Diet Prescription: Diet, Regular:   Consistent Carbohydrate {Evening Snack} (CSTCHOSN)  DASH/TLC {Sodium & Cholesterol Restricted} (DASH)  1000mL Fluid Restriction ([id number])  No Caffeine  No Carbonated Beverages  No Chocolate  Supplement Feeding Modality:  Oral  Ensure Compact Cans or Servings Per Day:  1       Frequency:  Two Times a day (10-14-22 @ 18:23)    Pertinent Medications: MEDICATIONS  (STANDING):  ALBUTerol    90 MICROgram(s) HFA Inhaler 2 Puff(s) Inhalation every 6 hours  aspirin enteric coated 81 milliGRAM(s) Oral daily  atorvastatin 40 milliGRAM(s) Oral at bedtime  carvedilol 6.25 milliGRAM(s) Oral every 12 hours  folic acid 1 milliGRAM(s) Oral daily  furosemide    Tablet 20 milliGRAM(s) Oral daily  heparin   Injectable 5000 Unit(s) SubCutaneous every 12 hours  hydrALAZINE 25 milliGRAM(s) Oral three times a day  influenza  Vaccine (HIGH DOSE) 0.7 milliLiter(s) IntraMuscular once  isosorbide   dinitrate Tablet (ISORDIL) 10 milliGRAM(s) Oral three times a day  multivitamin 1 Tablet(s) Oral daily  pantoprazole    Tablet 40 milliGRAM(s) Oral before breakfast  sacubitril 97 mG/valsartan 103 mG 1 Tablet(s) Oral two times a day  sertraline 50 milliGRAM(s) Oral daily  spironolactone 25 milliGRAM(s) Oral daily  tiotropium 18 MICROgram(s) Capsule 1 Capsule(s) Inhalation daily    MEDICATIONS  (PRN):  guaiFENesin Oral Liquid (Sugar-Free) 100 milliGRAM(s) Oral every 6 hours PRN Cough  melatonin 3 milliGRAM(s) Oral at bedtime PRN Insomnia    Pertinent Labs: 10-19 Na132 mmol/L<L> Glu 103 mg/dL<H> K+ 4.2 mmol/L Cr  1.03 mg/dL BUN 30 mg/dL<H> 10-19 Phos 3.1 mg/dL 10-13 Alb 3.5 g/dL 10-07 Chol 140 mg/dL LDL --    HDL 68 mg/dL Trig 62 mg/dL    Weight: Height (cm): 167.6 (10-07 @ 06:00)  Weight (kg): 51.2 (10-07 @ 06:00)  BMI (kg/m2): 18.2 (10-07 @ 06:00)  Weight Assessment: 10/7: 51.2kg, 10/12: 47.5kg, 10/18: 43.7kg. weight loss trend noted, possibly due to fluid shifts as patient is taking multiple PO diuretics. Intake has improved, and good intake of nutrition supplement.      Physical Assessment, per flowsheets:  Edema: No edema noted per RN flowsheets   Skin: Intact w/ no pressure injuries noted per RN flowsheets       Estimated Needs:   [X] No change since previous assessment    Previous Nutrition Diagnosis: severe protein calorie malnutrition   Nutrition Diagnosis is [ ] ongoing  [ ] resolved [ ] not applicable   New Nutrition Diagnosis: [ ] not applicable     Interventions:   1) Recommend DASH/TLC diet (d/c CSTCHO diet restriction)  2) Continue multivitamin and folic acid supplement for micronutrient coverage.   3) RD available prn     Monitor & Evaluate:  PO intake, tolerance to diet/supplement, nutrition related lab values, weight trends, BMs/GI distress, hydration status, skin integrity.    Jimena Isaac MS, RD| [id number] (Also available on TEAMS)

## 2022-10-19 NOTE — PROGRESS NOTE ADULT - PROBLEM SELECTOR PLAN 1
elevated UOsmo and Isaura suggestive of SIADH  fluid restrict to 1L qd  monitor BMP closely.  overall improved, stable in low 130s  appreciate nephro recs, was on salt tabs, now d/c'd  ctm bmp daily

## 2022-10-19 NOTE — PROGRESS NOTE ADULT - PROBLEM SELECTOR PLAN 2
echo with global LVDF, EF 25%  clinically euvolemic at this time  c/w entresto - send for insurance coverage.   c/w carvedilol, hydralazine, Isordil, spironolactone.   c/w ASA, statin.   f/u HF/Cards rec.  NST showed infarct, but no ischemia, no further workup per cards  lasix reduced to 20mg qd  d/w HF team regarding new 2nd degree heart block noted on telemetry, awaiting further recs echo with global LVDF, EF 25%  clinically euvolemic at this time  c/w entresto - send for insurance coverage.   c/w carvedilol, hydralazine, Isordil, spironolactone.   c/w ASA, statin.   f/u HF/Cards rec.  NST showed infarct, but no ischemia, no further workup per cards  lasix reduced to 20mg qd

## 2022-10-19 NOTE — PROGRESS NOTE ADULT - SUBJECTIVE AND OBJECTIVE BOX
[facility] Department of Hospital Medicine  Ed Ramos MD  Available on MS Teams  Pager: [phone]    Patient is a 72y old  Male who presents with a chief complaint of SOB (18 Oct 2022 16:04)    OVERNIGHT EVENTS: No acute events overnight. No further episodes of heart block noted on telemetry.    SUBJECTIVE: Pt seen and examined at bedside this morning. Continues to endorse feeling very well. Patient is eager to go to rehab and eventually return home. Denies any complaints today. Endorses good appetite, denies any CP, palpitations, SOB, nausea, vomiting or abd pain.    ADDITIONAL REVIEW OF SYSTEMS: As above    MEDICATIONS  (STANDING):  ALBUTerol    90 MICROgram(s) HFA Inhaler 2 Puff(s) Inhalation every 6 hours  aspirin enteric coated 81 milliGRAM(s) Oral daily  atorvastatin 40 milliGRAM(s) Oral at bedtime  carvedilol 6.25 milliGRAM(s) Oral every 12 hours  folic acid 1 milliGRAM(s) Oral daily  furosemide    Tablet 20 milliGRAM(s) Oral daily  heparin   Injectable 5000 Unit(s) SubCutaneous every 12 hours  hydrALAZINE 25 milliGRAM(s) Oral three times a day  influenza  Vaccine (HIGH DOSE) 0.7 milliLiter(s) IntraMuscular once  isosorbide   dinitrate Tablet (ISORDIL) 10 milliGRAM(s) Oral three times a day  multivitamin 1 Tablet(s) Oral daily  pantoprazole    Tablet 40 milliGRAM(s) Oral before breakfast  sacubitril 97 mG/valsartan 103 mG 1 Tablet(s) Oral two times a day  sertraline 50 milliGRAM(s) Oral daily  spironolactone 25 milliGRAM(s) Oral daily  tiotropium 18 MICROgram(s) Capsule 1 Capsule(s) Inhalation daily    MEDICATIONS  (PRN):  guaiFENesin Oral Liquid (Sugar-Free) 100 milliGRAM(s) Oral every 6 hours PRN Cough  melatonin 3 milliGRAM(s) Oral at bedtime PRN Insomnia    CAPILLARY BLOOD GLUCOSE    I&O's Summary    18 Oct 2022 07:01  -  19 Oct 2022 07:00  --------------------------------------------------------  IN: 0 mL / OUT: 200 mL / NET: -200 mL    PHYSICAL EXAM:  Vital Signs Last 24 Hrs  T(C): 36.7 (19 Oct 2022 12:20), Max: 36.9 (18 Oct 2022 22:30)  T(F): 98 (19 Oct 2022 12:20), Max: 98.5 (18 Oct 2022 22:30)  HR: 66 (19 Oct 2022 15:08) (66 - 80)  BP: 104/67 (19 Oct 2022 15:08) (104/67 - 152/90)  BP(mean): --  RR: 18 (19 Oct 2022 15:08) (17 - 18)  SpO2: 100% (19 Oct 2022 15:08) (100% - 100%)    Parameters below as of 19 Oct 2022 12:20  Patient On (Oxygen Delivery Method): room air    CONSTITUTIONAL: NAD, thin male  HEAD: Normocephalic, atraumatic  EYES: EOMI, PERRL  ENT: no rhinorrhea, no pharyngeal erythema  RESPIRATORY: No increased work of breathing, CTAB, no wheezes or crackles appreciated  CARDIOVASCULAR: RRR, S1 and S2 present, no m/r/g  ABDOMEN: soft, NT, ND, bowel sounds present  EXTREMITIES: No LE edema  MUSCULOSKELETAL: no joint swelling, no tenderness to palpation  NEURO: A&Ox3, moving all extremities    LABS:                        11.1   8.15  )-----------( 285      ( 19 Oct 2022 06:29 )             32.8     10-19    132<L>  |  98  |  30<H>  ----------------------------<  103<H>  4.2   |  21<L>  |  1.03    Ca    9.2      19 Oct 2022 06:29  Phos  3.1     10-19  Mg     1.70     10-19    RADIOLOGY & ADDITIONAL TESTS:    Results Reviewed:   Imaging Personally Reviewed:  Electrocardiogram Personally Reviewed:    COORDINATION OF CARE:  Care Discussed with Consultants/Other Providers [Y/N]:  Prior or Outpatient Records Reviewed [Y/N]:

## 2022-10-19 NOTE — PROGRESS NOTE ADULT - ASSESSMENT
72M with PMH HTN, HLD, CAD, HCV (s/p Harvoni), EtOH initially presented with SOB and acute hypoxic resp failure secondary to hypertensive emergency and acute decompensated HF. patient was managed in CCU with nitro gtt, lasix gtt. echo showed poor EF, global LVSD, mod MR, large RV. Patient is now transitioned to PO treatment for HF. Patient noted to be persistently hyponatremic, now improving with fluid restriction and salt tabs. Currently pending dispo planning to [facility].

## 2022-10-20 LAB
ANION GAP SERPL CALC-SCNC: 11 MMOL/L — SIGNIFICANT CHANGE UP (ref 7–14)
BUN SERPL-MCNC: 26 MG/DL — HIGH (ref 7–23)
CALCIUM SERPL-MCNC: 9.4 MG/DL — SIGNIFICANT CHANGE UP (ref 8.4–10.5)
CHLORIDE SERPL-SCNC: 97 MMOL/L — LOW (ref 98–107)
CO2 SERPL-SCNC: 21 MMOL/L — LOW (ref 22–31)
CREAT SERPL-MCNC: 0.99 MG/DL — SIGNIFICANT CHANGE UP (ref 0.5–1.3)
EGFR: 81 ML/MIN/1.73M2 — SIGNIFICANT CHANGE UP
GLUCOSE SERPL-MCNC: 88 MG/DL — SIGNIFICANT CHANGE UP (ref 70–99)
HCT VFR BLD CALC: 34.8 % — LOW (ref 39–50)
HGB BLD-MCNC: 11.5 G/DL — LOW (ref 13–17)
MCHC RBC-ENTMCNC: 27 PG — SIGNIFICANT CHANGE UP (ref 27–34)
MCHC RBC-ENTMCNC: 33 GM/DL — SIGNIFICANT CHANGE UP (ref 32–36)
MCV RBC AUTO: 81.7 FL — SIGNIFICANT CHANGE UP (ref 80–100)
NRBC # BLD: 0 /100 WBCS — SIGNIFICANT CHANGE UP (ref 0–0)
NRBC # FLD: 0 K/UL — SIGNIFICANT CHANGE UP (ref 0–0)
PLATELET # BLD AUTO: 306 K/UL — SIGNIFICANT CHANGE UP (ref 150–400)
POTASSIUM SERPL-MCNC: 4.1 MMOL/L — SIGNIFICANT CHANGE UP (ref 3.5–5.3)
POTASSIUM SERPL-SCNC: 4.1 MMOL/L — SIGNIFICANT CHANGE UP (ref 3.5–5.3)
RBC # BLD: 4.26 M/UL — SIGNIFICANT CHANGE UP (ref 4.2–5.8)
RBC # FLD: 13 % — SIGNIFICANT CHANGE UP (ref 10.3–14.5)
SODIUM SERPL-SCNC: 129 MMOL/L — LOW (ref 135–145)
WBC # BLD: 7.58 K/UL — SIGNIFICANT CHANGE UP (ref 3.8–10.5)
WBC # FLD AUTO: 7.58 K/UL — SIGNIFICANT CHANGE UP (ref 3.8–10.5)

## 2022-10-20 PROCEDURE — 99232 SBSQ HOSP IP/OBS MODERATE 35: CPT

## 2022-10-20 RX ORDER — SODIUM CHLORIDE 9 MG/ML
1 INJECTION INTRAMUSCULAR; INTRAVENOUS; SUBCUTANEOUS ONCE
Refills: 0 | Status: DISCONTINUED | OUTPATIENT
Start: 2022-10-20 | End: 2022-10-22

## 2022-10-20 RX ADMIN — SPIRONOLACTONE 25 MILLIGRAM(S): 25 TABLET, FILM COATED ORAL at 08:17

## 2022-10-20 RX ADMIN — SACUBITRIL AND VALSARTAN 1 TABLET(S): 24; 26 TABLET, FILM COATED ORAL at 17:43

## 2022-10-20 RX ADMIN — Medication 20 MILLIGRAM(S): at 06:26

## 2022-10-20 RX ADMIN — HEPARIN SODIUM 5000 UNIT(S): 5000 INJECTION INTRAVENOUS; SUBCUTANEOUS at 06:27

## 2022-10-20 RX ADMIN — ISOSORBIDE DINITRATE 10 MILLIGRAM(S): 5 TABLET ORAL at 06:26

## 2022-10-20 RX ADMIN — Medication 1 MILLIGRAM(S): at 13:04

## 2022-10-20 RX ADMIN — Medication 81 MILLIGRAM(S): at 13:03

## 2022-10-20 RX ADMIN — ALBUTEROL 2 PUFF(S): 90 AEROSOL, METERED ORAL at 22:25

## 2022-10-20 RX ADMIN — Medication 25 MILLIGRAM(S): at 13:04

## 2022-10-20 RX ADMIN — ALBUTEROL 2 PUFF(S): 90 AEROSOL, METERED ORAL at 06:27

## 2022-10-20 RX ADMIN — ISOSORBIDE DINITRATE 10 MILLIGRAM(S): 5 TABLET ORAL at 13:04

## 2022-10-20 RX ADMIN — CARVEDILOL PHOSPHATE 6.25 MILLIGRAM(S): 80 CAPSULE, EXTENDED RELEASE ORAL at 17:43

## 2022-10-20 RX ADMIN — ATORVASTATIN CALCIUM 40 MILLIGRAM(S): 80 TABLET, FILM COATED ORAL at 22:25

## 2022-10-20 RX ADMIN — SACUBITRIL AND VALSARTAN 1 TABLET(S): 24; 26 TABLET, FILM COATED ORAL at 06:26

## 2022-10-20 RX ADMIN — Medication 25 MILLIGRAM(S): at 06:26

## 2022-10-20 RX ADMIN — HEPARIN SODIUM 5000 UNIT(S): 5000 INJECTION INTRAVENOUS; SUBCUTANEOUS at 17:43

## 2022-10-20 RX ADMIN — SERTRALINE 50 MILLIGRAM(S): 25 TABLET, FILM COATED ORAL at 13:05

## 2022-10-20 RX ADMIN — Medication 25 MILLIGRAM(S): at 22:25

## 2022-10-20 RX ADMIN — Medication 1 TABLET(S): at 13:04

## 2022-10-20 RX ADMIN — ISOSORBIDE DINITRATE 10 MILLIGRAM(S): 5 TABLET ORAL at 22:25

## 2022-10-20 RX ADMIN — CARVEDILOL PHOSPHATE 6.25 MILLIGRAM(S): 80 CAPSULE, EXTENDED RELEASE ORAL at 06:26

## 2022-10-20 RX ADMIN — Medication 3 MILLIGRAM(S): at 22:26

## 2022-10-20 RX ADMIN — PANTOPRAZOLE SODIUM 40 MILLIGRAM(S): 20 TABLET, DELAYED RELEASE ORAL at 06:26

## 2022-10-20 NOTE — PROGRESS NOTE ADULT - SUBJECTIVE AND OBJECTIVE BOX
[facility] Department of Hospital Medicine  Ed Ramos MD  Available on MS Teams  Pager: [phone]    Patient is a 72y old  Male who presents with a chief complaint of SOB (19 Oct 2022 15:35)    OVERNIGHT EVENTS: No acute events overnight    SUBJECTIVE: Pt seen and examined at bedside this morning. Patient continues to endorse feeling very well. Denies any headache, dizziness, palpitations, CP or SOB. Endorses good appetite.    ADDITIONAL REVIEW OF SYSTEMS: As above.    MEDICATIONS  (STANDING):  ALBUTerol    90 MICROgram(s) HFA Inhaler 2 Puff(s) Inhalation every 6 hours  aspirin enteric coated 81 milliGRAM(s) Oral daily  atorvastatin 40 milliGRAM(s) Oral at bedtime  carvedilol 6.25 milliGRAM(s) Oral every 12 hours  folic acid 1 milliGRAM(s) Oral daily  furosemide    Tablet 20 milliGRAM(s) Oral daily  heparin   Injectable 5000 Unit(s) SubCutaneous every 12 hours  hydrALAZINE 25 milliGRAM(s) Oral three times a day  influenza  Vaccine (HIGH DOSE) 0.7 milliLiter(s) IntraMuscular once  isosorbide   dinitrate Tablet (ISORDIL) 10 milliGRAM(s) Oral three times a day  multivitamin 1 Tablet(s) Oral daily  pantoprazole    Tablet 40 milliGRAM(s) Oral before breakfast  sacubitril 97 mG/valsartan 103 mG 1 Tablet(s) Oral two times a day  sertraline 50 milliGRAM(s) Oral daily  sodium chloride 1 Gram(s) Oral once  spironolactone 25 milliGRAM(s) Oral daily  tiotropium 18 MICROgram(s) Capsule 1 Capsule(s) Inhalation daily    MEDICATIONS  (PRN):  guaiFENesin Oral Liquid (Sugar-Free) 100 milliGRAM(s) Oral every 6 hours PRN Cough  melatonin 3 milliGRAM(s) Oral at bedtime PRN Insomnia    CAPILLARY BLOOD GLUCOSE    I&O's Summary    19 Oct 2022 07:01  -  20 Oct 2022 07:00  --------------------------------------------------------  IN: 350 mL / OUT: 950 mL / NET: -600 mL    PHYSICAL EXAM:  Vital Signs Last 24 Hrs  T(C): 36.7 (20 Oct 2022 12:30), Max: 37.2 (19 Oct 2022 22:35)  T(F): 98 (20 Oct 2022 12:30), Max: 98.9 (19 Oct 2022 22:35)  HR: 85 (20 Oct 2022 17:42) (64 - 85)  BP: 123/84 (20 Oct 2022 17:42) (123/84 - 145/70)  BP(mean): --  RR: 18 (20 Oct 2022 12:30) (18 - 18)  SpO2: 100% (20 Oct 2022 12:30) (100% - 100%)    Parameters below as of 20 Oct 2022 12:30  Patient On (Oxygen Delivery Method): room air    CONSTITUTIONAL: NAD, thin male  HEAD: Normocephalic, atraumatic  EYES: EOMI, PERRL  ENT: no rhinorrhea, no pharyngeal erythema  RESPIRATORY: No increased work of breathing, CTAB, no wheezes or crackles appreciated  CARDIOVASCULAR: RRR, S1 and S2 present, no m/r/g  ABDOMEN: soft, NT, ND, bowel sounds present  EXTREMITIES: No LE edema  MUSCULOSKELETAL: no joint swelling, no tenderness to palpation  NEURO: A&Ox3, moving all extremities    LABS:                        11.5   7.58  )-----------( 306      ( 20 Oct 2022 07:25 )             34.8     10-20    129<L>  |  97<L>  |  26<H>  ----------------------------<  88  4.1   |  21<L>  |  0.99    Ca    9.4      20 Oct 2022 07:25  Phos  3.1     10-19  Mg     1.70     10-19    RADIOLOGY & ADDITIONAL TESTS:    Results Reviewed:   Imaging Personally Reviewed:  Electrocardiogram Personally Reviewed:    COORDINATION OF CARE:  Care Discussed with Consultants/Other Providers [Y/N]:  Prior or Outpatient Records Reviewed [Y/N]:

## 2022-10-20 NOTE — PROGRESS NOTE ADULT - ASSESSMENT
72M with PMH HTN, HLD, CAD, HCV (s/p Harvoni), EtOH initially presented with SOB and acute hypoxic resp failure secondary to hypertensive emergency and acute decompensated HF. patient was managed in CCU with nitro gtt, lasix gtt. echo showed poor EF, global LVSD, mod MR, large RV. Patient is now transitioned to PO treatment for HF. Patient noted to be persistently hyponatremic, now improving with fluid restriction and salt tabs. Currently pending dispo planning to [facility].

## 2022-10-21 LAB
ANION GAP SERPL CALC-SCNC: 14 MMOL/L — SIGNIFICANT CHANGE UP (ref 7–14)
BUN SERPL-MCNC: 27 MG/DL — HIGH (ref 7–23)
CALCIUM SERPL-MCNC: 10 MG/DL — SIGNIFICANT CHANGE UP (ref 8.4–10.5)
CHLORIDE SERPL-SCNC: 98 MMOL/L — SIGNIFICANT CHANGE UP (ref 98–107)
CO2 SERPL-SCNC: 21 MMOL/L — LOW (ref 22–31)
CREAT SERPL-MCNC: 0.95 MG/DL — SIGNIFICANT CHANGE UP (ref 0.5–1.3)
EGFR: 85 ML/MIN/1.73M2 — SIGNIFICANT CHANGE UP
GLUCOSE SERPL-MCNC: 90 MG/DL — SIGNIFICANT CHANGE UP (ref 70–99)
HCT VFR BLD CALC: 36.8 % — LOW (ref 39–50)
HGB BLD-MCNC: 12.3 G/DL — LOW (ref 13–17)
MAGNESIUM SERPL-MCNC: 1.6 MG/DL — SIGNIFICANT CHANGE UP (ref 1.6–2.6)
MCHC RBC-ENTMCNC: 27.9 PG — SIGNIFICANT CHANGE UP (ref 27–34)
MCHC RBC-ENTMCNC: 33.4 GM/DL — SIGNIFICANT CHANGE UP (ref 32–36)
MCV RBC AUTO: 83.4 FL — SIGNIFICANT CHANGE UP (ref 80–100)
NRBC # BLD: 0 /100 WBCS — SIGNIFICANT CHANGE UP (ref 0–0)
NRBC # FLD: 0 K/UL — SIGNIFICANT CHANGE UP (ref 0–0)
PLATELET # BLD AUTO: 315 K/UL — SIGNIFICANT CHANGE UP (ref 150–400)
POTASSIUM SERPL-MCNC: 5 MMOL/L — SIGNIFICANT CHANGE UP (ref 3.5–5.3)
POTASSIUM SERPL-SCNC: 5 MMOL/L — SIGNIFICANT CHANGE UP (ref 3.5–5.3)
RBC # BLD: 4.41 M/UL — SIGNIFICANT CHANGE UP (ref 4.2–5.8)
RBC # FLD: 13.2 % — SIGNIFICANT CHANGE UP (ref 10.3–14.5)
SODIUM SERPL-SCNC: 133 MMOL/L — LOW (ref 135–145)
WBC # BLD: 8.69 K/UL — SIGNIFICANT CHANGE UP (ref 3.8–10.5)
WBC # FLD AUTO: 8.69 K/UL — SIGNIFICANT CHANGE UP (ref 3.8–10.5)

## 2022-10-21 PROCEDURE — 99232 SBSQ HOSP IP/OBS MODERATE 35: CPT

## 2022-10-21 RX ADMIN — CARVEDILOL PHOSPHATE 6.25 MILLIGRAM(S): 80 CAPSULE, EXTENDED RELEASE ORAL at 17:21

## 2022-10-21 RX ADMIN — Medication 25 MILLIGRAM(S): at 14:55

## 2022-10-21 RX ADMIN — Medication 1 TABLET(S): at 11:28

## 2022-10-21 RX ADMIN — HEPARIN SODIUM 5000 UNIT(S): 5000 INJECTION INTRAVENOUS; SUBCUTANEOUS at 05:41

## 2022-10-21 RX ADMIN — SERTRALINE 50 MILLIGRAM(S): 25 TABLET, FILM COATED ORAL at 11:28

## 2022-10-21 RX ADMIN — Medication 81 MILLIGRAM(S): at 11:28

## 2022-10-21 RX ADMIN — ISOSORBIDE DINITRATE 10 MILLIGRAM(S): 5 TABLET ORAL at 05:41

## 2022-10-21 RX ADMIN — CARVEDILOL PHOSPHATE 6.25 MILLIGRAM(S): 80 CAPSULE, EXTENDED RELEASE ORAL at 05:42

## 2022-10-21 RX ADMIN — SACUBITRIL AND VALSARTAN 1 TABLET(S): 24; 26 TABLET, FILM COATED ORAL at 05:42

## 2022-10-21 RX ADMIN — ALBUTEROL 2 PUFF(S): 90 AEROSOL, METERED ORAL at 17:20

## 2022-10-21 RX ADMIN — ISOSORBIDE DINITRATE 10 MILLIGRAM(S): 5 TABLET ORAL at 21:06

## 2022-10-21 RX ADMIN — ALBUTEROL 2 PUFF(S): 90 AEROSOL, METERED ORAL at 21:05

## 2022-10-21 RX ADMIN — ISOSORBIDE DINITRATE 10 MILLIGRAM(S): 5 TABLET ORAL at 14:55

## 2022-10-21 RX ADMIN — ALBUTEROL 2 PUFF(S): 90 AEROSOL, METERED ORAL at 05:43

## 2022-10-21 RX ADMIN — Medication 25 MILLIGRAM(S): at 21:06

## 2022-10-21 RX ADMIN — PANTOPRAZOLE SODIUM 40 MILLIGRAM(S): 20 TABLET, DELAYED RELEASE ORAL at 05:42

## 2022-10-21 RX ADMIN — Medication 1 MILLIGRAM(S): at 11:28

## 2022-10-21 RX ADMIN — TIOTROPIUM BROMIDE 1 CAPSULE(S): 18 CAPSULE ORAL; RESPIRATORY (INHALATION) at 10:26

## 2022-10-21 RX ADMIN — SPIRONOLACTONE 25 MILLIGRAM(S): 25 TABLET, FILM COATED ORAL at 08:55

## 2022-10-21 RX ADMIN — Medication 25 MILLIGRAM(S): at 05:42

## 2022-10-21 RX ADMIN — ATORVASTATIN CALCIUM 40 MILLIGRAM(S): 80 TABLET, FILM COATED ORAL at 21:06

## 2022-10-21 RX ADMIN — SACUBITRIL AND VALSARTAN 1 TABLET(S): 24; 26 TABLET, FILM COATED ORAL at 17:21

## 2022-10-21 RX ADMIN — ALBUTEROL 2 PUFF(S): 90 AEROSOL, METERED ORAL at 10:26

## 2022-10-21 RX ADMIN — HEPARIN SODIUM 5000 UNIT(S): 5000 INJECTION INTRAVENOUS; SUBCUTANEOUS at 17:21

## 2022-10-21 RX ADMIN — Medication 20 MILLIGRAM(S): at 05:42

## 2022-10-21 NOTE — PROGRESS NOTE ADULT - ASSESSMENT
72M with PMH HTN, HLD, CAD, HCV (s/p Harvoni), EtOH initially presented with SOB and acute hypoxic resp failure secondary to hypertensive emergency and acute decompensated HF. patient was managed in CCU with nitro gtt, lasix gtt. echo showed poor EF, global LVSD, mod MR, large RV. Patient is now transitioned to PO treatment for HF. Patient noted to be persistently hyponatremic, now improving with fluid restriction and salt tabs. Currently pending dispo planning to [facility].

## 2022-10-21 NOTE — PROGRESS NOTE ADULT - PROBLEM SELECTOR PLAN 1
elevated UOsmo and Isaura suggestive of SIADH  fluid restrict to 1L qd  monitor BMP closely.  overall improved, stable in low 130s  appreciate nephro recs, was on salt tabs, now d/c'd  ctm bmp daily  Overall Na remaining stable in 129-133 range

## 2022-10-21 NOTE — PROGRESS NOTE ADULT - SUBJECTIVE AND OBJECTIVE BOX
[facility] Department of Hospital Medicine  Ed Ramos MD  Available on MS Teams  Pager: [phone]    Patient is a 72y old  Male who presents with a chief complaint of SOB (20 Oct 2022 17:54)    OVERNIGHT EVENTS: No acute events overnight. No events noted on telemetry.    SUBJECTIVE: Pt seen and examined at bedside this morning. Patient states that he feels well. Denies any acute complaints today. Patient denies any fevers, chills, n/v, CP or SOB. Denies any LE swelling.    ADDITIONAL REVIEW OF SYSTEMS: As above.    MEDICATIONS  (STANDING):  ALBUTerol    90 MICROgram(s) HFA Inhaler 2 Puff(s) Inhalation every 6 hours  aspirin enteric coated 81 milliGRAM(s) Oral daily  atorvastatin 40 milliGRAM(s) Oral at bedtime  carvedilol 6.25 milliGRAM(s) Oral every 12 hours  folic acid 1 milliGRAM(s) Oral daily  furosemide    Tablet 20 milliGRAM(s) Oral daily  heparin   Injectable 5000 Unit(s) SubCutaneous every 12 hours  hydrALAZINE 25 milliGRAM(s) Oral three times a day  influenza  Vaccine (HIGH DOSE) 0.7 milliLiter(s) IntraMuscular once  isosorbide   dinitrate Tablet (ISORDIL) 10 milliGRAM(s) Oral three times a day  multivitamin 1 Tablet(s) Oral daily  pantoprazole    Tablet 40 milliGRAM(s) Oral before breakfast  sacubitril 97 mG/valsartan 103 mG 1 Tablet(s) Oral two times a day  sertraline 50 milliGRAM(s) Oral daily  sodium chloride 1 Gram(s) Oral once  spironolactone 25 milliGRAM(s) Oral daily  tiotropium 18 MICROgram(s) Capsule 1 Capsule(s) Inhalation daily    MEDICATIONS  (PRN):  guaiFENesin Oral Liquid (Sugar-Free) 100 milliGRAM(s) Oral every 6 hours PRN Cough  melatonin 3 milliGRAM(s) Oral at bedtime PRN Insomnia    CAPILLARY BLOOD GLUCOSE    I&O's Summary    20 Oct 2022 07:01  -  21 Oct 2022 07:00  --------------------------------------------------------  IN: 0 mL / OUT: 352 mL / NET: -352 mL    21 Oct 2022 07:01  -  21 Oct 2022 17:01  --------------------------------------------------------  IN: 350 mL / OUT: 350 mL / NET: 0 mL    PHYSICAL EXAM:  Vital Signs Last 24 Hrs  T(C): 36.8 (21 Oct 2022 08:55), Max: 36.8 (21 Oct 2022 05:40)  T(F): 98.2 (21 Oct 2022 08:55), Max: 98.2 (21 Oct 2022 05:40)  HR: 79 (21 Oct 2022 14:55) (70 - 85)  BP: 137/75 (21 Oct 2022 14:55) (114/72 - 142/79)  BP(mean): --  RR: 18 (21 Oct 2022 08:55) (18 - 18)  SpO2: 100% (21 Oct 2022 08:55) (100% - 100%)    Parameters below as of 21 Oct 2022 08:55  Patient On (Oxygen Delivery Method): room air    CONSTITUTIONAL: NAD, thin male  HEAD: Normocephalic, atraumatic  EYES: EOMI, PERRL  ENT: no rhinorrhea, no pharyngeal erythema  RESPIRATORY: No increased work of breathing, CTAB, no wheezes or crackles appreciated  CARDIOVASCULAR: RRR, S1 and S2 present, no m/r/g  ABDOMEN: soft, NT, ND, bowel sounds present  EXTREMITIES: No LE edema  MUSCULOSKELETAL: no joint swelling, no tenderness to palpation  NEURO: A&Ox3, moving all extremities    LABS:                        12.3   8.69  )-----------( 315      ( 21 Oct 2022 05:50 )             36.8     10-21    133<L>  |  98  |  27<H>  ----------------------------<  90  5.0   |  21<L>  |  0.95    Ca    10.0      21 Oct 2022 05:50  Mg     1.60     10-21    RADIOLOGY & ADDITIONAL TESTS:    Results Reviewed:   Imaging Personally Reviewed:  Electrocardiogram Personally Reviewed:    COORDINATION OF CARE:  Care Discussed with Consultants/Other Providers [Y/N]:  Prior or Outpatient Records Reviewed [Y/N]:

## 2022-10-22 DIAGNOSIS — G93.40 ENCEPHALOPATHY, UNSPECIFIED: ICD-10-CM

## 2022-10-22 LAB
ANION GAP SERPL CALC-SCNC: 9 MMOL/L — SIGNIFICANT CHANGE UP (ref 7–14)
BUN SERPL-MCNC: 26 MG/DL — HIGH (ref 7–23)
CALCIUM SERPL-MCNC: 9.5 MG/DL — SIGNIFICANT CHANGE UP (ref 8.4–10.5)
CHLORIDE SERPL-SCNC: 97 MMOL/L — LOW (ref 98–107)
CO2 SERPL-SCNC: 23 MMOL/L — SIGNIFICANT CHANGE UP (ref 22–31)
CREAT SERPL-MCNC: 1 MG/DL — SIGNIFICANT CHANGE UP (ref 0.5–1.3)
EGFR: 80 ML/MIN/1.73M2 — SIGNIFICANT CHANGE UP
GLUCOSE BLDC GLUCOMTR-MCNC: 96 MG/DL — SIGNIFICANT CHANGE UP (ref 70–99)
GLUCOSE SERPL-MCNC: 105 MG/DL — HIGH (ref 70–99)
HCT VFR BLD CALC: 32.3 % — LOW (ref 39–50)
HGB BLD-MCNC: 10.7 G/DL — LOW (ref 13–17)
MAGNESIUM SERPL-MCNC: 1.5 MG/DL — LOW (ref 1.6–2.6)
MCHC RBC-ENTMCNC: 27.2 PG — SIGNIFICANT CHANGE UP (ref 27–34)
MCHC RBC-ENTMCNC: 33.1 GM/DL — SIGNIFICANT CHANGE UP (ref 32–36)
MCV RBC AUTO: 82 FL — SIGNIFICANT CHANGE UP (ref 80–100)
NRBC # BLD: 0 /100 WBCS — SIGNIFICANT CHANGE UP (ref 0–0)
NRBC # FLD: 0 K/UL — SIGNIFICANT CHANGE UP (ref 0–0)
PHOSPHATE SERPL-MCNC: 2.8 MG/DL — SIGNIFICANT CHANGE UP (ref 2.5–4.5)
PLATELET # BLD AUTO: 287 K/UL — SIGNIFICANT CHANGE UP (ref 150–400)
POTASSIUM SERPL-MCNC: 4.1 MMOL/L — SIGNIFICANT CHANGE UP (ref 3.5–5.3)
POTASSIUM SERPL-SCNC: 4.1 MMOL/L — SIGNIFICANT CHANGE UP (ref 3.5–5.3)
RBC # BLD: 3.94 M/UL — LOW (ref 4.2–5.8)
RBC # FLD: 13.2 % — SIGNIFICANT CHANGE UP (ref 10.3–14.5)
SODIUM SERPL-SCNC: 129 MMOL/L — LOW (ref 135–145)
WBC # BLD: 7.95 K/UL — SIGNIFICANT CHANGE UP (ref 3.8–10.5)
WBC # FLD AUTO: 7.95 K/UL — SIGNIFICANT CHANGE UP (ref 3.8–10.5)

## 2022-10-22 PROCEDURE — 99233 SBSQ HOSP IP/OBS HIGH 50: CPT

## 2022-10-22 RX ORDER — MAGNESIUM OXIDE 400 MG ORAL TABLET 241.3 MG
400 TABLET ORAL ONCE
Refills: 0 | Status: COMPLETED | OUTPATIENT
Start: 2022-10-22 | End: 2022-10-22

## 2022-10-22 RX ADMIN — ISOSORBIDE DINITRATE 10 MILLIGRAM(S): 5 TABLET ORAL at 05:48

## 2022-10-22 RX ADMIN — Medication 25 MILLIGRAM(S): at 22:29

## 2022-10-22 RX ADMIN — MAGNESIUM OXIDE 400 MG ORAL TABLET 400 MILLIGRAM(S): 241.3 TABLET ORAL at 10:02

## 2022-10-22 RX ADMIN — Medication 1 MILLIGRAM(S): at 11:11

## 2022-10-22 RX ADMIN — Medication 3 MILLIGRAM(S): at 22:28

## 2022-10-22 RX ADMIN — SACUBITRIL AND VALSARTAN 1 TABLET(S): 24; 26 TABLET, FILM COATED ORAL at 17:51

## 2022-10-22 RX ADMIN — CARVEDILOL PHOSPHATE 6.25 MILLIGRAM(S): 80 CAPSULE, EXTENDED RELEASE ORAL at 05:49

## 2022-10-22 RX ADMIN — ALBUTEROL 2 PUFF(S): 90 AEROSOL, METERED ORAL at 16:00

## 2022-10-22 RX ADMIN — ISOSORBIDE DINITRATE 10 MILLIGRAM(S): 5 TABLET ORAL at 22:29

## 2022-10-22 RX ADMIN — Medication 25 MILLIGRAM(S): at 05:49

## 2022-10-22 RX ADMIN — HEPARIN SODIUM 5000 UNIT(S): 5000 INJECTION INTRAVENOUS; SUBCUTANEOUS at 05:49

## 2022-10-22 RX ADMIN — ATORVASTATIN CALCIUM 40 MILLIGRAM(S): 80 TABLET, FILM COATED ORAL at 22:29

## 2022-10-22 RX ADMIN — Medication 25 MILLIGRAM(S): at 14:00

## 2022-10-22 RX ADMIN — Medication 1 TABLET(S): at 11:10

## 2022-10-22 RX ADMIN — SERTRALINE 50 MILLIGRAM(S): 25 TABLET, FILM COATED ORAL at 11:11

## 2022-10-22 RX ADMIN — TIOTROPIUM BROMIDE 1 CAPSULE(S): 18 CAPSULE ORAL; RESPIRATORY (INHALATION) at 10:01

## 2022-10-22 RX ADMIN — CARVEDILOL PHOSPHATE 6.25 MILLIGRAM(S): 80 CAPSULE, EXTENDED RELEASE ORAL at 17:53

## 2022-10-22 RX ADMIN — ISOSORBIDE DINITRATE 10 MILLIGRAM(S): 5 TABLET ORAL at 14:00

## 2022-10-22 RX ADMIN — HEPARIN SODIUM 5000 UNIT(S): 5000 INJECTION INTRAVENOUS; SUBCUTANEOUS at 17:52

## 2022-10-22 RX ADMIN — Medication 20 MILLIGRAM(S): at 05:49

## 2022-10-22 RX ADMIN — ALBUTEROL 2 PUFF(S): 90 AEROSOL, METERED ORAL at 10:01

## 2022-10-22 RX ADMIN — Medication 81 MILLIGRAM(S): at 11:11

## 2022-10-22 RX ADMIN — ALBUTEROL 2 PUFF(S): 90 AEROSOL, METERED ORAL at 22:00

## 2022-10-22 RX ADMIN — SACUBITRIL AND VALSARTAN 1 TABLET(S): 24; 26 TABLET, FILM COATED ORAL at 05:49

## 2022-10-22 RX ADMIN — ALBUTEROL 2 PUFF(S): 90 AEROSOL, METERED ORAL at 04:17

## 2022-10-22 NOTE — PROVIDER CONTACT NOTE (OTHER) - ACTION/TREATMENT ORDERED:
provider aware, wound consult carried out, foam applied to the sacrum, position changing education performed.
ACP notified and aware. cardiology is following up. no interventions ordered at this time. will continue to montior.

## 2022-10-22 NOTE — PROGRESS NOTE ADULT - SUBJECTIVE AND OBJECTIVE BOX
[facility] Department of Hospital Medicine  Ed Ramos MD  Available on MS Teams  Pager: [phone]    Patient is a 72y old  Male who presents with a chief complaint of SOB (21 Oct 2022 17:01)    OVERNIGHT EVENTS: No acute events overnight.    SUBJECTIVE: Pt seen and examined at bedside this morning. Continues to endorse feeling well. Patient remaining alert and cooperative. Excited to watch football this afternoon. Otherwise denies any fevers, chills, CP, SOB, palpitations, nausea/vomiting, or abd pain.    ADDITIONAL REVIEW OF SYSTEMS: As above    MEDICATIONS  (STANDING):  ALBUTerol    90 MICROgram(s) HFA Inhaler 2 Puff(s) Inhalation every 6 hours  aspirin enteric coated 81 milliGRAM(s) Oral daily  atorvastatin 40 milliGRAM(s) Oral at bedtime  carvedilol 6.25 milliGRAM(s) Oral every 12 hours  folic acid 1 milliGRAM(s) Oral daily  furosemide    Tablet 20 milliGRAM(s) Oral daily  heparin   Injectable 5000 Unit(s) SubCutaneous every 12 hours  hydrALAZINE 25 milliGRAM(s) Oral three times a day  influenza  Vaccine (HIGH DOSE) 0.7 milliLiter(s) IntraMuscular once  isosorbide   dinitrate Tablet (ISORDIL) 10 milliGRAM(s) Oral three times a day  multivitamin 1 Tablet(s) Oral daily  pantoprazole    Tablet 40 milliGRAM(s) Oral before breakfast  sacubitril 97 mG/valsartan 103 mG 1 Tablet(s) Oral two times a day  sertraline 50 milliGRAM(s) Oral daily  spironolactone 25 milliGRAM(s) Oral daily  tiotropium 18 MICROgram(s) Capsule 1 Capsule(s) Inhalation daily    MEDICATIONS  (PRN):  guaiFENesin Oral Liquid (Sugar-Free) 100 milliGRAM(s) Oral every 6 hours PRN Cough  melatonin 3 milliGRAM(s) Oral at bedtime PRN Insomnia    CAPILLARY BLOOD GLUCOSE    POCT Blood Glucose.: 96 mg/dL (22 Oct 2022 07:45)    I&O's Summary    21 Oct 2022 07:01  -  22 Oct 2022 07:00  --------------------------------------------------------  IN: 920 mL / OUT: 1100 mL / NET: -180 mL    22 Oct 2022 07:01  -  22 Oct 2022 18:14  --------------------------------------------------------  IN: 500 mL / OUT: 650 mL / NET: -150 mL    PHYSICAL EXAM:  Vital Signs Last 24 Hrs  T(C): 36.7 (22 Oct 2022 17:48), Max: 37.1 (22 Oct 2022 05:45)  T(F): 98.1 (22 Oct 2022 17:48), Max: 98.7 (22 Oct 2022 05:45)  HR: 77 (22 Oct 2022 17:48) (70 - 88)  BP: 147/83 (22 Oct 2022 17:48) (116/73 - 147/83)  BP(mean): --  RR: 18 (22 Oct 2022 17:48) (17 - 18)  SpO2: 100% (22 Oct 2022 17:48) (99% - 100%)    Parameters below as of 22 Oct 2022 17:48  Patient On (Oxygen Delivery Method): room air    CONSTITUTIONAL: NAD, thin male  HEAD: Normocephalic, atraumatic  EYES: EOMI, PERRL  ENT: no rhinorrhea, no pharyngeal erythema  RESPIRATORY: No increased work of breathing, CTAB, no wheezes or crackles appreciated  CARDIOVASCULAR: RRR, S1 and S2 present, no m/r/g  ABDOMEN: soft, NT, ND, bowel sounds present  EXTREMITIES: No LE edema  MUSCULOSKELETAL: no joint swelling, no tenderness to palpation  NEURO: A&Ox2-3 (patient oriented to self, place, year and month but not date, patient only partially oriented to his own clinical course and reason for admission), moving all extremities, strength equal throughout    LABS:                        10.7   7.95  )-----------( 287      ( 22 Oct 2022 05:56 )             32.3     10-22    129<L>  |  97<L>  |  26<H>  ----------------------------<  105<H>  4.1   |  23  |  1.00    Ca    9.5      22 Oct 2022 05:56  Phos  2.8     10-22  Mg     1.50     10-22    RADIOLOGY & ADDITIONAL TESTS:    Results Reviewed:   Imaging Personally Reviewed:  Electrocardiogram Personally Reviewed:    COORDINATION OF CARE:  Care Discussed with Consultants/Other Providers [Y/N]:  Prior or Outpatient Records Reviewed [Y/N]:

## 2022-10-22 NOTE — PROVIDER CONTACT NOTE (OTHER) - SITUATION
patient was noted with a non-opening skin ulcer measuring 1x0.4 cm to the sacrum
2nd degree heart block noted on tele monitor, noted first by the night nurse.

## 2022-10-22 NOTE — PROGRESS NOTE ADULT - PROBLEM SELECTOR PLAN 1
Pt made comfortable on cart, sat 88% on room air. Pt's lung sounds diminished. Pt states she does use neb treatments at home, pt states she had neb treatment this morning at 7am. RT called for duoneb. Per patient's daughter, patient has appeared more confused over the past few days, patient also appearing forgetful which is not his baseline  - patient currently A&Ox2-3 (oriented to self, place, year and month, but not date. Patient also unsure what he was treated for in the hospital), however mental status has remained stable over past week  - etiology unclear, patient may have encephalopathy from chronic alcohol use vs hyponatremia vs microvascular ischemia vs hospital delirium  - will obtain CTH non-con  - check folate/b12/syphillis with next labs

## 2022-10-22 NOTE — PROVIDER CONTACT NOTE (OTHER) - ASSESSMENT
pt is asymptomatic. VSS. Denies chest pain, dizziness, headache or shortness of breath. pt is resting in bed and stated they're feeling great this morning.
patient was noted with a non-opening skin ulcer measuring 1x0.4 cm to the sacrum, concerning stage I.

## 2022-10-22 NOTE — PROGRESS NOTE ADULT - PROBLEM SELECTOR PLAN 4
evaluated by pulm in CCU  c/w spiriva and albuterol   outpatient f/u with pulm for further evaluation and PFT.  f/u pulm recs.

## 2022-10-22 NOTE — PROGRESS NOTE ADULT - ASSESSMENT
72M with PMH HTN, HLD, CAD, HCV (s/p Harvoni), EtOH initially presented with SOB and acute hypoxic resp failure secondary to hypertensive emergency and acute decompensated HF. patient was managed in CCU with nitro gtt, lasix gtt. echo showed poor EF, global LVSD, mod MR, large RV. Patient is now transitioned to PO treatment for HF. Patient noted to be persistently hyponatremic, now improving with fluid restriction and salt tabs. Now with course c/b mild encephalopathy vs delirium, undergoing further workup.

## 2022-10-23 LAB
FOLATE SERPL-MCNC: 19 NG/ML — HIGH (ref 3.1–17.5)
VIT B12 SERPL-MCNC: 893 PG/ML — SIGNIFICANT CHANGE UP (ref 200–900)

## 2022-10-23 PROCEDURE — 99232 SBSQ HOSP IP/OBS MODERATE 35: CPT

## 2022-10-23 RX ADMIN — ISOSORBIDE DINITRATE 10 MILLIGRAM(S): 5 TABLET ORAL at 05:22

## 2022-10-23 RX ADMIN — ISOSORBIDE DINITRATE 10 MILLIGRAM(S): 5 TABLET ORAL at 13:39

## 2022-10-23 RX ADMIN — SACUBITRIL AND VALSARTAN 1 TABLET(S): 24; 26 TABLET, FILM COATED ORAL at 18:16

## 2022-10-23 RX ADMIN — CARVEDILOL PHOSPHATE 6.25 MILLIGRAM(S): 80 CAPSULE, EXTENDED RELEASE ORAL at 18:16

## 2022-10-23 RX ADMIN — CARVEDILOL PHOSPHATE 6.25 MILLIGRAM(S): 80 CAPSULE, EXTENDED RELEASE ORAL at 05:22

## 2022-10-23 RX ADMIN — Medication 81 MILLIGRAM(S): at 13:40

## 2022-10-23 RX ADMIN — ATORVASTATIN CALCIUM 40 MILLIGRAM(S): 80 TABLET, FILM COATED ORAL at 22:58

## 2022-10-23 RX ADMIN — SERTRALINE 50 MILLIGRAM(S): 25 TABLET, FILM COATED ORAL at 13:40

## 2022-10-23 RX ADMIN — ALBUTEROL 2 PUFF(S): 90 AEROSOL, METERED ORAL at 10:01

## 2022-10-23 RX ADMIN — HEPARIN SODIUM 5000 UNIT(S): 5000 INJECTION INTRAVENOUS; SUBCUTANEOUS at 18:16

## 2022-10-23 RX ADMIN — PANTOPRAZOLE SODIUM 40 MILLIGRAM(S): 20 TABLET, DELAYED RELEASE ORAL at 06:54

## 2022-10-23 RX ADMIN — TIOTROPIUM BROMIDE 1 CAPSULE(S): 18 CAPSULE ORAL; RESPIRATORY (INHALATION) at 10:02

## 2022-10-23 RX ADMIN — HEPARIN SODIUM 5000 UNIT(S): 5000 INJECTION INTRAVENOUS; SUBCUTANEOUS at 05:22

## 2022-10-23 RX ADMIN — ISOSORBIDE DINITRATE 10 MILLIGRAM(S): 5 TABLET ORAL at 22:58

## 2022-10-23 RX ADMIN — Medication 20 MILLIGRAM(S): at 05:24

## 2022-10-23 RX ADMIN — Medication 25 MILLIGRAM(S): at 22:59

## 2022-10-23 RX ADMIN — Medication 25 MILLIGRAM(S): at 05:22

## 2022-10-23 RX ADMIN — ALBUTEROL 2 PUFF(S): 90 AEROSOL, METERED ORAL at 18:47

## 2022-10-23 RX ADMIN — SACUBITRIL AND VALSARTAN 1 TABLET(S): 24; 26 TABLET, FILM COATED ORAL at 05:22

## 2022-10-23 RX ADMIN — Medication 1 TABLET(S): at 13:40

## 2022-10-23 RX ADMIN — SPIRONOLACTONE 25 MILLIGRAM(S): 25 TABLET, FILM COATED ORAL at 13:40

## 2022-10-23 RX ADMIN — Medication 1 MILLIGRAM(S): at 13:40

## 2022-10-23 RX ADMIN — Medication 25 MILLIGRAM(S): at 13:40

## 2022-10-23 NOTE — PROGRESS NOTE ADULT - PROBLEM SELECTOR PLAN 1
Per patient's daughter, patient has appeared more confused over the past few days, patient also appearing forgetful which is not his baseline  - patient currently A&Ox3 (oriented to self, place, year and month, but not exact date), however mental status has remained stable over past week  - etiology unclear, patient may have encephalopathy from chronic alcohol use vs hyponatremia vs microvascular ischemia vs hospital delirium  - CTH non-con pending  - B12/folate WNL, syphillis screen pending

## 2022-10-23 NOTE — PROGRESS NOTE ADULT - SUBJECTIVE AND OBJECTIVE BOX
[facility] Department of Hospital Medicine  Ed Ramos MD  Available on MS Teams  Pager: [phone]    Patient is a 72y old  Male who presents with a chief complaint of SOB (22 Oct 2022 18:13)    OVERNIGHT EVENTS: No acute events overnight.    SUBJECTIVE: Pt seen and examined at bedside this morning. Continues to deny any complaints this morning. Patient does endorse that at times he feels like he seems to be forgetting small details. States that he has written down the name of this hospital multiple times but always seems to forget it when asked. Otherwise denies any dizziness, vision changes, CP, palpitations, n/v or abd pain.    ADDITIONAL REVIEW OF SYSTEMS: As above    MEDICATIONS  (STANDING):  ALBUTerol    90 MICROgram(s) HFA Inhaler 2 Puff(s) Inhalation every 6 hours  aspirin enteric coated 81 milliGRAM(s) Oral daily  atorvastatin 40 milliGRAM(s) Oral at bedtime  carvedilol 6.25 milliGRAM(s) Oral every 12 hours  folic acid 1 milliGRAM(s) Oral daily  furosemide    Tablet 20 milliGRAM(s) Oral daily  heparin   Injectable 5000 Unit(s) SubCutaneous every 12 hours  hydrALAZINE 25 milliGRAM(s) Oral three times a day  influenza  Vaccine (HIGH DOSE) 0.7 milliLiter(s) IntraMuscular once  isosorbide   dinitrate Tablet (ISORDIL) 10 milliGRAM(s) Oral three times a day  multivitamin 1 Tablet(s) Oral daily  pantoprazole    Tablet 40 milliGRAM(s) Oral before breakfast  sacubitril 97 mG/valsartan 103 mG 1 Tablet(s) Oral two times a day  sertraline 50 milliGRAM(s) Oral daily  spironolactone 25 milliGRAM(s) Oral daily  tiotropium 18 MICROgram(s) Capsule 1 Capsule(s) Inhalation daily    MEDICATIONS  (PRN):  guaiFENesin Oral Liquid (Sugar-Free) 100 milliGRAM(s) Oral every 6 hours PRN Cough  melatonin 3 milliGRAM(s) Oral at bedtime PRN Insomnia    CAPILLARY BLOOD GLUCOSE    I&O's Summary    22 Oct 2022 07:01  -  23 Oct 2022 07:00  --------------------------------------------------------  IN: 500 mL / OUT: 1250 mL / NET: -750 mL    23 Oct 2022 07:01  -  23 Oct 2022 18:04  --------------------------------------------------------  IN: 340 mL / OUT: 700 mL / NET: -360 mL    PHYSICAL EXAM:  Vital Signs Last 24 Hrs  T(C): 36.9 (23 Oct 2022 17:10), Max: 36.9 (23 Oct 2022 17:10)  T(F): 98.4 (23 Oct 2022 17:10), Max: 98.4 (23 Oct 2022 17:10)  HR: 83 (23 Oct 2022 17:10) (69 - 83)  BP: 160/91 (23 Oct 2022 17:10) (132/73 - 160/91)  BP(mean): --  RR: 18 (23 Oct 2022 17:10) (17 - 18)  SpO2: 100% (23 Oct 2022 17:10) (100% - 100%)    Parameters below as of 23 Oct 2022 17:10  Patient On (Oxygen Delivery Method): room air    CONSTITUTIONAL: NAD, thin male  HEAD: Normocephalic, atraumatic  EYES: EOMI, PERRL  ENT: no rhinorrhea, no pharyngeal erythema  RESPIRATORY: No increased work of breathing, CTAB, no wheezes or crackles appreciated  CARDIOVASCULAR: RRR, S1 and S2 present, no m/r/g  ABDOMEN: soft, NT, ND, bowel sounds present  EXTREMITIES: No LE edema  MUSCULOSKELETAL: no joint swelling, no tenderness to palpation  NEURO: A&Ox2-3 (patient oriented to self, place, year and month but not date), moving all extremities spontaneously    LABS:                        10.7   7.95  )-----------( 287      ( 22 Oct 2022 05:56 )             32.3     10-22    129<L>  |  97<L>  |  26<H>  ----------------------------<  105<H>  4.1   |  23  |  1.00    Ca    9.5      22 Oct 2022 05:56  Phos  2.8     10-22  Mg     1.50     10-22    RADIOLOGY & ADDITIONAL TESTS:    Results Reviewed:   Imaging Personally Reviewed:  Electrocardiogram Personally Reviewed:    COORDINATION OF CARE:  Care Discussed with Consultants/Other Providers [Y/N]:  Prior or Outpatient Records Reviewed [Y/N]:

## 2022-10-24 LAB
ANION GAP SERPL CALC-SCNC: 11 MMOL/L — SIGNIFICANT CHANGE UP (ref 7–14)
BUN SERPL-MCNC: 26 MG/DL — HIGH (ref 7–23)
CALCIUM SERPL-MCNC: 9.7 MG/DL — SIGNIFICANT CHANGE UP (ref 8.4–10.5)
CHLORIDE SERPL-SCNC: 97 MMOL/L — LOW (ref 98–107)
CO2 SERPL-SCNC: 25 MMOL/L — SIGNIFICANT CHANGE UP (ref 22–31)
CREAT SERPL-MCNC: 0.86 MG/DL — SIGNIFICANT CHANGE UP (ref 0.5–1.3)
EGFR: 92 ML/MIN/1.73M2 — SIGNIFICANT CHANGE UP
GLUCOSE SERPL-MCNC: 89 MG/DL — SIGNIFICANT CHANGE UP (ref 70–99)
HCT VFR BLD CALC: 34.7 % — LOW (ref 39–50)
HGB BLD-MCNC: 11.4 G/DL — LOW (ref 13–17)
MAGNESIUM SERPL-MCNC: 1.5 MG/DL — LOW (ref 1.6–2.6)
MCHC RBC-ENTMCNC: 27.3 PG — SIGNIFICANT CHANGE UP (ref 27–34)
MCHC RBC-ENTMCNC: 32.9 GM/DL — SIGNIFICANT CHANGE UP (ref 32–36)
MCV RBC AUTO: 83 FL — SIGNIFICANT CHANGE UP (ref 80–100)
NRBC # BLD: 0 /100 WBCS — SIGNIFICANT CHANGE UP (ref 0–0)
NRBC # FLD: 0 K/UL — SIGNIFICANT CHANGE UP (ref 0–0)
PLATELET # BLD AUTO: 291 K/UL — SIGNIFICANT CHANGE UP (ref 150–400)
POTASSIUM SERPL-MCNC: 4.3 MMOL/L — SIGNIFICANT CHANGE UP (ref 3.5–5.3)
POTASSIUM SERPL-SCNC: 4.3 MMOL/L — SIGNIFICANT CHANGE UP (ref 3.5–5.3)
RBC # BLD: 4.18 M/UL — LOW (ref 4.2–5.8)
RBC # FLD: 13.3 % — SIGNIFICANT CHANGE UP (ref 10.3–14.5)
SODIUM SERPL-SCNC: 133 MMOL/L — LOW (ref 135–145)
T PALLIDUM AB TITR SER: NEGATIVE — SIGNIFICANT CHANGE UP
WBC # BLD: 7.23 K/UL — SIGNIFICANT CHANGE UP (ref 3.8–10.5)
WBC # FLD AUTO: 7.23 K/UL — SIGNIFICANT CHANGE UP (ref 3.8–10.5)

## 2022-10-24 PROCEDURE — 70450 CT HEAD/BRAIN W/O DYE: CPT | Mod: 26

## 2022-10-24 PROCEDURE — 99232 SBSQ HOSP IP/OBS MODERATE 35: CPT

## 2022-10-24 RX ORDER — MAGNESIUM OXIDE 400 MG ORAL TABLET 241.3 MG
400 TABLET ORAL
Refills: 0 | Status: COMPLETED | OUTPATIENT
Start: 2022-10-24 | End: 2022-10-26

## 2022-10-24 RX ORDER — MAGNESIUM SULFATE 500 MG/ML
1 VIAL (ML) INJECTION ONCE
Refills: 0 | Status: DISCONTINUED | OUTPATIENT
Start: 2022-10-24 | End: 2022-10-24

## 2022-10-24 RX ADMIN — Medication 25 MILLIGRAM(S): at 22:55

## 2022-10-24 RX ADMIN — MAGNESIUM OXIDE 400 MG ORAL TABLET 400 MILLIGRAM(S): 241.3 TABLET ORAL at 10:51

## 2022-10-24 RX ADMIN — SPIRONOLACTONE 25 MILLIGRAM(S): 25 TABLET, FILM COATED ORAL at 13:04

## 2022-10-24 RX ADMIN — MAGNESIUM OXIDE 400 MG ORAL TABLET 400 MILLIGRAM(S): 241.3 TABLET ORAL at 13:04

## 2022-10-24 RX ADMIN — PANTOPRAZOLE SODIUM 40 MILLIGRAM(S): 20 TABLET, DELAYED RELEASE ORAL at 07:22

## 2022-10-24 RX ADMIN — HEPARIN SODIUM 5000 UNIT(S): 5000 INJECTION INTRAVENOUS; SUBCUTANEOUS at 17:15

## 2022-10-24 RX ADMIN — ISOSORBIDE DINITRATE 10 MILLIGRAM(S): 5 TABLET ORAL at 05:20

## 2022-10-24 RX ADMIN — HEPARIN SODIUM 5000 UNIT(S): 5000 INJECTION INTRAVENOUS; SUBCUTANEOUS at 05:15

## 2022-10-24 RX ADMIN — ALBUTEROL 2 PUFF(S): 90 AEROSOL, METERED ORAL at 09:25

## 2022-10-24 RX ADMIN — ATORVASTATIN CALCIUM 40 MILLIGRAM(S): 80 TABLET, FILM COATED ORAL at 22:55

## 2022-10-24 RX ADMIN — ISOSORBIDE DINITRATE 10 MILLIGRAM(S): 5 TABLET ORAL at 22:54

## 2022-10-24 RX ADMIN — ISOSORBIDE DINITRATE 10 MILLIGRAM(S): 5 TABLET ORAL at 14:09

## 2022-10-24 RX ADMIN — Medication 20 MILLIGRAM(S): at 05:20

## 2022-10-24 RX ADMIN — Medication 25 MILLIGRAM(S): at 05:19

## 2022-10-24 RX ADMIN — Medication 81 MILLIGRAM(S): at 13:04

## 2022-10-24 RX ADMIN — Medication 1 TABLET(S): at 13:04

## 2022-10-24 RX ADMIN — MAGNESIUM OXIDE 400 MG ORAL TABLET 400 MILLIGRAM(S): 241.3 TABLET ORAL at 17:15

## 2022-10-24 RX ADMIN — SERTRALINE 50 MILLIGRAM(S): 25 TABLET, FILM COATED ORAL at 13:04

## 2022-10-24 RX ADMIN — SACUBITRIL AND VALSARTAN 1 TABLET(S): 24; 26 TABLET, FILM COATED ORAL at 05:20

## 2022-10-24 RX ADMIN — SACUBITRIL AND VALSARTAN 1 TABLET(S): 24; 26 TABLET, FILM COATED ORAL at 17:15

## 2022-10-24 RX ADMIN — Medication 25 MILLIGRAM(S): at 14:09

## 2022-10-24 RX ADMIN — CARVEDILOL PHOSPHATE 6.25 MILLIGRAM(S): 80 CAPSULE, EXTENDED RELEASE ORAL at 17:15

## 2022-10-24 RX ADMIN — CARVEDILOL PHOSPHATE 6.25 MILLIGRAM(S): 80 CAPSULE, EXTENDED RELEASE ORAL at 05:20

## 2022-10-24 RX ADMIN — TIOTROPIUM BROMIDE 1 CAPSULE(S): 18 CAPSULE ORAL; RESPIRATORY (INHALATION) at 09:25

## 2022-10-24 RX ADMIN — Medication 1 MILLIGRAM(S): at 13:04

## 2022-10-24 RX ADMIN — ALBUTEROL 2 PUFF(S): 90 AEROSOL, METERED ORAL at 05:10

## 2022-10-24 NOTE — PROGRESS NOTE ADULT - SUBJECTIVE AND OBJECTIVE BOX
Phu Rees MD  [employer] Hospitalist  Pager [phone]  Email: [email or web address]  Available on [employer] Teams        PROGRESS NOTE:     Patient is a 72y old  Male who presents with a chief complaint of SOB (23 Oct 2022 18:03)      SUBJECTIVE / OVERNIGHT EVENTS:  Patient seen and examined this morning. No acute events overnight.   ADDITIONAL REVIEW OF SYSTEMS:  No f/c/n/v    MEDICATIONS  (STANDING):  ALBUTerol    90 MICROgram(s) HFA Inhaler 2 Puff(s) Inhalation every 6 hours  aspirin enteric coated 81 milliGRAM(s) Oral daily  atorvastatin 40 milliGRAM(s) Oral at bedtime  carvedilol 6.25 milliGRAM(s) Oral every 12 hours  folic acid 1 milliGRAM(s) Oral daily  furosemide    Tablet 20 milliGRAM(s) Oral daily  heparin   Injectable 5000 Unit(s) SubCutaneous every 12 hours  hydrALAZINE 25 milliGRAM(s) Oral three times a day  influenza  Vaccine (HIGH DOSE) 0.7 milliLiter(s) IntraMuscular once  isosorbide   dinitrate Tablet (ISORDIL) 10 milliGRAM(s) Oral three times a day  magnesium oxide 400 milliGRAM(s) Oral three times a day with meals  multivitamin 1 Tablet(s) Oral daily  pantoprazole    Tablet 40 milliGRAM(s) Oral before breakfast  sacubitril 97 mG/valsartan 103 mG 1 Tablet(s) Oral two times a day  sertraline 50 milliGRAM(s) Oral daily  spironolactone 25 milliGRAM(s) Oral daily  tiotropium 18 MICROgram(s) Capsule 1 Capsule(s) Inhalation daily    MEDICATIONS  (PRN):  guaiFENesin Oral Liquid (Sugar-Free) 100 milliGRAM(s) Oral every 6 hours PRN Cough  melatonin 3 milliGRAM(s) Oral at bedtime PRN Insomnia      CAPILLARY BLOOD GLUCOSE        I&O's Summary    23 Oct 2022 07:01  -  24 Oct 2022 07:00  --------------------------------------------------------  IN: 340 mL / OUT: 1250 mL / NET: -910 mL        PHYSICAL EXAM:  Vital Signs Last 24 Hrs  T(C): 36.9 (24 Oct 2022 13:00), Max: 36.9 (23 Oct 2022 17:10)  T(F): 98.5 (24 Oct 2022 13:00), Max: 98.5 (24 Oct 2022 13:00)  HR: 83 (24 Oct 2022 13:00) (73 - 83)  BP: 114/71 (24 Oct 2022 13:00) (114/71 - 160/91)  BP(mean): --  RR: 18 (24 Oct 2022 13:00) (16 - 18)  SpO2: 100% (24 Oct 2022 13:00) (100% - 100%)    Parameters below as of 24 Oct 2022 13:00  Patient On (Oxygen Delivery Method): room air        CONSTITUTIONAL: NAD, thin male  HEAD: Normocephalic, atraumatic  EYES: EOMI, PERRL  ENT: no rhinorrhea, no pharyngeal erythema  RESPIRATORY: No increased work of breathing, CTAB, no wheezes or crackles appreciated  CARDIOVASCULAR: RRR, S1 and S2 present, no m/r/g  ABDOMEN: soft, NT, ND, bowel sounds present  EXTREMITIES: No LE edema  MUSCULOSKELETAL: no joint swelling, no tenderness to palpation  NEURO: moving all extremities spontaneously  PSYCH: pleasant, calm and appropriately interactive       LABS:                        11.4   7.23  )-----------( 291      ( 24 Oct 2022 07:49 )             34.7     10-24    133<L>  |  97<L>  |  26<H>  ----------------------------<  89  4.3   |  25  |  0.86    Ca    9.7      24 Oct 2022 07:49  Mg     1.50     10-24                  RADIOLOGY & ADDITIONAL TESTS:  Results Reviewed:   Imaging Personally Reviewed:  Electrocardiogram Personally Reviewed:    COORDINATION OF CARE:  Care Discussed with Consultants/Other Providers [Y/N]:  Prior or Outpatient Records Reviewed [Y/N]:

## 2022-10-24 NOTE — ADVANCED PRACTICE NURSE CONSULT - REASON FOR CONSULT
Patient seen on skin care rounds after wound care referral received for assessment of skin impairment and recommendations of topical management of stage 1 to sacrum. Patient is 72M with PMH HTN, HLD, CAD, HCV (s/p Harvoni), EtOH initially presented with SOB and acute hypoxic resp failure secondary to hypertensive emergency and acute decompensated HF. patient was managed in CCU with nitro gtt, lasix gtt. echo showed poor EF, global LVSD, mod MR, large RV. Patient is now transitioned to PO treatment for HF. Patient noted to be persistently hyponatremic, now improving with fluid restriction and salt tabs. Now with course c/b mild encephalopathy vs delirium, undergoing further workup.     Chart reviewed: WBC: 7.23, H&H: 11.4/34.7, Platelets: 291, INR: 1.17, A1C: 5.0,  BMI: 16.7, Stewart 19. Patient seen on skin care rounds after wound care referral received for assessment of skin impairment and recommendations of topical management of stage 1 to sacrum. Patient is 72M with PMH HTN, HLD, CAD, HCV (s/p Harvoni), EtOH initially presented with SOB and acute hypoxic resp failure secondary to hypertensive emergency and acute decompensated HF. patient was managed in CCU with nitro gtt, lasix gtt. echo showed poor EF, global LVSD, mod MR, large RV. Patient is now transitioned to PO treatment for HF. Patient noted to be persistently hyponatremic, now improving with fluid restriction and salt tabs. Now with course c/b mild encephalopathy vs delirium, undergoing further workup.     In the beginning of hospital stay patient was more sedentary, patient now more ambulatory and active, found standing up at bedside making bed. Patient reports having good appetite, but also complains about having multiple bowel movements.     Chart reviewed: WBC: 7.23, H&H: 11.4/34.7, Platelets: 291, INR: 1.17, A1C: 5.0,  BMI: 16.7, Stewart 19.

## 2022-10-24 NOTE — ADVANCED PRACTICE NURSE CONSULT - ASSESSMENT
General: A&Ox4, cachectic, able to ambulate with standby assistance, continent of urine, incontinent at times of stool. Skin warm, dry with increased moisture in intertriginous folds, adequate skin turgor, scattered areas of hyperpigmentation and hypopigmentation, scattered areas of ecchymosis on bilateral upper extremities with no hematoma. Blanchable erythema on bilateral heels.     Sacrum: Stage 2 as evidenced by wound measuring [id number] exposing 100% pink, dry dermis over aleida prominence in natural anatomical position.  Tender to touch as per patient. Periwound with hyperpigmentation and blanchable erythema. No induration, no erythema, no edema, no temperature changes, no overt signs of infection noted. Goals of care: prevent from pressure, friction, shearing, and moisture.  General: A&Ox4, cachectic, able to ambulate with standby assistance, continent of urine, incontinent at times of stool. Skin warm, dry with increased moisture in intertriginous folds, adequate skin turgor, scattered areas of hyperpigmentation and hypopigmentation, scattered areas of ecchymosis on bilateral upper extremities with no hematoma. Blanchable erythema on bilateral heels.     Sacrum: Stage 2, complicated by increased fecal output, as evidenced by wound measuring [id number] exposing 100% pink, dry dermis over aleida prominence in natural anatomical position.  Tender to touch as per patient. Periwound with hyperpigmentation and blanchable erythema. No induration, no erythema, no edema, no temperature changes, no overt signs of infection noted. Goals of care: prevent from pressure, friction, shearing, and moisture.

## 2022-10-24 NOTE — ADVANCED PRACTICE NURSE CONSULT - RECOMMEDATIONS
Topical Recommendations    Sacrum: Cleanse with NS, pat dry. Apply Liquid barrier film to periwound skin (allow to dry). Apply silicone foam dressing with borders, change daily and PRN if soiled.  *If patient having frequent BMs and foam dressing is continuously getting soiled, please consider switching to critic aide moisture barrier ointment.    Continue low air loss bed therapy, continue heel elevation, continue to turn & reposition per protocol, soft pillow between bony prominences, continue moisture management with barrier creams & single breathable pad, continue measures to decrease friction/shear/pressure.     Plan of care discussed with primary RN Malgorzata.    Please contact Wound Care Service Line if we can be of further assistance (ext [phone]).  Topical Recommendations    Sacrum: Patient with aleida prominence and increased mobility and independence, foam dressing appropriate at this time.  Cleanse with NS, pat dry. Apply Liquid barrier film to periwound skin (allow to dry). Apply silicone foam dressing with borders, change daily and PRN if soiled.  *If patient having frequent BMs and foam dressing is continuously getting soiled, please consider switching to critic aide moisture barrier ointment.    Continue low air loss bed therapy, continue heel elevation, continue to turn & reposition per protocol, soft pillow between bony prominences, continue moisture management with barrier creams & single breathable pad, continue measures to decrease friction/shear/pressure.     Plan of care discussed with primary RN Malgorzata.    Please contact Wound Care Service Line if we can be of further assistance (ext [phone]).

## 2022-10-24 NOTE — PROGRESS NOTE ADULT - PROBLEM SELECTOR PLAN 1
Per patient's daughter, patient has appeared more confused over the past few days, patient also appearing forgetful which is not his baseline  - patient currently A&Ox3 (oriented to self, place, year and month, but not exact date), however mental status has remained stable over past week  - etiology unclear, patient may have encephalopathy from chronic alcohol use vs hyponatremia vs microvascular ischemia vs hospital delirium  - CTH non-con WNL  - B12/folate WNL, syphillis screen negative

## 2022-10-25 LAB — SARS-COV-2 RNA SPEC QL NAA+PROBE: SIGNIFICANT CHANGE UP

## 2022-10-25 PROCEDURE — 99232 SBSQ HOSP IP/OBS MODERATE 35: CPT

## 2022-10-25 RX ADMIN — Medication 1 TABLET(S): at 12:41

## 2022-10-25 RX ADMIN — HEPARIN SODIUM 5000 UNIT(S): 5000 INJECTION INTRAVENOUS; SUBCUTANEOUS at 07:22

## 2022-10-25 RX ADMIN — Medication 25 MILLIGRAM(S): at 14:08

## 2022-10-25 RX ADMIN — HEPARIN SODIUM 5000 UNIT(S): 5000 INJECTION INTRAVENOUS; SUBCUTANEOUS at 19:23

## 2022-10-25 RX ADMIN — PANTOPRAZOLE SODIUM 40 MILLIGRAM(S): 20 TABLET, DELAYED RELEASE ORAL at 07:26

## 2022-10-25 RX ADMIN — ISOSORBIDE DINITRATE 10 MILLIGRAM(S): 5 TABLET ORAL at 21:52

## 2022-10-25 RX ADMIN — Medication 25 MILLIGRAM(S): at 21:53

## 2022-10-25 RX ADMIN — Medication 1 MILLIGRAM(S): at 12:40

## 2022-10-25 RX ADMIN — SACUBITRIL AND VALSARTAN 1 TABLET(S): 24; 26 TABLET, FILM COATED ORAL at 19:23

## 2022-10-25 RX ADMIN — MAGNESIUM OXIDE 400 MG ORAL TABLET 400 MILLIGRAM(S): 241.3 TABLET ORAL at 09:43

## 2022-10-25 RX ADMIN — SPIRONOLACTONE 25 MILLIGRAM(S): 25 TABLET, FILM COATED ORAL at 07:24

## 2022-10-25 RX ADMIN — Medication 25 MILLIGRAM(S): at 07:21

## 2022-10-25 RX ADMIN — CARVEDILOL PHOSPHATE 6.25 MILLIGRAM(S): 80 CAPSULE, EXTENDED RELEASE ORAL at 19:23

## 2022-10-25 RX ADMIN — MAGNESIUM OXIDE 400 MG ORAL TABLET 400 MILLIGRAM(S): 241.3 TABLET ORAL at 19:23

## 2022-10-25 RX ADMIN — SERTRALINE 50 MILLIGRAM(S): 25 TABLET, FILM COATED ORAL at 12:41

## 2022-10-25 RX ADMIN — ISOSORBIDE DINITRATE 10 MILLIGRAM(S): 5 TABLET ORAL at 07:21

## 2022-10-25 RX ADMIN — ATORVASTATIN CALCIUM 40 MILLIGRAM(S): 80 TABLET, FILM COATED ORAL at 21:53

## 2022-10-25 RX ADMIN — MAGNESIUM OXIDE 400 MG ORAL TABLET 400 MILLIGRAM(S): 241.3 TABLET ORAL at 12:40

## 2022-10-25 RX ADMIN — Medication 20 MILLIGRAM(S): at 07:22

## 2022-10-25 RX ADMIN — SACUBITRIL AND VALSARTAN 1 TABLET(S): 24; 26 TABLET, FILM COATED ORAL at 07:23

## 2022-10-25 RX ADMIN — CARVEDILOL PHOSPHATE 6.25 MILLIGRAM(S): 80 CAPSULE, EXTENDED RELEASE ORAL at 07:21

## 2022-10-25 RX ADMIN — ISOSORBIDE DINITRATE 10 MILLIGRAM(S): 5 TABLET ORAL at 14:07

## 2022-10-25 RX ADMIN — Medication 81 MILLIGRAM(S): at 12:40

## 2022-10-25 NOTE — PROGRESS NOTE ADULT - PROBLEM SELECTOR PLAN 4
evaluated by pulm in CCU  c/w spiriva and albuterol   outpatient f/u with pulm for further evaluation and PFT.

## 2022-10-25 NOTE — PROGRESS NOTE ADULT - PROBLEM SELECTOR PLAN 1
Per patient's daughter, patient has appeared more confused over the past few days, patient also appearing forgetful which is not his baseline  - patient currently A&Ox3 (oriented to self, place, year and month, but not exact date), however mental status has remained stable over past week  - etiology unclear, patient may have encephalopathy from chronic alcohol use vs hyponatremia vs microvascular ischemia vs hospital delirium  - CTH non-con WNL  - B12/folate WNL, syphillis screen negative  Patient w/o concerns, states that he feels fine and looks forward to being discharged.

## 2022-10-25 NOTE — PROGRESS NOTE ADULT - SUBJECTIVE AND OBJECTIVE BOX
Phu Rees MD  Academic Hospitalist  Pager [phone]  Email: [email or web address]  Available on [employer] Teams        PROGRESS NOTE:     Patient is a 72y old  Male who presents with a chief complaint of SOB (24 Oct 2022 14:13)      SUBJECTIVE / OVERNIGHT EVENTS:  Patient seen and examined this morning.   ADDITIONAL REVIEW OF SYSTEMS:    MEDICATIONS  (STANDING):  ALBUTerol    90 MICROgram(s) HFA Inhaler 2 Puff(s) Inhalation every 6 hours  aspirin enteric coated 81 milliGRAM(s) Oral daily  atorvastatin 40 milliGRAM(s) Oral at bedtime  carvedilol 6.25 milliGRAM(s) Oral every 12 hours  folic acid 1 milliGRAM(s) Oral daily  furosemide    Tablet 20 milliGRAM(s) Oral daily  heparin   Injectable 5000 Unit(s) SubCutaneous every 12 hours  hydrALAZINE 25 milliGRAM(s) Oral three times a day  influenza  Vaccine (HIGH DOSE) 0.7 milliLiter(s) IntraMuscular once  isosorbide   dinitrate Tablet (ISORDIL) 10 milliGRAM(s) Oral three times a day  magnesium oxide 400 milliGRAM(s) Oral three times a day with meals  multivitamin 1 Tablet(s) Oral daily  pantoprazole    Tablet 40 milliGRAM(s) Oral before breakfast  sacubitril 97 mG/valsartan 103 mG 1 Tablet(s) Oral two times a day  sertraline 50 milliGRAM(s) Oral daily  spironolactone 25 milliGRAM(s) Oral daily  tiotropium 18 MICROgram(s) Capsule 1 Capsule(s) Inhalation daily    MEDICATIONS  (PRN):  guaiFENesin Oral Liquid (Sugar-Free) 100 milliGRAM(s) Oral every 6 hours PRN Cough  melatonin 3 milliGRAM(s) Oral at bedtime PRN Insomnia      CAPILLARY BLOOD GLUCOSE        I&O's Summary      PHYSICAL EXAM:  Vital Signs Last 24 Hrs  T(C): 37.1 (25 Oct 2022 12:06), Max: 37.1 (25 Oct 2022 12:06)  T(F): 98.8 (25 Oct 2022 12:06), Max: 98.8 (25 Oct 2022 12:06)  HR: 84 (25 Oct 2022 12:06) (72 - 84)  BP: 117/71 (25 Oct 2022 12:06) (117/71 - 150/84)  BP(mean): --  RR: 18 (25 Oct 2022 12:06) (18 - 18)  SpO2: 100% (25 Oct 2022 12:06) (99% - 100%)    Parameters below as of 25 Oct 2022 12:06  Patient On (Oxygen Delivery Method): room air        CONSTITUTIONAL: NAD, thin male  ENT: no rhinorrhea, no pharyngeal erythema  RESPIRATORY: No increased work of breathing, CTAB, no wheezes or crackles appreciated  CARDIOVASCULAR: RRR, S1 and S2 present, no m/r/g  ABDOMEN: soft, NT, ND, bowel sounds present  EXTREMITIES: No LE edema  MUSCULOSKELETAL: no joint swelling, no tenderness to palpation  NEURO: moving all extremities spontaneously  PSYCH: pleasant, calm and appropriately interactive     LABS:                        11.4   7.23  )-----------( 291      ( 24 Oct 2022 07:49 )             34.7     10-24    133<L>  |  97<L>  |  26<H>  ----------------------------<  89  4.3   |  25  |  0.86    Ca    9.7      24 Oct 2022 07:49  Mg     1.50     10-24                  RADIOLOGY & ADDITIONAL TESTS:  Results Reviewed:   Imaging Personally Reviewed:  Electrocardiogram Personally Reviewed:    COORDINATION OF CARE:  Care Discussed with Consultants/Other Providers [Y/N]:  Prior or Outpatient Records Reviewed [Y/N]:

## 2022-10-25 NOTE — PROGRESS NOTE ADULT - PROBLEM SELECTOR PLAN 2
elevated UOsmo and Isaura suggestive of SIADH  fluid restrict to 1L qd  monitor BMP closely.  overall improved, stable in low 130s  appreciate nephro recs, was on salt tabs, now d/c'd  Overall Na remaining stable in 129-133 range

## 2022-10-26 PROCEDURE — 99232 SBSQ HOSP IP/OBS MODERATE 35: CPT

## 2022-10-26 RX ADMIN — SERTRALINE 50 MILLIGRAM(S): 25 TABLET, FILM COATED ORAL at 12:42

## 2022-10-26 RX ADMIN — Medication 25 MILLIGRAM(S): at 06:46

## 2022-10-26 RX ADMIN — Medication 81 MILLIGRAM(S): at 12:42

## 2022-10-26 RX ADMIN — Medication 1 TABLET(S): at 12:41

## 2022-10-26 RX ADMIN — CARVEDILOL PHOSPHATE 6.25 MILLIGRAM(S): 80 CAPSULE, EXTENDED RELEASE ORAL at 18:48

## 2022-10-26 RX ADMIN — Medication 20 MILLIGRAM(S): at 06:47

## 2022-10-26 RX ADMIN — Medication 1 MILLIGRAM(S): at 12:41

## 2022-10-26 RX ADMIN — HEPARIN SODIUM 5000 UNIT(S): 5000 INJECTION INTRAVENOUS; SUBCUTANEOUS at 06:45

## 2022-10-26 RX ADMIN — PANTOPRAZOLE SODIUM 40 MILLIGRAM(S): 20 TABLET, DELAYED RELEASE ORAL at 06:46

## 2022-10-26 RX ADMIN — HEPARIN SODIUM 5000 UNIT(S): 5000 INJECTION INTRAVENOUS; SUBCUTANEOUS at 18:48

## 2022-10-26 RX ADMIN — ISOSORBIDE DINITRATE 10 MILLIGRAM(S): 5 TABLET ORAL at 21:52

## 2022-10-26 RX ADMIN — SPIRONOLACTONE 25 MILLIGRAM(S): 25 TABLET, FILM COATED ORAL at 06:47

## 2022-10-26 RX ADMIN — ATORVASTATIN CALCIUM 40 MILLIGRAM(S): 80 TABLET, FILM COATED ORAL at 21:52

## 2022-10-26 RX ADMIN — SACUBITRIL AND VALSARTAN 1 TABLET(S): 24; 26 TABLET, FILM COATED ORAL at 06:46

## 2022-10-26 RX ADMIN — Medication 25 MILLIGRAM(S): at 13:55

## 2022-10-26 RX ADMIN — ISOSORBIDE DINITRATE 10 MILLIGRAM(S): 5 TABLET ORAL at 13:56

## 2022-10-26 RX ADMIN — ISOSORBIDE DINITRATE 10 MILLIGRAM(S): 5 TABLET ORAL at 06:52

## 2022-10-26 RX ADMIN — CARVEDILOL PHOSPHATE 6.25 MILLIGRAM(S): 80 CAPSULE, EXTENDED RELEASE ORAL at 06:47

## 2022-10-26 RX ADMIN — Medication 25 MILLIGRAM(S): at 21:52

## 2022-10-26 RX ADMIN — SACUBITRIL AND VALSARTAN 1 TABLET(S): 24; 26 TABLET, FILM COATED ORAL at 18:48

## 2022-10-26 RX ADMIN — MAGNESIUM OXIDE 400 MG ORAL TABLET 400 MILLIGRAM(S): 241.3 TABLET ORAL at 07:53

## 2022-10-26 NOTE — PROGRESS NOTE ADULT - SUBJECTIVE AND OBJECTIVE BOX
Phu Rees MD  Academic Hospitalist  Pager [phone]  Email: [email or web address]  Available on [employer] Teams        PROGRESS NOTE:     Patient is a 72y old  Male who presents with a chief complaint of SOB (25 Oct 2022 13:47)      SUBJECTIVE / OVERNIGHT EVENTS:  Patient seen and examined this morning. Patient upset about sewage backup in his house, being addressed by his daughter. Awaiting DC to rehab.    ADDITIONAL REVIEW OF SYSTEMS:  No f/c/n/v    MEDICATIONS  (STANDING):  ALBUTerol    90 MICROgram(s) HFA Inhaler 2 Puff(s) Inhalation every 6 hours  aspirin enteric coated 81 milliGRAM(s) Oral daily  atorvastatin 40 milliGRAM(s) Oral at bedtime  carvedilol 6.25 milliGRAM(s) Oral every 12 hours  folic acid 1 milliGRAM(s) Oral daily  furosemide    Tablet 20 milliGRAM(s) Oral daily  heparin   Injectable 5000 Unit(s) SubCutaneous every 12 hours  hydrALAZINE 25 milliGRAM(s) Oral three times a day  influenza  Vaccine (HIGH DOSE) 0.7 milliLiter(s) IntraMuscular once  isosorbide   dinitrate Tablet (ISORDIL) 10 milliGRAM(s) Oral three times a day  multivitamin 1 Tablet(s) Oral daily  pantoprazole    Tablet 40 milliGRAM(s) Oral before breakfast  sacubitril 97 mG/valsartan 103 mG 1 Tablet(s) Oral two times a day  sertraline 50 milliGRAM(s) Oral daily  spironolactone 25 milliGRAM(s) Oral daily  tiotropium 18 MICROgram(s) Capsule 1 Capsule(s) Inhalation daily    MEDICATIONS  (PRN):  guaiFENesin Oral Liquid (Sugar-Free) 100 milliGRAM(s) Oral every 6 hours PRN Cough  melatonin 3 milliGRAM(s) Oral at bedtime PRN Insomnia      CAPILLARY BLOOD GLUCOSE        I&O's Summary      PHYSICAL EXAM:  Vital Signs Last 24 Hrs  T(C): 36.3 (26 Oct 2022 06:40), Max: 37.1 (25 Oct 2022 17:45)  T(F): 97.4 (26 Oct 2022 06:40), Max: 98.8 (25 Oct 2022 17:45)  HR: 83 (26 Oct 2022 06:40) (75 - 83)  BP: 160/90 (26 Oct 2022 06:40) (115/87 - 160/90)  BP(mean): --  RR: 16 (26 Oct 2022 06:40) (16 - 18)  SpO2: 100% (26 Oct 2022 06:40) (100% - 100%)    Parameters below as of 26 Oct 2022 06:40  Patient On (Oxygen Delivery Method): room air        CONSTITUTIONAL: NAD, thin male  ENT: no rhinorrhea, no pharyngeal erythema  RESPIRATORY: No increased work of breathing, CTAB, no wheezes or crackles appreciated  CARDIOVASCULAR: RRR, S1 and S2 present, no m/r/g  ABDOMEN: soft, NT, ND, bowel sounds present  EXTREMITIES: No LE edema  MUSCULOSKELETAL: no joint swelling, no tenderness to palpation  NEURO: moving all extremities spontaneously  PSYCH: pleasant, calm and appropriately interactive       LABS:                      RADIOLOGY & ADDITIONAL TESTS:  Results Reviewed:   Imaging Personally Reviewed:  Electrocardiogram Personally Reviewed:    COORDINATION OF CARE:  Care Discussed with Consultants/Other Providers [Y/N]:  Prior or Outpatient Records Reviewed [Y/N]:

## 2022-10-27 PROCEDURE — 99232 SBSQ HOSP IP/OBS MODERATE 35: CPT

## 2022-10-27 RX ADMIN — SACUBITRIL AND VALSARTAN 1 TABLET(S): 24; 26 TABLET, FILM COATED ORAL at 05:11

## 2022-10-27 RX ADMIN — HEPARIN SODIUM 5000 UNIT(S): 5000 INJECTION INTRAVENOUS; SUBCUTANEOUS at 17:44

## 2022-10-27 RX ADMIN — PANTOPRAZOLE SODIUM 40 MILLIGRAM(S): 20 TABLET, DELAYED RELEASE ORAL at 05:22

## 2022-10-27 RX ADMIN — Medication 25 MILLIGRAM(S): at 14:43

## 2022-10-27 RX ADMIN — CARVEDILOL PHOSPHATE 6.25 MILLIGRAM(S): 80 CAPSULE, EXTENDED RELEASE ORAL at 17:44

## 2022-10-27 RX ADMIN — SPIRONOLACTONE 25 MILLIGRAM(S): 25 TABLET, FILM COATED ORAL at 05:13

## 2022-10-27 RX ADMIN — Medication 25 MILLIGRAM(S): at 05:12

## 2022-10-27 RX ADMIN — ATORVASTATIN CALCIUM 40 MILLIGRAM(S): 80 TABLET, FILM COATED ORAL at 22:56

## 2022-10-27 RX ADMIN — Medication 25 MILLIGRAM(S): at 22:56

## 2022-10-27 RX ADMIN — Medication 20 MILLIGRAM(S): at 05:12

## 2022-10-27 RX ADMIN — Medication 1 TABLET(S): at 12:10

## 2022-10-27 RX ADMIN — SACUBITRIL AND VALSARTAN 1 TABLET(S): 24; 26 TABLET, FILM COATED ORAL at 17:44

## 2022-10-27 RX ADMIN — Medication 1 MILLIGRAM(S): at 12:10

## 2022-10-27 RX ADMIN — Medication 81 MILLIGRAM(S): at 12:09

## 2022-10-27 RX ADMIN — ISOSORBIDE DINITRATE 10 MILLIGRAM(S): 5 TABLET ORAL at 05:12

## 2022-10-27 RX ADMIN — ISOSORBIDE DINITRATE 10 MILLIGRAM(S): 5 TABLET ORAL at 14:43

## 2022-10-27 RX ADMIN — SERTRALINE 50 MILLIGRAM(S): 25 TABLET, FILM COATED ORAL at 12:10

## 2022-10-27 RX ADMIN — CARVEDILOL PHOSPHATE 6.25 MILLIGRAM(S): 80 CAPSULE, EXTENDED RELEASE ORAL at 05:12

## 2022-10-27 RX ADMIN — HEPARIN SODIUM 5000 UNIT(S): 5000 INJECTION INTRAVENOUS; SUBCUTANEOUS at 05:11

## 2022-10-27 RX ADMIN — ISOSORBIDE DINITRATE 10 MILLIGRAM(S): 5 TABLET ORAL at 22:56

## 2022-10-27 NOTE — CHART NOTE - NSCHARTNOTESELECT_GEN_ALL_CORE
Event Note
hempotysis/Event Note
Event Note
Follow Up/Nutrition Services
Follow-up/Nutrition Services
MAR accept note
Nephrology fellow/Event Note
Nutrition Services
Transfer Note
Transfer Note

## 2022-10-27 NOTE — PROGRESS NOTE ADULT - SUBJECTIVE AND OBJECTIVE BOX
Phu Rees MD  Academic Hospitalist  Pager [phone]  Email: [email or web address]  Available on [employer] Teams        PROGRESS NOTE:     Patient is a 72y old  Male who presents with a chief complaint of SOB (28 Oct 2022 14:17)      SUBJECTIVE / OVERNIGHT EVENTS:  Patient seen and examined on 10/27. Awaiting discharge.  ADDITIONAL REVIEW OF SYSTEMS:  No f/c/n/v    MEDICATIONS  (STANDING):  ALBUTerol    90 MICROgram(s) HFA Inhaler 2 Puff(s) Inhalation every 6 hours  aspirin enteric coated 81 milliGRAM(s) Oral daily  atorvastatin 40 milliGRAM(s) Oral at bedtime  carvedilol 6.25 milliGRAM(s) Oral every 12 hours  folic acid 1 milliGRAM(s) Oral daily  furosemide    Tablet 20 milliGRAM(s) Oral daily  heparin   Injectable 5000 Unit(s) SubCutaneous every 12 hours  hydrALAZINE 25 milliGRAM(s) Oral three times a day  influenza  Vaccine (HIGH DOSE) 0.7 milliLiter(s) IntraMuscular once  isosorbide   dinitrate Tablet (ISORDIL) 10 milliGRAM(s) Oral three times a day  magnesium oxide 400 milliGRAM(s) Oral three times a day with meals  multivitamin 1 Tablet(s) Oral daily  pantoprazole    Tablet 40 milliGRAM(s) Oral before breakfast  sacubitril 97 mG/valsartan 103 mG 1 Tablet(s) Oral two times a day  sertraline 50 milliGRAM(s) Oral daily  spironolactone 25 milliGRAM(s) Oral daily  tiotropium 18 MICROgram(s) Capsule 1 Capsule(s) Inhalation daily    MEDICATIONS  (PRN):  guaiFENesin Oral Liquid (Sugar-Free) 100 milliGRAM(s) Oral every 6 hours PRN Cough  melatonin 3 milliGRAM(s) Oral at bedtime PRN Insomnia      CAPILLARY BLOOD GLUCOSE        I&O's Summary    28 Oct 2022 07:01  -  29 Oct 2022 07:00  --------------------------------------------------------  IN: 320 mL / OUT: 450 mL / NET: -130 mL        PHYSICAL EXAM:  Vital Signs Last 24 Hrs  T(C): 36.9 (29 Oct 2022 05:45), Max: 37.2 (28 Oct 2022 21:25)  T(F): 98.5 (29 Oct 2022 05:45), Max: 98.9 (28 Oct 2022 21:25)  HR: 80 (29 Oct 2022 05:45) (72 - 82)  BP: 138/82 (29 Oct 2022 05:45) (138/82 - 159/94)  BP(mean): --  RR: 17 (29 Oct 2022 05:45) (17 - 18)  SpO2: 98% (29 Oct 2022 05:45) (98% - 100%)    Parameters below as of 29 Oct 2022 05:45  Patient On (Oxygen Delivery Method): room air        CONSTITUTIONAL: NAD, thin male  ENT: no rhinorrhea, no pharyngeal erythema  RESPIRATORY: No increased work of breathing, CTAB, no wheezes or crackles appreciated  CARDIOVASCULAR: RRR, S1 and S2 present, no m/r/g  ABDOMEN: soft, NT, ND, bowel sounds present  EXTREMITIES: No LE edema  MUSCULOSKELETAL: no joint swelling, no tenderness to palpation  NEURO: moving all extremities spontaneously  PSYCH: pleasant, calm and appropriately interactive       LABS:    10-28    133<L>  |  101  |  29<H>  ----------------------------<  131<H>  4.0   |  23  |  0.90    Ca    9.2      28 Oct 2022 19:17  Phos  2.7     10-28  Mg     1.30     10-28                  RADIOLOGY & ADDITIONAL TESTS:  Results Reviewed:   Imaging Personally Reviewed:  Electrocardiogram Personally Reviewed:    COORDINATION OF CARE:  Care Discussed with Consultants/Other Providers [Y/N]:  Prior or Outpatient Records Reviewed [Y/N]:

## 2022-10-27 NOTE — CHART NOTE - NSCHARTNOTEFT_GEN_A_CORE
Source: Patient A&Ox4   Family [ ]     RN [x ]    Chart [x ]    HPI: 72M with PMH HTN, HLD, CAD, HCV (s/p Harvoni), EtOH initially presented with SOB and acute hypoxic resp failure secondary to hypertensive emergency and acute decompensated HF. patient was managed in CCU with nitro gtt, lasix gtt. echo showed poor EF, global LVSD, mod MR, large RV. Patient is now transitioned to PO treatment for HF. Patient noted to be persistently hyponatremic, now improving with fluid restriction and salt tabs. Now with course c/b mild encephalopathy vs delirium, undergoing further workup.      Reason for Follow-Up Assessment: severe protein calorie malnutrition   Pt states he is eating very well. Not drinking the Ensure Compact prefers Ensure Plus. No reported GI issues (nausea/vomiting/diarrhea/constipation.) Denies any chewing or swallowing difficulties at this time. Swallow assessment recommending regular, thin liquids.          Diet, Regular:   Consistent Carbohydrate {Evening Snack} (CSTCHOSN)  DASH/TLC {Sodium & Cholesterol Restricted} (DASH)  1000mL Fluid Restriction ([id number])  No Caffeine  No Carbonated Beverages  No Chocolate  Supplement Feeding Modality:  Oral  Ensure Compact Cans or Servings Per Day:  1       Frequency:  Two Times a day (10-14-22 @ 18:23)      GI: WDL. Last BM today    PO intake:  < 50% [ ] 50-75% [ ]   % [x ]  other :      Anthropometrics: Height (cm): 167.6 (10-07)  Weight (kg): 43.7 (10-18),  46.8 (10-24)  BMI (kg/m2): 16.7 (10-24)    Edema: none  Pressure Injuries: unstageable sacrum     __________________ Pertinent Medications__________________   MEDICATIONS  (STANDING):  ALBUTerol    90 MICROgram(s) HFA Inhaler 2 Puff(s) Inhalation every 6 hours  aspirin enteric coated 81 milliGRAM(s) Oral daily  atorvastatin 40 milliGRAM(s) Oral at bedtime  carvedilol 6.25 milliGRAM(s) Oral every 12 hours  folic acid 1 milliGRAM(s) Oral daily  furosemide    Tablet 20 milliGRAM(s) Oral daily  heparin   Injectable 5000 Unit(s) SubCutaneous every 12 hours  hydrALAZINE 25 milliGRAM(s) Oral three times a day  influenza  Vaccine (HIGH DOSE) 0.7 milliLiter(s) IntraMuscular once  isosorbide   dinitrate Tablet (ISORDIL) 10 milliGRAM(s) Oral three times a day  multivitamin 1 Tablet(s) Oral daily  pantoprazole    Tablet 40 milliGRAM(s) Oral before breakfast  sacubitril 97 mG/valsartan 103 mG 1 Tablet(s) Oral two times a day  sertraline 50 milliGRAM(s) Oral daily  spironolactone 25 milliGRAM(s) Oral daily  tiotropium 18 MICROgram(s) Capsule 1 Capsule(s) Inhalation daily    MEDICATIONS  (PRN):  guaiFENesin Oral Liquid (Sugar-Free) 100 milliGRAM(s) Oral every 6 hours PRN Cough  melatonin 3 milliGRAM(s) Oral at bedtime PRN Insomnia      __________________ Pertinent Labs__________________    10-22 Phos 2.8 mg/dL 10-07 Chol 140 mg/dL LDL --    HDL 68 mg/dL Trig 62 mg/dL        Estimated Needs:   [x ] no change since previous assessment      Previous Nutrition Diagnosis: severe protein calorie malnutrition     Nutrition Diagnosis is [x ] ongoing-pt meeting nutritional needs     Recommendations:  1. Low sodium diet + Ensure Plus HP 1x daily (350 irineo, 20 gm protein). Defer fluid restriction to medicine.   2. Encourage PO intake and honor food preferences as able.   3. Continue to document PO in RN flow sheets and monitor weekly weights.           Monitoring and Evaluation:      [ x] Tolerance to diet prescription [x ] weights [x ] follow up per protocol  [ ] other:

## 2022-10-28 LAB
ANION GAP SERPL CALC-SCNC: 9 MMOL/L — SIGNIFICANT CHANGE UP (ref 7–14)
BUN SERPL-MCNC: 29 MG/DL — HIGH (ref 7–23)
CALCIUM SERPL-MCNC: 9.2 MG/DL — SIGNIFICANT CHANGE UP (ref 8.4–10.5)
CHLORIDE SERPL-SCNC: 101 MMOL/L — SIGNIFICANT CHANGE UP (ref 98–107)
CO2 SERPL-SCNC: 23 MMOL/L — SIGNIFICANT CHANGE UP (ref 22–31)
CREAT SERPL-MCNC: 0.9 MG/DL — SIGNIFICANT CHANGE UP (ref 0.5–1.3)
EGFR: 91 ML/MIN/1.73M2 — SIGNIFICANT CHANGE UP
GLUCOSE SERPL-MCNC: 131 MG/DL — HIGH (ref 70–99)
MAGNESIUM SERPL-MCNC: 1.3 MG/DL — LOW (ref 1.6–2.6)
PHOSPHATE SERPL-MCNC: 2.7 MG/DL — SIGNIFICANT CHANGE UP (ref 2.5–4.5)
POTASSIUM SERPL-MCNC: 4 MMOL/L — SIGNIFICANT CHANGE UP (ref 3.5–5.3)
POTASSIUM SERPL-SCNC: 4 MMOL/L — SIGNIFICANT CHANGE UP (ref 3.5–5.3)
SODIUM SERPL-SCNC: 133 MMOL/L — LOW (ref 135–145)

## 2022-10-28 PROCEDURE — 99232 SBSQ HOSP IP/OBS MODERATE 35: CPT

## 2022-10-28 PROCEDURE — 93010 ELECTROCARDIOGRAM REPORT: CPT

## 2022-10-28 RX ORDER — MAGNESIUM SULFATE 500 MG/ML
1 VIAL (ML) INJECTION ONCE
Refills: 0 | Status: DISCONTINUED | OUTPATIENT
Start: 2022-10-28 | End: 2022-10-29

## 2022-10-28 RX ADMIN — SERTRALINE 50 MILLIGRAM(S): 25 TABLET, FILM COATED ORAL at 11:47

## 2022-10-28 RX ADMIN — PANTOPRAZOLE SODIUM 40 MILLIGRAM(S): 20 TABLET, DELAYED RELEASE ORAL at 05:17

## 2022-10-28 RX ADMIN — Medication 25 MILLIGRAM(S): at 05:18

## 2022-10-28 RX ADMIN — Medication 25 MILLIGRAM(S): at 22:57

## 2022-10-28 RX ADMIN — SACUBITRIL AND VALSARTAN 1 TABLET(S): 24; 26 TABLET, FILM COATED ORAL at 19:11

## 2022-10-28 RX ADMIN — Medication 1 MILLIGRAM(S): at 11:47

## 2022-10-28 RX ADMIN — CARVEDILOL PHOSPHATE 6.25 MILLIGRAM(S): 80 CAPSULE, EXTENDED RELEASE ORAL at 05:18

## 2022-10-28 RX ADMIN — Medication 1 TABLET(S): at 11:47

## 2022-10-28 RX ADMIN — ISOSORBIDE DINITRATE 10 MILLIGRAM(S): 5 TABLET ORAL at 13:49

## 2022-10-28 RX ADMIN — ATORVASTATIN CALCIUM 40 MILLIGRAM(S): 80 TABLET, FILM COATED ORAL at 22:57

## 2022-10-28 RX ADMIN — Medication 20 MILLIGRAM(S): at 05:17

## 2022-10-28 RX ADMIN — Medication 25 MILLIGRAM(S): at 13:50

## 2022-10-28 RX ADMIN — ISOSORBIDE DINITRATE 10 MILLIGRAM(S): 5 TABLET ORAL at 22:57

## 2022-10-28 RX ADMIN — HEPARIN SODIUM 5000 UNIT(S): 5000 INJECTION INTRAVENOUS; SUBCUTANEOUS at 19:12

## 2022-10-28 RX ADMIN — HEPARIN SODIUM 5000 UNIT(S): 5000 INJECTION INTRAVENOUS; SUBCUTANEOUS at 05:19

## 2022-10-28 RX ADMIN — ISOSORBIDE DINITRATE 10 MILLIGRAM(S): 5 TABLET ORAL at 05:17

## 2022-10-28 RX ADMIN — CARVEDILOL PHOSPHATE 6.25 MILLIGRAM(S): 80 CAPSULE, EXTENDED RELEASE ORAL at 19:11

## 2022-10-28 RX ADMIN — Medication 81 MILLIGRAM(S): at 11:47

## 2022-10-28 RX ADMIN — SACUBITRIL AND VALSARTAN 1 TABLET(S): 24; 26 TABLET, FILM COATED ORAL at 05:18

## 2022-10-28 RX ADMIN — SPIRONOLACTONE 25 MILLIGRAM(S): 25 TABLET, FILM COATED ORAL at 05:18

## 2022-10-28 RX ADMIN — ALBUTEROL 2 PUFF(S): 90 AEROSOL, METERED ORAL at 22:58

## 2022-10-28 NOTE — PROGRESS NOTE ADULT - SUBJECTIVE AND OBJECTIVE BOX
Phu Rees MD  Academic Hospitalist  Pager [phone]  Email: [email or web address]  Available on [employer] Teams        PROGRESS NOTE:     Patient is a 72y old  Male who presents with a chief complaint of SOB (26 Oct 2022 13:05)      SUBJECTIVE / OVERNIGHT EVENTS:  Patient seen and examined this morning. No new complaints, awaiting discharge to rehab. States that he would like to be home by Thanksgiving.  ADDITIONAL REVIEW OF SYSTEMS:      MEDICATIONS  (STANDING):  ALBUTerol    90 MICROgram(s) HFA Inhaler 2 Puff(s) Inhalation every 6 hours  aspirin enteric coated 81 milliGRAM(s) Oral daily  atorvastatin 40 milliGRAM(s) Oral at bedtime  carvedilol 6.25 milliGRAM(s) Oral every 12 hours  folic acid 1 milliGRAM(s) Oral daily  furosemide    Tablet 20 milliGRAM(s) Oral daily  heparin   Injectable 5000 Unit(s) SubCutaneous every 12 hours  hydrALAZINE 25 milliGRAM(s) Oral three times a day  influenza  Vaccine (HIGH DOSE) 0.7 milliLiter(s) IntraMuscular once  isosorbide   dinitrate Tablet (ISORDIL) 10 milliGRAM(s) Oral three times a day  multivitamin 1 Tablet(s) Oral daily  pantoprazole    Tablet 40 milliGRAM(s) Oral before breakfast  sacubitril 97 mG/valsartan 103 mG 1 Tablet(s) Oral two times a day  sertraline 50 milliGRAM(s) Oral daily  spironolactone 25 milliGRAM(s) Oral daily  tiotropium 18 MICROgram(s) Capsule 1 Capsule(s) Inhalation daily    MEDICATIONS  (PRN):  guaiFENesin Oral Liquid (Sugar-Free) 100 milliGRAM(s) Oral every 6 hours PRN Cough  melatonin 3 milliGRAM(s) Oral at bedtime PRN Insomnia      CAPILLARY BLOOD GLUCOSE        I&O's Summary    28 Oct 2022 07:01  -  28 Oct 2022 14:18  --------------------------------------------------------  IN: 0 mL / OUT: 250 mL / NET: -250 mL        PHYSICAL EXAM:  Vital Signs Last 24 Hrs  T(C): 37 (28 Oct 2022 10:00), Max: 37.1 (27 Oct 2022 17:00)  T(F): 98.6 (28 Oct 2022 10:00), Max: 98.7 (27 Oct 2022 17:00)  HR: 82 (28 Oct 2022 10:00) (70 - 87)  BP: 159/94 (28 Oct 2022 10:00) (117/74 - 159/94)  BP(mean): --  RR: 18 (28 Oct 2022 10:00) (18 - 18)  SpO2: 100% (28 Oct 2022 10:00) (100% - 100%)    Parameters below as of 28 Oct 2022 10:00  Patient On (Oxygen Delivery Method): room air        CONSTITUTIONAL: NAD, thin male  ENT: no rhinorrhea, no pharyngeal erythema  RESPIRATORY: No increased work of breathing, CTAB, no wheezes or crackles appreciated  CARDIOVASCULAR: RRR, S1 and S2 present, no m/r/g  ABDOMEN: soft, NT, ND, bowel sounds present  EXTREMITIES: No LE edema  MUSCULOSKELETAL: no joint swelling, no tenderness to palpation  NEURO: moving all extremities spontaneously  PSYCH: pleasant, calm and appropriately interactive     LABS:                      RADIOLOGY & ADDITIONAL TESTS:  Results Reviewed:   Imaging Personally Reviewed:  Electrocardiogram Personally Reviewed:    COORDINATION OF CARE:  Care Discussed with Consultants/Other Providers [Y/N]:   Prior or Outpatient Records Reviewed [Y/N]:

## 2022-10-28 NOTE — PROGRESS NOTE ADULT - ASSESSMENT
72M with PMH HTN, HLD, CAD, HCV (s/p Harvoni), EtOH initially presented with SOB and acute hypoxic resp failure secondary to hypertensive emergency and acute decompensated HF. patient was managed in CCU with nitro gtt, lasix gtt. echo showed poor EF, global LVSD, mod MR, large RV. Patient is now transitioned to PO treatment for HF. Patient noted to be persistently hyponatremic, now improving with fluid restriction and salt tabs. Now with course c/b mild encephalopathy vs delirium, undergoing further workup, which has resolved.

## 2022-10-29 LAB — GLUCOSE BLDC GLUCOMTR-MCNC: 93 MG/DL — SIGNIFICANT CHANGE UP (ref 70–99)

## 2022-10-29 PROCEDURE — 99232 SBSQ HOSP IP/OBS MODERATE 35: CPT

## 2022-10-29 RX ORDER — MAGNESIUM OXIDE 400 MG ORAL TABLET 241.3 MG
400 TABLET ORAL
Refills: 0 | Status: COMPLETED | OUTPATIENT
Start: 2022-10-29 | End: 2022-10-29

## 2022-10-29 RX ADMIN — Medication 1 TABLET(S): at 14:43

## 2022-10-29 RX ADMIN — ISOSORBIDE DINITRATE 10 MILLIGRAM(S): 5 TABLET ORAL at 14:44

## 2022-10-29 RX ADMIN — Medication 81 MILLIGRAM(S): at 12:54

## 2022-10-29 RX ADMIN — ISOSORBIDE DINITRATE 10 MILLIGRAM(S): 5 TABLET ORAL at 06:53

## 2022-10-29 RX ADMIN — ISOSORBIDE DINITRATE 10 MILLIGRAM(S): 5 TABLET ORAL at 22:21

## 2022-10-29 RX ADMIN — HEPARIN SODIUM 5000 UNIT(S): 5000 INJECTION INTRAVENOUS; SUBCUTANEOUS at 19:13

## 2022-10-29 RX ADMIN — MAGNESIUM OXIDE 400 MG ORAL TABLET 400 MILLIGRAM(S): 241.3 TABLET ORAL at 00:34

## 2022-10-29 RX ADMIN — MAGNESIUM OXIDE 400 MG ORAL TABLET 400 MILLIGRAM(S): 241.3 TABLET ORAL at 12:55

## 2022-10-29 RX ADMIN — MAGNESIUM OXIDE 400 MG ORAL TABLET 400 MILLIGRAM(S): 241.3 TABLET ORAL at 19:15

## 2022-10-29 RX ADMIN — Medication 25 MILLIGRAM(S): at 22:21

## 2022-10-29 RX ADMIN — ATORVASTATIN CALCIUM 40 MILLIGRAM(S): 80 TABLET, FILM COATED ORAL at 22:21

## 2022-10-29 RX ADMIN — Medication 25 MILLIGRAM(S): at 06:52

## 2022-10-29 RX ADMIN — SACUBITRIL AND VALSARTAN 1 TABLET(S): 24; 26 TABLET, FILM COATED ORAL at 19:13

## 2022-10-29 RX ADMIN — SACUBITRIL AND VALSARTAN 1 TABLET(S): 24; 26 TABLET, FILM COATED ORAL at 06:52

## 2022-10-29 RX ADMIN — PANTOPRAZOLE SODIUM 40 MILLIGRAM(S): 20 TABLET, DELAYED RELEASE ORAL at 06:54

## 2022-10-29 RX ADMIN — Medication 20 MILLIGRAM(S): at 06:53

## 2022-10-29 RX ADMIN — SPIRONOLACTONE 25 MILLIGRAM(S): 25 TABLET, FILM COATED ORAL at 07:54

## 2022-10-29 RX ADMIN — HEPARIN SODIUM 5000 UNIT(S): 5000 INJECTION INTRAVENOUS; SUBCUTANEOUS at 06:53

## 2022-10-29 RX ADMIN — Medication 1 MILLIGRAM(S): at 12:54

## 2022-10-29 RX ADMIN — SERTRALINE 50 MILLIGRAM(S): 25 TABLET, FILM COATED ORAL at 12:54

## 2022-10-29 RX ADMIN — CARVEDILOL PHOSPHATE 6.25 MILLIGRAM(S): 80 CAPSULE, EXTENDED RELEASE ORAL at 06:53

## 2022-10-29 RX ADMIN — ALBUTEROL 2 PUFF(S): 90 AEROSOL, METERED ORAL at 22:21

## 2022-10-29 RX ADMIN — MAGNESIUM OXIDE 400 MG ORAL TABLET 400 MILLIGRAM(S): 241.3 TABLET ORAL at 07:54

## 2022-10-29 RX ADMIN — Medication 25 MILLIGRAM(S): at 14:43

## 2022-10-29 RX ADMIN — CARVEDILOL PHOSPHATE 6.25 MILLIGRAM(S): 80 CAPSULE, EXTENDED RELEASE ORAL at 19:15

## 2022-10-29 NOTE — PROGRESS NOTE ADULT - SUBJECTIVE AND OBJECTIVE BOX
Phu Rees MD  Academic Hospitalist  Pager [phone]  Email: [email or web address]  Available on [employer] Teams        PROGRESS NOTE:     Patient is a 72y old  Male who presents with a chief complaint of SOB (28 Oct 2022 14:17)      SUBJECTIVE / OVERNIGHT EVENTS:  Patient seen and examined this morning. No new complaints, awaiting discharge to rehab. States that he would like to be home by Thanksgiving.  ADDITIONAL REVIEW OF SYSTEMS:  Denies shortness of breath, f/c/n/v.     MEDICATIONS  (STANDING):  ALBUTerol    90 MICROgram(s) HFA Inhaler 2 Puff(s) Inhalation every 6 hours  aspirin enteric coated 81 milliGRAM(s) Oral daily  atorvastatin 40 milliGRAM(s) Oral at bedtime  carvedilol 6.25 milliGRAM(s) Oral every 12 hours  folic acid 1 milliGRAM(s) Oral daily  furosemide    Tablet 20 milliGRAM(s) Oral daily  heparin   Injectable 5000 Unit(s) SubCutaneous every 12 hours  hydrALAZINE 25 milliGRAM(s) Oral three times a day  influenza  Vaccine (HIGH DOSE) 0.7 milliLiter(s) IntraMuscular once  isosorbide   dinitrate Tablet (ISORDIL) 10 milliGRAM(s) Oral three times a day  magnesium oxide 400 milliGRAM(s) Oral three times a day with meals  multivitamin 1 Tablet(s) Oral daily  pantoprazole    Tablet 40 milliGRAM(s) Oral before breakfast  sacubitril 97 mG/valsartan 103 mG 1 Tablet(s) Oral two times a day  sertraline 50 milliGRAM(s) Oral daily  spironolactone 25 milliGRAM(s) Oral daily  tiotropium 18 MICROgram(s) Capsule 1 Capsule(s) Inhalation daily    MEDICATIONS  (PRN):  guaiFENesin Oral Liquid (Sugar-Free) 100 milliGRAM(s) Oral every 6 hours PRN Cough  melatonin 3 milliGRAM(s) Oral at bedtime PRN Insomnia      CAPILLARY BLOOD GLUCOSE      POCT Blood Glucose.: 93 mg/dL (29 Oct 2022 07:37)    I&O's Summary    28 Oct 2022 07:01  -  29 Oct 2022 07:00  --------------------------------------------------------  IN: 320 mL / OUT: 450 mL / NET: -130 mL        PHYSICAL EXAM:  Vital Signs Last 24 Hrs  T(C): 36.9 (29 Oct 2022 09:00), Max: 37.2 (28 Oct 2022 21:25)  T(F): 98.4 (29 Oct 2022 09:00), Max: 98.9 (28 Oct 2022 21:25)  HR: 84 (29 Oct 2022 09:00) (72 - 84)  BP: 134/92 (29 Oct 2022 09:00) (134/92 - 148/82)  BP(mean): --  RR: 18 (29 Oct 2022 09:00) (17 - 18)  SpO2: 100% (29 Oct 2022 09:00) (98% - 100%)    Parameters below as of 29 Oct 2022 09:00  Patient On (Oxygen Delivery Method): room air          CONSTITUTIONAL: NAD, thin male  ENT: no rhinorrhea, no pharyngeal erythema  RESPIRATORY: No increased work of breathing, CTAB, no wheezes or crackles appreciated  CARDIOVASCULAR: RRR, S1 and S2 present, no m/r/g  ABDOMEN: soft, NT, ND, bowel sounds present  EXTREMITIES: No LE edema  MUSCULOSKELETAL: no joint swelling, no tenderness to palpation  NEURO: moving all extremities spontaneously  PSYCH: pleasant, calm and appropriately interactive     LABS:    10-28    133<L>  |  101  |  29<H>  ----------------------------<  131<H>  4.0   |  23  |  0.90    Ca    9.2      28 Oct 2022 19:17  Phos  2.7     10-28  Mg     1.30     10-28                  RADIOLOGY & ADDITIONAL TESTS:  Results Reviewed:   Imaging Personally Reviewed:  Electrocardiogram Personally Reviewed:    COORDINATION OF CARE:  Care Discussed with Consultants/Other Providers [Y/N]:  Prior or Outpatient Records Reviewed [Y/N]:

## 2022-10-30 PROCEDURE — 99232 SBSQ HOSP IP/OBS MODERATE 35: CPT

## 2022-10-30 RX ADMIN — SACUBITRIL AND VALSARTAN 1 TABLET(S): 24; 26 TABLET, FILM COATED ORAL at 19:29

## 2022-10-30 RX ADMIN — Medication 25 MILLIGRAM(S): at 13:06

## 2022-10-30 RX ADMIN — CARVEDILOL PHOSPHATE 6.25 MILLIGRAM(S): 80 CAPSULE, EXTENDED RELEASE ORAL at 06:26

## 2022-10-30 RX ADMIN — Medication 1 TABLET(S): at 13:05

## 2022-10-30 RX ADMIN — HEPARIN SODIUM 5000 UNIT(S): 5000 INJECTION INTRAVENOUS; SUBCUTANEOUS at 19:28

## 2022-10-30 RX ADMIN — ISOSORBIDE DINITRATE 10 MILLIGRAM(S): 5 TABLET ORAL at 13:06

## 2022-10-30 RX ADMIN — SPIRONOLACTONE 25 MILLIGRAM(S): 25 TABLET, FILM COATED ORAL at 08:10

## 2022-10-30 RX ADMIN — CARVEDILOL PHOSPHATE 6.25 MILLIGRAM(S): 80 CAPSULE, EXTENDED RELEASE ORAL at 19:29

## 2022-10-30 RX ADMIN — ISOSORBIDE DINITRATE 10 MILLIGRAM(S): 5 TABLET ORAL at 06:25

## 2022-10-30 RX ADMIN — ISOSORBIDE DINITRATE 10 MILLIGRAM(S): 5 TABLET ORAL at 21:50

## 2022-10-30 RX ADMIN — SACUBITRIL AND VALSARTAN 1 TABLET(S): 24; 26 TABLET, FILM COATED ORAL at 06:24

## 2022-10-30 RX ADMIN — Medication 1 MILLIGRAM(S): at 13:06

## 2022-10-30 RX ADMIN — PANTOPRAZOLE SODIUM 40 MILLIGRAM(S): 20 TABLET, DELAYED RELEASE ORAL at 06:25

## 2022-10-30 RX ADMIN — HEPARIN SODIUM 5000 UNIT(S): 5000 INJECTION INTRAVENOUS; SUBCUTANEOUS at 06:24

## 2022-10-30 RX ADMIN — SERTRALINE 50 MILLIGRAM(S): 25 TABLET, FILM COATED ORAL at 13:05

## 2022-10-30 RX ADMIN — Medication 20 MILLIGRAM(S): at 06:25

## 2022-10-30 RX ADMIN — Medication 25 MILLIGRAM(S): at 06:25

## 2022-10-30 RX ADMIN — ATORVASTATIN CALCIUM 40 MILLIGRAM(S): 80 TABLET, FILM COATED ORAL at 21:50

## 2022-10-30 RX ADMIN — Medication 81 MILLIGRAM(S): at 13:06

## 2022-10-30 RX ADMIN — Medication 25 MILLIGRAM(S): at 21:51

## 2022-10-30 NOTE — PROGRESS NOTE ADULT - SUBJECTIVE AND OBJECTIVE BOX
Phu Rees MD  Academic Hospitalist  Pager [phone]  Email: [email or web address]  Available on [employer] Teams        PROGRESS NOTE:     Patient is a 72y old  Male who presents with a chief complaint of SOB (29 Oct 2022 10:45)      SUBJECTIVE / OVERNIGHT EVENTS:  Patient seen and examined this morning. Patient states that he is comfortable, still awaiting discharge to rehab.  ADDITIONAL REVIEW OF SYSTEMS:  No f/c/n/v    MEDICATIONS  (STANDING):  ALBUTerol    90 MICROgram(s) HFA Inhaler 2 Puff(s) Inhalation every 6 hours  aspirin enteric coated 81 milliGRAM(s) Oral daily  atorvastatin 40 milliGRAM(s) Oral at bedtime  carvedilol 6.25 milliGRAM(s) Oral every 12 hours  folic acid 1 milliGRAM(s) Oral daily  furosemide    Tablet 20 milliGRAM(s) Oral daily  heparin   Injectable 5000 Unit(s) SubCutaneous every 12 hours  hydrALAZINE 25 milliGRAM(s) Oral three times a day  influenza  Vaccine (HIGH DOSE) 0.7 milliLiter(s) IntraMuscular once  isosorbide   dinitrate Tablet (ISORDIL) 10 milliGRAM(s) Oral three times a day  multivitamin 1 Tablet(s) Oral daily  pantoprazole    Tablet 40 milliGRAM(s) Oral before breakfast  sacubitril 97 mG/valsartan 103 mG 1 Tablet(s) Oral two times a day  sertraline 50 milliGRAM(s) Oral daily  spironolactone 25 milliGRAM(s) Oral daily  tiotropium 18 MICROgram(s) Capsule 1 Capsule(s) Inhalation daily    MEDICATIONS  (PRN):  guaiFENesin Oral Liquid (Sugar-Free) 100 milliGRAM(s) Oral every 6 hours PRN Cough  melatonin 3 milliGRAM(s) Oral at bedtime PRN Insomnia      CAPILLARY BLOOD GLUCOSE        I&O's Summary      PHYSICAL EXAM:  Vital Signs Last 24 Hrs  T(C): 36.4 (30 Oct 2022 09:10), Max: 36.9 (29 Oct 2022 22:15)  T(F): 97.6 (30 Oct 2022 09:10), Max: 98.5 (29 Oct 2022 22:15)  HR: 84 (30 Oct 2022 09:10) (82 - 96)  BP: 134/92 (30 Oct 2022 09:10) (133/77 - 167/79)  BP(mean): --  RR: 19 (30 Oct 2022 09:10) (18 - 19)  SpO2: 98% (30 Oct 2022 09:10) (98% - 100%)    Parameters below as of 30 Oct 2022 09:10  Patient On (Oxygen Delivery Method): room air        CONSTITUTIONAL: NAD, thin male  ENT: no rhinorrhea, no pharyngeal erythema  RESPIRATORY: No increased work of breathing, CTAB, no wheezes or crackles appreciated  CARDIOVASCULAR: RRR, S1 and S2 present, no m/r/g  ABDOMEN: soft, NT, ND, bowel sounds present  EXTREMITIES: No LE edema  MUSCULOSKELETAL: no joint swelling, no tenderness to palpation  NEURO: moving all extremities spontaneously  PSYCH: pleasant, calm and appropriately interactive     LABS:    10-28    133<L>  |  101  |  29<H>  ----------------------------<  131<H>  4.0   |  23  |  0.90    Ca    9.2      28 Oct 2022 19:17  Phos  2.7     10-28  Mg     1.30     10-28                  RADIOLOGY & ADDITIONAL TESTS:  Results Reviewed:   Imaging Personally Reviewed:  Electrocardiogram Personally Reviewed:    COORDINATION OF CARE:  Care Discussed with Consultants/Other Providers [Y/N]:  Prior or Outpatient Records Reviewed [Y/N]:

## 2022-10-31 LAB — SARS-COV-2 RNA SPEC QL NAA+PROBE: SIGNIFICANT CHANGE UP

## 2022-10-31 PROCEDURE — 99233 SBSQ HOSP IP/OBS HIGH 50: CPT

## 2022-10-31 RX ADMIN — SPIRONOLACTONE 25 MILLIGRAM(S): 25 TABLET, FILM COATED ORAL at 09:35

## 2022-10-31 RX ADMIN — Medication 25 MILLIGRAM(S): at 21:16

## 2022-10-31 RX ADMIN — Medication 81 MILLIGRAM(S): at 13:09

## 2022-10-31 RX ADMIN — Medication 1 MILLIGRAM(S): at 13:10

## 2022-10-31 RX ADMIN — ATORVASTATIN CALCIUM 40 MILLIGRAM(S): 80 TABLET, FILM COATED ORAL at 21:14

## 2022-10-31 RX ADMIN — Medication 25 MILLIGRAM(S): at 13:09

## 2022-10-31 RX ADMIN — ISOSORBIDE DINITRATE 10 MILLIGRAM(S): 5 TABLET ORAL at 21:14

## 2022-10-31 RX ADMIN — CARVEDILOL PHOSPHATE 6.25 MILLIGRAM(S): 80 CAPSULE, EXTENDED RELEASE ORAL at 17:40

## 2022-10-31 RX ADMIN — ISOSORBIDE DINITRATE 10 MILLIGRAM(S): 5 TABLET ORAL at 13:09

## 2022-10-31 RX ADMIN — Medication 20 MILLIGRAM(S): at 05:46

## 2022-10-31 RX ADMIN — Medication 25 MILLIGRAM(S): at 05:45

## 2022-10-31 RX ADMIN — PANTOPRAZOLE SODIUM 40 MILLIGRAM(S): 20 TABLET, DELAYED RELEASE ORAL at 09:35

## 2022-10-31 RX ADMIN — Medication 1 TABLET(S): at 13:09

## 2022-10-31 RX ADMIN — SACUBITRIL AND VALSARTAN 1 TABLET(S): 24; 26 TABLET, FILM COATED ORAL at 17:40

## 2022-10-31 RX ADMIN — HEPARIN SODIUM 5000 UNIT(S): 5000 INJECTION INTRAVENOUS; SUBCUTANEOUS at 05:45

## 2022-10-31 RX ADMIN — CARVEDILOL PHOSPHATE 6.25 MILLIGRAM(S): 80 CAPSULE, EXTENDED RELEASE ORAL at 05:48

## 2022-10-31 RX ADMIN — SERTRALINE 50 MILLIGRAM(S): 25 TABLET, FILM COATED ORAL at 13:09

## 2022-10-31 RX ADMIN — SACUBITRIL AND VALSARTAN 1 TABLET(S): 24; 26 TABLET, FILM COATED ORAL at 05:48

## 2022-10-31 RX ADMIN — ISOSORBIDE DINITRATE 10 MILLIGRAM(S): 5 TABLET ORAL at 05:45

## 2022-10-31 NOTE — PROGRESS NOTE ADULT - SUBJECTIVE AND OBJECTIVE BOX
Patient is a 72y old  Male who presents with a chief complaint of SOB (30 Oct 2022 10:43)      INTERVAL HPI/OVERNIGHT EVENTS:  Seen by me this morning, seen on hallway walking with PT and then in room, feeling well, eager to going to [facility]. Tolerating PO.    Review of Systems: 12 point review of systems otherwise negative    MEDICATIONS  (STANDING):  ALBUTerol    90 MICROgram(s) HFA Inhaler 2 Puff(s) Inhalation every 6 hours  aspirin enteric coated 81 milliGRAM(s) Oral daily  atorvastatin 40 milliGRAM(s) Oral at bedtime  carvedilol 6.25 milliGRAM(s) Oral every 12 hours  folic acid 1 milliGRAM(s) Oral daily  furosemide    Tablet 20 milliGRAM(s) Oral daily  heparin   Injectable 5000 Unit(s) SubCutaneous every 12 hours  hydrALAZINE 25 milliGRAM(s) Oral three times a day  influenza  Vaccine (HIGH DOSE) 0.7 milliLiter(s) IntraMuscular once  isosorbide   dinitrate Tablet (ISORDIL) 10 milliGRAM(s) Oral three times a day  multivitamin 1 Tablet(s) Oral daily  pantoprazole    Tablet 40 milliGRAM(s) Oral before breakfast  sacubitril 97 mG/valsartan 103 mG 1 Tablet(s) Oral two times a day  sertraline 50 milliGRAM(s) Oral daily  spironolactone 25 milliGRAM(s) Oral daily  tiotropium 18 MICROgram(s) Capsule 1 Capsule(s) Inhalation daily    MEDICATIONS  (PRN):  guaiFENesin Oral Liquid (Sugar-Free) 100 milliGRAM(s) Oral every 6 hours PRN Cough  melatonin 3 milliGRAM(s) Oral at bedtime PRN Insomnia      Allergies    No Known Allergies    Intolerances          Vital Signs Last 24 Hrs  T(C): 37.1 (31 Oct 2022 17:34), Max: 37.8 (30 Oct 2022 21:00)  T(F): 98.8 (31 Oct 2022 17:34), Max: 100.1 (30 Oct 2022 21:00)  HR: 98 (31 Oct 2022 17:34) (89 - 110)  BP: 144/80 (31 Oct 2022 17:34) (127/- - 163/94)  BP(mean): 86 (31 Oct 2022 06:00) (86 - 113)  RR: 17 (31 Oct 2022 17:34) (17 - 18)  SpO2: 100% (31 Oct 2022 17:34) (100% - 100%)    Parameters below as of 31 Oct 2022 17:34  Patient On (Oxygen Delivery Method): room air      CAPILLARY BLOOD GLUCOSE          10-30 @ 07:01  -  10-31 @ 07:00  --------------------------------------------------------  IN: 480 mL / OUT: 400 mL / NET: 80 mL        Physical Exam:    Daily     Daily   General:  Well appearing, NAD, cachetic  HEENT:  Nonicteric, PERRLA  CV:  RRR, no murmur, no JVD  Lungs:  CTA B/L, no wheezes, rales, rhonchi  Abdomen:  Soft, non-tender, no distended, positive BS  Extremities:  2+ pulses, no c/c, no edema  Skin:  Warm and dry, no rashes  :  No díaz  Neuro:  AAOx3, non-focal, CN II-XII grossly intact  No Restraints    LABS:                  RADIOLOGY & ADDITIONAL TESTS:  Reviewed by me

## 2022-10-31 NOTE — PROGRESS NOTE ADULT - PROBLEM SELECTOR PLAN 1
Per patient's daughter, patient has appeared more confused over the past few days, patient also appearing forgetful which is not his baseline  - patient currently A&Ox3 (oriented to self, place, year and month, but not exact date), however mental status has remained stable over past week  - etiology unclear, patient may have encephalopathy from chronic alcohol use vs hyponatremia vs microvascular ischemia vs hospital delirium  - CTH non-con WNL  - B12/folate WNL, syphilis screen negative  Patient w/o concerns, states that he feels fine and looks forward to being discharged

## 2022-11-01 LAB
ANION GAP SERPL CALC-SCNC: 10 MMOL/L — SIGNIFICANT CHANGE UP (ref 7–14)
ANION GAP SERPL CALC-SCNC: 11 MMOL/L — SIGNIFICANT CHANGE UP (ref 7–14)
BUN SERPL-MCNC: 27 MG/DL — HIGH (ref 7–23)
BUN SERPL-MCNC: 28 MG/DL — HIGH (ref 7–23)
CALCIUM SERPL-MCNC: 9.6 MG/DL — SIGNIFICANT CHANGE UP (ref 8.4–10.5)
CALCIUM SERPL-MCNC: 9.8 MG/DL — SIGNIFICANT CHANGE UP (ref 8.4–10.5)
CHLORIDE SERPL-SCNC: 92 MMOL/L — LOW (ref 98–107)
CHLORIDE SERPL-SCNC: 93 MMOL/L — LOW (ref 98–107)
CO2 SERPL-SCNC: 27 MMOL/L — SIGNIFICANT CHANGE UP (ref 22–31)
CO2 SERPL-SCNC: 27 MMOL/L — SIGNIFICANT CHANGE UP (ref 22–31)
CREAT SERPL-MCNC: 0.88 MG/DL — SIGNIFICANT CHANGE UP (ref 0.5–1.3)
CREAT SERPL-MCNC: 0.91 MG/DL — SIGNIFICANT CHANGE UP (ref 0.5–1.3)
EGFR: 90 ML/MIN/1.73M2 — SIGNIFICANT CHANGE UP
EGFR: 91 ML/MIN/1.73M2 — SIGNIFICANT CHANGE UP
GLUCOSE SERPL-MCNC: 116 MG/DL — HIGH (ref 70–99)
GLUCOSE SERPL-MCNC: 117 MG/DL — HIGH (ref 70–99)
HCT VFR BLD CALC: 31.5 % — LOW (ref 39–50)
HGB BLD-MCNC: 10.4 G/DL — LOW (ref 13–17)
MAGNESIUM SERPL-MCNC: 1.3 MG/DL — LOW (ref 1.6–2.6)
MAGNESIUM SERPL-MCNC: 1.4 MG/DL — LOW (ref 1.6–2.6)
MCHC RBC-ENTMCNC: 27.1 PG — SIGNIFICANT CHANGE UP (ref 27–34)
MCHC RBC-ENTMCNC: 33 GM/DL — SIGNIFICANT CHANGE UP (ref 32–36)
MCV RBC AUTO: 82 FL — SIGNIFICANT CHANGE UP (ref 80–100)
NRBC # BLD: 0 /100 WBCS — SIGNIFICANT CHANGE UP (ref 0–0)
NRBC # FLD: 0 K/UL — SIGNIFICANT CHANGE UP (ref 0–0)
PHOSPHATE SERPL-MCNC: 3.7 MG/DL — SIGNIFICANT CHANGE UP (ref 2.5–4.5)
PHOSPHATE SERPL-MCNC: 3.8 MG/DL — SIGNIFICANT CHANGE UP (ref 2.5–4.5)
PLATELET # BLD AUTO: 267 K/UL — SIGNIFICANT CHANGE UP (ref 150–400)
POTASSIUM SERPL-MCNC: 4.3 MMOL/L — SIGNIFICANT CHANGE UP (ref 3.5–5.3)
POTASSIUM SERPL-MCNC: 4.5 MMOL/L — SIGNIFICANT CHANGE UP (ref 3.5–5.3)
POTASSIUM SERPL-SCNC: 4.3 MMOL/L — SIGNIFICANT CHANGE UP (ref 3.5–5.3)
POTASSIUM SERPL-SCNC: 4.5 MMOL/L — SIGNIFICANT CHANGE UP (ref 3.5–5.3)
RBC # BLD: 3.84 M/UL — LOW (ref 4.2–5.8)
RBC # FLD: 13.3 % — SIGNIFICANT CHANGE UP (ref 10.3–14.5)
SODIUM SERPL-SCNC: 129 MMOL/L — LOW (ref 135–145)
SODIUM SERPL-SCNC: 131 MMOL/L — LOW (ref 135–145)
WBC # BLD: 9.21 K/UL — SIGNIFICANT CHANGE UP (ref 3.8–10.5)
WBC # FLD AUTO: 9.21 K/UL — SIGNIFICANT CHANGE UP (ref 3.8–10.5)

## 2022-11-01 PROCEDURE — 99233 SBSQ HOSP IP/OBS HIGH 50: CPT

## 2022-11-01 RX ADMIN — Medication 1 TABLET(S): at 14:23

## 2022-11-01 RX ADMIN — HEPARIN SODIUM 5000 UNIT(S): 5000 INJECTION INTRAVENOUS; SUBCUTANEOUS at 06:27

## 2022-11-01 RX ADMIN — CARVEDILOL PHOSPHATE 6.25 MILLIGRAM(S): 80 CAPSULE, EXTENDED RELEASE ORAL at 06:27

## 2022-11-01 RX ADMIN — SACUBITRIL AND VALSARTAN 1 TABLET(S): 24; 26 TABLET, FILM COATED ORAL at 06:28

## 2022-11-01 RX ADMIN — ALBUTEROL 2 PUFF(S): 90 AEROSOL, METERED ORAL at 21:38

## 2022-11-01 RX ADMIN — CARVEDILOL PHOSPHATE 6.25 MILLIGRAM(S): 80 CAPSULE, EXTENDED RELEASE ORAL at 17:39

## 2022-11-01 RX ADMIN — ISOSORBIDE DINITRATE 10 MILLIGRAM(S): 5 TABLET ORAL at 06:31

## 2022-11-01 RX ADMIN — Medication 1 MILLIGRAM(S): at 12:39

## 2022-11-01 RX ADMIN — Medication 20 MILLIGRAM(S): at 06:27

## 2022-11-01 RX ADMIN — ATORVASTATIN CALCIUM 40 MILLIGRAM(S): 80 TABLET, FILM COATED ORAL at 21:38

## 2022-11-01 RX ADMIN — ISOSORBIDE DINITRATE 10 MILLIGRAM(S): 5 TABLET ORAL at 14:42

## 2022-11-01 RX ADMIN — HEPARIN SODIUM 5000 UNIT(S): 5000 INJECTION INTRAVENOUS; SUBCUTANEOUS at 17:41

## 2022-11-01 RX ADMIN — Medication 81 MILLIGRAM(S): at 12:39

## 2022-11-01 RX ADMIN — SPIRONOLACTONE 25 MILLIGRAM(S): 25 TABLET, FILM COATED ORAL at 08:18

## 2022-11-01 RX ADMIN — PANTOPRAZOLE SODIUM 40 MILLIGRAM(S): 20 TABLET, DELAYED RELEASE ORAL at 06:27

## 2022-11-01 RX ADMIN — Medication 25 MILLIGRAM(S): at 21:38

## 2022-11-01 RX ADMIN — SERTRALINE 50 MILLIGRAM(S): 25 TABLET, FILM COATED ORAL at 12:39

## 2022-11-01 RX ADMIN — ISOSORBIDE DINITRATE 10 MILLIGRAM(S): 5 TABLET ORAL at 21:37

## 2022-11-01 RX ADMIN — Medication 25 MILLIGRAM(S): at 06:27

## 2022-11-01 RX ADMIN — Medication 25 MILLIGRAM(S): at 14:42

## 2022-11-01 RX ADMIN — SACUBITRIL AND VALSARTAN 1 TABLET(S): 24; 26 TABLET, FILM COATED ORAL at 17:40

## 2022-11-01 NOTE — PROGRESS NOTE ADULT - PROBLEM SELECTOR PLAN 3
evaluated by pulm in CCU  c/w spiriva and albuterol   outpatient f/u with pulm for further evaluation and PFT.  f/u pulm recs.
evaluated by pulm in CCU  c/w spiriva and albuterol   outpatient f/u with pulm for further evaluation and PFT.  f/u pulm recs.
echo with global LVDF, EF 25%  clinically euvolemic at this time  c/w entresto - send for insurance coverage.   - entresto still with high cost to family, will d/w cardiology regarding possible alternative agents  c/w carvedilol, hydralazine, Isordil, spironolactone.   c/w ASA, statin.   f/u HF/Cards rec.  NST showed infarct, but no ischemia, no further workup per cards  lasix reduced to 20mg qd
echo with global LVDF, EF 25%  clinically euvolemic at this time  c/w entresto - send for insurance coverage.   - entresto still with high cost to family, will d/w cardiology regarding possible alternative agents  c/w carvedilol, hydralazine, Isordil, spironolactone.   c/w ASA, statin.   f/u HF/Cards rec.  NST showed infarct, but no ischemia, no further workup per cards  lasix reduced to 20mg qd
evaluated by pulm in CCU  c/w spiriva and albuterol   outpatient f/u with pulm for further evaluation and PFT.  f/u pulm recs.
evaluated by pulm in CCU  c/w spiriva and albuterol   outpatient f/u with pulm for further evaluation and PFT.  f/u pulm recs.
echo with global LVDF, EF 25%  clinically euvolemic at this time  c/w entresto - send for insurance coverage.  - entresto still with high cost to family, will d/w cardiology regarding possible alternative agents  c/w carvedilol, hydralazine, Isordil, spironolactone.   c/w ASA, statin.   f/u HF/Cards rec.  NST showed infarct, but no ischemia, no further workup per cards  lasix reduced to 20mg qd
echo with global LVDF, EF 25%  clinically euvolemic at this time  c/w entresto - send for insurance coverage.   - entresto still with high cost to family, will d/w cardiology regarding possible alternative agents  c/w carvedilol, hydralazine, Isordil, spironolactone.   c/w ASA, statin.   f/u HF/Cards rec.  NST showed infarct, but no ischemia, no further workup per cards  lasix reduced to 20mg qd
evaluated by pulm in CCU  c/w spiriva and albuterol   outpatient f/u with pulm for further evaluation and PFT.  f/u pulm recs.
echo with global LVDF, EF 25%  clinically euvolemic at this time  c/w entresto - send for insurance coverage.   - entresto still with high cost to family, will d/w cardiology regarding possible alternative agents  c/w carvedilol, hydralazine, Isordil, spironolactone.   c/w ASA, statin.   f/u HF/Cards rec.  NST showed infarct, but no ischemia, no further workup per cards  lasix reduced to 20mg qd
evaluated by pulm in CCU  c/w spiriva and albuterol   outpatient f/u with pulm for further evaluation and PFT.  f/u pulm recs.
echo with global LVDF, EF 25%  clinically euvolemic at this time  c/w entresto - send for insurance coverage.   - entresto still with high cost to family, will d/w cardiology regarding possible alternative agents  c/w carvedilol, hydralazine, Isordil, spironolactone.   c/w ASA, statin.   f/u HF/Cards rec.  NST showed infarct, but no ischemia, no further workup per cards  lasix reduced to 20mg qd

## 2022-11-01 NOTE — PROGRESS NOTE ADULT - PROBLEM SELECTOR PLAN 7
DVT: HSQ  Diet: Regular/DASH  Dispo: [facility]
DVT: HSQ  Dispo: [facility], awaiting for acceptances/insurance authorization      Discussed with ACP  D/w SW/CM
DVT: HSQ  Dispo: [facility], awaiting insurance authorization      Discussed with ACP  D/w SW/CM

## 2022-11-01 NOTE — PROGRESS NOTE ADULT - PROBLEM SELECTOR PROBLEM 3
Patient to room with family. C/o left shoulder pain, radiating through left arm, beginning approximately two hours ago while at wrestling practice. Patient states while wrestling an opponent, he felt his left shoulder \"crack\".
COPD with hypoxia
Acute systolic congestive heart failure
COPD with hypoxia
COPD with hypoxia
Acute systolic congestive heart failure
COPD with hypoxia
Acute systolic congestive heart failure
Acute systolic congestive heart failure
COPD with hypoxia
Acute systolic congestive heart failure
Acute systolic congestive heart failure
COPD with hypoxia
Acute systolic congestive heart failure
COPD with hypoxia
Acute systolic congestive heart failure
Acute systolic congestive heart failure

## 2022-11-01 NOTE — PROGRESS NOTE ADULT - PROBLEM SELECTOR PLAN 6
c/w Zoloft.
c/w Zoloft.
DVT: HSQ  Diet: Regular/DASH  Dispo: [facility]
c/w Zoloft.
DVT: HSQ  Diet: Regular/DASH  Dispo: [facility]
c/w Zoloft.
DVT: HSQ  Diet: Regular/DASH  Dispo: [facility]
c/w Zoloft.
DVT: HSQ  Diet: Regular/DASH  Dispo: [facility]
DVT: HSQ  Diet: Regular/DASH  Dispo: [facility]. PT.
c/w Zoloft.
DVT: HSQ  Diet: Regular/DASH  Dispo: [facility]. PT.
c/w Zoloft.

## 2022-11-01 NOTE — PROGRESS NOTE ADULT - PROBLEM SELECTOR PROBLEM 1
Encephalopathy, unspecified
Acute systolic congestive heart failure
Acute systolic congestive heart failure
Hyponatremia
Encephalopathy, unspecified
Acute systolic congestive heart failure
Hyponatremia
Hyponatremia
Encephalopathy, unspecified
Encephalopathy, unspecified
Hyponatremia
Encephalopathy, unspecified
Hyponatremia
Encephalopathy, unspecified
Hyponatremia
Hyponatremia
Encephalopathy, unspecified
Encephalopathy, unspecified
Hyponatremia
Encephalopathy, unspecified

## 2022-11-01 NOTE — PROGRESS NOTE ADULT - NUTRITIONAL ASSESSMENT
This patient has been assessed with a concern for Malnutrition and has been determined to have a diagnosis/diagnoses of Severe protein-calorie malnutrition and Underweight (BMI < 19).    This patient is being managed with:   Diet Regular-  Consistent Carbohydrate {Evening Snack} (CSTCHOSN)  DASH/TLC {Sodium & Cholesterol Restricted} (DASH)  1000mL Fluid Restriction ([id number])  No Caffeine  No Carbonated Beverages  Entered: Oct 13 2022 11:16AM    
This patient has been assessed with a concern for Malnutrition and has been determined to have a diagnosis/diagnoses of Severe protein-calorie malnutrition and Underweight (BMI < 19).    This patient is being managed with:   Diet Regular-  Consistent Carbohydrate {Evening Snack} (CSTCHOSN)  DASH/TLC {Sodium & Cholesterol Restricted} (DASH)  1000mL Fluid Restriction ([id number])  No Caffeine  No Carbonated Beverages  Entered: Oct 13 2022 11:16AM    
This patient has been assessed with a concern for Malnutrition and has been determined to have a diagnosis/diagnoses of Severe protein-calorie malnutrition and Underweight (BMI < 19).    This patient is being managed with:   Diet Minced and Moist-  DASH/TLC {Sodium & Cholesterol Restricted} (DASH)  1500mL Fluid Restriction ([id number])  Supplement Feeding Modality:  Oral  Ensure Compact Cans or Servings Per Day:  1       Frequency:  Three Times a day  Entered: Oct  8 2022  3:33PM    
This patient has been assessed with a concern for Malnutrition and has been determined to have a diagnosis/diagnoses of Severe protein-calorie malnutrition and Underweight (BMI < 19).    This patient is being managed with:   Diet Regular-  Consistent Carbohydrate {Evening Snack} (CSTCHOSN)  DASH/TLC {Sodium & Cholesterol Restricted} (DASH)  1500mL Fluid Restriction ([id number])  No Caffeine  No Carbonated Beverages  Entered: Oct 11 2022  2:24PM    
This patient has been assessed with a concern for Malnutrition and has been determined to have a diagnosis/diagnoses of Severe protein-calorie malnutrition and Underweight (BMI < 19).    This patient is being managed with:   Diet Minced and Moist-  DASH/TLC {Sodium & Cholesterol Restricted} (DASH)  1500mL Fluid Restriction ([id number])  Supplement Feeding Modality:  Oral  Ensure Compact Cans or Servings Per Day:  1       Frequency:  Three Times a day  Entered: Oct  8 2022  3:33PM    
This patient has been assessed with a concern for Malnutrition and has been determined to have a diagnosis/diagnoses of Severe protein-calorie malnutrition and Underweight (BMI < 19).    This patient is being managed with:   Diet Regular-  Consistent Carbohydrate {Evening Snack} (CSTCHOSN)  DASH/TLC {Sodium & Cholesterol Restricted} (DASH)  1000mL Fluid Restriction ([id number])  No Caffeine  No Carbonated Beverages  No Chocolate  Entered: Oct 13 2022  2:33PM    
This patient has been assessed with a concern for Malnutrition and has been determined to have a diagnosis/diagnoses of Severe protein-calorie malnutrition and Underweight (BMI < 19).    This patient is being managed with:   Diet Minced and Moist-  DASH/TLC {Sodium & Cholesterol Restricted} (DASH)  1500mL Fluid Restriction ([id number])  Supplement Feeding Modality:  Oral  Ensure Compact Cans or Servings Per Day:  1       Frequency:  Three Times a day  Entered: Oct  8 2022  3:33PM    
This patient has been assessed with a concern for Malnutrition and has been determined to have a diagnosis/diagnoses of Severe protein-calorie malnutrition and Underweight (BMI < 19).    This patient is being managed with:   Diet NPO after Midnight-     NPO Start Date: 13-Oct-2022   NPO Start Time: 23:59  Except Medications  Entered: Oct 13 2022  2:53PM    Diet Regular-  Consistent Carbohydrate {Evening Snack} (CSTCHOSN)  DASH/TLC {Sodium & Cholesterol Restricted} (DASH)  1000mL Fluid Restriction ([id number])  No Caffeine  No Carbonated Beverages  No Chocolate  Entered: Oct 13 2022  2:33PM    
This patient has been assessed with a concern for Malnutrition and has been determined to have a diagnosis/diagnoses of Severe protein-calorie malnutrition and Underweight (BMI < 19).    This patient is being managed with:   Diet Regular-  Consistent Carbohydrate {Evening Snack} (CSTCHOSN)  DASH/TLC {Sodium & Cholesterol Restricted} (DASH)  1000mL Fluid Restriction ([id number])  No Caffeine  No Carbonated Beverages  No Chocolate  Supplement Feeding Modality:  Oral  Ensure Compact Cans or Servings Per Day:  1       Frequency:  Two Times a day  Entered: Oct 14 2022  6:23PM    
This patient has been assessed with a concern for Malnutrition and has been determined to have a diagnosis/diagnoses of Severe protein-calorie malnutrition and Underweight (BMI < 19).    This patient is being managed with:   Diet Regular-  Consistent Carbohydrate {Evening Snack} (CSTCHOSN)  DASH/TLC {Sodium & Cholesterol Restricted} (DASH)  1000mL Fluid Restriction ([id number])  No Caffeine  No Carbonated Beverages  No Chocolate  Supplement Feeding Modality:  Oral  Ensure Compact Cans or Servings Per Day:  1       Frequency:  Two Times a day  Entered: Oct 14 2022  6:23PM    
This patient has been assessed with a concern for Malnutrition and has been determined to have a diagnosis/diagnoses of Severe protein-calorie malnutrition and Underweight (BMI < 19).    This patient is being managed with:   Diet Regular-  Consistent Carbohydrate {Evening Snack} (CSTCHOSN)  DASH/TLC {Sodium & Cholesterol Restricted} (DASH)  1000mL Fluid Restriction ([id number])  No Caffeine  No Carbonated Beverages  No Chocolate  Supplement Feeding Modality:  Oral  Ensure Compact Cans or Servings Per Day:  1       Frequency:  Two Times a day  Entered: Oct 14 2022  6:23PM    
This patient has been assessed with a concern for Malnutrition and has been determined to have a diagnosis/diagnoses of Severe protein-calorie malnutrition and Underweight (BMI < 19).    This patient is being managed with:   Diet Regular-  Consistent Carbohydrate {Evening Snack} (CSTCHOSN)  DASH/TLC {Sodium & Cholesterol Restricted} (DASH)  1000mL Fluid Restriction ([id number])  No Caffeine  No Carbonated Beverages  No Chocolate  Supplement Feeding Modality:  Oral  Ensure Compact Cans or Servings Per Day:  1       Frequency:  Two Times a day  Entered: Oct 14 2022  6:23PM    
This patient has been assessed with a concern for Malnutrition and has been determined to have a diagnosis/diagnoses of Severe protein-calorie malnutrition and Underweight (BMI < 19).    This patient is being managed with:   Diet Regular-  Consistent Carbohydrate {Evening Snack} (CSTCHOSN)  DASH/TLC {Sodium & Cholesterol Restricted} (DASH)  1000mL Fluid Restriction ([id number])  No Caffeine  No Carbonated Beverages  No Chocolate  Supplement Feeding Modality:  Oral  Ensure Compact Cans or Servings Per Day:  1       Frequency:  Two Times a day  Entered: Oct 14 2022  6:23PM    
This patient has been assessed with a concern for Malnutrition and has been determined to have a diagnosis/diagnoses of Severe protein-calorie malnutrition and Underweight (BMI < 19).    This patient is being managed with:   Diet Regular-  Consistent Carbohydrate {Evening Snack} (CSTCHOSN)  DASH/TLC {Sodium & Cholesterol Restricted} (DASH)  1000mL Fluid Restriction ([id number])  No Caffeine  No Carbonated Beverages  No Chocolate  Supplement Feeding Modality:  Oral  Ensure Compact Cans or Servings Per Day:  1       Frequency:  Two Times a day  Entered: Oct 14 2022  6:23PM    
This patient has been assessed with a concern for Malnutrition and has been determined to have a diagnosis/diagnoses of Severe protein-calorie malnutrition and Underweight (BMI < 19).    This patient is being managed with:   Diet Regular-  Consistent Carbohydrate {Evening Snack} (CSTCHOSN)  DASH/TLC {Sodium & Cholesterol Restricted} (DASH)  1000mL Fluid Restriction ([id number])  No Caffeine  No Carbonated Beverages  No Chocolate  Supplement Feeding Modality:  Oral  Ensure Compact Cans or Servings Per Day:  1       Frequency:  Two Times a day  Entered: Oct 14 2022  6:23PM    
This patient has been assessed with a concern for Malnutrition and has been determined to have a diagnosis/diagnoses of Severe protein-calorie malnutrition and Underweight (BMI < 19).    This patient is being managed with:   Diet Regular-  Consistent Carbohydrate {Evening Snack} (CSTCHOSN)  DASH/TLC {Sodium & Cholesterol Restricted} (DASH)  1000mL Fluid Restriction ([id number])  No Caffeine  No Carbonated Beverages  No Chocolate  Supplement Feeding Modality:  Oral  Ensure Compact Cans or Servings Per Day:  1       Frequency:  Two Times a day  Entered: Oct 14 2022  6:23PM    
This patient has been assessed with a concern for Malnutrition and has been determined to have a diagnosis/diagnoses of Severe protein-calorie malnutrition and Underweight (BMI < 19).    This patient is being managed with:   Diet Regular-  Consistent Carbohydrate {Evening Snack} (CSTCHOSN)  DASH/TLC {Sodium & Cholesterol Restricted} (DASH)  1000mL Fluid Restriction ([id number])  No Caffeine  No Carbonated Beverages  No Chocolate  Supplement Feeding Modality:  Oral  Ensure Compact Cans or Servings Per Day:  1       Frequency:  Two Times a day  Entered: Oct 14 2022  6:23PM    
This patient has been assessed with a concern for Malnutrition and has been determined to have a diagnosis/diagnoses of Severe protein-calorie malnutrition and Underweight (BMI < 19).    This patient is being managed with:   Diet Regular-  Consistent Carbohydrate {Evening Snack} (CSTCHOSN)  DASH/TLC {Sodium & Cholesterol Restricted} (DASH)  1000mL Fluid Restriction ([id number])  No Caffeine  No Carbonated Beverages  No Chocolate  Supplement Feeding Modality:  Oral  Ensure Compact Cans or Servings Per Day:  1       Frequency:  Two Times a day  Entered: Oct 14 2022  6:23PM    
This patient has been assessed with a concern for Malnutrition and has been determined to have a diagnosis/diagnoses of Severe protein-calorie malnutrition and Underweight (BMI < 19).    This patient is being managed with:   Diet Regular-  Consistent Carbohydrate {Evening Snack} (CSTCHOSN)  DASH/TLC {Sodium & Cholesterol Restricted} (DASH)  1000mL Fluid Restriction ([id number])  No Caffeine  No Carbonated Beverages  No Chocolate  Supplement Feeding Modality:  Oral  Ensure Compact Cans or Servings Per Day:  1       Frequency:  Two Times a day  Entered: Oct 14 2022  6:23PM    
This patient has been assessed with a concern for Malnutrition and has been determined to have a diagnosis/diagnoses of Severe protein-calorie malnutrition and Underweight (BMI < 19).    This patient is being managed with:   Diet Regular-  Consistent Carbohydrate {Evening Snack} (CSTCHOSN)  DASH/TLC {Sodium & Cholesterol Restricted} (DASH)  1000mL Fluid Restriction ([id number])  No Caffeine  No Carbonated Beverages  No Chocolate  Supplement Feeding Modality:  Oral  Ensure Compact Cans or Servings Per Day:  1       Frequency:  Two Times a day  Entered: Oct 14 2022  6:23PM    
This patient has been assessed with a concern for Malnutrition and has been determined to have a diagnosis/diagnoses of Severe protein-calorie malnutrition and Underweight (BMI < 19).    This patient is being managed with:   Diet Regular-  Consistent Carbohydrate {Evening Snack} (CSTCHOSN)  DASH/TLC {Sodium & Cholesterol Restricted} (DASH)  1000mL Fluid Restriction ([id number])  No Caffeine  No Carbonated Beverages  No Chocolate  Supplement Feeding Modality:  Oral  Ensure Compact Cans or Servings Per Day:  1       Frequency:  Two Times a day  Entered: Oct 14 2022  6:23PM    
This patient has been assessed with a concern for Malnutrition and has been determined to have a diagnosis/diagnoses of Severe protein-calorie malnutrition and Underweight (BMI < 19).    This patient is being managed with:   Diet Regular-  Consistent Carbohydrate {Evening Snack} (CSTCHOSN)  DASH/TLC {Sodium & Cholesterol Restricted} (DASH)  1000mL Fluid Restriction ([id number])  No Caffeine  No Carbonated Beverages  No Chocolate  Supplement Feeding Modality:  Oral  Ensure Compact Cans or Servings Per Day:  1       Frequency:  Two Times a day  Entered: Oct 14 2022  6:23PM    
This patient has been assessed with a concern for Malnutrition and has been determined to have a diagnosis/diagnoses of Severe protein-calorie malnutrition and Underweight (BMI < 19).    This patient is being managed with:   Diet Regular-  Consistent Carbohydrate {Evening Snack} (CSTCHOSN)  DASH/TLC {Sodium & Cholesterol Restricted} (DASH)  1000mL Fluid Restriction ([id number])  No Caffeine  No Carbonated Beverages  No Chocolate  Supplement Feeding Modality:  Oral  Ensure Compact Cans or Servings Per Day:  1       Frequency:  Two Times a day  Entered: Oct 14 2022  6:23PM    
This patient has been assessed with a concern for Malnutrition and has been determined to have a diagnosis/diagnoses of Severe protein-calorie malnutrition and Underweight (BMI < 19).    This patient is being managed with:   Diet Regular-  Consistent Carbohydrate {Evening Snack} (CSTCHOSN)  DASH/TLC {Sodium & Cholesterol Restricted} (DASH)  1000mL Fluid Restriction ([id number])  No Caffeine  No Carbonated Beverages  No Chocolate  Supplement Feeding Modality:  Oral  Ensure Compact Cans or Servings Per Day:  1       Frequency:  Two Times a day  Entered: Oct 14 2022  6:23PM    
This patient has been assessed with a concern for Malnutrition and has been determined to have a diagnosis/diagnoses of Severe protein-calorie malnutrition and Underweight (BMI < 19).    This patient is being managed with:   Diet Regular-  Consistent Carbohydrate {Evening Snack} (CSTCHOSN)  DASH/TLC {Sodium & Cholesterol Restricted} (DASH)  1000mL Fluid Restriction ([id number])  No Caffeine  No Carbonated Beverages  No Chocolate  Supplement Feeding Modality:  Oral  Ensure Compact Cans or Servings Per Day:  1       Frequency:  Two Times a day  Entered: Oct 14 2022  6:23PM    
This patient has been assessed with a concern for Malnutrition and has been determined to have a diagnosis/diagnoses of Severe protein-calorie malnutrition and Underweight (BMI < 19).    This patient is being managed with:   Diet Regular-  Consistent Carbohydrate {Evening Snack} (CSTCHOSN)  DASH/TLC {Sodium & Cholesterol Restricted} (DASH)  1000mL Fluid Restriction ([id number])  No Caffeine  No Carbonated Beverages  No Chocolate  Supplement Feeding Modality:  Oral  Ensure Compact Cans or Servings Per Day:  1       Frequency:  Two Times a day  Entered: Oct 14 2022  6:23PM

## 2022-11-01 NOTE — PROGRESS NOTE ADULT - PROBLEM SELECTOR PROBLEM 7
Prophylactic measure
Airway patent, Nasal mucosa clear. Mouth with normal mucosa. Throat has no vesicles, no oropharyngeal exudates and uvula is midline.
Prophylactic measure
Prophylactic measure

## 2022-11-01 NOTE — PROGRESS NOTE ADULT - PROBLEM SELECTOR PROBLEM 2
Acute systolic congestive heart failure
Hyponatremia
Acute systolic congestive heart failure
Hyponatremia
Hyponatremia
Acute systolic congestive heart failure
Hyponatremia
Hyponatremia
Acute systolic congestive heart failure
Acute systolic congestive heart failure
Hyponatremia
Acute systolic congestive heart failure
Hyponatremia
Acute systolic congestive heart failure
Hyponatremia
Hyponatremia

## 2022-11-01 NOTE — PROGRESS NOTE ADULT - SUBJECTIVE AND OBJECTIVE BOX
Patient is a 72y old  Male who presents with a chief complaint of SOB (31 Oct 2022 19:30)      INTERVAL HPI/OVERNIGHT EVENTS:  Seen by me this afternoon, having lunch at time of visit, no complaints, wants to go to [facility].    Review of Systems: 12 point review of systems otherwise negative    MEDICATIONS  (STANDING):  ALBUTerol    90 MICROgram(s) HFA Inhaler 2 Puff(s) Inhalation every 6 hours  aspirin enteric coated 81 milliGRAM(s) Oral daily  atorvastatin 40 milliGRAM(s) Oral at bedtime  carvedilol 6.25 milliGRAM(s) Oral every 12 hours  folic acid 1 milliGRAM(s) Oral daily  furosemide    Tablet 20 milliGRAM(s) Oral daily  heparin   Injectable 5000 Unit(s) SubCutaneous every 12 hours  hydrALAZINE 25 milliGRAM(s) Oral three times a day  influenza  Vaccine (HIGH DOSE) 0.7 milliLiter(s) IntraMuscular once  isosorbide   dinitrate Tablet (ISORDIL) 10 milliGRAM(s) Oral three times a day  multivitamin 1 Tablet(s) Oral daily  pantoprazole    Tablet 40 milliGRAM(s) Oral before breakfast  sacubitril 97 mG/valsartan 103 mG 1 Tablet(s) Oral two times a day  sertraline 50 milliGRAM(s) Oral daily  spironolactone 25 milliGRAM(s) Oral daily  tiotropium 18 MICROgram(s) Capsule 1 Capsule(s) Inhalation daily    MEDICATIONS  (PRN):  guaiFENesin Oral Liquid (Sugar-Free) 100 milliGRAM(s) Oral every 6 hours PRN Cough  melatonin 3 milliGRAM(s) Oral at bedtime PRN Insomnia      Allergies    No Known Allergies    Intolerances          Vital Signs Last 24 Hrs  T(C): 37.1 (01 Nov 2022 17:12), Max: 37.1 (01 Nov 2022 09:20)  T(F): 98.7 (01 Nov 2022 17:12), Max: 98.8 (01 Nov 2022 09:20)  HR: 98 (01 Nov 2022 17:12) (83 - 98)  BP: 150/78 (01 Nov 2022 17:12) (114/76 - 160/92)  BP(mean): --  RR: 17 (01 Nov 2022 17:12) (17 - 17)  SpO2: 100% (01 Nov 2022 17:12) (99% - 100%)    Parameters below as of 01 Nov 2022 17:12  Patient On (Oxygen Delivery Method): room air      CAPILLARY BLOOD GLUCOSE          11-01 @ 07:01  -  11-01 @ 21:07  --------------------------------------------------------  IN: 0 mL / OUT: 700 mL / NET: -700 mL        Physical Exam:    Daily     Daily   General:  Well appearing, NAD, cachetic  HEENT:  Nonicteric, PERRLA  CV:  RRR, no murmur, no JVD  Lungs:  CTA B/L, no wheezes, rales, rhonchi  Abdomen:  Soft, non-tender, no distended, positive BS  Extremities:  2+ pulses, no c/c, no edema  Skin:  Warm and dry, no rashes  :  No díaz  Neuro:  AAOx3, non-focal, CN II-XII grossly intact  No Restraints    LABS:                        10.4   9.21  )-----------( 267      ( 01 Nov 2022 11:24 )             31.5     11-01    129<L>  |  92<L>  |  27<H>  ----------------------------<  117<H>  4.3   |  27  |  0.91    Ca    9.8      01 Nov 2022 11:24  Phos  3.7     11-01  Mg     1.30     11-01              RADIOLOGY & ADDITIONAL TESTS:  Reviewed by me

## 2022-11-01 NOTE — PROGRESS NOTE ADULT - PROBLEM SELECTOR PROBLEM 6
Major depression, chronic
Prophylactic measure
Major depression, chronic
Prophylactic measure
Prophylactic measure
Major depression, chronic
Prophylactic measure
Prophylactic measure
Major depression, chronic
Prophylactic measure

## 2022-11-01 NOTE — PROGRESS NOTE ADULT - PROVIDER SPECIALTY LIST ADULT
CCU
CCU
Heart Failure
Hospitalist
Hospitalist
CCU
Cardiology
Heart Failure
Heart Failure
Pulmonology
Pulmonology
CCU
Nephrology
Hospitalist

## 2022-11-02 ENCOUNTER — TRANSCRIPTION ENCOUNTER (OUTPATIENT)
Age: 72
End: 2022-11-02

## 2022-11-02 ENCOUNTER — APPOINTMENT (OUTPATIENT)
Dept: CARDIOLOGY | Facility: CLINIC | Age: 72
End: 2022-11-02

## 2022-11-02 VITALS
OXYGEN SATURATION: 100 % | DIASTOLIC BLOOD PRESSURE: 86 MMHG | HEART RATE: 81 BPM | TEMPERATURE: 98 F | RESPIRATION RATE: 18 BRPM | SYSTOLIC BLOOD PRESSURE: 153 MMHG

## 2022-11-02 PROCEDURE — 99239 HOSP IP/OBS DSCHRG MGMT >30: CPT

## 2022-11-02 RX ORDER — ATORVASTATIN CALCIUM 80 MG/1
1 TABLET, FILM COATED ORAL
Qty: 0 | Refills: 0 | DISCHARGE

## 2022-11-02 RX ORDER — ASPIRIN/CALCIUM CARB/MAGNESIUM 324 MG
1 TABLET ORAL
Qty: 0 | Refills: 0 | DISCHARGE
Start: 2022-11-02

## 2022-11-02 RX ORDER — ALBUTEROL 90 UG/1
2 AEROSOL, METERED ORAL
Qty: 0 | Refills: 0 | DISCHARGE
Start: 2022-11-02

## 2022-11-02 RX ORDER — CARVEDILOL PHOSPHATE 80 MG/1
1 CAPSULE, EXTENDED RELEASE ORAL
Qty: 0 | Refills: 0 | DISCHARGE
Start: 2022-11-02

## 2022-11-02 RX ORDER — SPIRONOLACTONE 25 MG/1
1 TABLET, FILM COATED ORAL
Qty: 0 | Refills: 0 | DISCHARGE
Start: 2022-11-02

## 2022-11-02 RX ORDER — TIOTROPIUM BROMIDE 18 UG/1
1 CAPSULE ORAL; RESPIRATORY (INHALATION)
Qty: 0 | Refills: 0 | DISCHARGE
Start: 2022-11-02

## 2022-11-02 RX ORDER — FOLIC ACID 0.8 MG
1 TABLET ORAL
Qty: 0 | Refills: 0 | DISCHARGE
Start: 2022-11-02

## 2022-11-02 RX ORDER — SERTRALINE 25 MG/1
1 TABLET, FILM COATED ORAL
Qty: 0 | Refills: 0 | DISCHARGE

## 2022-11-02 RX ORDER — ISOSORBIDE DINITRATE 5 MG/1
1 TABLET ORAL
Qty: 0 | Refills: 0 | DISCHARGE
Start: 2022-11-02

## 2022-11-02 RX ORDER — HYDRALAZINE HCL 50 MG
1 TABLET ORAL
Qty: 0 | Refills: 0 | DISCHARGE
Start: 2022-11-02

## 2022-11-02 RX ORDER — FUROSEMIDE 40 MG
1 TABLET ORAL
Qty: 0 | Refills: 0 | DISCHARGE
Start: 2022-11-02

## 2022-11-02 RX ORDER — SERTRALINE 25 MG/1
1 TABLET, FILM COATED ORAL
Qty: 0 | Refills: 0 | DISCHARGE
Start: 2022-11-02

## 2022-11-02 RX ORDER — PANTOPRAZOLE SODIUM 20 MG/1
1 TABLET, DELAYED RELEASE ORAL
Qty: 0 | Refills: 0 | DISCHARGE
Start: 2022-11-02

## 2022-11-02 RX ORDER — ATORVASTATIN CALCIUM 80 MG/1
1 TABLET, FILM COATED ORAL
Qty: 0 | Refills: 0 | DISCHARGE
Start: 2022-11-02

## 2022-11-02 RX ADMIN — Medication 1 MILLIGRAM(S): at 11:49

## 2022-11-02 RX ADMIN — SERTRALINE 50 MILLIGRAM(S): 25 TABLET, FILM COATED ORAL at 11:50

## 2022-11-02 RX ADMIN — ISOSORBIDE DINITRATE 10 MILLIGRAM(S): 5 TABLET ORAL at 05:17

## 2022-11-02 RX ADMIN — Medication 81 MILLIGRAM(S): at 11:48

## 2022-11-02 RX ADMIN — HEPARIN SODIUM 5000 UNIT(S): 5000 INJECTION INTRAVENOUS; SUBCUTANEOUS at 17:10

## 2022-11-02 RX ADMIN — SACUBITRIL AND VALSARTAN 1 TABLET(S): 24; 26 TABLET, FILM COATED ORAL at 05:19

## 2022-11-02 RX ADMIN — SACUBITRIL AND VALSARTAN 1 TABLET(S): 24; 26 TABLET, FILM COATED ORAL at 17:05

## 2022-11-02 RX ADMIN — Medication 20 MILLIGRAM(S): at 05:18

## 2022-11-02 RX ADMIN — SPIRONOLACTONE 25 MILLIGRAM(S): 25 TABLET, FILM COATED ORAL at 11:49

## 2022-11-02 RX ADMIN — CARVEDILOL PHOSPHATE 6.25 MILLIGRAM(S): 80 CAPSULE, EXTENDED RELEASE ORAL at 05:17

## 2022-11-02 RX ADMIN — Medication 1 TABLET(S): at 11:48

## 2022-11-02 RX ADMIN — CARVEDILOL PHOSPHATE 6.25 MILLIGRAM(S): 80 CAPSULE, EXTENDED RELEASE ORAL at 17:07

## 2022-11-02 RX ADMIN — Medication 25 MILLIGRAM(S): at 15:17

## 2022-11-02 RX ADMIN — PANTOPRAZOLE SODIUM 40 MILLIGRAM(S): 20 TABLET, DELAYED RELEASE ORAL at 05:18

## 2022-11-02 RX ADMIN — ISOSORBIDE DINITRATE 10 MILLIGRAM(S): 5 TABLET ORAL at 15:18

## 2022-11-02 RX ADMIN — HEPARIN SODIUM 5000 UNIT(S): 5000 INJECTION INTRAVENOUS; SUBCUTANEOUS at 05:19

## 2022-11-02 RX ADMIN — Medication 25 MILLIGRAM(S): at 05:18

## 2022-11-02 NOTE — DISCHARGE NOTE NURSING/CASE MANAGEMENT/SOCIAL WORK - NSPROEXTENSIONSOFSELF_GEN_A_NUR
HOSPITALISTS PROGRESS NOTE    7/25/2021 9:19 AM        Name: Selin Luther . Admitted: 7/24/2021  Primary Care Provider: Saint Provost, MD (Tel: [phone])      CC:     Brief Course: 80 y.o. male  with PMHx of hypertension, HFrEF(EF 15 to 20%), PAD, CKD, seizures, childhood TB, dementia and BLE venous ulceration was brought into the ED by EMS after they were called to his residence for a lift assist.  EMS found patient to be febrile and his legs look bad and right foot seemed gangrenous. Of note, patient was recently admitted at [facility] from 7/1/2021--7/7/2021 for lower extremity providentia wound infection and Enterobacter bacteremia and was discharged on levofloxacin. On admission, patient was tachycardic, febrile with temp max of 101.5. Initial diagnostic labs were unremarkable except elevated BUN, albumin 2.8, hemoglobin 10.8 (13.8 on 7/7/21). Right foot x-ray showed gangrene of first digit and lateral foot ulcer with no evidence of osteomyelitis. Patient was given IV fluids and Vanco and Zosyn in the ED. Interval history:   Pt seen and examined today   Overnight events noted and interval ancillary notes  Pt denied any fevers, chills, chest pain, palpitations, cough, SOB, . Assessment & Plan:     RLE cellulitis and right hallux gangrene  Right foot x-ray showed gangrene of first digit and lateral foot ulcer with no evidence of osteomyelitis. MRI of the right foot showed generalized subcutaneous edema compatible with cellulitis, no definitive findings of acute osteomyelitis or drainable abscess, suspected cystic versus erosive-like changes in fifth metatarsal head likely chronic in nature.   Podiatry and infectious disease consulted  Might need right hallux debridement/amputation  Vascular surgeon on board: Plan for peripheral angiography and possible intervention to assist with wound healing prior to probable amputation  Wound wound care consulted  Continue Zyvox and Zosyn  Blood cultures in process     Sepsis secondary to above  Tachycardic and febrile on admission  Treatment as above     HFrEF: Last echo on 6/8/2021 showed EF of 15%, severe hypokinesis and grade 2 diastolic dysfunction  Cardiology on board: Appreciate their recs  Continue carvedilol; holding Lasix as patient blood pressures are soft  Not on any ACE prior to admission  Salt and fluid restriction  Strict intake output and daily weights     Hx of paroxysmal A. Fib  Continue Coreg; started on digoxin per cardiology recs  Holding Xarelto for possible amputation  Monitor on telemetry     Acute blood loss anemia; hemoglobin 13.8 on 7/7/2021  Hemoglobin 10.8 on admission likely secondary to bleeding from foot ulcer  Monitor hemoglobin closely     Hx of chronic thrombocytopenia; improved platelet count on admission     Hypothyroidism, continue Synthroid     Hx of seizure; continue Keppra        DVT prophylaxis: Lovenox  Code status: DNR CCA       DVT PPX:   Code:DNR-CCA  Diet: ADULT DIET; Regular;  Low Sodium (2 gm)  Diet NPO    Disposition:     Current Medications  sodium chloride flush 0.9 % injection 5-40 mL, 2 times per day  sodium chloride flush 0.9 % injection 5-40 mL, PRN  0.9 % sodium chloride infusion, PRN  acetaminophen (TYLENOL) tablet 650 mg, Q6H PRN   Or  acetaminophen (TYLENOL) suppository 650 mg, Q6H PRN  piperacillin-tazobactam (ZOSYN) 3,375 mg in dextrose 5 % 50 mL IVPB (mini-bag), Q6H  mupirocin (BACTROBAN) 2 % ointment, Daily  carvedilol (COREG) tablet 3.125 mg, BID WC  levETIRAcetam (KEPPRA) tablet 500 mg, BID  linezolid (ZYVOX) tablet 600 mg, 2 times per day  aspirin EC tablet 81 mg, Daily  enoxaparin (LOVENOX) injection 90 mg, BID        Objective:  BP 99/67   Pulse 98   Temp 97.7 °F (36.5 °C) (Oral)   Resp 18   Ht 6' 4\" (1.93 m)   Wt 215 lb 11.2 oz (97.8 kg)   SpO2 97%   BMI 26.26 kg/m²     Intake/Output Summary (Last 24 hours) at 7/25/2021 0919  Last data filed at 7/25/2021 0355  Gross per 24 hour   Intake 1681.94 ml   Output 350 ml   Net 1331.94 ml      Wt Readings from Last 3 Encounters:   07/25/21 215 lb 11.2 oz (97.8 kg)   07/07/21 206 lb 7 oz (93.6 kg)   06/23/21 206 lb (93.4 kg)       Physical Examination:   General appearance: No apparent distress appears stated age and cooperative. HEENT Normal cephalic, atraumatic without obvious deformity. Pupils equal, round, and reactive to light. Extra ocular muscles intact. Conjunctivae/corneas clear. Lungs: Clear to auscultation, bilaterally without Rales/Wheezes/Rhonchi with good respiratory effort. Heart: Regular rate and rhythm with Normal S1/S2 without murmurs, rubs or gallops, point of maximum impulse non-displaced  Abdomen: Soft, non-tender or non-distended without rigidity or guarding and positive bowel sounds all four quadrants. Extremities:  Normal ROM BUE; ROM of BLE could not be obtained  Skin : BLE erythematous with scattered open ulceration, dressing in place on right foot, gangrenous right toes with open ulcers  Neurologic: Alert and oriented x2. Neurovascularly intact with sensory/motor intact upper extremities/lower extremities, bilaterally. Cranial nerves: II-XII intact, grossly non-focal.    Labs and Tests:  CBC:   Recent Labs     07/24/21  0719 07/25/21  0443   WBC 7.1 7.1   HGB 10.8* 10.8*    170     BMP:    Recent Labs     07/24/21  0719 07/25/21  0443    139   K 4.4 4.2    103   CO2 28 26   BUN 32* 32*   CREATININE 1.0 1.0   GLUCOSE 111* 112*     Hepatic:   Recent Labs     07/24/21  0719   AST 21   ALT 11   BILITOT 1.0   ALKPHOS 114     MRI FOOT RIGHT WO CONTRAST   Final Result   Evaluation is limited as the patient could not complete the whole exam.      Within this limitation, there are no definitive findings of acute   osteomyelitis or a drainable abscess. Generalized subcutaneous edema compatible with cellulitis.       Suspected cystic versus erosive-like change in the 5th metatarsal head which   is favored to be chronic in nature. XR CHEST PORTABLE   Final Result   Cardiomegaly with no evidence of edema         XR ANKLE RIGHT (2 VIEWS)   Final Result   1. Negative ankle   2. Gangrene of the 1st digit and lateral foot ulcer, with no findings of   osteomyelitis         XR FOOT RIGHT (2 VIEWS)   Final Result   1. Negative ankle   2. Gangrene of the 1st digit and lateral foot ulcer, with no findings of   osteomyelitis             Problem List  Active Problems:    Sepsis ([address]  Resolved Problems:    * No resolved hospital problems.  Luis Maria MD   7/25/2021 9:19 AM none

## 2022-11-02 NOTE — DISCHARGE NOTE NURSING/CASE MANAGEMENT/SOCIAL WORK - NSDCPEFALRISK_GEN_ALL_CORE
For information on Fall & Injury Prevention, visit: [email or web address] OR  [email or web address] OR  https://www.cdc.gov/steadi/patient.html

## 2022-11-02 NOTE — DISCHARGE NOTE NURSING/CASE MANAGEMENT/SOCIAL WORK - PATIENT PORTAL LINK FT
You can access the FollowMyHealth Patient Portal offered by [facility] by registering at the following website: [email or web address]. By joining [facility]’s FollowMyHealth portal, you will also be able to view your health information using other applications (apps) compatible with our system.

## 2022-11-02 NOTE — DISCHARGE NOTE NURSING/CASE MANAGEMENT/SOCIAL WORK - NSDCFUADDAPPT_GEN_ALL_CORE_FT
Please follow up with your PCP, cardiologist, and pulmonologist within 1-2 weeks of leaving the hospital.     Follow up with pulmonology at [address] [place] [address] on 11/7.    Follow up with cardiology, Dr. Mendoza, on 11/2 at 2pm.

## 2022-11-02 NOTE — DISCHARGE NOTE NURSING/CASE MANAGEMENT/SOCIAL WORK - NSDCVIVACCINE_GEN_ALL_CORE_FT
Tdap; 22-Jan-2020 23:12; Stefani Dobson (SHAHAB); [employer]; [id number] (Exp. Date: 06-Jul-2021); IntraMuscular; Deltoid Left.; 0.5 milliLiter(s); VIS (VIS Published: 09-May-2013, VIS Presented: 22-Jan-2020);

## 2022-11-07 ENCOUNTER — APPOINTMENT (OUTPATIENT)
Dept: PULMONOLOGY | Facility: CLINIC | Age: 72
End: 2022-11-07

## 2023-01-03 ENCOUNTER — RX RENEWAL (OUTPATIENT)
Age: 73
End: 2023-01-03

## 2023-02-20 ENCOUNTER — INPATIENT (INPATIENT)
Facility: HOSPITAL | Age: 73
LOS: 2 days | Discharge: HOME | End: 2023-02-23
Attending: STUDENT IN AN ORGANIZED HEALTH CARE EDUCATION/TRAINING PROGRAM | Admitting: STUDENT IN AN ORGANIZED HEALTH CARE EDUCATION/TRAINING PROGRAM
Payer: MEDICARE

## 2023-02-20 VITALS
SYSTOLIC BLOOD PRESSURE: 187 MMHG | DIASTOLIC BLOOD PRESSURE: 109 MMHG | TEMPERATURE: 98 F | RESPIRATION RATE: 20 BRPM | OXYGEN SATURATION: 100 % | HEART RATE: 123 BPM

## 2023-02-20 DIAGNOSIS — Z29.9 ENCOUNTER FOR PROPHYLACTIC MEASURES, UNSPECIFIED: ICD-10-CM

## 2023-02-20 DIAGNOSIS — E87.1 HYPO-OSMOLALITY AND HYPONATREMIA: ICD-10-CM

## 2023-02-20 DIAGNOSIS — R06.02 SHORTNESS OF BREATH: ICD-10-CM

## 2023-02-20 DIAGNOSIS — Z98.89 OTHER SPECIFIED POSTPROCEDURAL STATES: Chronic | ICD-10-CM

## 2023-02-20 DIAGNOSIS — F10.239 ALCOHOL DEPENDENCE WITH WITHDRAWAL, UNSPECIFIED: ICD-10-CM

## 2023-02-20 DIAGNOSIS — I10 ESSENTIAL (PRIMARY) HYPERTENSION: ICD-10-CM

## 2023-02-20 DIAGNOSIS — F32.9 MAJOR DEPRESSIVE DISORDER, SINGLE EPISODE, UNSPECIFIED: ICD-10-CM

## 2023-02-20 DIAGNOSIS — I25.10 ATHEROSCLEROTIC HEART DISEASE OF NATIVE CORONARY ARTERY WITHOUT ANGINA PECTORIS: ICD-10-CM

## 2023-02-20 DIAGNOSIS — I50.9 HEART FAILURE, UNSPECIFIED: ICD-10-CM

## 2023-02-20 LAB
ALBUMIN SERPL ELPH-MCNC: 5.3 G/DL — HIGH (ref 3.3–5)
ALP SERPL-CCNC: 94 U/L — SIGNIFICANT CHANGE UP (ref 40–120)
ALT FLD-CCNC: 18 U/L — SIGNIFICANT CHANGE UP (ref 4–41)
ANION GAP SERPL CALC-SCNC: 20 MMOL/L — HIGH (ref 7–14)
AST SERPL-CCNC: 33 U/L — SIGNIFICANT CHANGE UP (ref 4–40)
BASOPHILS # BLD AUTO: 0.04 K/UL — SIGNIFICANT CHANGE UP (ref 0–0.2)
BASOPHILS NFR BLD AUTO: 0.4 % — SIGNIFICANT CHANGE UP (ref 0–2)
BILIRUB SERPL-MCNC: 0.6 MG/DL — SIGNIFICANT CHANGE UP (ref 0.2–1.2)
BUN SERPL-MCNC: 15 MG/DL — SIGNIFICANT CHANGE UP (ref 7–23)
CALCIUM SERPL-MCNC: 10.9 MG/DL — HIGH (ref 8.4–10.5)
CHLORIDE SERPL-SCNC: 91 MMOL/L — LOW (ref 98–107)
CO2 SERPL-SCNC: 19 MMOL/L — LOW (ref 22–31)
CREAT SERPL-MCNC: 1.06 MG/DL — SIGNIFICANT CHANGE UP (ref 0.5–1.3)
EGFR: 75 ML/MIN/1.73M2 — SIGNIFICANT CHANGE UP
EOSINOPHIL # BLD AUTO: 0 K/UL — SIGNIFICANT CHANGE UP (ref 0–0.5)
EOSINOPHIL NFR BLD AUTO: 0 % — SIGNIFICANT CHANGE UP (ref 0–6)
GLUCOSE SERPL-MCNC: 108 MG/DL — HIGH (ref 70–99)
HCT VFR BLD CALC: 40.1 % — SIGNIFICANT CHANGE UP (ref 39–50)
HGB BLD-MCNC: 13.9 G/DL — SIGNIFICANT CHANGE UP (ref 13–17)
IANC: 9.21 K/UL — HIGH (ref 1.8–7.4)
IMM GRANULOCYTES NFR BLD AUTO: 0.4 % — SIGNIFICANT CHANGE UP (ref 0–0.9)
LYMPHOCYTES # BLD AUTO: 1.16 K/UL — SIGNIFICANT CHANGE UP (ref 1–3.3)
LYMPHOCYTES # BLD AUTO: 10.2 % — LOW (ref 13–44)
MAGNESIUM SERPL-MCNC: 1.5 MG/DL — LOW (ref 1.6–2.6)
MCHC RBC-ENTMCNC: 26.3 PG — LOW (ref 27–34)
MCHC RBC-ENTMCNC: 34.7 GM/DL — SIGNIFICANT CHANGE UP (ref 32–36)
MCV RBC AUTO: 75.8 FL — LOW (ref 80–100)
MONOCYTES # BLD AUTO: 0.89 K/UL — SIGNIFICANT CHANGE UP (ref 0–0.9)
MONOCYTES NFR BLD AUTO: 7.8 % — SIGNIFICANT CHANGE UP (ref 2–14)
NEUTROPHILS # BLD AUTO: 9.21 K/UL — HIGH (ref 1.8–7.4)
NEUTROPHILS NFR BLD AUTO: 81.2 % — HIGH (ref 43–77)
NRBC # BLD: 0 /100 WBCS — SIGNIFICANT CHANGE UP (ref 0–0)
NRBC # FLD: 0 K/UL — SIGNIFICANT CHANGE UP (ref 0–0)
NT-PROBNP SERPL-SCNC: HIGH PG/ML
PHOSPHATE SERPL-MCNC: 2.6 MG/DL — SIGNIFICANT CHANGE UP (ref 2.5–4.5)
PLATELET # BLD AUTO: 264 K/UL — SIGNIFICANT CHANGE UP (ref 150–400)
POTASSIUM SERPL-MCNC: 3.6 MMOL/L — SIGNIFICANT CHANGE UP (ref 3.5–5.3)
POTASSIUM SERPL-SCNC: 3.6 MMOL/L — SIGNIFICANT CHANGE UP (ref 3.5–5.3)
PROT SERPL-MCNC: 8.7 G/DL — HIGH (ref 6–8.3)
RBC # BLD: 5.29 M/UL — SIGNIFICANT CHANGE UP (ref 4.2–5.8)
RBC # FLD: 15.1 % — HIGH (ref 10.3–14.5)
SARS-COV-2 RNA SPEC QL NAA+PROBE: SIGNIFICANT CHANGE UP
SODIUM SERPL-SCNC: 130 MMOL/L — LOW (ref 135–145)
TROPONIN T, HIGH SENSITIVITY RESULT: 30 NG/L — SIGNIFICANT CHANGE UP
TROPONIN T, HIGH SENSITIVITY RESULT: 38 NG/L — SIGNIFICANT CHANGE UP
WBC # BLD: 11.35 K/UL — HIGH (ref 3.8–10.5)
WBC # FLD AUTO: 11.35 K/UL — HIGH (ref 3.8–10.5)

## 2023-02-20 PROCEDURE — 99285 EMERGENCY DEPT VISIT HI MDM: CPT

## 2023-02-20 PROCEDURE — 71046 X-RAY EXAM CHEST 2 VIEWS: CPT | Mod: 26

## 2023-02-20 PROCEDURE — 99223 1ST HOSP IP/OBS HIGH 75: CPT

## 2023-02-20 RX ORDER — DIAZEPAM 5 MG
5 TABLET ORAL ONCE
Refills: 0 | Status: DISCONTINUED | OUTPATIENT
Start: 2023-02-20 | End: 2023-02-20

## 2023-02-20 RX ORDER — THIAMINE MONONITRATE (VIT B1) 100 MG
100 TABLET ORAL DAILY
Refills: 0 | Status: COMPLETED | OUTPATIENT
Start: 2023-02-21 | End: 2023-02-23

## 2023-02-20 RX ORDER — MAGNESIUM SULFATE 500 MG/ML
2 VIAL (ML) INJECTION ONCE
Refills: 0 | Status: COMPLETED | OUTPATIENT
Start: 2023-02-20 | End: 2023-02-20

## 2023-02-20 RX ORDER — SODIUM CHLORIDE 9 MG/ML
250 INJECTION INTRAMUSCULAR; INTRAVENOUS; SUBCUTANEOUS ONCE
Refills: 0 | Status: COMPLETED | OUTPATIENT
Start: 2023-02-20 | End: 2023-02-20

## 2023-02-20 RX ORDER — FOLIC ACID 0.8 MG
1 TABLET ORAL DAILY
Refills: 0 | Status: DISCONTINUED | OUTPATIENT
Start: 2023-02-21 | End: 2023-02-23

## 2023-02-20 RX ORDER — ACETAMINOPHEN 500 MG
650 TABLET ORAL EVERY 6 HOURS
Refills: 0 | Status: DISCONTINUED | OUTPATIENT
Start: 2023-02-20 | End: 2023-02-23

## 2023-02-20 RX ORDER — LANOLIN ALCOHOL/MO/W.PET/CERES
3 CREAM (GRAM) TOPICAL AT BEDTIME
Refills: 0 | Status: DISCONTINUED | OUTPATIENT
Start: 2023-02-20 | End: 2023-02-23

## 2023-02-20 RX ORDER — THIAMINE MONONITRATE (VIT B1) 100 MG
100 TABLET ORAL ONCE
Refills: 0 | Status: DISCONTINUED | OUTPATIENT
Start: 2023-02-20 | End: 2023-02-20

## 2023-02-20 RX ORDER — THIAMINE MONONITRATE (VIT B1) 100 MG
100 TABLET ORAL ONCE
Refills: 0 | Status: COMPLETED | OUTPATIENT
Start: 2023-02-20 | End: 2023-02-20

## 2023-02-20 RX ORDER — FOLIC ACID 0.8 MG
1 TABLET ORAL ONCE
Refills: 0 | Status: DISCONTINUED | OUTPATIENT
Start: 2023-02-20 | End: 2023-02-20

## 2023-02-20 RX ORDER — FOLIC ACID 0.8 MG
1 TABLET ORAL ONCE
Refills: 0 | Status: COMPLETED | OUTPATIENT
Start: 2023-02-20 | End: 2023-02-20

## 2023-02-20 RX ADMIN — Medication 25 GRAM(S): at 19:35

## 2023-02-20 RX ADMIN — Medication 1 TABLET(S): at 17:13

## 2023-02-20 RX ADMIN — Medication 5 MILLIGRAM(S): at 17:28

## 2023-02-20 RX ADMIN — Medication 5 MILLIGRAM(S): at 18:52

## 2023-02-20 RX ADMIN — Medication 1 MILLIGRAM(S): at 17:13

## 2023-02-20 RX ADMIN — SODIUM CHLORIDE 250 MILLILITER(S): 9 INJECTION INTRAMUSCULAR; INTRAVENOUS; SUBCUTANEOUS at 17:28

## 2023-02-20 RX ADMIN — Medication 100 MILLIGRAM(S): at 17:13

## 2023-02-20 RX ADMIN — Medication 5 MILLIGRAM(S): at 22:28

## 2023-02-20 NOTE — H&P ADULT - PROBLEM SELECTOR PLAN 1
- patient with complaints of SOB and chest discomfort  - reports that he has not been compliant with his medications at home  - pBNP this admission >10K (noted to be 56K on previous admission)  - no gross evidence of volume overload on exam, lungs clear on xray and exam  - previously discharged on Lasix 20mg qd, will give lasix 20mg IV today and assess response  - c/w home carvedilol 6.25mg BID  - c/w entresto 97-103mg BID  - c/w spironolactone 25mg qd

## 2023-02-20 NOTE — ED PROVIDER NOTE - PHYSICAL EXAMINATION
Gen: NAD, AAOx3, non-toxic appearing  HEENT: NCAT, normal conjunctiva, dry oral mucosa  Lung: speaking in full sentences, good aeration bilaterally, lungs CTA b/l, +JVD, no peripheral edema  CV: tachycardic regular rhythm. cap refill <2x. peripheral pulses 2+bilaterally   Abd: soft, ND, NT  MSK: no visible deformities  Neuro: No focal deficits  Skin: Intact  Psych: normal affect

## 2023-02-20 NOTE — ED ADULT NURSE NOTE - CHIEF COMPLAINT QUOTE
Pt from home with multiple medical complaints, feeling SOB since yesterday. Also "feeling anxious". Last had a drink yesterday, admits to daily alcohol consumption Phx: HTN, high cholesterol, depression, Hep C, ETOH.

## 2023-02-20 NOTE — H&P ADULT - PROBLEM SELECTOR PLAN 7
Diet: DASH  DVT: Lovenox  Dispo: pending clinical course - patient previously taking sertraline 50mg qd  - has not followed up with therapist or psychiatry  - patient's current poor appetite and anxiety may also be compounded by ongoing mood symptoms  - will likely require outpatient follow up with  for possible medication management

## 2023-02-20 NOTE — H&P ADULT - PROBLEM SELECTOR PLAN 5
- patient previously taking sertraline 50mg qd  - has not followed up with therapist or psychiatry  - patient's current poor appetite and anxiety may also be compounded by ongoing mood symptoms  - will likely require outpatient follow up with  for possible medication management - c/w carvedilol  - c/w entresto  - c/w isosorbide dinitrate 10mg TID   - c/w hydralazine 25mg TID  - would reintroduce medications gradually to avoid hypotension - c/w ASA 81  - EKG on admission non-ischemic  - troponins 30 >38, can check additional repeat in AM, however patient asymptomatic currently  - c/w atorva 40 qhs

## 2023-02-20 NOTE — H&P ADULT - NSHPLABSRESULTS_GEN_ALL_CORE
.  LABS:                         13.9   11.35 )-----------( 264      ( 20 Feb 2023 17:36 )             40.1     02-20    130<L>  |  91<L>  |  15  ----------------------------<  108<H>  3.6   |  19<L>  |  1.06    Ca    10.9<H>      20 Feb 2023 17:36  Phos  2.6     02-20  Mg     1.50     02-20    TPro  8.7<H>  /  Alb  5.3<H>  /  TBili  0.6  /  DBili  x   /  AST  33  /  ALT  18  /  AlkPhos  94  02-20    Serum Pro-Brain Natriuretic Peptide: 44914 pg/mL (02-20 @ 17:36)    RADIOLOGY, EKG & ADDITIONAL TESTS: Reviewed.     < from: Xray Chest 2 Views PA/Lat (02.20.23 @ 17:59) >    FINDINGS:  The heart is normal in size.  The lungs are clear.  There is no pneumothorax or pleural effusion.    IMPRESSION:  Clear lungs.    < end of copied text >

## 2023-02-20 NOTE — ED ADULT NURSE NOTE - OBJECTIVE STATEMENT
Patient received in room 4. Patient A&Ox4 and ambulatory with cane at baseline. Patient presents to the ED c/o anxiety. Patient denies significant PMH and PSH. Patient states "I am very anxious". Patient is hyperverbal with repetitive speech. Patient denies alcohol use, tobacco use, SI/HI, and AH/VH/tacticle disturbances. No neuro deficits noted, airway patent, chest rise equal bilaterally, lung sounds clear and equal bilaterally, respirations unlabored. Patient denies dyspnea, shortness of breath, and chest pain. Abdomen flat, soft and non-distended; patient denies nausea/vomiting and changes in BMs/urine. Pulse/motor/sensory present and no edema noted to all four extremities. 20G IV placed to left forearm, labs collected and sent, medications administered as prescribed. Safety measures in place and call bell within reach.

## 2023-02-20 NOTE — H&P ADULT - PROBLEM SELECTOR PLAN 3
- c/w ASA 81  - EKG on admission non-ischemic  - troponins 30 >38, can check additional repeat in AM, however patient asymptomatic currently  - c/w atorva 40 qhs Possible component of patient's previous COPD vs CHF or possible anxiety as well  - currently resolved s/p valium in ED  - CXR clear  - c/w diuresis for suspected HF  - c/w albuterol PRN for COPD  - low suspicion for acute COPD exacerbation at this time Noted with hyponatremia to 130  - based on previous lab values from recent admission, hyponatremia appears to be chronic  - patient is alert and oriented, otherwise asymptomatic from hyponatremia  - CTM

## 2023-02-20 NOTE — ED PROVIDER NOTE - OBJECTIVE STATEMENT
72-year-old male with history of hypertension, hyperlipidemia, CAD, HCV s/p Harvoni in 2018, current smoker and daily alcohol drinker presenting with shortness of breath and chest discomfort.  Patient reports feeling like he could not catch his breath for the last week.  Describes chest discomfort as midsternal, nonradiating, nonexertional associated with anxiety. States "I think I am having panic attacks." Patient accompanied by daughter who states she is concerned that he has not been taking any of his medications since his recent discharge from rehab following an admission for heart failure.  She is also concerned that he is not taking care of himself, not eating.  Patient states he drinks "2 beers" daily but has not had a drink for the last week.  Initially denied history of withdrawal however patient daughter states that he was treated for alcohol withdrawal during his admission at the end of last year. Denies SI/HI/AH/VH.

## 2023-02-20 NOTE — ED ADULT NURSE REASSESSMENT NOTE - NS ED NURSE REASSESS COMMENT FT1
Break coverage RN: Pt A&Ox4 sitting on stretcher. Respirations even and unlabored, sinus tachycardia on monitor. Pt hypertensive, provider made aware. pt denies any headache, visual disturbances, or chest pain, CIWA as per flowsheet. Pt offers no complaints at this time. No acute distress noted. Suction set up at bedside. bed in lowest position, side rails up, call bell in hand, safety maintained. pt aware to call for assistance prior to getting up - pt educated on how to use call bell.

## 2023-02-20 NOTE — H&P ADULT - SOCIAL HISTORY: ALCOHOL USE
Patient regularly drinks alcohol  Endorses about 2 beers/day  Reports last drink was 1 week prior to current admission

## 2023-02-20 NOTE — H&P ADULT - HISTORY OF PRESENT ILLNESS
Patient is a72 y/o M with HTN, HLD, CAD, HCV s/p treatment with Harvoni, now presenting to the hospital with SOB and chest discomfort. Of note, patient was admitted to [facility] from October through November of 2023 due to HF exacerbation requiring CCU. Patient today states that he came to the hospital at the behest of his children as they were concerned about his well being. He reports that about a week ago he "hit a wall" and stated that he "slowed down" and didn't have the strength to do anything. He stated that he hadn't taken most of his medications in several weeks and reported that he was occasionally having "panic attacks" which caused him to feel short of breath and have palpitations. He also states that he has been eating very little over the past week. Patient otherwise has not followed up with any of his outpatient providers since his recent discharge in November. Patient also states that he does occasionally have "a beer or 2" most days but states that his last drink was over a week ago. Denies any tremors currently. Denies any SI/HI. He denies any current chest pain/tightness, he denies any SOB, fever, chills, nausea, vomiting or diarrhea. States that his urination has been unchanged.    ED Course: patient received valium 5mg IV x2, NS 250cc, 2g of Magnesium sulfate as well as folic acid and thiamine.

## 2023-02-20 NOTE — ED PROVIDER NOTE - NS ED ROS FT
Constitutional:  (-) fever, (-) chills, (-) fatigue  Eyes:  (-) eye pain (-) visual changes  ENMT: (-) nasal discharge, (-) sore throat. (-) neck pain or stiffness  Cardiac: (+) chest pain (-) palpitations  Respiratory:  (-) cough (+) shortness of breath  GI:  (-) nausea (-) vomiting (-) diarrhea (-) abdominal pain  :  (-) dysuria (-) frequency (-) burning  MS:  (-) back pain (-) joint pain  Neuro:  (-) headache (-) numbness (-) tingling (-) focal weakness  Skin:  (-) rash  Except as documented in the HPI,  all other systems are negative

## 2023-02-20 NOTE — H&P ADULT - PROBLEM SELECTOR PLAN 8
Diet: DASH  DVT: Lovenox  Dispo: pending clinical course. Patient's family has multiple concerns and concerned that patient cannot adequately care for himself. May require placement once medically optimized Diet: DASH  DVT: Lovenox  Dispo: pending clinical course. Patient's family has multiple concerns and concerned that patient cannot adequately care for himself. May require placement once medically optimized    Case and plan of care discussed with patient's daughter, Alyson, via telephone at 12:10AM on 2/21/23

## 2023-02-20 NOTE — H&P ADULT - NSHPPHYSICALEXAM_GEN_ALL_CORE
GENERAL: NAD, lying in bed comfortably  HEENT: NC/AT, EOMI, PERRL  NECK: Supple, No JVD  CHEST/LUNG: CTAB, no increased WOB  HEART: RRR, no m/r/g  ABDOMEN: soft, NT, ND, BS+  EXTREMITIES:  2+ peripheral pulses, no clubbing, no edema  NERVOUS SYSTEM:  A&Ox3, no focal deficits  MSK: FROM all 4 extremities, full and equal strength  SKIN: No rashes or lesions

## 2023-02-20 NOTE — ED ADULT NURSE REASSESSMENT NOTE - NS ED NURSE REASSESS COMMENT FT1
Patient seated in stretcher with family at bedside. Respirations even and unlabored, no signs/symptoms of acute distress. Patient denies dyspnea, chest pain, and shortness of breath. Sinus tachycardic on tele. monitor, MD aware. Safety measures in place and call bell within reach.

## 2023-02-20 NOTE — H&P ADULT - NSHPREVIEWOFSYSTEMS_GEN_ALL_CORE
CONSTITUTIONAL: No weakness, fevers or chills  EYES/ENT: No visual changes;  No vertigo or throat pain   NECK: No pain or stiffness  RESPIRATORY: No cough, wheezing, hemoptysis; No shortness of breath  CARDIOVASCULAR: No chest pain or palpitations  GASTROINTESTINAL: No abdominal or epigastric pain. No nausea, vomiting, or hematemesis; No diarrhea or constipation. No melena or hematochezia.  GENITOURINARY: No dysuria, frequency or hematuria  NEUROLOGICAL: No numbness or weakness  PSYCHOLOGICAL: appropriate mood and affect

## 2023-02-20 NOTE — H&P ADULT - PROBLEM SELECTOR PLAN 4
- c/w carvedilol  - c/w entresto  - c/w isosorbide dinitrate 10mg TID   - c/w hydralazine 25mg TID  - would reintroduce medications gradually to avoid hypotension - c/w ASA 81  - EKG on admission non-ischemic  - troponins 30 >38, can check additional repeat in AM, however patient asymptomatic currently  - c/w atorva 40 qhs Possible component of patient's previous COPD vs CHF or possible anxiety as well  - currently resolved s/p valium in ED  - CXR clear  - c/w diuresis for suspected HF  - c/w albuterol PRN for COPD  - low suspicion for acute COPD exacerbation at this time

## 2023-02-20 NOTE — ED ADULT TRIAGE NOTE - CHIEF COMPLAINT QUOTE
Pt from home with multiple medical complaints, feeling SOB that since yesterday. Also "feeling anxious". Last had a drink yesterday, admits to daily alcohol consumption Phx: HTN, high cholesterol, depression, Hep C, ETOH. Pt from home with multiple medical complaints, feeling SOB since yesterday. Also "feeling anxious". Last had a drink yesterday, admits to daily alcohol consumption Phx: HTN, high cholesterol, depression, Hep C, ETOH.

## 2023-02-20 NOTE — ED PROVIDER NOTE - CLINICAL SUMMARY MEDICAL DECISION MAKING FREE TEXT BOX
72-year-old male with history of heart failure and alcohol dependence with withdrawal in the past presenting with shortness of breath and anxiety.  Patient tachycardic, hypertensive, afebrile, satting 100% on room air.  Anxious and thin appearing with dry mucous membranes, mild tremors of upper extremities with tongue fasciculations.  Concern for acute withdrawal.  Patient with risk factors for ACS versus heart failure exacerbation.  Will give benzos, labs, gentle hydration, and will require admission.

## 2023-02-20 NOTE — H&P ADULT - PROBLEM SELECTOR PLAN 6
Diet: DASH  DVT: Lovenox  Dispo: pending clinical course - patient previously taking sertraline 50mg qd  - has not followed up with therapist or psychiatry  - patient's current poor appetite and anxiety may also be compounded by ongoing mood symptoms  - will likely require outpatient follow up with  for possible medication management - c/w carvedilol  - c/w entresto  - c/w isosorbide dinitrate 10mg TID   - c/w hydralazine 25mg TID  - would reintroduce medications gradually to avoid hypotension

## 2023-02-20 NOTE — H&P ADULT - ASSESSMENT
71 y/o M with HTN, CAD, HCV s/p johnie, presenting to the hospital due to shortness of breath and chest discomfort. Now admitted for HF exacerbation as well as possible alcohol withdrawal.

## 2023-02-20 NOTE — ED PROVIDER NOTE - ATTENDING CONTRIBUTION TO CARE
I (Esther) agree with above, I performed a history and physical. Counseled abimael medical staff, physician assistant, and/or medical student on medical decision making as documented. Medical decisions and treatment interventions were made in real time during the patient encounter. Additionally and/or with the following exceptions: Patient is a 72-year-old male past medical history of congestive heart failure and longstanding ethanol abuse who presents emergency department with tremulousness.  Also has shortness of breath.  Patient says he has not had a drink for about a week.  However on physical exam patient has tremulousness and tongue fasciculations.  Was given diazepam x2 with good effect.  Was also given thiamine for longstanding alcohol abuse.  CMP with mild hyponatremia otherwise unremarkable.  CBC within normal limits x-ray clear.  BNP 10,000, patient was admitted for alcohol withdrawal as well as management of CHF exacerbation with high probability of failure of outpatient treatment.  Admission attestation

## 2023-02-20 NOTE — H&P ADULT - PROBLEM SELECTOR PLAN 2
Possible component of patient's previous COPD vs CHF or possible anxiety as well  - currently resolved s/p valium in ED  - CXR clear  - c/w diuresis for suspected HF  - c/w albuterol PRN for COPD  - low suspicion for acute COPD exacerbation at this time Noted with hyponatremia to 130  - based on previous lab values from recent admission, hyponatremia appears to be chronic  - patient is alert and oriented, otherwise asymptomatic from hyponatremia  - CTM Patient reporting daily EtOH use  - reports last drink was one week ago  - collateral obtained from patient's daughter  - family noted multiple empty bottles of alcohol around patient's house  - Patient noted with tremors and anxiety in ED, improved with benzos  - c/w CIWA with symptom triggered ativan  - c/w folic acid/thiamine/multivitamin

## 2023-02-21 DIAGNOSIS — E87.1 HYPO-OSMOLALITY AND HYPONATREMIA: ICD-10-CM

## 2023-02-21 DIAGNOSIS — F10.239 ALCOHOL DEPENDENCE WITH WITHDRAWAL, UNSPECIFIED: ICD-10-CM

## 2023-02-21 LAB
ANION GAP SERPL CALC-SCNC: 15 MMOL/L — HIGH (ref 7–14)
BUN SERPL-MCNC: 22 MG/DL — SIGNIFICANT CHANGE UP (ref 7–23)
CALCIUM SERPL-MCNC: 9.5 MG/DL — SIGNIFICANT CHANGE UP (ref 8.4–10.5)
CHLORIDE SERPL-SCNC: 94 MMOL/L — LOW (ref 98–107)
CO2 SERPL-SCNC: 22 MMOL/L — SIGNIFICANT CHANGE UP (ref 22–31)
CREAT SERPL-MCNC: 1.36 MG/DL — HIGH (ref 0.5–1.3)
EGFR: 55 ML/MIN/1.73M2 — LOW
GLUCOSE SERPL-MCNC: 98 MG/DL — SIGNIFICANT CHANGE UP (ref 70–99)
HCT VFR BLD CALC: 35.7 % — LOW (ref 39–50)
HGB BLD-MCNC: 11.9 G/DL — LOW (ref 13–17)
MAGNESIUM SERPL-MCNC: 2.1 MG/DL — SIGNIFICANT CHANGE UP (ref 1.6–2.6)
MCHC RBC-ENTMCNC: 26.2 PG — LOW (ref 27–34)
MCHC RBC-ENTMCNC: 33.3 GM/DL — SIGNIFICANT CHANGE UP (ref 32–36)
MCV RBC AUTO: 78.6 FL — LOW (ref 80–100)
NRBC # BLD: 0 /100 WBCS — SIGNIFICANT CHANGE UP (ref 0–0)
NRBC # FLD: 0 K/UL — SIGNIFICANT CHANGE UP (ref 0–0)
PHOSPHATE SERPL-MCNC: 4.4 MG/DL — SIGNIFICANT CHANGE UP (ref 2.5–4.5)
PLATELET # BLD AUTO: 189 K/UL — SIGNIFICANT CHANGE UP (ref 150–400)
POTASSIUM SERPL-MCNC: 3.5 MMOL/L — SIGNIFICANT CHANGE UP (ref 3.5–5.3)
POTASSIUM SERPL-SCNC: 3.5 MMOL/L — SIGNIFICANT CHANGE UP (ref 3.5–5.3)
RBC # BLD: 4.54 M/UL — SIGNIFICANT CHANGE UP (ref 4.2–5.8)
RBC # FLD: 15.5 % — HIGH (ref 10.3–14.5)
SODIUM SERPL-SCNC: 131 MMOL/L — LOW (ref 135–145)
WBC # BLD: 7.16 K/UL — SIGNIFICANT CHANGE UP (ref 3.8–10.5)
WBC # FLD AUTO: 7.16 K/UL — SIGNIFICANT CHANGE UP (ref 3.8–10.5)

## 2023-02-21 PROCEDURE — 76770 US EXAM ABDO BACK WALL COMP: CPT | Mod: 26

## 2023-02-21 PROCEDURE — 99233 SBSQ HOSP IP/OBS HIGH 50: CPT

## 2023-02-21 RX ORDER — PANTOPRAZOLE SODIUM 20 MG/1
40 TABLET, DELAYED RELEASE ORAL
Refills: 0 | Status: DISCONTINUED | OUTPATIENT
Start: 2023-02-21 | End: 2023-02-23

## 2023-02-21 RX ORDER — ASPIRIN/CALCIUM CARB/MAGNESIUM 324 MG
81 TABLET ORAL DAILY
Refills: 0 | Status: DISCONTINUED | OUTPATIENT
Start: 2023-02-21 | End: 2023-02-23

## 2023-02-21 RX ORDER — ALBUTEROL 90 UG/1
2 AEROSOL, METERED ORAL EVERY 6 HOURS
Refills: 0 | Status: DISCONTINUED | OUTPATIENT
Start: 2023-02-21 | End: 2023-02-23

## 2023-02-21 RX ORDER — POTASSIUM CHLORIDE 20 MEQ
40 PACKET (EA) ORAL EVERY 4 HOURS
Refills: 0 | Status: COMPLETED | OUTPATIENT
Start: 2023-02-21 | End: 2023-02-21

## 2023-02-21 RX ORDER — INFLUENZA VIRUS VACCINE 15; 15; 15; 15 UG/.5ML; UG/.5ML; UG/.5ML; UG/.5ML
0.7 SUSPENSION INTRAMUSCULAR ONCE
Refills: 0 | Status: DISCONTINUED | OUTPATIENT
Start: 2023-02-21 | End: 2023-02-23

## 2023-02-21 RX ORDER — ENOXAPARIN SODIUM 100 MG/ML
40 INJECTION SUBCUTANEOUS EVERY 24 HOURS
Refills: 0 | Status: DISCONTINUED | OUTPATIENT
Start: 2023-02-21 | End: 2023-02-21

## 2023-02-21 RX ORDER — HEPARIN SODIUM 5000 [USP'U]/ML
5000 INJECTION INTRAVENOUS; SUBCUTANEOUS EVERY 12 HOURS
Refills: 0 | Status: DISCONTINUED | OUTPATIENT
Start: 2023-02-21 | End: 2023-02-23

## 2023-02-21 RX ORDER — TIOTROPIUM BROMIDE 18 UG/1
2 CAPSULE ORAL; RESPIRATORY (INHALATION) DAILY
Refills: 0 | Status: DISCONTINUED | OUTPATIENT
Start: 2023-02-21 | End: 2023-02-23

## 2023-02-21 RX ORDER — SERTRALINE 25 MG/1
50 TABLET, FILM COATED ORAL DAILY
Refills: 0 | Status: DISCONTINUED | OUTPATIENT
Start: 2023-02-21 | End: 2023-02-23

## 2023-02-21 RX ORDER — CARVEDILOL PHOSPHATE 80 MG/1
6.25 CAPSULE, EXTENDED RELEASE ORAL EVERY 12 HOURS
Refills: 0 | Status: DISCONTINUED | OUTPATIENT
Start: 2023-02-21 | End: 2023-02-23

## 2023-02-21 RX ORDER — SACUBITRIL AND VALSARTAN 24; 26 MG/1; MG/1
1 TABLET, FILM COATED ORAL
Refills: 0 | Status: DISCONTINUED | OUTPATIENT
Start: 2023-02-21 | End: 2023-02-23

## 2023-02-21 RX ORDER — FUROSEMIDE 40 MG
40 TABLET ORAL DAILY
Refills: 0 | Status: DISCONTINUED | OUTPATIENT
Start: 2023-02-21 | End: 2023-02-21

## 2023-02-21 RX ORDER — ATORVASTATIN CALCIUM 80 MG/1
40 TABLET, FILM COATED ORAL AT BEDTIME
Refills: 0 | Status: DISCONTINUED | OUTPATIENT
Start: 2023-02-21 | End: 2023-02-23

## 2023-02-21 RX ORDER — FUROSEMIDE 40 MG
40 TABLET ORAL DAILY
Refills: 0 | Status: DISCONTINUED | OUTPATIENT
Start: 2023-02-21 | End: 2023-02-22

## 2023-02-21 RX ADMIN — Medication 40 MILLIGRAM(S): at 05:52

## 2023-02-21 RX ADMIN — Medication 40 MILLIEQUIVALENT(S): at 10:47

## 2023-02-21 RX ADMIN — Medication 40 MILLIEQUIVALENT(S): at 14:51

## 2023-02-21 RX ADMIN — ATORVASTATIN CALCIUM 40 MILLIGRAM(S): 80 TABLET, FILM COATED ORAL at 22:22

## 2023-02-21 RX ADMIN — SACUBITRIL AND VALSARTAN 1 TABLET(S): 24; 26 TABLET, FILM COATED ORAL at 19:20

## 2023-02-21 RX ADMIN — Medication 1 TABLET(S): at 12:17

## 2023-02-21 RX ADMIN — PANTOPRAZOLE SODIUM 40 MILLIGRAM(S): 20 TABLET, DELAYED RELEASE ORAL at 07:35

## 2023-02-21 RX ADMIN — Medication 1 MILLIGRAM(S): at 15:08

## 2023-02-21 RX ADMIN — Medication 100 MILLIGRAM(S): at 12:17

## 2023-02-21 RX ADMIN — CARVEDILOL PHOSPHATE 6.25 MILLIGRAM(S): 80 CAPSULE, EXTENDED RELEASE ORAL at 18:23

## 2023-02-21 RX ADMIN — Medication 81 MILLIGRAM(S): at 12:18

## 2023-02-21 RX ADMIN — CARVEDILOL PHOSPHATE 6.25 MILLIGRAM(S): 80 CAPSULE, EXTENDED RELEASE ORAL at 05:52

## 2023-02-21 RX ADMIN — Medication 1 MILLIGRAM(S): at 12:17

## 2023-02-21 RX ADMIN — SACUBITRIL AND VALSARTAN 1 TABLET(S): 24; 26 TABLET, FILM COATED ORAL at 05:52

## 2023-02-21 RX ADMIN — SERTRALINE 50 MILLIGRAM(S): 25 TABLET, FILM COATED ORAL at 12:17

## 2023-02-21 NOTE — ED ADULT NURSE REASSESSMENT NOTE - AS PAIN REST
0 (no pain/absence of nonverbal indicators of pain)

## 2023-02-21 NOTE — ED ADULT NURSE REASSESSMENT NOTE - NS ED NURSE REASSESS COMMENT FT1
Pt aaox4, ambulatory, breathing even and unlabored in bed. Pt denies SOB, headache, dizziness, blurry vision. Pt states he is beginning to feel anxious. MD notified. Pt aaox4, ambulatory, breathing even and unlabored in bed. Pt denies SOB, headache, dizziness, blurry vision. Pt states he is beginning to feel anxious. KARLY Mccormick updated pt about plan of care.

## 2023-02-21 NOTE — ED ADULT NURSE REASSESSMENT NOTE - NS ED NURSE REASSESS COMMENT FT1
Report and pt received from RN Kesha, pt A&ox4 resting on stretcher. Respirations even and unlabored, normal sinus on cardiac monitor. Vital signs stable. Pt offers no complaints at this time. CIWA as per flowsheet. Pending bed assignment. bed in lowest position, side rails up, call bell in hand, safety maintained.

## 2023-02-21 NOTE — ED ADULT NURSE REASSESSMENT NOTE - NS ED NURSE REASSESS COMMENT FT1
Pt daughter calling requesting update on pt plan of care, provider paged to make aware, pt in NAD, will continue to monitor.

## 2023-02-21 NOTE — PROGRESS NOTE ADULT - SUBJECTIVE AND OBJECTIVE BOX
[facility] Division of Hospital Medicine  Margie Nowak MD  Available via MS Teams    SUBJECTIVE / OVERNIGHT EVENTS: No acute overnight events. Pt reports that he feels fine and wants to go home. Denies chest pain, SOB, abd pain,n/v/d, fever, chills    ADDITIONAL REVIEW OF SYSTEMS: negative as above    MEDICATIONS  (STANDING):  aspirin enteric coated 81 milliGRAM(s) Oral daily  atorvastatin 40 milliGRAM(s) Oral at bedtime  carvedilol 6.25 milliGRAM(s) Oral every 12 hours  folic acid 1 milliGRAM(s) Oral daily  furosemide    Tablet 40 milliGRAM(s) Oral daily  heparin   Injectable 5000 Unit(s) SubCutaneous every 12 hours  influenza  Vaccine (HIGH DOSE) 0.7 milliLiter(s) IntraMuscular once  multivitamin 1 Tablet(s) Oral daily  pantoprazole    Tablet 40 milliGRAM(s) Oral before breakfast  sacubitril 97 mG/valsartan 103 mG 1 Tablet(s) Oral two times a day  sertraline 50 milliGRAM(s) Oral daily  thiamine 100 milliGRAM(s) Oral daily  tiotropium 2.5 MICROgram(s) Inhaler 2 Puff(s) Inhalation daily    MEDICATIONS  (PRN):  acetaminophen     Tablet .. 650 milliGRAM(s) Oral every 6 hours PRN Temp greater or equal to 38C (100.4F), Mild Pain (1 - 3)  albuterol    90 MICROgram(s) HFA Inhaler 2 Puff(s) Inhalation every 6 hours PRN Shortness of Breath and/or Wheezing  aluminum hydroxide/magnesium hydroxide/simethicone Suspension 30 milliLiter(s) Oral every 4 hours PRN Dyspepsia  LORazepam   Injectable 1 milliGRAM(s) IV Push every 2 hours PRN CIWA-Ar score increase by 2 points and a total score of 7 or less  melatonin 3 milliGRAM(s) Oral at bedtime PRN Insomnia      I&O's Summary      PHYSICAL EXAM:  Vital Signs Last 24 Hrs  T(C): 36.8 (21 Feb 2023 08:54), Max: 36.8 (21 Feb 2023 00:45)  T(F): 98.2 (21 Feb 2023 08:54), Max: 98.3 (21 Feb 2023 04:48)  HR: 88 (21 Feb 2023 12:35) (78 - 121)  BP: 140/81 (21 Feb 2023 12:35) (117/78 - 224/126)  BP(mean): 163 (20 Feb 2023 17:35) (163 - 163)  RR: 16 (21 Feb 2023 12:35) (16 - 22)  SpO2: 100% (21 Feb 2023 12:35) (98% - 100%)    Parameters below as of 21 Feb 2023 12:35  Patient On (Oxygen Delivery Method): room air      CONSTITUTIONAL: NAD, thin appearing  EYES: PERRLA; conjunctiva and sclera clear  ENMT: Moist oral mucosa, no pharyngeal injection or exudates; normal dentition  NECK: Supple, no palpable masses; no thyromegaly  RESPIRATORY: Normal respiratory effort; lungs are clear to auscultation bilaterally  CARDIOVASCULAR: Regular rate and rhythm, normal S1 and S2, no murmur/rub/gallop; No lower extremity edema; Peripheral pulses are 2+ bilaterally  ABDOMEN: Nontender to palpation, normoactive bowel sounds, no rebound/guarding; No hepatosplenomegaly  MUSCULOSKELETAL:  Normal gait; no clubbing or cyanosis of digits; no joint swelling or tenderness to palpation  PSYCH: A+O to person, place, and time; affect appropriate  NEUROLOGY: CN 2-12 are intact and symmetric; no gross sensory deficits   SKIN: No rashes; no palpable lesions    LABS:                        11.9   7.16  )-----------( 189      ( 21 Feb 2023 05:50 )             35.7     Hb Trend: 11.9<--, 13.9<--  02-21    131<L>  |  94<L>  |  22  ----------------------------<  98  3.5   |  22  |  1.36<H>    Ca    9.5      21 Feb 2023 05:50  Phos  4.4     02-21  Mg     2.10     02-21    TPro  8.7<H>  /  Alb  5.3<H>  /  TBili  0.6  /  DBili  x   /  AST  33  /  ALT  18  /  AlkPhos  94  02-20    Creatinine Trend: 1.36<--, 1.06<--            COVID-19 PCR: NotDetec (20 Feb 2023 17:56)  COVID-19 PCR: NotDetec (31 Oct 2022 08:14)  COVID-19 PCR: NotDetec (25 Oct 2022 19:43)  COVID-19 PCR: NotDetec (19 Oct 2022 12:22)  COVID-19 PCR: NotDetec (12 Oct 2022 15:30)  SARS-CoV-2: NotDetec (08 Oct 2022 17:23)      RADIOLOGY & ADDITIONAL TESTS:  US: no obstruction of kidneys or bladder    COORDINATION OF CARE:  Consultant Communication and Details of Discussion (where applicable): d/w ACP

## 2023-02-21 NOTE — ED ADULT NURSE REASSESSMENT NOTE - NS ED NURSE REASSESS COMMENT FT1
Pt requesting to speak with provider, KARLY Mccormick paged to make aware, will continue to monitor.

## 2023-02-21 NOTE — ED ADULT NURSE REASSESSMENT NOTE - NS ED NURSE REASSESS COMMENT FT1
Report received from night shift RN, pt aaox4, breathing even and unlabored in bed. Pt denies SOB, chest pain, dizziness, headache, and pain. No tremors, diaphoresis or pallor noted.

## 2023-02-21 NOTE — PATIENT PROFILE ADULT - FALL HARM RISK - UNIVERSAL INTERVENTIONS
Bed in lowest position, wheels locked, appropriate side rails in place/Call bell, personal items and telephone in reach/Instruct patient to call for assistance before getting out of bed or chair/Non-slip footwear when patient is out of bed/[place] to call system/Physically safe environment - no spills, clutter or unnecessary equipment/Purposeful Proactive Rounding/Room/bathroom lighting operational, light cord in reach

## 2023-02-21 NOTE — ED ADULT NURSE REASSESSMENT NOTE - NS ED NURSE REASSESS COMMENT FT1
Pt resting in bed, NSR on cardiac monitor, denies pain at this time. Respirations are even and unlabored, no signs of respiratory distress.

## 2023-02-22 LAB
ANION GAP SERPL CALC-SCNC: 14 MMOL/L — SIGNIFICANT CHANGE UP (ref 7–14)
BUN SERPL-MCNC: 32 MG/DL — HIGH (ref 7–23)
CALCIUM SERPL-MCNC: 9.1 MG/DL — SIGNIFICANT CHANGE UP (ref 8.4–10.5)
CHLORIDE SERPL-SCNC: 98 MMOL/L — SIGNIFICANT CHANGE UP (ref 98–107)
CO2 SERPL-SCNC: 19 MMOL/L — LOW (ref 22–31)
CREAT SERPL-MCNC: 1.35 MG/DL — HIGH (ref 0.5–1.3)
EGFR: 56 ML/MIN/1.73M2 — LOW
GLUCOSE SERPL-MCNC: 100 MG/DL — HIGH (ref 70–99)
HCT VFR BLD CALC: 35.2 % — LOW (ref 39–50)
HGB BLD-MCNC: 11.4 G/DL — LOW (ref 13–17)
MAGNESIUM SERPL-MCNC: 1.8 MG/DL — SIGNIFICANT CHANGE UP (ref 1.6–2.6)
MCHC RBC-ENTMCNC: 25.8 PG — LOW (ref 27–34)
MCHC RBC-ENTMCNC: 32.4 GM/DL — SIGNIFICANT CHANGE UP (ref 32–36)
MCV RBC AUTO: 79.6 FL — LOW (ref 80–100)
NRBC # BLD: 0 /100 WBCS — SIGNIFICANT CHANGE UP (ref 0–0)
NRBC # FLD: 0 K/UL — SIGNIFICANT CHANGE UP (ref 0–0)
PHOSPHATE SERPL-MCNC: 3.6 MG/DL — SIGNIFICANT CHANGE UP (ref 2.5–4.5)
PLATELET # BLD AUTO: 179 K/UL — SIGNIFICANT CHANGE UP (ref 150–400)
POTASSIUM SERPL-MCNC: 4.1 MMOL/L — SIGNIFICANT CHANGE UP (ref 3.5–5.3)
POTASSIUM SERPL-SCNC: 4.1 MMOL/L — SIGNIFICANT CHANGE UP (ref 3.5–5.3)
RBC # BLD: 4.42 M/UL — SIGNIFICANT CHANGE UP (ref 4.2–5.8)
RBC # FLD: 15.6 % — HIGH (ref 10.3–14.5)
SODIUM SERPL-SCNC: 131 MMOL/L — LOW (ref 135–145)
WBC # BLD: 7.63 K/UL — SIGNIFICANT CHANGE UP (ref 3.8–10.5)
WBC # FLD AUTO: 7.63 K/UL — SIGNIFICANT CHANGE UP (ref 3.8–10.5)

## 2023-02-22 PROCEDURE — 90792 PSYCH DIAG EVAL W/MED SRVCS: CPT

## 2023-02-22 PROCEDURE — 99232 SBSQ HOSP IP/OBS MODERATE 35: CPT

## 2023-02-22 RX ORDER — SPIRONOLACTONE 25 MG/1
25 TABLET, FILM COATED ORAL DAILY
Refills: 0 | Status: DISCONTINUED | OUTPATIENT
Start: 2023-02-22 | End: 2023-02-23

## 2023-02-22 RX ORDER — FUROSEMIDE 40 MG
20 TABLET ORAL DAILY
Refills: 0 | Status: DISCONTINUED | OUTPATIENT
Start: 2023-02-22 | End: 2023-02-23

## 2023-02-22 RX ORDER — MAGNESIUM SULFATE 500 MG/ML
1 VIAL (ML) INJECTION ONCE
Refills: 0 | Status: COMPLETED | OUTPATIENT
Start: 2023-02-22 | End: 2023-02-22

## 2023-02-22 RX ADMIN — Medication 40 MILLIGRAM(S): at 06:06

## 2023-02-22 RX ADMIN — ATORVASTATIN CALCIUM 40 MILLIGRAM(S): 80 TABLET, FILM COATED ORAL at 21:37

## 2023-02-22 RX ADMIN — Medication 100 MILLIGRAM(S): at 11:31

## 2023-02-22 RX ADMIN — Medication 1 MILLIGRAM(S): at 15:24

## 2023-02-22 RX ADMIN — Medication 1 TABLET(S): at 11:31

## 2023-02-22 RX ADMIN — Medication 1 MILLIGRAM(S): at 11:30

## 2023-02-22 RX ADMIN — CARVEDILOL PHOSPHATE 6.25 MILLIGRAM(S): 80 CAPSULE, EXTENDED RELEASE ORAL at 17:23

## 2023-02-22 RX ADMIN — SPIRONOLACTONE 25 MILLIGRAM(S): 25 TABLET, FILM COATED ORAL at 15:29

## 2023-02-22 RX ADMIN — PANTOPRAZOLE SODIUM 40 MILLIGRAM(S): 20 TABLET, DELAYED RELEASE ORAL at 06:06

## 2023-02-22 RX ADMIN — Medication 81 MILLIGRAM(S): at 11:31

## 2023-02-22 RX ADMIN — HEPARIN SODIUM 5000 UNIT(S): 5000 INJECTION INTRAVENOUS; SUBCUTANEOUS at 06:07

## 2023-02-22 RX ADMIN — CARVEDILOL PHOSPHATE 6.25 MILLIGRAM(S): 80 CAPSULE, EXTENDED RELEASE ORAL at 06:06

## 2023-02-22 RX ADMIN — Medication 20 MILLIGRAM(S): at 15:52

## 2023-02-22 RX ADMIN — SACUBITRIL AND VALSARTAN 1 TABLET(S): 24; 26 TABLET, FILM COATED ORAL at 17:22

## 2023-02-22 RX ADMIN — Medication 100 GRAM(S): at 15:29

## 2023-02-22 RX ADMIN — SERTRALINE 50 MILLIGRAM(S): 25 TABLET, FILM COATED ORAL at 11:31

## 2023-02-22 RX ADMIN — HEPARIN SODIUM 5000 UNIT(S): 5000 INJECTION INTRAVENOUS; SUBCUTANEOUS at 17:23

## 2023-02-22 RX ADMIN — SACUBITRIL AND VALSARTAN 1 TABLET(S): 24; 26 TABLET, FILM COATED ORAL at 06:06

## 2023-02-22 NOTE — PROGRESS NOTE ADULT - SUBJECTIVE AND OBJECTIVE BOX
[facility] Division of Hospital Medicine  Margie Nowak MD  Available via MS Teams    SUBJECTIVE / OVERNIGHT EVENTS: No acute overnight events. Pt denies chest pain, SOB, abd pain, n/v/d, fever, chills. Pt notes that he h    ADDITIONAL REVIEW OF SYSTEMS:    MEDICATIONS  (STANDING):  aspirin enteric coated 81 milliGRAM(s) Oral daily  atorvastatin 40 milliGRAM(s) Oral at bedtime  carvedilol 6.25 milliGRAM(s) Oral every 12 hours  folic acid 1 milliGRAM(s) Oral daily  furosemide    Tablet 40 milliGRAM(s) Oral daily  heparin   Injectable 5000 Unit(s) SubCutaneous every 12 hours  influenza  Vaccine (HIGH DOSE) 0.7 milliLiter(s) IntraMuscular once  multivitamin 1 Tablet(s) Oral daily  pantoprazole    Tablet 40 milliGRAM(s) Oral before breakfast  sacubitril 97 mG/valsartan 103 mG 1 Tablet(s) Oral two times a day  sertraline 50 milliGRAM(s) Oral daily  thiamine 100 milliGRAM(s) Oral daily  tiotropium 2.5 MICROgram(s) Inhaler 2 Puff(s) Inhalation daily    MEDICATIONS  (PRN):  acetaminophen     Tablet .. 650 milliGRAM(s) Oral every 6 hours PRN Temp greater or equal to 38C (100.4F), Mild Pain (1 - 3)  albuterol    90 MICROgram(s) HFA Inhaler 2 Puff(s) Inhalation every 6 hours PRN Shortness of Breath and/or Wheezing  aluminum hydroxide/magnesium hydroxide/simethicone Suspension 30 milliLiter(s) Oral every 4 hours PRN Dyspepsia  LORazepam   Injectable 1 milliGRAM(s) IV Push every 2 hours PRN CIWA-Ar score increase by 2 points and a total score of 7 or less  melatonin 3 milliGRAM(s) Oral at bedtime PRN Insomnia      I&O's Summary      PHYSICAL EXAM:  Vital Signs Last 24 Hrs  T(C): 36.8 (22 Feb 2023 10:30), Max: 37 (22 Feb 2023 02:30)  T(F): 98.2 (22 Feb 2023 10:30), Max: 98.6 (22 Feb 2023 02:30)  HR: 75 (22 Feb 2023 10:30) (69 - 84)  BP: 113/66 (22 Feb 2023 10:30) (112/74 - 156/92)  BP(mean): --  RR: 18 (22 Feb 2023 10:30) (16 - 18)  SpO2: 99% (22 Feb 2023 10:30) (99% - 100%)    Parameters below as of 22 Feb 2023 10:30  Patient On (Oxygen Delivery Method): room air      CONSTITUTIONAL: NAD, well-developed, well-groomed  EYES: PERRLA; conjunctiva and sclera clear  ENMT: Moist oral mucosa, no pharyngeal injection or exudates; normal dentition  NECK: Supple, no palpable masses; no thyromegaly  RESPIRATORY: Normal respiratory effort; lungs are clear to auscultation bilaterally  CARDIOVASCULAR: Regular rate and rhythm, normal S1 and S2, no murmur/rub/gallop; No lower extremity edema; Peripheral pulses are 2+ bilaterally  ABDOMEN: Nontender to palpation, normoactive bowel sounds, no rebound/guarding; No hepatosplenomegaly  MUSCULOSKELETAL:  Normal gait; no clubbing or cyanosis of digits; no joint swelling or tenderness to palpation  PSYCH: A+O to person, place, and time; affect appropriate  NEUROLOGY: CN 2-12 are intact and symmetric; no gross sensory deficits   SKIN: No rashes; no palpable lesions    LABS:                        11.4   7.63  )-----------( 179      ( 22 Feb 2023 04:30 )             35.2     Hb Trend: 11.4<--, 11.9<--, 13.9<--  02-22    131<L>  |  98  |  32<H>  ----------------------------<  100<H>  4.1   |  19<L>  |  1.35<H>    Ca    9.1      22 Feb 2023 04:30  Phos  3.6     02-22  Mg     1.80     02-22    TPro  8.7<H>  /  Alb  5.3<H>  /  TBili  0.6  /  DBili  x   /  AST  33  /  ALT  18  /  AlkPhos  94  02-20    Creatinine Trend: 1.35<--, 1.36<--, 1.06<--            COVID-19 PCR: NotDetec (20 Feb 2023 17:56)  COVID-19 PCR: NotDetec (31 Oct 2022 08:14)  COVID-19 PCR: NotDetec (25 Oct 2022 19:43)  COVID-19 PCR: NotDetec (19 Oct 2022 12:22)  COVID-19 PCR: NotDetec (12 Oct 2022 15:30)  SARS-CoV-2: NotDetec (08 Oct 2022 17:23)      RADIOLOGY & ADDITIONAL TESTS:  New Imaging Personally Reviewed Today:  New Electrocardiogram Personally Reviewed Today:  Other Results Reviewed Today:   Prior or Outpatient Records Reviewed Today with Summary:    COORDINATION OF CARE:  Consultant Communication and Details of Discussion (where applicable):     [facility] Division of Hospital Medicine  Margie Nowak MD  Available via MS Teams    SUBJECTIVE / OVERNIGHT EVENTS: No acute overnight events. Pt denies chest pain, SOB, abd pain, n/v/d, fever, chills. Pt notes that he has anxiety and depression which has been getting worse over the past several weeks. Denies SI/HI. Pt asking when he can be discharged.    ADDITIONAL REVIEW OF SYSTEMS: Negative other than HPI    MEDICATIONS  (STANDING):  aspirin enteric coated 81 milliGRAM(s) Oral daily  atorvastatin 40 milliGRAM(s) Oral at bedtime  carvedilol 6.25 milliGRAM(s) Oral every 12 hours  folic acid 1 milliGRAM(s) Oral daily  furosemide    Tablet 40 milliGRAM(s) Oral daily  heparin   Injectable 5000 Unit(s) SubCutaneous every 12 hours  influenza  Vaccine (HIGH DOSE) 0.7 milliLiter(s) IntraMuscular once  multivitamin 1 Tablet(s) Oral daily  pantoprazole    Tablet 40 milliGRAM(s) Oral before breakfast  sacubitril 97 mG/valsartan 103 mG 1 Tablet(s) Oral two times a day  sertraline 50 milliGRAM(s) Oral daily  thiamine 100 milliGRAM(s) Oral daily  tiotropium 2.5 MICROgram(s) Inhaler 2 Puff(s) Inhalation daily    MEDICATIONS  (PRN):  acetaminophen     Tablet .. 650 milliGRAM(s) Oral every 6 hours PRN Temp greater or equal to 38C (100.4F), Mild Pain (1 - 3)  albuterol    90 MICROgram(s) HFA Inhaler 2 Puff(s) Inhalation every 6 hours PRN Shortness of Breath and/or Wheezing  aluminum hydroxide/magnesium hydroxide/simethicone Suspension 30 milliLiter(s) Oral every 4 hours PRN Dyspepsia  LORazepam   Injectable 1 milliGRAM(s) IV Push every 2 hours PRN CIWA-Ar score increase by 2 points and a total score of 7 or less  melatonin 3 milliGRAM(s) Oral at bedtime PRN Insomnia      I&O's Summary      PHYSICAL EXAM:  Vital Signs Last 24 Hrs  T(C): 36.8 (22 Feb 2023 10:30), Max: 37 (22 Feb 2023 02:30)  T(F): 98.2 (22 Feb 2023 10:30), Max: 98.6 (22 Feb 2023 02:30)  HR: 75 (22 Feb 2023 10:30) (69 - 84)  BP: 113/66 (22 Feb 2023 10:30) (112/74 - 156/92)  BP(mean): --  RR: 18 (22 Feb 2023 10:30) (16 - 18)  SpO2: 99% (22 Feb 2023 10:30) (99% - 100%)    Parameters below as of 22 Feb 2023 10:30  Patient On (Oxygen Delivery Method): room air      CONSTITUTIONAL: malnourished, NAD  EYES: PERRLA; conjunctiva and sclera clear  ENMT: Moist oral mucosa, no pharyngeal injection or exudates; normal dentition  NECK: Supple, no palpable masses; no thyromegaly  RESPIRATORY: Normal respiratory effort; lungs are clear to auscultation bilaterally  CARDIOVASCULAR: Regular rate and rhythm, normal S1 and S2, no murmur/rub/gallop; No lower extremity edema; Peripheral pulses are 2+ bilaterally  ABDOMEN: Nontender to palpation, normoactive bowel sounds, no rebound/guarding; No hepatosplenomegaly  MUSCULOSKELETAL:  Normal gait; no clubbing or cyanosis of digits; no joint swelling or tenderness to palpation  PSYCH: A+O to person, place, and time; affect appropriate  NEUROLOGY: CN 2-12 are intact and symmetric; no gross sensory deficits   SKIN: No rashes; no palpable lesions    LABS:                        11.4   7.63  )-----------( 179      ( 22 Feb 2023 04:30 )             35.2     Hb Trend: 11.4<--, 11.9<--, 13.9<--  02-22    131<L>  |  98  |  32<H>  ----------------------------<  100<H>  4.1   |  19<L>  |  1.35<H>    Ca    9.1      22 Feb 2023 04:30  Phos  3.6     02-22  Mg     1.80     02-22    TPro  8.7<H>  /  Alb  5.3<H>  /  TBili  0.6  /  DBili  x   /  AST  33  /  ALT  18  /  AlkPhos  94  02-20    Creatinine Trend: 1.35<--, 1.36<--, 1.06<--            COVID-19 PCR: NotDetec (20 Feb 2023 17:56)  COVID-19 PCR: NotDetec (31 Oct 2022 08:14)  COVID-19 PCR: NotDetec (25 Oct 2022 19:43)  COVID-19 PCR: NotDetec (19 Oct 2022 12:22)  COVID-19 PCR: NotDetec (12 Oct 2022 15:30)  SARS-CoV-2: NotDetec (08 Oct 2022 17:23)      RADIOLOGY & ADDITIONAL TESTS:  New Imaging Personally Reviewed Today:  New Electrocardiogram Personally Reviewed Today:  Other Results Reviewed Today:   Prior or Outpatient Records Reviewed Today with Summary:    COORDINATION OF CARE:  Consultant Communication and Details of Discussion (where applicable):

## 2023-02-22 NOTE — DIETITIAN INITIAL EVALUATION ADULT - ADD RECOMMEND
1) Recommend continue with current diet, which remains appropriate at this time.   2) Recommend add Ensure Plus HP 3x daily (350cal, 20gm pro each).   3) Monitor PO intake, Labs, weights, BMs, and skin integrity.   4) Heart failure nutrition therapy provided. Discussed daily weights, no added salt to foods, and limiting eating out RD to remain available for further nutritional interventions as indicated.

## 2023-02-22 NOTE — PHYSICAL THERAPY INITIAL EVALUATION ADULT - GAIT DEVIATIONS NOTED, PT EVAL
forward flexed posture/decreased neyda/increased time in double stance/decreased velocity of limb motion/decreased step length/decreased weight-shifting ability

## 2023-02-22 NOTE — BH CONSULTATION LIAISON ASSESSMENT NOTE - CURRENT MEDICATION
MEDICATIONS  (STANDING):  aspirin enteric coated 81 milliGRAM(s) Oral daily  atorvastatin 40 milliGRAM(s) Oral at bedtime  carvedilol 6.25 milliGRAM(s) Oral every 12 hours  folic acid 1 milliGRAM(s) Oral daily  furosemide    Tablet 40 milliGRAM(s) Oral daily  heparin   Injectable 5000 Unit(s) SubCutaneous every 12 hours  influenza  Vaccine (HIGH DOSE) 0.7 milliLiter(s) IntraMuscular once  magnesium sulfate  IVPB 1 Gram(s) IV Intermittent once  multivitamin 1 Tablet(s) Oral daily  pantoprazole    Tablet 40 milliGRAM(s) Oral before breakfast  sacubitril 97 mG/valsartan 103 mG 1 Tablet(s) Oral two times a day  sertraline 50 milliGRAM(s) Oral daily  spironolactone 25 milliGRAM(s) Oral daily  thiamine 100 milliGRAM(s) Oral daily  tiotropium 2.5 MICROgram(s) Inhaler 2 Puff(s) Inhalation daily    MEDICATIONS  (PRN):  acetaminophen     Tablet .. 650 milliGRAM(s) Oral every 6 hours PRN Temp greater or equal to 38C (100.4F), Mild Pain (1 - 3)  albuterol    90 MICROgram(s) HFA Inhaler 2 Puff(s) Inhalation every 6 hours PRN Shortness of Breath and/or Wheezing  aluminum hydroxide/magnesium hydroxide/simethicone Suspension 30 milliLiter(s) Oral every 4 hours PRN Dyspepsia  LORazepam   Injectable 1 milliGRAM(s) IV Push every 2 hours PRN CIWA-Ar score increase by 2 points and a total score of 7 or less  melatonin 3 milliGRAM(s) Oral at bedtime PRN Insomnia

## 2023-02-22 NOTE — DIETITIAN INITIAL EVALUATION ADULT - ORAL INTAKE PTA/DIET HISTORY
Patient reports his appetite has lost over the past couple of months. Patient states " I eat whenever I feel hungry, but I don't feel hungry very often". Likely patient is not eating adequately to meeting his nutrient needs. Patient noted with ETOH abuse. As per pt, his last drink was a week ago. Denies any nausea and vomiting PTA. Pt reports of drinking Ensure Plus intermittently at home.

## 2023-02-22 NOTE — PHYSICAL THERAPY INITIAL EVALUATION ADULT - IMPAIRMENTS CONTRIBUTING TO GAIT DEVIATIONS, PT EVAL
impaired balance/decreased flexibility/impaired motor control/impaired postural control/scissoring/decreased strength

## 2023-02-22 NOTE — PROGRESS NOTE ADULT - PROBLEM SELECTOR PLAN 7
- patient previously taking sertraline 50mg qd  - psych consulted, no contraindication to discharge
- patient previously taking sertraline 50mg qd  - has not followed up with therapist or psychiatry  - patient's current poor appetite and anxiety may also be compounded by ongoing mood symptoms  - will likely require outpatient follow up with  for possible medication management

## 2023-02-22 NOTE — BH CONSULTATION LIAISON ASSESSMENT NOTE - NSUNABLEASSESSPROTRISKCOMMENT_PSY_ALL_CORE
Pt lives w/ dog and has girlfriend. Pt denies any history of SI and is looking forward to the spring.

## 2023-02-22 NOTE — PHYSICAL THERAPY INITIAL EVALUATION ADULT - ASSISTIVE DEVICE FOR TRANSFER: SIT/STAND, REHAB EVAL
rolling walker O-T Plasty Text: The defect edges were debeveled with a #15 scalpel blade.  Given the location of the defect, shape of the defect and the proximity to free margins an O-T plasty was deemed most appropriate.  Using a sterile surgical marker, an appropriate O-T plasty was drawn incorporating the defect and placing the expected incisions within the relaxed skin tension lines where possible.    The area thus outlined was incised deep to adipose tissue with a #15 scalpel blade.  The skin margins were undermined to an appropriate distance in all directions utilizing iris scissors.

## 2023-02-22 NOTE — PROGRESS NOTE ADULT - PROBLEM SELECTOR PLAN 3
Noted with hyponatremia to 130  - based on previous lab values from recent admission, hyponatremia appears to be chronic  - patient is alert and oriented, otherwise asymptomatic from hyponatremia  - CTM
Noted with hyponatremia to 130  - based on previous lab values from recent admission, hyponatremia appears to be chronic  - patient is alert and oriented, otherwise asymptomatic from hyponatremia  - CTM

## 2023-02-22 NOTE — PROGRESS NOTE ADULT - PROBLEM SELECTOR PLAN 2
Patient reporting daily EtOH use  - reports last drink was one week ago  - collateral obtained from patient's daughter, family noted multiple empty bottles of alcohol around patient's house. She suspects that she cannot care for himself at home  - Patient noted with tremors and anxiety in ED, improved with benzos  - c/w CIWA with symptom triggered ativan  - c/w folic acid/thiamine/multivitamin
Patient reporting daily EtOH use  - reports last drink was one week ago  - collateral obtained from patient's daughter, family noted multiple empty bottles of alcohol around patient's house. She suspects that she cannot care for himself at home  - Patient noted with tremors and anxiety in ED, improved with benzos  - c/w CIWA with symptom triggered ativan  - c/w folic acid/thiamine/multivitamin

## 2023-02-22 NOTE — DIETITIAN INITIAL EVALUATION ADULT - OTHER INFO
Per chart review, 73 y/o M with HTN, CAD, HCV s/p johnie, presenting to the hospital due to shortness of breath and chest discomfort. Now admitted for HF exacerbation as well as possible alcohol withdrawal.     Patient seen at bedside. Endorses appetite has been slightly improved in the hospital, able to eat ~51-75% of his meals in hospital. Food preferences explored and honored to encourage PO intake. Patient is amenable to receive Ensure Plus HP 3x daily (350cal, 20gm pro each). Denies chewing and swallowing difficulties. Denies any GI distress (nausea/vomiting/diarrhea/constipation.) last BM today. Patient's labs notable with low Na 131L, s/p sodium chloride for hydration and repletion. MVI, thiamine, folic acid in use for micronutrients coverage.

## 2023-02-22 NOTE — PHYSICAL THERAPY INITIAL EVALUATION ADULT - PERTINENT HX OF CURRENT PROBLEM, REHAB EVAL
patient is a 72 year old male admitted for CHF exacerbation, also with concern for alcohol withdrawal

## 2023-02-22 NOTE — PROGRESS NOTE ADULT - PROBLEM SELECTOR PLAN 5
- c/w ASA 81  - EKG on admission non-ischemic  - troponins 30 >38, can check additional repeat in AM, however patient asymptomatic currently  - c/w atorva 40 qhs
- c/w ASA 81  - EKG on admission non-ischemic  - troponins 30 >38, can check additional repeat in AM, however patient asymptomatic currently  - c/w atorva 40 qhs

## 2023-02-22 NOTE — DIETITIAN INITIAL EVALUATION ADULT - PERTINENT MEDS FT
MEDICATIONS  (STANDING):  aspirin enteric coated 81 milliGRAM(s) Oral daily  atorvastatin 40 milliGRAM(s) Oral at bedtime  carvedilol 6.25 milliGRAM(s) Oral every 12 hours  folic acid 1 milliGRAM(s) Oral daily  furosemide    Tablet 20 milliGRAM(s) Oral daily  heparin   Injectable 5000 Unit(s) SubCutaneous every 12 hours  influenza  Vaccine (HIGH DOSE) 0.7 milliLiter(s) IntraMuscular once  magnesium sulfate  IVPB 1 Gram(s) IV Intermittent once  multivitamin 1 Tablet(s) Oral daily  pantoprazole    Tablet 40 milliGRAM(s) Oral before breakfast  sacubitril 97 mG/valsartan 103 mG 1 Tablet(s) Oral two times a day  sertraline 50 milliGRAM(s) Oral daily  spironolactone 25 milliGRAM(s) Oral daily  thiamine 100 milliGRAM(s) Oral daily  tiotropium 2.5 MICROgram(s) Inhaler 2 Puff(s) Inhalation daily    MEDICATIONS  (PRN):  acetaminophen     Tablet .. 650 milliGRAM(s) Oral every 6 hours PRN Temp greater or equal to 38C (100.4F), Mild Pain (1 - 3)  albuterol    90 MICROgram(s) HFA Inhaler 2 Puff(s) Inhalation every 6 hours PRN Shortness of Breath and/or Wheezing  aluminum hydroxide/magnesium hydroxide/simethicone Suspension 30 milliLiter(s) Oral every 4 hours PRN Dyspepsia  LORazepam   Injectable 1 milliGRAM(s) IV Push every 2 hours PRN CIWA-Ar score increase by 2 points and a total score of 7 or less  melatonin 3 milliGRAM(s) Oral at bedtime PRN Insomnia

## 2023-02-22 NOTE — PROGRESS NOTE ADULT - PROBLEM SELECTOR PLAN 6
Addendum Note by Moises Pérez MD at 1/5/2018  9:13 AM     Author: Moises Pérez MD Service: -- Author Type: Physician    Filed: 1/5/2018  9:13 AM Encounter Date: 1/4/2018 Status: Signed    : Moises Pérez MD (Physician)    Addended by: MOISES PÉREZ on: 1/5/2018 09:13 AM        Modules accepted: Orders        
- c/w carvedilol  - c/w entresto  - c/w isosorbide dinitrate 10mg TID   - c/w hydralazine 25mg TID  - would reintroduce medications gradually to avoid hypotension
- c/w carvedilol  - c/w entresto  - c/w isosorbide dinitrate 10mg TID   - c/w hydralazine 25mg TID  - would reintroduce medications gradually to avoid hypotension

## 2023-02-22 NOTE — BH CONSULTATION LIAISON ASSESSMENT NOTE - NSBHCHARTREVIEWVS_PSY_A_CORE FT
Vital Signs Last 24 Hrs  T(C): 36.8 (22 Feb 2023 10:30), Max: 37 (22 Feb 2023 02:30)  T(F): 98.2 (22 Feb 2023 10:30), Max: 98.6 (22 Feb 2023 02:30)  HR: 75 (22 Feb 2023 10:30) (69 - 84)  BP: 113/66 (22 Feb 2023 10:30) (112/74 - 156/92)  BP(mean): --  RR: 18 (22 Feb 2023 10:30) (16 - 18)  SpO2: 99% (22 Feb 2023 10:30) (99% - 100%)    Parameters below as of 22 Feb 2023 10:30  Patient On (Oxygen Delivery Method): room air

## 2023-02-22 NOTE — DIETITIAN INITIAL EVALUATION ADULT - PERTINENT LABORATORY DATA
02-22    131<L>  |  98  |  32<H>  ----------------------------<  100<H>  4.1   |  19<L>  |  1.35<H>    Ca    9.1      22 Feb 2023 04:30  Phos  3.6     02-22  Mg     1.80     02-22    TPro  8.7<H>  /  Alb  5.3<H>  /  TBili  0.6  /  DBili  x   /  AST  33  /  ALT  18  /  AlkPhos  94  02-20  A1C with Estimated Average Glucose Result: 5.0 % (10-07-22 @ 05:45)

## 2023-02-22 NOTE — BH CONSULTATION LIAISON ASSESSMENT NOTE - RISK ASSESSMENT
Risk factors include: active alcohol use, recent psychosocial stressors, recent medical issues  Chronic risk factors for suicide include: history of alcohol use, not adherent to medical treatment  Protective factors include: denies SI/I/P, no history of suicide attempts, no history of NSSIB, identifies reasons for living, fture oriented

## 2023-02-22 NOTE — SBIRT NOTE ADULT - NSSBIRTALCPOSREINDET_GEN_A_CORE
Patient stated that he stopped drinking about a month ago because he knew it wasn't good for his health.

## 2023-02-22 NOTE — PROGRESS NOTE ADULT - PROBLEM SELECTOR PLAN 4
Possible component of patient's previous COPD vs CHF or possible anxiety as well  - currently resolved s/p valium in ED  - CXR clear  - c/w diuresis for suspected HF  - c/w albuterol PRN for COPD  - low suspicion for acute COPD exacerbation at this time
Possible component of patient's previous COPD vs CHF or possible anxiety as well  - currently resolved s/p valium in ED  - CXR clear  - c/w diuresis for suspected HF  - c/w albuterol PRN for COPD  - low suspicion for acute COPD exacerbation at this time

## 2023-02-22 NOTE — PROGRESS NOTE ADULT - PROBLEM SELECTOR PLAN 1
SOB and chest discomfort resolved  - reports that he has not been compliant with his medications at home  - pBNP this admission >10K (noted to be 56K on previous admission)  - no gross evidence of volume overload on exam, lungs clear on xray and exam  - previously discharged on Lasix 20mg qd, s/p lasix 20mg IVP, transition now to lasix 20 daily  - c/w home carvedilol 6.25mg BID  - c/w entresto 97-103mg BID  - c/w spironolactone 25mg qd
- patient with complaints of SOB and chest discomfort  - reports that he has not been compliant with his medications at home  - pBNP this admission >10K (noted to be 56K on previous admission)  - no gross evidence of volume overload on exam, lungs clear on xray and exam  - previously discharged on Lasix 20mg qd, s/p lasix 20mg IVP and transitioned to lasix 40mg PO daily  - c/w home carvedilol 6.25mg BID  - c/w entresto 97-103mg BID  - c/w spironolactone 25mg qd

## 2023-02-22 NOTE — BH CONSULTATION LIAISON ASSESSMENT NOTE - NSBHCONSULTFOLLOWAFTERCARE_PSY_A_CORE FT
Pt can consider joining AA if he needs additional help for AUD  Pt can f/u w/ [facility] at [phone] for anxiety symptoms Pt can consider joining AA if he needs additional help for AUD  Pt can f/u w/ [facility] at [phone] for alcohol use treatment  Pt can f/u at [facility] [phone] if in urgent need of psychiatric evaluation.  Return to emergency room if in emergent need of psychiatric evaluation.

## 2023-02-22 NOTE — PROGRESS NOTE ADULT - ASSESSMENT
73 y/o M with HTN, CAD, HCV s/p johnie, presenting to the hospital due to shortness of breath and chest discomfort. Now admitted for HF exacerbation as well as possible alcohol withdrawal.
71 y/o M with HTN, CAD, HCV s/p johnie, presenting to the hospital due to shortness of breath and chest discomfort. Now admitted for HF exacerbation as well as possible alcohol withdrawal.

## 2023-02-22 NOTE — BH CONSULTATION LIAISON ASSESSMENT NOTE - SUMMARY
Pt is a 71yo M w/ pmhx HTN, HLD, CAD, HCV s/p Harvoni, p/w SOB and chest discomfort. Pt endorses that he is presenting this time b/c his daughters came into his home and were concerned about his living conditions.  Psychiatry was consulted to assess safety of DC per his daughter's request.     Full psych history was obtained. Pt was AAOx3 and cooperative throughout the interview. Pt endorses anxiety but denies being anxious at baseline.  Pt denies other mood symptoms and endorses good sleep and diet. He denies AVH and denies SI/HI.  He has a history of alcohol use (3-4 beers a day) and endorses that he now wants to quit.  He was able to quit smoking cold turkey because of fear of it getting him sick and he believes he will do the same thing with alcohol now.  He has no intention of drinking when he gets home and is looking forward to she spring.  He historically didn't take his HF medications b/c he was "being stupid" but now endorse that he wants to take them in order to make sure he doesn't end up back in the hospital.     Pt currently doesn't pose a risk ot himself or others and as such is cleared for DC.  Of not pts daughter is worried about his condition b/c she reports his girlfriend is a hoarder, his house isn't being taken care of, his dog pees all over the floor and it isn't cleaned up. Per discussion with the daughter she can followup with adult  for outpatient assessment of living safety.    Plan  -Cleared for DC once medically cleared Pt is a 71yo M w/ pmhx HTN, HLD, CAD, HCV s/p Harvoni, p/w SOB and chest discomfort. Pt endorses that he is presenting this time b/c his daughters came into his home and were concerned about his living conditions.  Psychiatry was consulted to assess safety of DC per his daughter's request.     Full psych history was obtained. Pt was AAOx3 and cooperative throughout the interview. Pt endorses anxiety but denies being anxious at baseline.  Pt denies other mood symptoms and endorses good sleep and diet. He denies AVH and denies SI/HI.  He has a history of alcohol use (3-4 beers a day) and endorses that he now wants to quit.  He was able to quit smoking cold turkey because of fear of it getting him sick and he believes he will do the same thing with alcohol now.  He has no intention of drinking when he gets home and is looking forward to the spring.  After his last hospitalization he didn't take his HF medications b/c he was "being stupid" but now endorse that he wants to take them in order to make sure he doesn't end up back in the hospital.     Pt currently doesn't pose a risk to himself or others and as such is cleared for DC.  Of note pts daughter is worried about his condition b/c she reports his girlfriend is a hoarder, his house isn't being taken care of. Per discussion with the daughter she can followup with adult  for outpatient assessment of living safety.    Plan  -OK for DC once medically cleared Pt is a 73yo M w/ pmhx HTN, HLD, CAD, HCV s/p Harvoni, p/w SOB and chest discomfort. Pt endorses that he is presenting this time b/c his daughters came into his home and were concerned about his living conditions.  Psychiatry was consulted to assess safety of DC per his daughter's request.     Full psych history was obtained. Pt was AAOx3 and cooperative throughout the interview. Pt endorses anxiety but denies being anxious at baseline.  Pt denies other mood symptoms and endorses good sleep and diet. He denies AVH and denies SI/HI.  He has a history of alcohol use (3-4 beers a day) and endorses that he now wants to quit.  He was able to quit smoking cold turkey because of fear of it getting him sick and he believes he will do the same thing with alcohol now.  He has no intention of drinking when he gets home and is looking forward to the spring.  After his last hospitalization he didn't take his HF medications b/c he was "being stupid" but now endorse that he wants to take them in order to make sure he doesn't end up back in the hospital.     Pt currently doesn't pose a risk to himself or others and as such is cleared for DC. On discharge, patient would benefit from prescription of naltrexone 50mg daily if patient is amenable to treatment for alcohol use. Of note, pts daughter is worried about his condition b/c she reports his girlfriend is a hoarder, his house isn't being taken care of. Per discussion with the daughter, patient is interested in discussing with SW regarding involvement of APC.    Plan  -No indication for CO at this time; Not at acute risk of harm to self or others  -Can D/C with prescription for naltrexone 50mg daily if patient is amenable for medication-associated treatment  -PRN's: Can use Ativan 1mg BID PRN anxiety; Monitor for delirium  -Recommend collaboration with SW regarding safe disposition due concerns for whether home is habitable  -OK for DC once medically cleared Pt is a 73yo M w/ pmhx HTN, HLD, CAD, HCV s/p Harvoni, p/w SOB and chest discomfort. Pt endorses that he is presenting this time b/c his daughters came into his home and were concerned about his living conditions.  Psychiatry was consulted to assess safety of DC per his daughter's request.     Full psych history was obtained. Pt was AAOx3 and cooperative throughout the interview. Pt endorses anxiety but denies being anxious at baseline.  Pt denies other mood symptoms and endorses good sleep and diet. He denies AVH and denies SI/HI.  He has a history of alcohol use (3-4 beers a day) and endorses that he now wants to quit.  He was able to quit smoking cold turkey because of fear of it getting him sick and he believes he will do the same thing with alcohol now.  He has no intention of drinking when he gets home and is looking forward to the spring.  After his last hospitalization he didn't take his HF medications b/c he was "being stupid" but now endorse that he wants to take them in order to make sure he doesn't end up back in the hospital.     Pt currently doesn't pose a risk to himself or others and as such is cleared for DC. On discharge, patient would benefit from prescription of naltrexone 50mg daily if patient is amenable to treatment for alcohol use. Of note, pts daughter is worried about his condition b/c she reports his girlfriend is a hoarder, his house isn't being taken care of. Per discussion with the daughter, patient is interested in discussing with SW regarding involvement of APC.    Plan  -No indication for CO at this time; Not at acute risk of harm to self or others  -Can D/C with prescription for naltrexone 50mg daily if patient is amenable for medication-assisted etoh addiction treatment  -PRN's: Can use Ativan 1mg BID PRN anxiety; Monitor for delirium  -OK to c/w CIWA  -Recommend collaboration with SW regarding safe disposition due concerns for whether home is habitable  -OK to DC from psych perspective once medically cleared

## 2023-02-22 NOTE — PHYSICAL THERAPY INITIAL EVALUATION ADULT - ADDITIONAL COMMENTS
patient reports he walks with walker at baseline, reports no falls in the past 6 months, reports he is mostly household ambulator, reports his tenant does his food shopping

## 2023-02-22 NOTE — BH CONSULTATION LIAISON ASSESSMENT NOTE - NSBHATTESTCOMMENTATTENDFT_PSY_A_CORE
agree with plan as above - patient is a 73yo M with PPH of etoh use d/o, PMH of HTN, HLD, CAD, HCV s/p Harvoni, p/w SOB and chest discomfort. Patient AOx3, calm and cooperative, denies notable depression/anhedonia, denies SI/HI, no signs of current etoh withdrawal, no AH/VH delusions or paranoia. Patient will be at continued risk of decompensation from chronic etoh abuse, which likely explains his living conditions. Does not meet criterion for involuntary commitment for psychiatric purposes, however would benefit from voluntary addiction rehab. F/u SBIRT eval for outpt vs. inpt rehab options. Please DC on naltrexone PO as above. No psych c/i to discharge.

## 2023-02-22 NOTE — BH CONSULTATION LIAISON ASSESSMENT NOTE - NSSUICPROTFACT_PSY_ALL_CORE
Responsibility to children, family, or others/Identifies reasons for living/Fear of death or the actual act of killing self/Frustration tolerance/Beloved pets

## 2023-02-22 NOTE — PHYSICAL THERAPY INITIAL EVALUATION ADULT - FUNCTIONAL LIMITATIONS, PT EVAL
"Chief Complaints and History of Present Illnesses   Patient presents with     Follow Up For     initial visit with Dr. Casillas for subjective visual disturbance     HPI    Symptoms:              Comments:  Patient states blurry vision each eye and happens occasionally and gets better towards the end of the day. Patient states \" it feels like you have an eyelash in your eyes but it is not actually there.\"    Color/pattern makes vision or images \"wobbly\" per patient.     YOEL Shahid 7/25/2018 8:13 AM               "
self-care/home management/community/leisure

## 2023-02-22 NOTE — PROGRESS NOTE ADULT - PROBLEM SELECTOR PLAN 8
Diet: DASH  DVT: Lovenox  Dispo: pending clinical course. Patient's family has multiple concerns and concerned that patient cannot adequately care for himself. May require placement once medically optimized. SW consulted    Case and plan of care discussed with patient's daughter, Alyson, via phone
Diet: DASH  DVT: Lovenox  Dispo: pending clinical course. Patient's family has multiple concerns and concerned that patient cannot adequately care for himself. SW consulted    Case and plan of care discussed with patient's daughter, Alyson, via phone

## 2023-02-22 NOTE — BH CONSULTATION LIAISON ASSESSMENT NOTE - HPI (INCLUDE ILLNESS QUALITY, SEVERITY, DURATION, TIMING, CONTEXT, MODIFYING FACTORS, ASSOCIATED SIGNS AND SYMPTOMS)
Pt is a 71yo M w/ pmhx HTN, HLD, CAD, HCV s/p Harvoni, p/w SOB and chest discomfort. Pt endorses that he is presenting this time b/c his daughters came into his home and were concerned about his living conditions.  Pt was previously admitted to [facility] in October-November 2021 for HF exacerbation requiring CCU.  Following this DC he reports that he did not take all of his medications because he was "being stupid". Pt otherwise has not followed up with any of his outpatient providers since his recent discharge in November. Psychiatry was consulted to assess safety of DC per his daughter's request.    Pt endorses that he recently "hit a wall" and stated that he "slowed down" and didn't have the strength to do anything.  He endorses that he has been occasionally having "panic attacks" which caused him to feel SOB and have palpitations. He denies that he is an anxious person at baseline and denies any history of trauma. He reports he has a good appetite and cooks for himself but per his H&P he has been eating very little over the past week. He endorses good sleep and feeling hopeful about getting home.  He denies SI/HI. He denies AVH. He denies any past psychiatric diagnoses and hospitalizations.     Pt states that he drinks about 3-4 Heinekens most days. He started drinking in his 20s and his longest time without alcohol was a month during his most recent hospitalization and rehab stay but when he got home he started drinking again.  He quit smoking cold turkey about 2 years ago following smoking for 20 years.  He denies any other drug use. Pt is a 73yo M w/ pmhx HTN, HLD, CAD, HCV s/p Harvoni, p/w SOB and chest discomfort. Pt endorses that he is presenting this time b/c his daughters came into his home and were concerned about his living conditions.  Pt was previously admitted to [facility] in October-November 2021 for HF exacerbation requiring CCU.  Following this DC he reports that he did not take all of his medications because he was "being stupid". Pt otherwise has not followed up with any of his outpatient providers since his recent discharge in November. Psychiatry was consulted to assess safety of DC per his daughter's request.    Per H&P: Pt endorses that he recently "hit a wall" and stated that he "slowed down" and didn't have the strength to do anything.  He endorses that he has been occasionally having "panic attacks" which caused him to feel SOB and have palpitations. He endorses that he has been eating very little over the past week.    On our interview he reports that his mood is good.  He says that he has been anxious while in the hospital but he denies that he is an anxious person at baseline and denies any history of trauma. He reports he has a good appetite and cooks for himself 2 meals a day. He endorses good sleep and feeling hopeful about getting home and enjoying the spring.  He denies SI/HI. He denies AVH. He denies any past psychiatric diagnoses and hospitalizations.     Pt states that he drinks about 3-4 Heinekens most days. He started drinking in his 20s and his longest time without alcohol was for a month during his most recent hospitalization and rehab stay but when he got home he started drinking again.  He quit smoking cold turkey about 2 years ago following smoking for 20 years.  He denies any other drug use.    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  He reports that he pays all of his bills himself and takes care of his own medications. 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  He denies any history of falls but uses a walker to get around because he is weaker than he used to be.  Pt is a 71yo M w/ pmhx HTN, HLD, CAD, HCV s/p Harvoni, p/w SOB and chest discomfort. Pt endorses that he is presenting this time b/c his daughters came into his home and were concerned about his living conditions.  Pt was previously admitted to [facility] in October-November 2021 for HF exacerbation requiring CCU.  Following this DC he reports that he did not take all of his medications because he was "being stupid". Pt otherwise has not followed up with any of his outpatient providers since his recent discharge in November. Psychiatry was consulted to assess safety of DC per his daughter's request.    Per H&P: Pt endorses that he recently "hit a wall" and stated that he "slowed down" and didn't have the strength to do anything.  He endorses that he has been occasionally having "panic attacks" which caused him to feel SOB and have palpitations. He endorses that he has been eating very little over the past week.    On our interview he reports that his mood is good.  He says that he has been anxious while in the hospital but he denies that he is an anxious person at baseline and denies any history of trauma. He reports he has a good appetite and cooks for himself 2 meals a day. He endorses good sleep and feeling hopeful about getting home and enjoying the spring.  He denies SI/HI. He denies AVH. He denies any past psychiatric diagnoses and hospitalizations.     Pt states that he drinks about 3-4 Heinekens most days. He started drinking in his 20s and his longest time without alcohol was for a month during his most recent hospitalization and rehab stay but when he got home he started drinking again.  He quit smoking cold turkey about 2 years ago following smoking for 20 years.  He denies any other drug use.    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  He reports that he pays all of his bills himself and takes care of his own medications. 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  He denies any history of falls but uses a walker to get around because he is weaker than he used to be.     Per collateral from patient's daughter, Alyson Cortes ([phone]): Ms. Cortes denies any concerns for acute risks of harm to self or other, but worries about his overall condition and the condition of his home. He has had a complicated course over the past few months. Family has been worried about his ability to care for himself and his home due to his alcohol use. Ms. Cortes is currently his HCP and tries to manage his affairs, though he has been resistant. He lives in a single-family home, which she feels is currently in disarray to the point of being unhabitable. His girlfriend has sometimes stayed with him. He also has a tenant living in his home. His daughter is worried that both of them are taking advantage of him. Per Ms. Cortes, the patient's girlfriend hoards objects in the home and called the police when his daughters tried to clean it up. Per Ms. Cortes, the tenant who lives with him also takes advantage of him by using his money. She has previously tried to call APS to assist, but was unsuccessful. Regarding alcohol use, patient's daughter is unsure how much he drinks but noted it started after the loss of his wife along with decline in functioning. He previously worked in IT with [employer], then transitioned to the post office, and now is planned to retire due to difficulty managing.

## 2023-02-23 ENCOUNTER — TRANSCRIPTION ENCOUNTER (OUTPATIENT)
Age: 73
End: 2023-02-23

## 2023-02-23 VITALS
RESPIRATION RATE: 18 BRPM | SYSTOLIC BLOOD PRESSURE: 133 MMHG | DIASTOLIC BLOOD PRESSURE: 80 MMHG | HEART RATE: 77 BPM | TEMPERATURE: 98 F | OXYGEN SATURATION: 100 %

## 2023-02-23 LAB
ANION GAP SERPL CALC-SCNC: 10 MMOL/L — SIGNIFICANT CHANGE UP (ref 7–14)
BUN SERPL-MCNC: 33 MG/DL — HIGH (ref 7–23)
CALCIUM SERPL-MCNC: 9.4 MG/DL — SIGNIFICANT CHANGE UP (ref 8.4–10.5)
CHLORIDE SERPL-SCNC: 96 MMOL/L — LOW (ref 98–107)
CO2 SERPL-SCNC: 22 MMOL/L — SIGNIFICANT CHANGE UP (ref 22–31)
CREAT SERPL-MCNC: 1.34 MG/DL — HIGH (ref 0.5–1.3)
CULTURE RESULTS: SIGNIFICANT CHANGE UP
EGFR: 56 ML/MIN/1.73M2 — LOW
GLUCOSE SERPL-MCNC: 101 MG/DL — HIGH (ref 70–99)
HCT VFR BLD CALC: 34.9 % — LOW (ref 39–50)
HGB BLD-MCNC: 11.2 G/DL — LOW (ref 13–17)
MAGNESIUM SERPL-MCNC: 1.8 MG/DL — SIGNIFICANT CHANGE UP (ref 1.6–2.6)
MCHC RBC-ENTMCNC: 25.6 PG — LOW (ref 27–34)
MCHC RBC-ENTMCNC: 32.1 GM/DL — SIGNIFICANT CHANGE UP (ref 32–36)
MCV RBC AUTO: 79.9 FL — LOW (ref 80–100)
NRBC # BLD: 0 /100 WBCS — SIGNIFICANT CHANGE UP (ref 0–0)
NRBC # FLD: 0 K/UL — SIGNIFICANT CHANGE UP (ref 0–0)
PHOSPHATE SERPL-MCNC: 3.3 MG/DL — SIGNIFICANT CHANGE UP (ref 2.5–4.5)
PLATELET # BLD AUTO: 206 K/UL — SIGNIFICANT CHANGE UP (ref 150–400)
POTASSIUM SERPL-MCNC: 4 MMOL/L — SIGNIFICANT CHANGE UP (ref 3.5–5.3)
POTASSIUM SERPL-SCNC: 4 MMOL/L — SIGNIFICANT CHANGE UP (ref 3.5–5.3)
RBC # BLD: 4.37 M/UL — SIGNIFICANT CHANGE UP (ref 4.2–5.8)
RBC # FLD: 15.6 % — HIGH (ref 10.3–14.5)
SODIUM SERPL-SCNC: 128 MMOL/L — LOW (ref 135–145)
SPECIMEN SOURCE: SIGNIFICANT CHANGE UP
WBC # BLD: 7.2 K/UL — SIGNIFICANT CHANGE UP (ref 3.8–10.5)
WBC # FLD AUTO: 7.2 K/UL — SIGNIFICANT CHANGE UP (ref 3.8–10.5)

## 2023-02-23 PROCEDURE — 99239 HOSP IP/OBS DSCHRG MGMT >30: CPT

## 2023-02-23 RX ORDER — ATORVASTATIN CALCIUM 80 MG/1
1 TABLET, FILM COATED ORAL
Qty: 30 | Refills: 0
Start: 2023-02-23 | End: 2023-03-24

## 2023-02-23 RX ORDER — SERTRALINE 25 MG/1
1 TABLET, FILM COATED ORAL
Qty: 30 | Refills: 0
Start: 2023-02-23 | End: 2023-03-24

## 2023-02-23 RX ORDER — THIAMINE MONONITRATE (VIT B1) 100 MG
1 TABLET ORAL
Qty: 30 | Refills: 0
Start: 2023-02-23 | End: 2023-03-24

## 2023-02-23 RX ORDER — SPIRONOLACTONE 25 MG/1
1 TABLET, FILM COATED ORAL
Qty: 30 | Refills: 0
Start: 2023-02-23 | End: 2023-03-24

## 2023-02-23 RX ORDER — FOLIC ACID 0.8 MG
1 TABLET ORAL
Qty: 30 | Refills: 0
Start: 2023-02-23 | End: 2023-03-24

## 2023-02-23 RX ORDER — ALBUTEROL 90 UG/1
2 AEROSOL, METERED ORAL
Qty: 240 | Refills: 0
Start: 2023-02-23 | End: 2023-03-24

## 2023-02-23 RX ORDER — ASPIRIN/CALCIUM CARB/MAGNESIUM 324 MG
1 TABLET ORAL
Qty: 30 | Refills: 0
Start: 2023-02-23 | End: 2023-03-24

## 2023-02-23 RX ORDER — FUROSEMIDE 40 MG
1 TABLET ORAL
Qty: 30 | Refills: 0
Start: 2023-02-23 | End: 2023-03-24

## 2023-02-23 RX ORDER — TIOTROPIUM BROMIDE 18 UG/1
2 CAPSULE ORAL; RESPIRATORY (INHALATION)
Qty: 60 | Refills: 0
Start: 2023-02-23 | End: 2023-03-24

## 2023-02-23 RX ORDER — PANTOPRAZOLE SODIUM 20 MG/1
1 TABLET, DELAYED RELEASE ORAL
Qty: 30 | Refills: 0
Start: 2023-02-23 | End: 2023-03-24

## 2023-02-23 RX ORDER — CARVEDILOL PHOSPHATE 80 MG/1
1 CAPSULE, EXTENDED RELEASE ORAL
Qty: 60 | Refills: 0
Start: 2023-02-23 | End: 2023-03-24

## 2023-02-23 RX ORDER — SACUBITRIL AND VALSARTAN 24; 26 MG/1; MG/1
1 TABLET, FILM COATED ORAL
Qty: 60 | Refills: 0
Start: 2023-02-23 | End: 2023-03-24

## 2023-02-23 RX ADMIN — Medication 20 MILLIGRAM(S): at 05:42

## 2023-02-23 RX ADMIN — HEPARIN SODIUM 5000 UNIT(S): 5000 INJECTION INTRAVENOUS; SUBCUTANEOUS at 05:42

## 2023-02-23 RX ADMIN — TIOTROPIUM BROMIDE 2 PUFF(S): 18 CAPSULE ORAL; RESPIRATORY (INHALATION) at 11:22

## 2023-02-23 RX ADMIN — PANTOPRAZOLE SODIUM 40 MILLIGRAM(S): 20 TABLET, DELAYED RELEASE ORAL at 05:41

## 2023-02-23 RX ADMIN — Medication 1 MILLIGRAM(S): at 11:24

## 2023-02-23 RX ADMIN — CARVEDILOL PHOSPHATE 6.25 MILLIGRAM(S): 80 CAPSULE, EXTENDED RELEASE ORAL at 05:42

## 2023-02-23 RX ADMIN — SPIRONOLACTONE 25 MILLIGRAM(S): 25 TABLET, FILM COATED ORAL at 05:42

## 2023-02-23 RX ADMIN — Medication 1 TABLET(S): at 11:24

## 2023-02-23 RX ADMIN — Medication 100 MILLIGRAM(S): at 11:24

## 2023-02-23 RX ADMIN — SACUBITRIL AND VALSARTAN 1 TABLET(S): 24; 26 TABLET, FILM COATED ORAL at 05:41

## 2023-02-23 RX ADMIN — Medication 81 MILLIGRAM(S): at 11:23

## 2023-02-23 RX ADMIN — SERTRALINE 50 MILLIGRAM(S): 25 TABLET, FILM COATED ORAL at 11:24

## 2023-02-23 NOTE — DISCHARGE NOTE PROVIDER - CARE PROVIDER_API CALL
Dain Saldana)  Internal Medicine  [address], [address]  [place] [address]  Phone: [phone]  Fax: [phone]  Follow Up Time:

## 2023-02-23 NOTE — DISCHARGE NOTE PROVIDER - DETAILS OF MALNUTRITION DIAGNOSIS/DIAGNOSES
This patient has been assessed with a concern for Malnutrition and was treated during this hospitalization for the following Nutrition diagnosis/diagnoses:     -  02/22/2023: Severe protein-calorie malnutrition

## 2023-02-23 NOTE — DISCHARGE NOTE PROVIDER - HOSPITAL COURSE
HPI:  Patient is a72 y/o M with HTN, HLD, CAD, HCV s/p treatment with Harvoni, now presenting to the hospital with SOB and chest discomfort. Of note, patient was admitted to [facility] from October through November of 2023 due to HF exacerbation requiring CCU. Patient today states that he came to the hospital at the behest of his children as they were concerned about his well being. He reports that about a week ago he "hit a wall" and stated that he "slowed down" and didn't have the strength to do anything. He stated that he hadn't taken most of his medications in several weeks and reported that he was occasionally having "panic attacks" which caused him to feel short of breath and have palpitations. He also states that he has been eating very little over the past week. Patient otherwise has not followed up with any of his outpatient providers since his recent discharge in November. Patient also states that he does occasionally have "a beer or 2" most days but states that his last drink was over a week ago. Denies any tremors currently. Denies any SI/HI. He denies any current chest pain/tightness, he denies any SOB, fever, chills, nausea, vomiting or diarrhea. States that his urination has been unchanged.    ED Course: patient received valium 5mg IV x2, NS 250cc, 2g of Magnesium sulfate as well as folic acid and thiamine.      Hospital Course  Pt was restarted on his home medications and given a dose of IV lasix with improvement in fluid overload symptoms. Labs were significant for hyponatremia but at the patient's baseline. Pt also had SHANIQUA which stabilized. He was started on symptom-triggered CIWA protocol and did not demonstrate any evidence of alcohol withdrawal. The patient continued to express recent anxiety and depression which was worse prior to admission, but denied SI and HI. Psychiatry was consulted and noted that he currently doesn't pose a risk to himself or others and as such is cleared for discharge. The plan was discussed with the patient's daughter, Alyson, who expressed concerns regarding the patient's living conditions. Social work was notified and adult protective services was called.     To Follow up:  [ ] trend renal function  [ ] cardiology follow up for CHF  [ ] psychiatry follow up

## 2023-02-23 NOTE — DISCHARGE NOTE PROVIDER - NSDCMRMEDTOKEN_GEN_ALL_CORE_FT
albuterol 90 mcg/inh inhalation aerosol: 2 puff(s) inhaled every 6 hours, As needed, Shortness of Breath and/or Wheezing  aspirin 81 mg oral delayed release tablet: 1 tab(s) orally once a day    meds to beds ***   atorvastatin 40 mg oral tablet: 1 tab(s) orally once a day (at bedtime)    *** meds to beds***   carvedilol 6.25 mg oral tablet: 1 tab(s) orally every 12 hours    * please send meds to beds all meds  folic acid 1 mg oral tablet: 1 tab(s) orally once a day  furosemide 20 mg oral tablet: 1 tab(s) orally once a day  Multiple Vitamins oral tablet: 1 tab(s) by gastrostomy tube once a day   pantoprazole 40 mg oral delayed release tablet: 1 tab(s) orally once a day (before a meal)  physical therapy: 1 unit(s) orally 3 times a week   sacubitril-valsartan 97 mg-103 mg oral tablet: 1 tab(s) orally 2 times a day  sertraline 50 mg oral tablet: 1 tab(s) orally once a day  spironolactone 25 mg oral tablet: 1 tab(s) orally once a day  thiamine 100 mg oral tablet: 1 tab(s) orally once a day   tiotropium 2.5 mcg/inh inhalation aerosol: 2 puff(s) inhaled once a day

## 2023-02-23 NOTE — DISCHARGE NOTE NURSING/CASE MANAGEMENT/SOCIAL WORK - NSDCFUADDAPPT_GEN_ALL_CORE_FT
Follow up with your primary care physician for further monitoring in 1-2 weeks. Please call to arrange appointment.  APPTS ARE READY TO BE MADE: [X] YES    Best Family or Patient Contact (if needed):    Additional Information about above appointments (if needed):    1: Please set patient up with [facility] cardiology appointment    Other comments or requests:

## 2023-02-23 NOTE — DISCHARGE NOTE PROVIDER - NSFOLLOWUPCLINICS_GEN_ALL_ED_FT
[facility]  Cardiology  [address] [place] [address]  Phone: [phone]  Fax:      [facility]  Cardiology  [address] [place] [address]  Phone: [phone]  Fax:     [facility] Substance [facility]  Psychiatry Substance Abuse  [address] [address]  Phone: [phone]  Fax:     [facility]  Psychiatry  [address] [place] [address]  Phone: [phone]  Fax:

## 2023-02-23 NOTE — DISCHARGE NOTE NURSING/CASE MANAGEMENT/SOCIAL WORK - NSDCVIVACCINE_GEN_ALL_CORE_FT
Tdap; 22-Jan-2020 23:12; Stefani Dobson (SHAHAB); [employer]; [id number] (Exp. Date: 06-Jul-2021); IntraMuscular; Deltoid Left.; 0.5 milliLiter(s); VIS (VIS Published: 09-May-2013, VIS Presented: 22-Jan-2020);

## 2023-02-23 NOTE — DISCHARGE NOTE PROVIDER - NSDCCPCAREPLAN_GEN_ALL_CORE_FT
PRINCIPAL DISCHARGE DIAGNOSIS  Diagnosis: Chronic systolic congestive heart failure  Assessment and Plan of Treatment: You were found to have a CHF exacerbation on admission to the hosptial. Your medications were restarted and delivered to your bedside prior to discharge. Continue to take the medications as prescribed. Return to the ED if you have chest pain, shortness of breath, swelling in your legs, or any other concerning symptoms. Follow up with cardiology at the scheduled appointment time.      SECONDARY DISCHARGE DIAGNOSES  Diagnosis: SHANIQUA (acute kidney injury)  Assessment and Plan of Treatment: Follow up with your PMD within one week for repeat creatinine check. Return to the ED if you have decreased urination, shortness of breath, or any other concerning symptoms.    Diagnosis: Major depression, chronic  Assessment and Plan of Treatment: Continue to take your medications as prescribed. Follow up with psychiatry at the next available appointment. Return to the ED if you hae suicidal ideation, or any other concerning symptoms. You can follow up at the [facility] [phone] if in urgent need of psychiatric evaluation.      Diagnosis: Alcohol dependence with withdrawal  Assessment and Plan of Treatment: Follow up with psychiatry as above for management of alcohol abuse. You were offered naltrexone as a method of curbing alcohol cravings. Follow up with your primary care doctor for further management.

## 2023-02-23 NOTE — DISCHARGE NOTE NURSING/CASE MANAGEMENT/SOCIAL WORK - PATIENT PORTAL LINK FT
You can access the FollowMyHealth Patient Portal offered by [facility] by registering at the following website: [email or web address]. By joining [employer]’s FollowMyHealth portal, you will also be able to view your health information using other applications (apps) compatible with our system.

## 2023-02-23 NOTE — DISCHARGE NOTE PROVIDER - NSFOLLOWUPCLINICSTOKEN_GEN_ALL_ED_FT
[id number]: || ||00\01||False; [id number]: || ||00\01||False;[id number]: || ||00\01||False;[id number]: || ||00\01||False;

## 2023-02-23 NOTE — DISCHARGE NOTE PROVIDER - ATTENDING DISCHARGE PHYSICAL EXAMINATION:
T(C): 36.6 (02-23-23 @ 09:00), Max: 37 (02-22-23 @ 20:31)  HR: 84 (02-23-23 @ 09:00) (74 - 86)  BP: 128/68 (02-23-23 @ 09:00) (103/64 - 128/68)  RR: 18 (02-23-23 @ 09:00) (17 - 20)  SpO2: 100% (02-23-23 @ 09:00) (94% - 100%)    CONSTITUTIONAL: thin appearing, no apparent distress  EYES: PERRLA and symmetric, EOMI, No conjunctival or scleral injection, non-icteric  ENMT: Oral mucosa with moist membranes. Normal dentition; no pharyngeal injection or exudates  NECK: Supple, symmetric and without tracheal deviation   RESP: No respiratory distress, no use of accessory muscles; CTA b/l, no WRR  CV: RRR, +S1S2, no MRG; no JVD; no peripheral edema  GI: Soft, NT, ND, no rebound, no guarding; no palpable masses; no hepatosplenomegaly; no hernia palpated  MSK: No digital clubbing or cyanosis; examination of the head/neck/spine/ribs/pelvis, RUE, LUE, RLE, LLE without misalignment,            Normal ROM without pain, no spinal tenderness, normal muscle strength/tone  SKIN: No rashes or ulcers noted; skin warm and well perfused  NEURO: CN II-XII intact; normal reflexes in upper and lower extremities, sensation intact in upper and lower extremities b/l to light touch   PSYCH: Appropriate insight/judgment; A+O x 3, mood and affect appropriate, recent/remote memory intact

## 2023-02-23 NOTE — DISCHARGE NOTE NURSING/CASE MANAGEMENT/SOCIAL WORK - NSDCPEFALRISK_GEN_ALL_CORE
For information on Fall & Injury Prevention, visit: [email or web address] OR  [email or web address] OR  https://www.cdc.gov/steadi/patient.html

## 2023-02-23 NOTE — DISCHARGE NOTE PROVIDER - NSDCFUADDAPPT_GEN_ALL_CORE_FT
Follow up with your primary care physician for further monitoring in 1-2 weeks. Please call to arrange appointment.  APPTS ARE READY TO BE MADE: [X] YES    Best Family or Patient Contact (if needed):    Additional Information about above appointments (if needed):    1: Please set patient up with [facility] cardiology appointment    Other comments or requests:    Follow up with your primary care physician for further monitoring in 1-2 weeks. Please call to arrange appointment.  APPTS ARE READY TO BE MADE: [X] YES    Best Family or Patient Contact (if needed):    Additional Information about above appointments (if needed):    1: Please set patient up with [facility] cardiology appointment    Other comments or requests:        Follow up with your primary care physician for further monitoring in 1-2 weeks. Please call to arrange appointment.  APPTS ARE READY TO BE MADE: [X] YES    Best Family or Patient Contact (if needed):    Additional Information about above appointments (if needed):    1: Please set patient up with [facility] cardiology appointment    Other comments or requests:   3 attempts were made to reach patient, which have been unsuccessful. 3 Voicemails have been left on 2/23, 2/24 and 2/27. Will await a call back from patient to coordinate follow up care.

## 2023-02-23 NOTE — CHART NOTE - NSCHARTNOTEFT_GEN_A_CORE
Pt to be discharged home today, please refer to discharge note for hospital course and physical exam. Plan d/w pt and daughter

## 2023-03-15 ENCOUNTER — APPOINTMENT (OUTPATIENT)
Dept: CARDIOLOGY | Facility: CLINIC | Age: 73
End: 2023-03-15

## 2023-03-16 ENCOUNTER — APPOINTMENT (OUTPATIENT)
Dept: INTERNAL MEDICINE | Facility: CLINIC | Age: 73
End: 2023-03-16

## 2023-04-03 NOTE — PHYSICAL THERAPY INITIAL EVALUATION ADULT - SITTING BALANCE: STATIC
Medical Assistant Note:  Quiana Dunaway presents today for lab draw.    Patient seen by provider today: Yes: Dr Parry.   present during visit today: Not Applicable.    Concerns: No Concerns.    Procedure:  Lab draw site: LAC, Needle type: BF, Gauge: 23. Gauze and coban applied    Post Assessment:  Labs drawn without difficulty: Yes.    Discharge Plan:  Departure Mode: Ambulatory.    Face to Face Time: 5.    Sultana Jones CMA             fair balance

## 2023-04-12 ENCOUNTER — APPOINTMENT (OUTPATIENT)
Dept: INTERNAL MEDICINE | Facility: CLINIC | Age: 73
End: 2023-04-12
Payer: MEDICARE

## 2023-04-12 VITALS
HEART RATE: 106 BPM | BODY MASS INDEX: 19.93 KG/M2 | DIASTOLIC BLOOD PRESSURE: 100 MMHG | OXYGEN SATURATION: 98 % | RESPIRATION RATE: 16 BRPM | WEIGHT: 124 LBS | SYSTOLIC BLOOD PRESSURE: 170 MMHG | HEIGHT: 66 IN

## 2023-04-12 DIAGNOSIS — Z09 ENCOUNTER FOR FOLLOW-UP EXAMINATION AFTER COMPLETED TREATMENT FOR CONDITIONS OTHER THAN MALIGNANT NEOPLASM: ICD-10-CM

## 2023-04-12 DIAGNOSIS — Z00.00 ENCOUNTER FOR GENERAL ADULT MEDICAL EXAMINATION W/OUT ABNORMAL FINDINGS: ICD-10-CM

## 2023-04-12 PROCEDURE — 99213 OFFICE O/P EST LOW 20 MIN: CPT

## 2023-04-12 RX ORDER — ALBUTEROL SULFATE 90 UG/1
108 (90 BASE) INHALANT RESPIRATORY (INHALATION) EVERY 4 HOURS
Qty: 1 | Refills: 1 | Status: ACTIVE | COMMUNITY
Start: 2023-04-12 | End: 1900-01-01

## 2023-04-12 RX ORDER — TADALAFIL 20 MG/1
20 TABLET ORAL
Qty: 15 | Refills: 3 | Status: DISCONTINUED | COMMUNITY
Start: 2019-10-24 | End: 2023-04-12

## 2023-04-12 RX ORDER — NIFEDIPINE 60 MG/1
60 TABLET, FILM COATED, EXTENDED RELEASE ORAL
Qty: 90 | Refills: 0 | Status: DISCONTINUED | COMMUNITY
Start: 2018-11-26 | End: 2023-04-12

## 2023-04-12 RX ORDER — TRAMADOL HYDROCHLORIDE 50 MG/1
50 TABLET, COATED ORAL
Qty: 20 | Refills: 0 | Status: DISCONTINUED | COMMUNITY
Start: 2019-03-14 | End: 2023-04-12

## 2023-04-12 RX ORDER — CYCLOBENZAPRINE HYDROCHLORIDE 10 MG/1
10 TABLET, FILM COATED ORAL 3 TIMES DAILY
Qty: 21 | Refills: 0 | Status: DISCONTINUED | COMMUNITY
Start: 2019-03-11 | End: 2023-04-12

## 2023-04-12 RX ORDER — MELOXICAM 7.5 MG/1
7.5 TABLET ORAL
Qty: 30 | Refills: 11 | Status: DISCONTINUED | COMMUNITY
Start: 2017-06-01 | End: 2023-04-12

## 2023-04-12 NOTE — HISTORY OF PRESENT ILLNESS
[Admitted on: ___] : The patient was admitted on [unfilled] [Discharged on ___] : discharged on [unfilled] [Post-hospitalization from ___ Hospital] : Post-hospitalization from [unfilled] Hospital [FreeTextEntry2] : She reports that he was hospitalized for heart failure in February and requires medication refills.\par \par Patient denies any fevers, chills, nausea, vomiting, diarrhea, constipation, chest pain, shortness of breath, weakness, numbness, tingling at this time.\par \par Patient wants PSA level checked.\par He reports that he lives alone with a Tenent- Lives with a coworker\par Reports that he is \par He reports that he is a former smoker.  he reports that he quit smoking 10 years ago. Reports that he does not smoke anymore. 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 Youngest daughter - kam\par Reports that he is able to do ADLs and IADLs by himself\par Walks with a walker

## 2023-04-13 LAB
ALBUMIN SERPL ELPH-MCNC: 4.9 G/DL
ALP BLD-CCNC: 86 U/L
ALT SERPL-CCNC: 10 U/L
ANION GAP SERPL CALC-SCNC: 17 MMOL/L
AST SERPL-CCNC: 22 U/L
BASOPHILS # BLD AUTO: 0.06 K/UL
BASOPHILS NFR BLD AUTO: 0.9 %
BILIRUB SERPL-MCNC: 0.4 MG/DL
BUN SERPL-MCNC: 12 MG/DL
CALCIUM SERPL-MCNC: 10.3 MG/DL
CHLORIDE SERPL-SCNC: 97 MMOL/L
CHOLEST SERPL-MCNC: 171 MG/DL
CO2 SERPL-SCNC: 25 MMOL/L
CREAT SERPL-MCNC: 0.93 MG/DL
EGFR: 87 ML/MIN/1.73M2
EOSINOPHIL # BLD AUTO: 0.03 K/UL
EOSINOPHIL NFR BLD AUTO: 0.5 %
ESTIMATED AVERAGE GLUCOSE: 103 MG/DL
FOLATE SERPL-MCNC: >20 NG/ML
GLUCOSE SERPL-MCNC: 71 MG/DL
HBA1C MFR BLD HPLC: 5.2 %
HCT VFR BLD CALC: 34.5 %
HDLC SERPL-MCNC: 93 MG/DL
HGB BLD-MCNC: 11.4 G/DL
IMM GRANULOCYTES NFR BLD AUTO: 0.3 %
LDLC SERPL CALC-MCNC: 67 MG/DL
LYMPHOCYTES # BLD AUTO: 1.06 K/UL
LYMPHOCYTES NFR BLD AUTO: 16 %
MAN DIFF?: NORMAL
MCHC RBC-ENTMCNC: 26.6 PG
MCHC RBC-ENTMCNC: 33 GM/DL
MCV RBC AUTO: 80.4 FL
MONOCYTES # BLD AUTO: 0.71 K/UL
MONOCYTES NFR BLD AUTO: 10.7 %
NEUTROPHILS # BLD AUTO: 4.75 K/UL
NEUTROPHILS NFR BLD AUTO: 71.6 %
NONHDLC SERPL-MCNC: 78 MG/DL
PLATELET # BLD AUTO: 220 K/UL
POTASSIUM SERPL-SCNC: 4.1 MMOL/L
PROT SERPL-MCNC: 7.5 G/DL
PSA SERPL-MCNC: 0.32 NG/ML
RBC # BLD: 4.29 M/UL
RBC # FLD: 15.9 %
SODIUM SERPL-SCNC: 139 MMOL/L
TRIGL SERPL-MCNC: 55 MG/DL
TSH SERPL-ACNC: 1.43 UIU/ML
VIT B12 SERPL-MCNC: 584 PG/ML
WBC # FLD AUTO: 6.63 K/UL

## 2023-05-18 NOTE — ED ADULT NURSE NOTE - CAS DISCH ACCOMP BY
Assessment per SGNA guidelines.  Non labored breathing, skin, dry, warm and appropriate for race.  Abdomen soft.     Spouse

## 2023-07-11 ENCOUNTER — APPOINTMENT (OUTPATIENT)
Dept: INTERNAL MEDICINE | Facility: CLINIC | Age: 73
End: 2023-07-11

## 2023-07-25 NOTE — ED PROVIDER NOTE - OBJECTIVE STATEMENT
69 y/o M hx HTN, HLD here c/o sudden onset of left knee pain x 2 days. Works as a , but states he did do anything to aggravate the knee specifically. Took Aspirin and Tylenol w/ no relief. Was given Meloxicam by his PMD, which helped improve the pain. Able to ambulate w/ limp. Denies fevers, chills, or any other complaints. PAST MEDICAL HISTORY:  2010  'uterine Cyst'     Anemia Thalassemia    Calcaneal fracture     History of rotator cuff tear bilateral    Papillary thyroid carcinoma

## 2023-09-19 ENCOUNTER — APPOINTMENT (OUTPATIENT)
Dept: CARDIOLOGY | Facility: CLINIC | Age: 73
End: 2023-09-19

## 2023-10-04 NOTE — BH CONSULTATION LIAISON ASSESSMENT NOTE - RECORDS REVIEWED
Physical Therapy Visit    Visit Type: Daily Treatment Note  Visit: 3  Referring Provider: Ira Luna MD  Medical Diagnosis (from order): Diagnosis Information    Diagnosis  M25.559 (ICD-10-CM) - Hip pain         SUBJECTIVE                                                                                                               Patient reports that he went to sit down in a chair last week when it collapsed and he fell and landed on his buttocks. Had increased pain in his buttocks but has improved since. Still experiencing a dull/pulsing pain in the posterolateral left hip.      OBJECTIVE                                                                                                                                       Treatment     Therapeutic Exercise  Prone hip extensions, 2 x 10 bilateral - pain on left so discontinued after second set  Single leg leg press, 95#, 3 x 10 bilateral   Penguin waddles with red Tband 2 x 10 bilateral     Not today:  Single leg glute bridge, 2 x 8 bilateral   PGM clams, 2 x 10 bilateral   SLR with hip external rotation, 3 x 10  Side stepping with red Tband around ankles, 3 x 12' in bilateral directions  Monster walks with red Tband around ankles, 3 x 12' forward and backward        Manual Therapy   hip harmonics, x30  hip long axis distraction grade III/IV, and V  hip posterior mobilization grade III/IV  hip inferior mobilization grade III/IV    Patient verbally agreed to and consented to all joint mobilizations and manipulations      Neuromuscular Re-Education  - Lower abdominal Bracing in hook lying position with blood pressure biofeedback 40 mmhg-60mmhg, 10X10 seconds holds with emphasis on diaphragmatic breathing with lower abdominal activation  - Lower abdominal bracing in hook lying position with blood pressure biofeed back 40 mmhg-60mmhg with alternating knee fall outs 2X10 performed each leg  - Lower abdominal bracing in hook lying position with blood pressure biofeed  back 40 mmhg-60mmhg with alternating hip marches X10 performed each leg      Skilled input: verbal instruction/cues and tactile instruction/cues    Writer verbally educated and received verbal consent for hand placement, positioning of patient, and techniques to be performed today from patient for clothing adjustments for techniques and therapist position for techniques as described above and how they are pertinent to the patient's plan of care.  Home Exercise Program  Access Code: [email or web address]  URL: [email or web address]  Date: 09/21/2023  Prepared by: Demarcus Wiley    Exercises  - PERSHING- PGM clamshells  - 1 x daily - 7 x weekly - 3 sets - 10 reps  - Supine Bridge  - 1 x daily - 7 x weekly - 3 sets - 10 reps      ASSESSMENT                                                                                                            Patient still limited in hip accessory mobility but improved following manual therapy. Patient experiencing an increase in tissue irritability in today's session. Emphasis of session on improving hip mobility and total hip strength, as well as lumbopelvic neuromuscular control. Will continue to progress patient in upcoming sessions to reduce impairments and improve function.    Education:   - Results of above outlined education: Verbalizes understanding and Demonstrates understanding       Therapy procedure time and total treatment time can be found documented on the Time Entry flowsheet     Hospital chart (includes HIE)

## 2023-12-05 ENCOUNTER — APPOINTMENT (OUTPATIENT)
Dept: INTERNAL MEDICINE | Facility: CLINIC | Age: 73
End: 2023-12-05

## 2023-12-05 ENCOUNTER — INPATIENT (INPATIENT)
Facility: HOSPITAL | Age: 73
LOS: 1 days | Discharge: HOME | DRG: 641 | End: 2023-12-07
Attending: STUDENT IN AN ORGANIZED HEALTH CARE EDUCATION/TRAINING PROGRAM | Admitting: HOSPITALIST
Payer: MEDICARE

## 2023-12-05 VITALS
DIASTOLIC BLOOD PRESSURE: 106 MMHG | HEIGHT: 66 IN | SYSTOLIC BLOOD PRESSURE: 211 MMHG | HEART RATE: 100 BPM | WEIGHT: 139.99 LBS | TEMPERATURE: 98 F | RESPIRATION RATE: 20 BRPM | OXYGEN SATURATION: 100 %

## 2023-12-05 DIAGNOSIS — Z98.89 OTHER SPECIFIED POSTPROCEDURAL STATES: Chronic | ICD-10-CM

## 2023-12-05 LAB
ALBUMIN SERPL ELPH-MCNC: 4.7 G/DL — SIGNIFICANT CHANGE UP (ref 3.3–5)
ALBUMIN SERPL ELPH-MCNC: 4.7 G/DL — SIGNIFICANT CHANGE UP (ref 3.3–5)
ALP SERPL-CCNC: 94 U/L — SIGNIFICANT CHANGE UP (ref 40–120)
ALP SERPL-CCNC: 94 U/L — SIGNIFICANT CHANGE UP (ref 40–120)
ALT FLD-CCNC: 23 U/L — SIGNIFICANT CHANGE UP (ref 10–45)
ALT FLD-CCNC: 23 U/L — SIGNIFICANT CHANGE UP (ref 10–45)
ANION GAP SERPL CALC-SCNC: 13 MMOL/L — SIGNIFICANT CHANGE UP (ref 5–17)
ANION GAP SERPL CALC-SCNC: 13 MMOL/L — SIGNIFICANT CHANGE UP (ref 5–17)
AST SERPL-CCNC: 26 U/L — SIGNIFICANT CHANGE UP (ref 10–40)
AST SERPL-CCNC: 26 U/L — SIGNIFICANT CHANGE UP (ref 10–40)
BASOPHILS # BLD AUTO: 0.06 K/UL — SIGNIFICANT CHANGE UP (ref 0–0.2)
BASOPHILS # BLD AUTO: 0.06 K/UL — SIGNIFICANT CHANGE UP (ref 0–0.2)
BASOPHILS NFR BLD AUTO: 0.8 % — SIGNIFICANT CHANGE UP (ref 0–2)
BASOPHILS NFR BLD AUTO: 0.8 % — SIGNIFICANT CHANGE UP (ref 0–2)
BILIRUB SERPL-MCNC: 0.4 MG/DL — SIGNIFICANT CHANGE UP (ref 0.2–1.2)
BILIRUB SERPL-MCNC: 0.4 MG/DL — SIGNIFICANT CHANGE UP (ref 0.2–1.2)
BUN SERPL-MCNC: 29 MG/DL — HIGH (ref 7–23)
BUN SERPL-MCNC: 29 MG/DL — HIGH (ref 7–23)
CALCIUM SERPL-MCNC: 10.2 MG/DL — SIGNIFICANT CHANGE UP (ref 8.4–10.5)
CALCIUM SERPL-MCNC: 10.2 MG/DL — SIGNIFICANT CHANGE UP (ref 8.4–10.5)
CHLORIDE SERPL-SCNC: 91 MMOL/L — LOW (ref 96–108)
CHLORIDE SERPL-SCNC: 91 MMOL/L — LOW (ref 96–108)
CO2 SERPL-SCNC: 25 MMOL/L — SIGNIFICANT CHANGE UP (ref 22–31)
CO2 SERPL-SCNC: 25 MMOL/L — SIGNIFICANT CHANGE UP (ref 22–31)
CREAT SERPL-MCNC: 1.33 MG/DL — HIGH (ref 0.5–1.3)
CREAT SERPL-MCNC: 1.33 MG/DL — HIGH (ref 0.5–1.3)
EGFR: 56 ML/MIN/1.73M2 — LOW
EGFR: 56 ML/MIN/1.73M2 — LOW
EOSINOPHIL # BLD AUTO: 0.05 K/UL — SIGNIFICANT CHANGE UP (ref 0–0.5)
EOSINOPHIL # BLD AUTO: 0.05 K/UL — SIGNIFICANT CHANGE UP (ref 0–0.5)
EOSINOPHIL NFR BLD AUTO: 0.6 % — SIGNIFICANT CHANGE UP (ref 0–6)
EOSINOPHIL NFR BLD AUTO: 0.6 % — SIGNIFICANT CHANGE UP (ref 0–6)
GLUCOSE SERPL-MCNC: 105 MG/DL — HIGH (ref 70–99)
GLUCOSE SERPL-MCNC: 105 MG/DL — HIGH (ref 70–99)
HCT VFR BLD CALC: 34.1 % — LOW (ref 39–50)
HCT VFR BLD CALC: 34.1 % — LOW (ref 39–50)
HGB BLD-MCNC: 11.8 G/DL — LOW (ref 13–17)
HGB BLD-MCNC: 11.8 G/DL — LOW (ref 13–17)
IMM GRANULOCYTES NFR BLD AUTO: 0.4 % — SIGNIFICANT CHANGE UP (ref 0–0.9)
IMM GRANULOCYTES NFR BLD AUTO: 0.4 % — SIGNIFICANT CHANGE UP (ref 0–0.9)
LYMPHOCYTES # BLD AUTO: 1.29 K/UL — SIGNIFICANT CHANGE UP (ref 1–3.3)
LYMPHOCYTES # BLD AUTO: 1.29 K/UL — SIGNIFICANT CHANGE UP (ref 1–3.3)
LYMPHOCYTES # BLD AUTO: 16.1 % — SIGNIFICANT CHANGE UP (ref 13–44)
LYMPHOCYTES # BLD AUTO: 16.1 % — SIGNIFICANT CHANGE UP (ref 13–44)
MCHC RBC-ENTMCNC: 27 PG — SIGNIFICANT CHANGE UP (ref 27–34)
MCHC RBC-ENTMCNC: 27 PG — SIGNIFICANT CHANGE UP (ref 27–34)
MCHC RBC-ENTMCNC: 34.6 GM/DL — SIGNIFICANT CHANGE UP (ref 32–36)
MCHC RBC-ENTMCNC: 34.6 GM/DL — SIGNIFICANT CHANGE UP (ref 32–36)
MCV RBC AUTO: 78 FL — LOW (ref 80–100)
MCV RBC AUTO: 78 FL — LOW (ref 80–100)
MONOCYTES # BLD AUTO: 0.85 K/UL — SIGNIFICANT CHANGE UP (ref 0–0.9)
MONOCYTES # BLD AUTO: 0.85 K/UL — SIGNIFICANT CHANGE UP (ref 0–0.9)
MONOCYTES NFR BLD AUTO: 10.6 % — SIGNIFICANT CHANGE UP (ref 2–14)
MONOCYTES NFR BLD AUTO: 10.6 % — SIGNIFICANT CHANGE UP (ref 2–14)
NEUTROPHILS # BLD AUTO: 5.72 K/UL — SIGNIFICANT CHANGE UP (ref 1.8–7.4)
NEUTROPHILS # BLD AUTO: 5.72 K/UL — SIGNIFICANT CHANGE UP (ref 1.8–7.4)
NEUTROPHILS NFR BLD AUTO: 71.5 % — SIGNIFICANT CHANGE UP (ref 43–77)
NEUTROPHILS NFR BLD AUTO: 71.5 % — SIGNIFICANT CHANGE UP (ref 43–77)
NRBC # BLD: 0 /100 WBCS — SIGNIFICANT CHANGE UP (ref 0–0)
NRBC # BLD: 0 /100 WBCS — SIGNIFICANT CHANGE UP (ref 0–0)
PLATELET # BLD AUTO: 286 K/UL — SIGNIFICANT CHANGE UP (ref 150–400)
PLATELET # BLD AUTO: 286 K/UL — SIGNIFICANT CHANGE UP (ref 150–400)
POTASSIUM SERPL-MCNC: 3.6 MMOL/L — SIGNIFICANT CHANGE UP (ref 3.5–5.3)
POTASSIUM SERPL-MCNC: 3.6 MMOL/L — SIGNIFICANT CHANGE UP (ref 3.5–5.3)
POTASSIUM SERPL-SCNC: 3.6 MMOL/L — SIGNIFICANT CHANGE UP (ref 3.5–5.3)
POTASSIUM SERPL-SCNC: 3.6 MMOL/L — SIGNIFICANT CHANGE UP (ref 3.5–5.3)
PROT SERPL-MCNC: 8.3 G/DL — SIGNIFICANT CHANGE UP (ref 6–8.3)
PROT SERPL-MCNC: 8.3 G/DL — SIGNIFICANT CHANGE UP (ref 6–8.3)
RBC # BLD: 4.37 M/UL — SIGNIFICANT CHANGE UP (ref 4.2–5.8)
RBC # BLD: 4.37 M/UL — SIGNIFICANT CHANGE UP (ref 4.2–5.8)
RBC # FLD: 13.2 % — SIGNIFICANT CHANGE UP (ref 10.3–14.5)
RBC # FLD: 13.2 % — SIGNIFICANT CHANGE UP (ref 10.3–14.5)
SODIUM SERPL-SCNC: 129 MMOL/L — LOW (ref 135–145)
SODIUM SERPL-SCNC: 129 MMOL/L — LOW (ref 135–145)
WBC # BLD: 8 K/UL — SIGNIFICANT CHANGE UP (ref 3.8–10.5)
WBC # BLD: 8 K/UL — SIGNIFICANT CHANGE UP (ref 3.8–10.5)
WBC # FLD AUTO: 8 K/UL — SIGNIFICANT CHANGE UP (ref 3.8–10.5)
WBC # FLD AUTO: 8 K/UL — SIGNIFICANT CHANGE UP (ref 3.8–10.5)

## 2023-12-05 PROCEDURE — 70450 CT HEAD/BRAIN W/O DYE: CPT | Mod: 26,MA

## 2023-12-05 PROCEDURE — 99285 EMERGENCY DEPT VISIT HI MDM: CPT

## 2023-12-05 PROCEDURE — 72125 CT NECK SPINE W/O DYE: CPT | Mod: 26,MA

## 2023-12-05 PROCEDURE — 72170 X-RAY EXAM OF PELVIS: CPT | Mod: 26

## 2023-12-05 PROCEDURE — 71045 X-RAY EXAM CHEST 1 VIEW: CPT | Mod: 26

## 2023-12-05 RX ORDER — CARVEDILOL PHOSPHATE 80 MG/1
6.25 CAPSULE, EXTENDED RELEASE ORAL EVERY 12 HOURS
Refills: 0 | Status: DISCONTINUED | OUTPATIENT
Start: 2023-12-05 | End: 2023-12-07

## 2023-12-05 RX ORDER — SACUBITRIL AND VALSARTAN 24; 26 MG/1; MG/1
1 TABLET, FILM COATED ORAL
Refills: 0 | Status: DISCONTINUED | OUTPATIENT
Start: 2023-12-05 | End: 2023-12-07

## 2023-12-05 RX ADMIN — SACUBITRIL AND VALSARTAN 1 TABLET(S): 24; 26 TABLET, FILM COATED ORAL at 19:29

## 2023-12-05 RX ADMIN — CARVEDILOL PHOSPHATE 6.25 MILLIGRAM(S): 80 CAPSULE, EXTENDED RELEASE ORAL at 19:30

## 2023-12-05 NOTE — ED PROVIDER NOTE - ATTENDING CONTRIBUTION TO CARE
Patient is a 72-year-old male with a history of hypertension, hyperlipidemia, CHF, history of hep C s/p treatment with Harvoni, here for high blood pressure.  Patient states his daughter called EMS without his knowledge.  He denies any headache, chest pain, shortness of breath.  He states he takes his medications once a day.  He is not sure what his medications are.  He states he did not take his medications today.  Upon further questioning, patient is not oriented to month, year, president.  He knows he is in the hospital.  Patient cannot name his primary care physician.  Per chart review, patient had a heart failure admission to the CCU INFeD 2023.  At that time, daughter had expressed unsafe conditions at home and social work was consulted and Adult Protective Services were contacted per documentation in the notes.  Patient states he lives alone and has a dog.      VS noted  Gen. no acute distress, Non toxic   HEENT: EOMI, mmm  Lungs: CTAB/L no C/ W /R   CVS: RRR   Abd; Soft non tender, non distended   Ext: no edema  Skin: no rash  Neuro AAOx1, CN II-XII intact, strength equal bilaterally, sensation intact bilaterally, clear speech  a/p:  high blood pressure–patient denies  headache, chest pain, shortness of breath, infectious symptoms.   Blood pressure is noted to be greater than 200 systolic.  Plan to give patient his home medications for blood pressure.  We will reach out to daughter for collateral.  - Alex OGLESBY

## 2023-12-05 NOTE — ED PROVIDER NOTE - PROGRESS NOTE DETAILS
Francesca Mcclelland M.D. (Resident Physician): Spoke with pt's daughter Alyson Cortes. She said that she called EMS because pt has not been taking his medications. His house is also very dirty and has dog feces all over it because he does not take the dog outside. His girlfriend is a hoarder and daughter says the house is not livable. He has been having difficulty ambulating and fell yesterday hitting his head. He becomes combative when his children try to help. Daughter also thinks he is a daily alchocol drinker. Pt is currently A&Ox3. Will get CTH and c-spine as well as CXR and XR pelvis.

## 2023-12-05 NOTE — ED PROVIDER NOTE - PHYSICAL EXAMINATION
General: Cachectic NAD  HEENT: NCAT, PERRL, oropharyngeal AW patent, no conjunctival pallor  Cardiac: RRR, no murmurs, 2+ peripheral pulses  Resp: CTAB, normal rate  Abdomen: soft, non-distended, bowel sounds present, no ttp, no rebound or guarding. no organomegaly  Extremities: no peripheral edema, calf tenderness, or leg size discrepancies  Skin: warm and dry no rashes  Neuro: Disoriented to month, year, president, alert to place and self.  5+motor, sensation grossly intact, CN II through XII intact  Psych: Mildly agitated that his daughter called EMS for "no good reason".

## 2023-12-05 NOTE — ED ADULT NURSE NOTE - OBJECTIVE STATEMENT
Pt is a 73y M, AxO3, PMH HTN, Hep c, presents to ED for HTN. As er EMS, pt did not want to come to ED today- he is completely asymptomatic. However, pt family called EMS because of elevated BP and they believe pt has not been compliant with medications x5 months. On assessment, breathing is spontaneous and unlabored, abd is nontender, skin is warm and dry. Denies headache, vision changes, numbness, tingling, chest pain, SOB. Pt is well appearing, resting comfortably in bed, ED team is aware of pt current BP, no acute distress.

## 2023-12-05 NOTE — ED PROVIDER NOTE - CLINICAL SUMMARY MEDICAL DECISION MAKING FREE TEXT BOX
Elderly gentleman with prior history of cardiac disease, heart failure, prior Adult Protective Services investigation due to concerns from daughter about patient's living conditions.  Present's disoriented but asymptomatic.  Possible that patient was complaining of symptoms that he cannot remember.  Will obtain hypertensive emergency workup, EKG, cardiac monitoring, obtain collateral and dispo per results.  Home medications reviewed in the chart and p.m. doses given for blood pressure control.

## 2023-12-05 NOTE — ED PROVIDER NOTE - OBJECTIVE STATEMENT
72-year-old male history of hypertension, hyperlipidemia, heart failure, recent CCU admission presents via EMS after daughter called for unknown reason.  Patient denies any headache, vision changes, chest pain, shortness of breath, nausea, vomiting, imbalance, numbness/tingling/weakness.  Thinks he takes his medications once a day.  Cannot recall the name of his primary care doctor.  Lives alone and "does not need any help".  Found to be hypertensive to 210s by EMS

## 2023-12-05 NOTE — ED ADULT TRIAGE NOTE - CHIEF COMPLAINT QUOTE
hypertension 220sys at home  Pt asymptomatic, daughter called EMS  due to pt not taking home medications
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
## 2023-12-06 DIAGNOSIS — R62.7 ADULT FAILURE TO THRIVE: ICD-10-CM

## 2023-12-06 DIAGNOSIS — Z29.9 ENCOUNTER FOR PROPHYLACTIC MEASURES, UNSPECIFIED: ICD-10-CM

## 2023-12-06 DIAGNOSIS — E87.1 HYPO-OSMOLALITY AND HYPONATREMIA: ICD-10-CM

## 2023-12-06 DIAGNOSIS — F10.90 ALCOHOL USE, UNSPECIFIED, UNCOMPLICATED: ICD-10-CM

## 2023-12-06 DIAGNOSIS — I50.22 CHRONIC SYSTOLIC (CONGESTIVE) HEART FAILURE: ICD-10-CM

## 2023-12-06 DIAGNOSIS — I25.10 ATHEROSCLEROTIC HEART DISEASE OF NATIVE CORONARY ARTERY WITHOUT ANGINA PECTORIS: ICD-10-CM

## 2023-12-06 DIAGNOSIS — I16.0 HYPERTENSIVE URGENCY: ICD-10-CM

## 2023-12-06 DIAGNOSIS — F32.9 MAJOR DEPRESSIVE DISORDER, SINGLE EPISODE, UNSPECIFIED: ICD-10-CM

## 2023-12-06 DIAGNOSIS — Z79.899 OTHER LONG TERM (CURRENT) DRUG THERAPY: ICD-10-CM

## 2023-12-06 LAB
ALBUMIN SERPL ELPH-MCNC: 4.3 G/DL — SIGNIFICANT CHANGE UP (ref 3.3–5)
ALBUMIN SERPL ELPH-MCNC: 4.3 G/DL — SIGNIFICANT CHANGE UP (ref 3.3–5)
ALP SERPL-CCNC: 89 U/L — SIGNIFICANT CHANGE UP (ref 40–120)
ALP SERPL-CCNC: 89 U/L — SIGNIFICANT CHANGE UP (ref 40–120)
ALT FLD-CCNC: 16 U/L — SIGNIFICANT CHANGE UP (ref 10–45)
ALT FLD-CCNC: 16 U/L — SIGNIFICANT CHANGE UP (ref 10–45)
ANION GAP SERPL CALC-SCNC: 10 MMOL/L — SIGNIFICANT CHANGE UP (ref 5–17)
ANION GAP SERPL CALC-SCNC: 10 MMOL/L — SIGNIFICANT CHANGE UP (ref 5–17)
AST SERPL-CCNC: 22 U/L — SIGNIFICANT CHANGE UP (ref 10–40)
AST SERPL-CCNC: 22 U/L — SIGNIFICANT CHANGE UP (ref 10–40)
BILIRUB SERPL-MCNC: 0.4 MG/DL — SIGNIFICANT CHANGE UP (ref 0.2–1.2)
BILIRUB SERPL-MCNC: 0.4 MG/DL — SIGNIFICANT CHANGE UP (ref 0.2–1.2)
BUN SERPL-MCNC: 29 MG/DL — HIGH (ref 7–23)
BUN SERPL-MCNC: 29 MG/DL — HIGH (ref 7–23)
CALCIUM SERPL-MCNC: 9.9 MG/DL — SIGNIFICANT CHANGE UP (ref 8.4–10.5)
CALCIUM SERPL-MCNC: 9.9 MG/DL — SIGNIFICANT CHANGE UP (ref 8.4–10.5)
CHLORIDE SERPL-SCNC: 93 MMOL/L — LOW (ref 96–108)
CHLORIDE SERPL-SCNC: 93 MMOL/L — LOW (ref 96–108)
CO2 SERPL-SCNC: 26 MMOL/L — SIGNIFICANT CHANGE UP (ref 22–31)
CO2 SERPL-SCNC: 26 MMOL/L — SIGNIFICANT CHANGE UP (ref 22–31)
CREAT SERPL-MCNC: 1.3 MG/DL — SIGNIFICANT CHANGE UP (ref 0.5–1.3)
CREAT SERPL-MCNC: 1.3 MG/DL — SIGNIFICANT CHANGE UP (ref 0.5–1.3)
EGFR: 58 ML/MIN/1.73M2 — LOW
EGFR: 58 ML/MIN/1.73M2 — LOW
FERRITIN SERPL-MCNC: 400 NG/ML — SIGNIFICANT CHANGE UP (ref 30–400)
FERRITIN SERPL-MCNC: 400 NG/ML — SIGNIFICANT CHANGE UP (ref 30–400)
FOLATE SERPL-MCNC: 12.9 NG/ML — SIGNIFICANT CHANGE UP
FOLATE SERPL-MCNC: 12.9 NG/ML — SIGNIFICANT CHANGE UP
GLUCOSE SERPL-MCNC: 119 MG/DL — HIGH (ref 70–99)
GLUCOSE SERPL-MCNC: 119 MG/DL — HIGH (ref 70–99)
HCT VFR BLD CALC: 32.3 % — LOW (ref 39–50)
HCT VFR BLD CALC: 32.3 % — LOW (ref 39–50)
HGB BLD-MCNC: 11.7 G/DL — LOW (ref 13–17)
HGB BLD-MCNC: 11.7 G/DL — LOW (ref 13–17)
IRON SATN MFR SERPL: 10 % — LOW (ref 16–55)
IRON SATN MFR SERPL: 10 % — LOW (ref 16–55)
IRON SATN MFR SERPL: 28 UG/DL — LOW (ref 45–165)
IRON SATN MFR SERPL: 28 UG/DL — LOW (ref 45–165)
MAGNESIUM SERPL-MCNC: 1.6 MG/DL — SIGNIFICANT CHANGE UP (ref 1.6–2.6)
MAGNESIUM SERPL-MCNC: 1.6 MG/DL — SIGNIFICANT CHANGE UP (ref 1.6–2.6)
MCHC RBC-ENTMCNC: 28.8 PG — SIGNIFICANT CHANGE UP (ref 27–34)
MCHC RBC-ENTMCNC: 28.8 PG — SIGNIFICANT CHANGE UP (ref 27–34)
MCHC RBC-ENTMCNC: 36.2 GM/DL — HIGH (ref 32–36)
MCHC RBC-ENTMCNC: 36.2 GM/DL — HIGH (ref 32–36)
MCV RBC AUTO: 79.6 FL — LOW (ref 80–100)
MCV RBC AUTO: 79.6 FL — LOW (ref 80–100)
NRBC # BLD: 0 /100 WBCS — SIGNIFICANT CHANGE UP (ref 0–0)
NRBC # BLD: 0 /100 WBCS — SIGNIFICANT CHANGE UP (ref 0–0)
NT-PROBNP SERPL-SCNC: 2147 PG/ML — HIGH (ref 0–300)
NT-PROBNP SERPL-SCNC: 2147 PG/ML — HIGH (ref 0–300)
PHOSPHATE SERPL-MCNC: 3 MG/DL — SIGNIFICANT CHANGE UP (ref 2.5–4.5)
PHOSPHATE SERPL-MCNC: 3 MG/DL — SIGNIFICANT CHANGE UP (ref 2.5–4.5)
PLATELET # BLD AUTO: 272 K/UL — SIGNIFICANT CHANGE UP (ref 150–400)
PLATELET # BLD AUTO: 272 K/UL — SIGNIFICANT CHANGE UP (ref 150–400)
POTASSIUM SERPL-MCNC: 3.8 MMOL/L — SIGNIFICANT CHANGE UP (ref 3.5–5.3)
POTASSIUM SERPL-MCNC: 3.8 MMOL/L — SIGNIFICANT CHANGE UP (ref 3.5–5.3)
POTASSIUM SERPL-SCNC: 3.8 MMOL/L — SIGNIFICANT CHANGE UP (ref 3.5–5.3)
POTASSIUM SERPL-SCNC: 3.8 MMOL/L — SIGNIFICANT CHANGE UP (ref 3.5–5.3)
PROT SERPL-MCNC: 6.9 G/DL — SIGNIFICANT CHANGE UP (ref 6–8.3)
PROT SERPL-MCNC: 6.9 G/DL — SIGNIFICANT CHANGE UP (ref 6–8.3)
RBC # BLD: 4.06 M/UL — LOW (ref 4.2–5.8)
RBC # BLD: 4.06 M/UL — LOW (ref 4.2–5.8)
RBC # FLD: 13.3 % — SIGNIFICANT CHANGE UP (ref 10.3–14.5)
RBC # FLD: 13.3 % — SIGNIFICANT CHANGE UP (ref 10.3–14.5)
SODIUM SERPL-SCNC: 129 MMOL/L — LOW (ref 135–145)
SODIUM SERPL-SCNC: 129 MMOL/L — LOW (ref 135–145)
TIBC SERPL-MCNC: 265 UG/DL — SIGNIFICANT CHANGE UP (ref 220–430)
TIBC SERPL-MCNC: 265 UG/DL — SIGNIFICANT CHANGE UP (ref 220–430)
TROPONIN T, HIGH SENSITIVITY RESULT: 31 NG/L — SIGNIFICANT CHANGE UP (ref 0–51)
TROPONIN T, HIGH SENSITIVITY RESULT: 31 NG/L — SIGNIFICANT CHANGE UP (ref 0–51)
UIBC SERPL-MCNC: 237 UG/DL — SIGNIFICANT CHANGE UP (ref 110–370)
UIBC SERPL-MCNC: 237 UG/DL — SIGNIFICANT CHANGE UP (ref 110–370)
VIT B12 SERPL-MCNC: 862 PG/ML — SIGNIFICANT CHANGE UP (ref 232–1245)
VIT B12 SERPL-MCNC: 862 PG/ML — SIGNIFICANT CHANGE UP (ref 232–1245)
WBC # BLD: 6.71 K/UL — SIGNIFICANT CHANGE UP (ref 3.8–10.5)
WBC # BLD: 6.71 K/UL — SIGNIFICANT CHANGE UP (ref 3.8–10.5)
WBC # FLD AUTO: 6.71 K/UL — SIGNIFICANT CHANGE UP (ref 3.8–10.5)
WBC # FLD AUTO: 6.71 K/UL — SIGNIFICANT CHANGE UP (ref 3.8–10.5)

## 2023-12-06 PROCEDURE — 99223 1ST HOSP IP/OBS HIGH 75: CPT

## 2023-12-06 RX ORDER — INFLUENZA VIRUS VACCINE 15; 15; 15; 15 UG/.5ML; UG/.5ML; UG/.5ML; UG/.5ML
0.7 SUSPENSION INTRAMUSCULAR ONCE
Refills: 0 | Status: DISCONTINUED | OUTPATIENT
Start: 2023-12-06 | End: 2023-12-07

## 2023-12-06 RX ORDER — ASPIRIN/CALCIUM CARB/MAGNESIUM 324 MG
81 TABLET ORAL DAILY
Refills: 0 | Status: DISCONTINUED | OUTPATIENT
Start: 2023-12-06 | End: 2023-12-07

## 2023-12-06 RX ORDER — ATORVASTATIN CALCIUM 80 MG/1
40 TABLET, FILM COATED ORAL AT BEDTIME
Refills: 0 | Status: DISCONTINUED | OUTPATIENT
Start: 2023-12-06 | End: 2023-12-07

## 2023-12-06 RX ORDER — THIAMINE MONONITRATE (VIT B1) 100 MG
100 TABLET ORAL DAILY
Refills: 0 | Status: DISCONTINUED | OUTPATIENT
Start: 2023-12-06 | End: 2023-12-07

## 2023-12-06 RX ORDER — ACETAMINOPHEN 500 MG
650 TABLET ORAL EVERY 6 HOURS
Refills: 0 | Status: DISCONTINUED | OUTPATIENT
Start: 2023-12-06 | End: 2023-12-07

## 2023-12-06 RX ORDER — FOLIC ACID 0.8 MG
1 TABLET ORAL DAILY
Refills: 0 | Status: DISCONTINUED | OUTPATIENT
Start: 2023-12-06 | End: 2023-12-07

## 2023-12-06 RX ADMIN — CARVEDILOL PHOSPHATE 6.25 MILLIGRAM(S): 80 CAPSULE, EXTENDED RELEASE ORAL at 18:36

## 2023-12-06 RX ADMIN — Medication 1 MILLIGRAM(S): at 12:10

## 2023-12-06 RX ADMIN — CARVEDILOL PHOSPHATE 6.25 MILLIGRAM(S): 80 CAPSULE, EXTENDED RELEASE ORAL at 06:08

## 2023-12-06 RX ADMIN — ATORVASTATIN CALCIUM 40 MILLIGRAM(S): 80 TABLET, FILM COATED ORAL at 21:13

## 2023-12-06 RX ADMIN — Medication 100 MILLIGRAM(S): at 12:11

## 2023-12-06 RX ADMIN — Medication 81 MILLIGRAM(S): at 12:10

## 2023-12-06 RX ADMIN — SACUBITRIL AND VALSARTAN 1 TABLET(S): 24; 26 TABLET, FILM COATED ORAL at 06:08

## 2023-12-06 RX ADMIN — Medication 1 TABLET(S): at 12:11

## 2023-12-06 RX ADMIN — SACUBITRIL AND VALSARTAN 1 TABLET(S): 24; 26 TABLET, FILM COATED ORAL at 21:13

## 2023-12-06 NOTE — H&P ADULT - PROBLEM SELECTOR PLAN 1
Family noted concerns of unsafe living conditions at home and inability for pt to take care of self.  Adult Protective Services was previously contacted regarding during his Feb 2023 admission.  - f/u SW consult  - PT eval given recent fall  - Nutrition consult given EtOH use and hx of severe HFrEF  - Podiatry consult in AM for toenail care  - consider BH/Psych eval given concerns of inability to take care of self and poor insight into his medical conditions

## 2023-12-06 NOTE — CHART NOTE - NSCHARTNOTEFT_GEN_A_CORE
72M w/ hx of HFrEF (EF 25% in 10/2022), mod MR, HTN, HLD, CAD, HCV (s/p Harvoni treatment), chronic hyponatremia, EtOH use disorder, presenting after EMS was called by daughter for concerns of unsafe living conditions at home and inability for pt to take care of self. Course c/b hypertensive urgency in setting of med noncompliance/missing dose    No acute events overnight, vss, afebrile  Pt without any complaints, wondering when he is going home  Spoke with daughter Ciera over the phone - doesn't think home is safe for her father    - appreciate PT recs: no skilled PT needs  - SW on board for safe dc planning    Kathia Brewster MD  Division of Hospital Medicine  Contact via [employer] Teams  Office: [phone] 72M w/ hx of HFrEF (EF 25% in 10/2022), mod MR, HTN, HLD, CAD, HCV (s/p Harvoni treatment), chronic hyponatremia, EtOH use disorder, presenting after EMS was called by daughter for concerns of unsafe living conditions at home and inability for pt to take care of self. Course c/b hypertensive urgency in setting of med noncompliance/missing dose    No acute events overnight, vss, afebrile  Pt without any complaints, wondering when he is going home  Spoke with daughter Ciera over the phone - doesn't think home is safe for her father    - appreciate PT recs: no skilled PT needs  - SW on board for safe dc planning    Kathia Brewster MD  Division of Hospital Medicine  Contact via [employer] Teams  Office: [phone]

## 2023-12-06 NOTE — PATIENT PROFILE ADULT - FUNCTIONAL ASSESSMENT - BASIC MOBILITY 6.
4-calculated by average/Not able to assess (calculate score using [facility] averaging method) 4-calculated by average/Not able to assess (calculate score using [facility] averaging method)

## 2023-12-06 NOTE — PHYSICAL THERAPY INITIAL EVALUATION ADULT - ACTIVE RANGE OF MOTION EXAMINATION, REHAB EVAL
ab. upper extremity Active ROM was WNL (within normal limits)/bilateral lower extremity Active ROM was WNL (within normal limits)

## 2023-12-06 NOTE — H&P ADULT - PROBLEM SELECTOR PROBLEM 9
Pt states she is having allergic side effects to hydrocodone, and she would like a different pain med.   Had surgery on last week    -Please advise?   Medication management

## 2023-12-06 NOTE — H&P ADULT - NSHPSOCIALHISTORY_GEN_ALL_CORE
Former smoker, said he quit years ago. Smokes cigars now occasionally. Drinks 1-2 beers with dinner on most nights. Denied illicit/recreational drug use. Lives alone at home with his dog. Ambulates with a walker (at times) at baseline.

## 2023-12-06 NOTE — H&P ADULT - PROBLEM SELECTOR PLAN 5
Hx of HFrEF (EF 25% in 10/2022) and with mod MR. Has had previous admissions for CHF exacerbation and required CICU stay in 10/22/23.  - clinically euvolemic-appearing on admission, does not appear to be in exacerbation  - c/w Entresto and Coreg for GDMT  - previously discharged on Lasix 20mg daily and Spironolactone 25mg daily, but pt has not been compliant; consider resuming Lasix for maintenance, but currently appears euvolemic  - f/u proBNP  - f/u TTE to assess interval progression as pt has not been compliant with medication and has poor outpatient follow-up

## 2023-12-06 NOTE — H&P ADULT - NSHPPHYSICALEXAM_GEN_ALL_CORE
Vital Signs Last 24 Hrs  T(C): 36.9 (06 Dec 2023 03:02), Max: 37.1 (05 Dec 2023 19:23)  T(F): 98.5 (06 Dec 2023 03:02), Max: 98.7 (05 Dec 2023 19:23)  HR: 85 (06 Dec 2023 03:02) (73 - 100)  BP: 120/83 (06 Dec 2023 03:02) (120/83 - 211/106)  BP(mean): --  RR: 18 (06 Dec 2023 03:02) (17 - 20)  SpO2: 99% (06 Dec 2023 03:02) (99% - 100%)    Parameters below as of 06 Dec 2023 03:02  Patient On (Oxygen Delivery Method): room air        CONSTITUTIONAL: NAD, pleasant elderly man laying in bed  EYES: PERRL; sclera clear  ENMT: Moist oral mucosa, no pharyngeal exudates  NECK: Supple, no palpable masses  RESPIRATORY: Normal respiratory effort; lungs are clear to auscultation bilaterally; No wheezes or rales  CARDIOVASCULAR: Regular rate and rhythm; Normal S1 and S2; No murmurs, rubs, or gallops; No lower extremity edema; Peripheral pulses are palpable bilaterally  ABDOMEN: Soft, Nontender, Nondistended; Bowel sounds present  MUSCULOSKELETAL:  No clubbing or cyanosis of digits; No joint swelling or tenderness to palpation  PSYCH: AAOx3 (oriented to person, place, and month/year/president - after taking some time to think and talking through it by himself); affect appropriate; poor insight on his medical conditions (did not think he needed meds for his CHF)  NEUROLOGY: moving all extremities spontaneously; no gross sensory deficits  SKIN: No rashes; No palpable lesions; Healing wound scabbed over on forehead; Long unkempt toenails noted with dark-colored substance between toes and over bedsheets (pt reported it was dirt)

## 2023-12-06 NOTE — H&P ADULT - HISTORY OF PRESENT ILLNESS
72M w/ hx of HFrEF (EF 25% in 10/2022), mod MR, HTN, HLD, CAD, HCV (s/p Harvoni treatment), chronic hyponatremia, EtOH use disorder, presenting after EMS was called by daughter for concerns of unsafe living conditions at home and inability for pt to take care of self. He said EMS was called without his knowledge. He was found to have SBP to 210s by EMS on arrival. Pt reported he lives alone with his dog. He believes he takes his medications once a day, but had missed taking his medications on day of presentation and does not know what medications he is on. In ED, he was noted to not be oriented to month, year, or president. Stated "he does not need any help."     Per collateral information obtained from daughter (Alyson Cortes) by ED, she had called EMS because pt had not been taking his medications and the house he is in is not livable. She had noted his house being very dirty with dog feces all over because he does not take the dog outside. Noted that his girlfriend is a hoarder. Reported that pt has been having difficulty ambulating and fell yesterday hitting his head. Stated pt becomes combative when his children try to help him. Daughter thinks pt is a daily alcohol drinker. Of note, during his prior admission (at [facility] in Feb 2022 for CHF exacerbation), daughter Alyson had also expressed concerns of unsafe conditions and inability for pt to take care of self, for which SW was consulted and Adult Protective Services were contacted.    In ED: Afebrile, HR 70s-100s, SBP 210s->120s, RR 17-20, sating 100% on RA. Labs notable for Hgb 11.8, MCV 78, Na 129, SCr 1.33, hsTrop 32->31. CTH/C-spine w/o acute findings, but noted chronic/unchanged "ventriculomegaly out of proportion to sulcal prominence could represent sequela of central volume loss or normal pressure hydrocephalus." Received coreg 6.25mg x1 and Entresto 97-103mg x1. Admitted to Medicine for further management. 72M w/ hx of HFrEF (EF 25% in 10/2022), mod MR, HTN, HLD, CAD, HCV (s/p Harvoni treatment), chronic hyponatremia, EtOH use disorder, presenting after EMS was called by daughter for concerns of unsafe living conditions at home and inability for pt to take care of self. He said EMS was called without his knowledge. He was found to have SBP to 210s by EMS on arrival. Pt reported he lives alone with his dog. He believes he takes his medications once a day, but had missed taking his medications on day of presentation and does not know what medications he is on. In ED, he was noted to not be oriented to month, year, or president. Stated "he does not need any help."     Per collateral information obtained from daughter (Alyson Cortes) by ED, she had called EMS because pt had not been taking his medications and the house he is in is not livable. She had noted his house being very dirty with dog feces all over because he does not take the dog outside. Noted that his girlfriend is a hoarder. Reported that pt has been having difficulty ambulating and fell yesterday hitting his head. Stated pt becomes combative when his children try to help him. Daughter thinks pt is a daily alcohol drinker. Of note, during his prior admission (at [facility] in Feb 2022 for CHF exacerbation), daughter Alyson had also expressed concerns of unsafe conditions and inability for pt to take care of self, for which SW was consulted and Adult Protective Services were contacted.    In ED: Afebrile, HR 70s-100s, SBP 210s->120s, RR 17-20, sating 100% on RA. Labs notable for Hgb 11.8, MCV 78, Na 129, SCr 1.33, hsTrop 32->31. CTH/C-spine w/o acute findings, but noted chronic/unchanged "ventriculomegaly out of proportion to sulcal prominence could represent sequela of central volume loss or normal pressure hydrocephalus." Received coreg 6.25mg x1 and Entresto 97-103mg x1. Admitted to Medicine for further management. 72M w/ hx of HFrEF (EF 25% in 10/2022), mod MR, HTN, HLD, CAD, HCV (s/p Harvoni treatment), chronic hyponatremia, EtOH use disorder, presenting after EMS was called by daughter for concerns of unsafe living conditions at home and inability for pt to take care of self. Pt reported he lives alone with his dog. Said his daughters called EMS without his knowledge, which is why he is in the hospital. He reported feeling well overall and without complaints. Endorsed he fell yesterday, but denied LOC or prodromal symptoms, said he just slipped on the stairs and maybe bumped his head (after the scabs on his forehead was pointed out).    Per chart review, he was found to have SBP to 210s by EMS. He endorsed he had missed taking his medications on day of presentation. Noted he "focuses on taking one medication that he believes is most important" which is his blood pressure medication, but he does not know name of it. He recalled getting a bunch of medications after his discharge from last hospitalization in Feb 2023 (including water pills), but does not take them bedsides the one medication. In ED, he was reportedly not oriented to month, year, or president at one point, but on encounter for admission, pt was AAOx3 (though took a little time to figure out month/president). Reported he drinks 1-2 beers with dinner most nights and last drink was 12/4/23 evening. Toenails appeared unkempt with dark-colored substance between toes, which he attributed to dirt (said he walks outside with shoes often with his dog).    Per collateral information obtained from daughter (Alyson Cortes) by ED, she had called EMS because pt had not been taking his medications and the house he is in is not livable. She had noted his house being very dirty with dog feces all over because he does not take the dog outside. Noted that his girlfriend is a hoarder. Reported that pt has been having difficulty ambulating and fell yesterday hitting his head. Stated pt becomes combative when his children try to help him. Daughter thinks pt is a daily alcohol drinker. Of note, during his prior admission (at [facility] in Feb 2022 for CHF exacerbation), daughter Alyson had also expressed concerns of unsafe conditions and inability for pt to take care of self, for which SW was consulted and Adult Protective Services were contacted. Stated his daughters' concerns are "just fabrication."    In ED: Afebrile, HR 70s-100s, SBP 210s->120s, RR 17-20, sating 100% on RA. Labs notable for Hgb 11.8, MCV 78, Na 129, SCr 1.33, hsTrop 32->31. CTH/C-spine w/o acute findings, but noted chronic/unchanged "ventriculomegaly out of proportion to sulcal prominence could represent sequela of central volume loss or normal pressure hydrocephalus." Received coreg 6.25mg x1 and Entresto 97-103mg x1. Admitted to Medicine for further management. 72M w/ hx of HFrEF (EF 25% in 10/2022), mod MR, HTN, HLD, CAD, HCV (s/p Harvoni treatment), chronic hyponatremia, EtOH use disorder, presenting after EMS was called by daughter for concerns of unsafe living conditions at home and inability for pt to take care of self. Pt reported he lives alone with his dog. Said his daughters called EMS without his knowledge, which is why he is in the hospital. He reported feeling well overall and without complaints. Endorsed he fell yesterday, but denied LOC or prodromal symptoms, said he just slipped on the stairs and maybe bumped his head (after the scabs on his forehead was pointed out).    Per chart review, he was found to have SBP to 210s by EMS. He endorsed he had missed taking his medications on day of presentation. Noted he "focuses on taking one medication that he believes is most important" which is his blood pressure medication, but he does not know name of it. He recalled getting a bunch of medications after his discharge from last hospitalization in Feb 2023 (including water pills), but does not take them bedsides the one medication. In ED, he was reportedly not oriented to month, year, or president at one point, but on encounter for admission, pt was AAOx3 (though took a little time to figure out month/president). Reported he drinks 1-2 beers with dinner most nights and last drink was 12/4/23 evening. Toenails appeared unkempt with dark-colored substance between toes, which he attributed to dirt (said he walks outside with shoes often with his dog).    Per collateral information obtained from daughter (Alyson Cortes) by ED, she had called EMS because pt had not been taking his medications and the house he is in is not livable. She had noted his house being very dirty with dog feces all over because he does not take the dog outside. Noted that his girlfriend is a hoarder. Reported that pt has been having difficulty ambulating and fell yesterday hitting his head. Stated pt becomes combative when his children try to help him. Daughter thinks pt is a daily alcohol drinker. Of note, during his prior admission (at [facility] in Feb 2022 for CHF exacerbation), daughter Alyson had also expressed concerns of unsafe conditions and inability for pt to take care of self, for which SW was consulted and Adult Protective Services were contacted. Stated his daughters' concerns are "just fabrication."    In ED: Afebrile, HR 70s-100s, SBP 210s->120s, RR 17-20, sating 100% on RA. Labs notable for Hgb 11.8, MCV 78, Na 129, SCr 1.33, hsTrop 32->31. CTH/C-spine w/o acute findings, but noted chronic/unchanged "ventriculomegaly out of proportion to sulcal prominence could represent sequela of central volume loss or normal pressure hydrocephalus." Received coreg 6.25mg x1 and Entresto 97-103mg x1. Admitted to Medicine for further management.

## 2023-12-06 NOTE — H&P ADULT - NSHPLABSRESULTS_GEN_ALL_CORE
LABS:                        11.8   8.00  )-----------( 286      ( 05 Dec 2023 19:35 )             34.1     12-05    129<L>  |  91<L>  |  29<H>  ----------------------------<  105<H>  3.6   |  25  |  1.33<H>    Ca    10.2      05 Dec 2023 19:35    TPro  8.3  /  Alb  4.7  /  TBili  0.4  /  DBili  x   /  AST  26  /  ALT  23  /  AlkPhos  94  12-05          Urinalysis Basic - ( 05 Dec 2023 19:35 )    Color: x / Appearance: x / SG: x / pH: x  Gluc: 105 mg/dL / Ketone: x  / Bili: x / Urobili: x   Blood: x / Protein: x / Nitrite: x   Leuk Esterase: x / RBC: x / WBC x   Sq Epi: x / Non Sq Epi: x / Bacteria: x            IMAGING/ADDITIONAL TESTS:    Reviewed last TTE:  < from: Transthoracic Echocardiogram (10.06.22 @ 18:39) >  CONCLUSIONS:  EF 25%  1. Mitral annular calcification, tethered mitral leaflets  with normal diastolic opening. Moderate mitral  regurgitation.  2. Calcified trileaflet aortic valve with normal opening.  Mild aortic regurgitation.  3. Mild concentric left ventricular hypertrophy.  4. Severe global left ventricular systolic dysfunction.  5. Right ventricular enlargement with normal right  ventricular systolic function.  6. Bilateral pleural effusions.  < end of copied text > LABS:                        11.8   8.00  )-----------( 286      ( 05 Dec 2023 19:35 )             34.1     12-05    129<L>  |  91<L>  |  29<H>  ----------------------------<  105<H>  3.6   |  25  |  1.33<H>    Ca    10.2      05 Dec 2023 19:35    TPro  8.3  /  Alb  4.7  /  TBili  0.4  /  DBili  x   /  AST  26  /  ALT  23  /  AlkPhos  94  12-05          Urinalysis Basic - ( 05 Dec 2023 19:35 )    Color: x / Appearance: x / SG: x / pH: x  Gluc: 105 mg/dL / Ketone: x  / Bili: x / Urobili: x   Blood: x / Protein: x / Nitrite: x   Leuk Esterase: x / RBC: x / WBC x   Sq Epi: x / Non Sq Epi: x / Bacteria: x            IMAGING/ADDITIONAL TESTS:    EKG (12/5/23) personally reviewed: NSR, LVH, HR 88, QTc 474; ~1.5mm WEN in V3 only, no ST depressions; TWI in V2 (present on EKG from Oct 2022)    CXR (12/5/23) personally reviewed: grossly clear, no focal consolidation    < from: CT Head No Cont (12.05.23 @ 21:10) >  IMPRESSION:  No acute intracranial findings. Ventriculomegaly out of proportion to   sulcal prominence could represent sequela of central volume loss or   normal pressure hydrocephalus. This is chronic and unchanged.  No fracture or dislocation of the cervical spine.  --- End of Report ---  < end of copied text >    < from: Xray Pelvis AP only (12.05.23 @ 20:45) >  IMPRESSION:  Patient is rotated, limiting evaluation of the right hip. No visualized   acute fracture or dislocation.  ******PRELIMINARY REPORT******    < end of copied text >      Reviewed last TTE:  < from: Transthoracic Echocardiogram (10.06.22 @ 18:39) >  CONCLUSIONS:  EF 25%  1. Mitral annular calcification, tethered mitral leaflets  with normal diastolic opening. Moderate mitral  regurgitation.  2. Calcified trileaflet aortic valve with normal opening.  Mild aortic regurgitation.  3. Mild concentric left ventricular hypertrophy.  4. Severe global left ventricular systolic dysfunction.  5. Right ventricular enlargement with normal right  ventricular systolic function.  6. Bilateral pleural effusions.  < end of copied text >

## 2023-12-06 NOTE — H&P ADULT - PROBLEM SELECTOR PLAN 7
Presented with Na 129, appears to be around baseline.  - AAOx3 on admission  - continue to monitor on BMP

## 2023-12-06 NOTE — H&P ADULT - PROBLEM SELECTOR PLAN 10
- DVT ppx: IMPROVEDD score of 1, monitor off chemical ppx for now  - Diet: DASH  - Dispo: pending SW and PT eval

## 2023-12-06 NOTE — H&P ADULT - PROBLEM SELECTOR PLAN 8
Hx of depression per chart review. Previously on sertraline.  - hold off on resuming sertraline for now

## 2023-12-06 NOTE — H&P ADULT - PROBLEM SELECTOR PLAN 4
Presented with SBP to 210s. Likely in setting of med noncompliance/missed dose. Reported that he only takes one medication at home that is for BP.  - c/w Coreg and Entresto for now

## 2023-12-06 NOTE — H&P ADULT - NSHPREVIEWOFSYSTEMS_GEN_ALL_CORE
REVIEW OF SYSTEMS:    CONSTITUTIONAL: No generalized weakness, fevers, or chills  EYES:  No visual changes or eye pain  ENMT: No hearing loss or sore throat  RESPIRATORY: No cough, wheezing, hemoptysis; No shortness of breath  CARDIOVASCULAR: No chest pain or palpitations  GASTROINTESTINAL: No abdominal or epigastric pain; No nausea, vomiting, or hematemesis; No diarrhea or constipation; No melena or hematochezia  GENITOURINARY: No dysuria or hematuria  MUSCULOSKELETAL: No joint pain or swelling; No back pain  NEUROLOGICAL: No headache; No dizziness; No numbness or loss of sensation; No loss of strength in extremities  PSYCHIATRIC: No anxiety or depression  SKIN: No itching, burning, rashes

## 2023-12-06 NOTE — H&P ADULT - PROBLEM SELECTOR PLAN 6
Hx of non-obstructive CAD. No active CP or SOB.  - EKG (12/5/23): NSR, LVH, HR 88, QTc 474; ~1.5mm WEN in V3 only, no ST depressions; TWI in V2 (present on EKG from Oct 2022)  - hsTrop 32->31  - can obtain TTE as above  - resume ASA and statin

## 2023-12-06 NOTE — H&P ADULT - PROBLEM SELECTOR PLAN 2
Had fall with head strike at home on day prior to presentation. Pt reported was mechanical, slipped on stairs. Denied LOC or prodromal symptoms.  - CTH/C-spine w/o acute findings, but noted chronic/unchanged "ventriculomegaly out of proportion to sulcal prominence could represent sequela of central volume loss or normal pressure hydrocephalus."  - denied urinary symptoms  - maintain fall precautions  - PT eval

## 2023-12-06 NOTE — H&P ADULT - ASSESSMENT
72M w/ hx of HFrEF (EF 25% in 10/2022), mod MR, HTN, HLD, CAD, HCV (s/p Harvoni treatment), chronic hyponatremia, EtOH use disorder, presenting after EMS was called by daughter for concerns of unsafe living conditions at home and inability for pt to take care of self. Course c/b hypertensive urgency in setting of med noncompliance/missing dose.

## 2023-12-06 NOTE — PHYSICAL THERAPY INITIAL EVALUATION ADULT - NSPTDISCHREC_GEN_A_CORE
pt requires no skilled PT at this time. pt is functionally independent, CM aware./No skilled PT needs

## 2023-12-06 NOTE — PATIENT PROFILE ADULT - FALL HARM RISK - UNIVERSAL INTERVENTIONS
Bed in lowest position, wheels locked, appropriate side rails in place/Call bell, personal items and telephone in reach/Instruct patient to call for assistance before getting out of bed or chair/Non-slip footwear when patient is out of bed/[place] to call system/Physically safe environment - no spills, clutter or unnecessary equipment/Purposeful Proactive Rounding/Room/bathroom lighting operational, light cord in reach Bed in lowest position, wheels locked, appropriate side rails in place/Call bell, personal items and telephone in reach/Instruct patient to call for assistance before getting out of bed or chair/Non-slip footwear when patient is out of bed/[place] to call system/Physically safe environment - no spills, clutter or unnecessary equipment/Purposeful Proactive Rounding/Room/bathroom lighting operational, light cord in reach

## 2023-12-06 NOTE — PHYSICAL THERAPY INITIAL EVALUATION ADULT - ADDITIONAL COMMENTS
pt lives in a private home alone. pt has 3 stairs to enter with HRs and once inside has 1 flight up to 2nd floor. pt independent with all ADLs and ambulates with a rolling walker.

## 2023-12-06 NOTE — H&P ADULT - PROBLEM SELECTOR PLAN 9
Previously discharged on multiple medications after last admission for CHF exacerbation in Feb 2023, however, pt acknowledges he only takes one medication for BP (but unsure of name)  - pt requested reducing number of medications he is supposed to take

## 2023-12-06 NOTE — H&P ADULT - NSICDXPASTMEDICALHX_GEN_ALL_CORE_FT
PAST MEDICAL HISTORY:  Hepatitis C virus infection, unspecified chronicity (was tx with Harvoni)    HTN (hypertension)     Major depression, chronic      PAST MEDICAL HISTORY:  Alcohol use disorder     CAD (coronary artery disease)     Chronic HFrEF (heart failure with reduced ejection fraction)     Chronic hyponatremia     Hepatitis C virus infection, unspecified chronicity (was tx with Harvoni)    HTN (hypertension)     Major depression, chronic     Moderate mitral regurgitation

## 2023-12-06 NOTE — H&P ADULT - PROBLEM SELECTOR PLAN 3
Drinks 1-2 beers with dinner at home. Last drink on 12/4/23 evening.  - monitor on CIWA with symptom-triggered Ativan for now  - c/w MV, folate, thiamine  - SBIRT  - f/u blood alcohol level

## 2023-12-07 ENCOUNTER — TRANSCRIPTION ENCOUNTER (OUTPATIENT)
Age: 73
End: 2023-12-07

## 2023-12-07 VITALS
HEART RATE: 69 BPM | TEMPERATURE: 98 F | SYSTOLIC BLOOD PRESSURE: 143 MMHG | OXYGEN SATURATION: 99 % | RESPIRATION RATE: 18 BRPM | DIASTOLIC BLOOD PRESSURE: 88 MMHG

## 2023-12-07 LAB
ANION GAP SERPL CALC-SCNC: 14 MMOL/L — SIGNIFICANT CHANGE UP (ref 5–17)
ANION GAP SERPL CALC-SCNC: 14 MMOL/L — SIGNIFICANT CHANGE UP (ref 5–17)
BUN SERPL-MCNC: 31 MG/DL — HIGH (ref 7–23)
BUN SERPL-MCNC: 31 MG/DL — HIGH (ref 7–23)
CALCIUM SERPL-MCNC: 9.3 MG/DL — SIGNIFICANT CHANGE UP (ref 8.4–10.5)
CALCIUM SERPL-MCNC: 9.3 MG/DL — SIGNIFICANT CHANGE UP (ref 8.4–10.5)
CHLORIDE SERPL-SCNC: 95 MMOL/L — LOW (ref 96–108)
CHLORIDE SERPL-SCNC: 95 MMOL/L — LOW (ref 96–108)
CO2 SERPL-SCNC: 22 MMOL/L — SIGNIFICANT CHANGE UP (ref 22–31)
CO2 SERPL-SCNC: 22 MMOL/L — SIGNIFICANT CHANGE UP (ref 22–31)
CREAT SERPL-MCNC: 1.04 MG/DL — SIGNIFICANT CHANGE UP (ref 0.5–1.3)
CREAT SERPL-MCNC: 1.04 MG/DL — SIGNIFICANT CHANGE UP (ref 0.5–1.3)
EGFR: 76 ML/MIN/1.73M2 — SIGNIFICANT CHANGE UP
EGFR: 76 ML/MIN/1.73M2 — SIGNIFICANT CHANGE UP
GLUCOSE SERPL-MCNC: 91 MG/DL — SIGNIFICANT CHANGE UP (ref 70–99)
GLUCOSE SERPL-MCNC: 91 MG/DL — SIGNIFICANT CHANGE UP (ref 70–99)
HCT VFR BLD CALC: 35 % — LOW (ref 39–50)
HCT VFR BLD CALC: 35 % — LOW (ref 39–50)
HGB BLD-MCNC: 11.7 G/DL — LOW (ref 13–17)
HGB BLD-MCNC: 11.7 G/DL — LOW (ref 13–17)
MCHC RBC-ENTMCNC: 26.9 PG — LOW (ref 27–34)
MCHC RBC-ENTMCNC: 26.9 PG — LOW (ref 27–34)
MCHC RBC-ENTMCNC: 33.4 GM/DL — SIGNIFICANT CHANGE UP (ref 32–36)
MCHC RBC-ENTMCNC: 33.4 GM/DL — SIGNIFICANT CHANGE UP (ref 32–36)
MCV RBC AUTO: 80.5 FL — SIGNIFICANT CHANGE UP (ref 80–100)
MCV RBC AUTO: 80.5 FL — SIGNIFICANT CHANGE UP (ref 80–100)
NRBC # BLD: 0 /100 WBCS — SIGNIFICANT CHANGE UP (ref 0–0)
NRBC # BLD: 0 /100 WBCS — SIGNIFICANT CHANGE UP (ref 0–0)
PLATELET # BLD AUTO: 270 K/UL — SIGNIFICANT CHANGE UP (ref 150–400)
PLATELET # BLD AUTO: 270 K/UL — SIGNIFICANT CHANGE UP (ref 150–400)
POTASSIUM SERPL-MCNC: 3.9 MMOL/L — SIGNIFICANT CHANGE UP (ref 3.5–5.3)
POTASSIUM SERPL-MCNC: 3.9 MMOL/L — SIGNIFICANT CHANGE UP (ref 3.5–5.3)
POTASSIUM SERPL-SCNC: 3.9 MMOL/L — SIGNIFICANT CHANGE UP (ref 3.5–5.3)
POTASSIUM SERPL-SCNC: 3.9 MMOL/L — SIGNIFICANT CHANGE UP (ref 3.5–5.3)
RBC # BLD: 4.35 M/UL — SIGNIFICANT CHANGE UP (ref 4.2–5.8)
RBC # BLD: 4.35 M/UL — SIGNIFICANT CHANGE UP (ref 4.2–5.8)
RBC # FLD: 13.4 % — SIGNIFICANT CHANGE UP (ref 10.3–14.5)
RBC # FLD: 13.4 % — SIGNIFICANT CHANGE UP (ref 10.3–14.5)
SODIUM SERPL-SCNC: 131 MMOL/L — LOW (ref 135–145)
SODIUM SERPL-SCNC: 131 MMOL/L — LOW (ref 135–145)
WBC # BLD: 6.25 K/UL — SIGNIFICANT CHANGE UP (ref 3.8–10.5)
WBC # BLD: 6.25 K/UL — SIGNIFICANT CHANGE UP (ref 3.8–10.5)
WBC # FLD AUTO: 6.25 K/UL — SIGNIFICANT CHANGE UP (ref 3.8–10.5)
WBC # FLD AUTO: 6.25 K/UL — SIGNIFICANT CHANGE UP (ref 3.8–10.5)

## 2023-12-07 PROCEDURE — 83735 ASSAY OF MAGNESIUM: CPT

## 2023-12-07 PROCEDURE — 82607 VITAMIN B-12: CPT

## 2023-12-07 PROCEDURE — 99285 EMERGENCY DEPT VISIT HI MDM: CPT

## 2023-12-07 PROCEDURE — 99239 HOSP IP/OBS DSCHRG MGMT >30: CPT

## 2023-12-07 PROCEDURE — 84484 ASSAY OF TROPONIN QUANT: CPT

## 2023-12-07 PROCEDURE — 70450 CT HEAD/BRAIN W/O DYE: CPT | Mod: MA

## 2023-12-07 PROCEDURE — C8929: CPT

## 2023-12-07 PROCEDURE — 83550 IRON BINDING TEST: CPT

## 2023-12-07 PROCEDURE — 82728 ASSAY OF FERRITIN: CPT

## 2023-12-07 PROCEDURE — 85027 COMPLETE CBC AUTOMATED: CPT

## 2023-12-07 PROCEDURE — 97161 PT EVAL LOW COMPLEX 20 MIN: CPT

## 2023-12-07 PROCEDURE — 72125 CT NECK SPINE W/O DYE: CPT | Mod: MA

## 2023-12-07 PROCEDURE — 72170 X-RAY EXAM OF PELVIS: CPT

## 2023-12-07 PROCEDURE — 93306 TTE W/DOPPLER COMPLETE: CPT | Mod: 26

## 2023-12-07 PROCEDURE — 71045 X-RAY EXAM CHEST 1 VIEW: CPT

## 2023-12-07 PROCEDURE — 85025 COMPLETE CBC W/AUTO DIFF WBC: CPT

## 2023-12-07 PROCEDURE — 83880 ASSAY OF NATRIURETIC PEPTIDE: CPT

## 2023-12-07 PROCEDURE — 80048 BASIC METABOLIC PNL TOTAL CA: CPT

## 2023-12-07 PROCEDURE — 80053 COMPREHEN METABOLIC PANEL: CPT

## 2023-12-07 PROCEDURE — 84100 ASSAY OF PHOSPHORUS: CPT

## 2023-12-07 PROCEDURE — 36415 COLL VENOUS BLD VENIPUNCTURE: CPT

## 2023-12-07 PROCEDURE — 82746 ASSAY OF FOLIC ACID SERUM: CPT

## 2023-12-07 PROCEDURE — 83540 ASSAY OF IRON: CPT

## 2023-12-07 RX ORDER — THIAMINE MONONITRATE (VIT B1) 100 MG
1 TABLET ORAL
Qty: 0 | Refills: 0 | DISCHARGE
Start: 2023-12-07

## 2023-12-07 RX ORDER — ATORVASTATIN CALCIUM 80 MG/1
1 TABLET, FILM COATED ORAL
Qty: 30 | Refills: 0
Start: 2023-12-07 | End: 2024-01-05

## 2023-12-07 RX ORDER — FOLIC ACID 0.8 MG
1 TABLET ORAL
Qty: 0 | Refills: 0 | DISCHARGE
Start: 2023-12-07

## 2023-12-07 RX ORDER — ASPIRIN/CALCIUM CARB/MAGNESIUM 324 MG
1 TABLET ORAL
Qty: 0 | Refills: 0 | DISCHARGE
Start: 2023-12-07

## 2023-12-07 RX ORDER — NIFEDIPINE 30 MG
0 TABLET, EXTENDED RELEASE 24 HR ORAL
Refills: 0 | DISCHARGE

## 2023-12-07 RX ORDER — CARVEDILOL PHOSPHATE 80 MG/1
1 CAPSULE, EXTENDED RELEASE ORAL
Qty: 60 | Refills: 0
Start: 2023-12-07 | End: 2024-01-05

## 2023-12-07 RX ORDER — SACUBITRIL AND VALSARTAN 24; 26 MG/1; MG/1
1 TABLET, FILM COATED ORAL
Qty: 60 | Refills: 0
Start: 2023-12-07 | End: 2024-01-05

## 2023-12-07 RX ADMIN — Medication 100 MILLIGRAM(S): at 14:18

## 2023-12-07 RX ADMIN — CARVEDILOL PHOSPHATE 6.25 MILLIGRAM(S): 80 CAPSULE, EXTENDED RELEASE ORAL at 05:53

## 2023-12-07 RX ADMIN — Medication 81 MILLIGRAM(S): at 14:18

## 2023-12-07 RX ADMIN — SACUBITRIL AND VALSARTAN 1 TABLET(S): 24; 26 TABLET, FILM COATED ORAL at 05:52

## 2023-12-07 RX ADMIN — Medication 1 MILLIGRAM(S): at 14:18

## 2023-12-07 RX ADMIN — Medication 1 TABLET(S): at 14:18

## 2023-12-07 NOTE — DIETITIAN INITIAL EVALUATION ADULT - REASON INDICATOR FOR ASSESSMENT
RD assessment warranted for: Consult for Assessment, Education, Pressure injury stage 2 or >. Chart reviewed, events noted.  Source: medical record, patient. RD assessment warranted for: Consult for Assessment, Education, Pressure injury stage 2 or >. Chart reviewed, events noted.  Source: medical record, patient, previous RD notes.

## 2023-12-07 NOTE — DISCHARGE NOTE PROVIDER - CARE PROVIDERS DIRECT ADDRESSES
,[email or web address]

## 2023-12-07 NOTE — DIETITIAN INITIAL EVALUATION ADULT - NS FNS DIET ORDER
Diet, Regular:   DASH/TLC {Sodium & Cholesterol Restricted} (DASH) (12-06-23 @ 03:29) [Active]

## 2023-12-07 NOTE — DIETITIAN INITIAL EVALUATION ADULT - OTHER INFO
Patient reports UBW 150lb, reports no recent changes in weight PTA.  Dosing weight: 140lb (12/5).  IBW: 142lb, %IBW: 99% based on dosing weight.  Weight history per [facility]: 124lb (4/12/23), 118.3lb (2/22/23), 103.1lb (10/24/22).  Weight appears uptrending. RD to continue to monitor weight trends as available/able.     -Diagnosed with severe malnutrition at previous admission per previous RD note 2/22/23.  -Ordered for Multivitamin, thiamine, folic acid (EtOH use noted per chart).  -Hyponatremia noted. Patient reports UBW 150lb, reports no recent changes in weight PTA.  Dosing weight: 140lb (12/5).  IBW: 142lb, %IBW: 99% based on dosing weight.  Weight history per [facility]: 124lb (4/12/23), 118.3lb (2/22/23), 103.1lb (10/24/22).  Weight appears uptrending. RD to continue to monitor weight trends as available/able.     -Diagnosed with severe malnutrition at previous admission per previous RD note 2/22/23.  -Ordered for Multivitamin, thiamine, folic acid (EtOH use noted per chart).  -Hyponatremia noted.

## 2023-12-07 NOTE — DIETITIAN INITIAL EVALUATION ADULT - NSFNSPHYEXAMSKINFT_GEN_A_CORE
Pressure Injury 1: sacrum, Stage II  Pressure Injury 2: Left:, sacrum, Stage II  Pressure Injury 3: none, none  Pressure Injury 4: none, none  Pressure Injury 5: none, none  Pressure Injury 6: none, none  Pressure Injury 7: none, none  Pressure Injury 8: none, none  Pressure Injury 9: none, none  Pressure Injury 10: none, none  Pressure Injury 11: none, none per flowsheets:   Pressure Injury 1: sacrum, Stage II  Pressure Injury 2: Left:, sacrum, Stage II

## 2023-12-07 NOTE — DISCHARGE NOTE PROVIDER - NSDCCPCAREPLAN_GEN_ALL_CORE_FT
PRINCIPAL DISCHARGE DIAGNOSIS  Diagnosis: Adult failure to thrive  Assessment and Plan of Treatment: CTH/C-spine w/o acute findings, but noted chronic/unchanged "ventriculomegaly out of proportion to sulcal prominence could represent sequela of central volume loss or normal pressure hydrocephalus. Please follow up with your PCP.   As noted with social work due toprevious referral and reason as to why patient was brought in, APS report to bemade upon discharge.          SECONDARY DISCHARGE DIAGNOSES  Diagnosis: Hypertensive urgency  Assessment and Plan of Treatment: Now improved. Please continue to take medication as prescribe.     PRINCIPAL DISCHARGE DIAGNOSIS  Diagnosis: Adult failure to thrive  Assessment and Plan of Treatment: CTH/C-spine w/o acute findings, but noted chronic/unchanged "ventriculomegaly out of proportion to sulcal prominence could represent sequela of central volume loss or normal pressure hydrocephalus. Please follow up with your PCP.   As noted with social work due toprevious referral and reason as to why patient was brought in, APS report to bemade upon discharge.          SECONDARY DISCHARGE DIAGNOSES  Diagnosis: Alcohol use disorder  Assessment and Plan of Treatment: abstaine from drinking alcohol    Diagnosis: Chronic HFrEF (heart failure with reduced ejection fraction)  Assessment and Plan of Treatment: stable, cont meds as prescribed, f/up with card    Diagnosis: Chronic hyponatremia  Assessment and Plan of Treatment: stable    Diagnosis: Hypertensive urgency  Assessment and Plan of Treatment: Now improved. Please continue to take medication as prescribed. Follow up with cardiologist

## 2023-12-07 NOTE — DISCHARGE NOTE PROVIDER - HOSPITAL COURSE
HPI:  72M w/ hx of HFrEF (EF 25% in 10/2022), mod MR, HTN, HLD, CAD, HCV (s/p Harvoni treatment), chronic hyponatremia, EtOH use disorder, presenting after EMS was called by daughter for concerns of unsafe living conditions at home and inability for pt to take care of self. Pt reported he lives alone with his dog. Said his daughters called EMS without his knowledge, which is why he is in the hospital. He reported feeling well overall and without complaints. Endorsed he fell yesterday, but denied LOC or prodromal symptoms, said he just slipped on the stairs and maybe bumped his head (after the scabs on his forehead was pointed out).    Per chart review, he was found to have SBP to 210s by EMS. He endorsed he had missed taking his medications on day of presentation. Noted he "focuses on taking one medication that he believes is most important" which is his blood pressure medication, but he does not know name of it. He recalled getting a bunch of medications after his discharge from last hospitalization in Feb 2023 (including water pills), but does not take them bedsides the one medication. In ED, he was reportedly not oriented to month, year, or president at one point, but on encounter for admission, pt was AAOx3 (though took a little time to figure out month/president). Reported he drinks 1-2 beers with dinner most nights and last drink was 12/4/23 evening. Toenails appeared unkempt with dark-colored substance between toes, which he attributed to dirt (said he walks outside with shoes often with his dog).    Per collateral information obtained from daughter (Alyson Cortes) by ED, she had called EMS because pt had not been taking his medications and the house he is in is not livable. She had noted his house being very dirty with dog feces all over because he does not take the dog outside. Noted that his girlfriend is a hoarder. Reported that pt has been having difficulty ambulating and fell yesterday hitting his head. Stated pt becomes combative when his children try to help him. Daughter thinks pt is a daily alcohol drinker. Of note, during his prior admission (at [facility] in Feb 2022 for CHF exacerbation), daughter Alyson had also expressed concerns of unsafe conditions and inability for pt to take care of self, for which SW was consulted and Adult Protective Services were contacted. Stated his daughters' concerns are "just fabrication."    In ED: Afebrile, HR 70s-100s, SBP 210s->120s, RR 17-20, sating 100% on RA. Labs notable for Hgb 11.8, MCV 78, Na 129, SCr 1.33, hsTrop 32->31. CTH/C-spine w/o acute findings, but noted chronic/unchanged "ventriculomegaly out of proportion to sulcal prominence could represent sequela of central volume loss or normal pressure hydrocephalus." Received coreg 6.25mg x1 and Entresto 97-103mg x1. Admitted to Medicine for further management. (06 Dec 2023 02:01)    Hospital Course: Pt presenting after EMS was called by daughter for concerns of unsafe living conditions at home and inability for pt to take care of self. Course c/b hypertensive urgency in setting of med noncompliance/missing dose. Pt had fall with head strike at home on day prior to presentation. Pt reported was mechanical, slipped on stairs. Denied LOC or prodromal symptoms.  CTH/C-spine w/o acute findings, but noted chronic/unchanged "ventriculomegaly out of proportion to sulcal prominence could represent sequela of central volume loss or normal pressure hydrocephalus. Pt also with SBP to 210s. Likely in setting of med noncompliance/missed dose. Will c/w Coreg and Entresto for now. Echo-> Left ventricular systolic function is severely decreased with an ejection fraction of 30 % by Salazar's method of disks. Global left ventricular hypokinesis. BP has improved. Pt is medically   stable for discharge home.       Important Medication Changes and Reason:________****    Active or Pending Issues Requiring Follow-up: Follow up with PCP     Advanced Directives:   [x ] Full code  [ ] DNR  [ ] Hospice    Discharge Diagnoses:  HFrEF   mod MR   HTN   HLD,  CAD   HCV (s/p Harvoni treatment)   chronic hyponatremia   EtOH use disorder       HPI:  72M w/ hx of HFrEF (EF 25% in 10/2022), mod MR, HTN, HLD, CAD, HCV (s/p Harvoni treatment), chronic hyponatremia, EtOH use disorder, presenting after EMS was called by daughter for concerns of unsafe living conditions at home and inability for pt to take care of self. Pt reported he lives alone with his dog. Said his daughters called EMS without his knowledge, which is why he is in the hospital. He reported feeling well overall and without complaints. Endorsed he fell yesterday, but denied LOC or prodromal symptoms, said he just slipped on the stairs and maybe bumped his head (after the scabs on his forehead was pointed out).    Per chart review, he was found to have SBP to 210s by EMS. He endorsed he had missed taking his medications on day of presentation. Noted he "focuses on taking one medication that he believes is most important" which is his blood pressure medication, but he does not know name of it. He recalled getting a bunch of medications after his discharge from last hospitalization in Feb 2023 (including water pills), but does not take them bedsides the one medication. In ED, he was reportedly not oriented to month, year, or president at one point, but on encounter for admission, pt was AAOx3 (though took a little time to figure out month/president). Reported he drinks 1-2 beers with dinner most nights and last drink was 12/4/23 evening. Toenails appeared unkempt with dark-colored substance between toes, which he attributed to dirt (said he walks outside with shoes often with his dog).    Per collateral information obtained from daughter (Alyson Cortes) by ED, she had called EMS because pt had not been taking his medications and the house he is in is not livable. She had noted his house being very dirty with dog feces all over because he does not take the dog outside. Noted that his girlfriend is a hoarder. Reported that pt has been having difficulty ambulating and fell yesterday hitting his head. Stated pt becomes combative when his children try to help him. Daughter thinks pt is a daily alcohol drinker. Of note, during his prior admission (at [facility] in Feb 2022 for CHF exacerbation), daughter Alyson had also expressed concerns of unsafe conditions and inability for pt to take care of self, for which SW was consulted and Adult Protective Services were contacted. Stated his daughters' concerns are "just fabrication."    In ED: Afebrile, HR 70s-100s, SBP 210s->120s, RR 17-20, sating 100% on RA. Labs notable for Hgb 11.8, MCV 78, Na 129, SCr 1.33, hsTrop 32->31. CTH/C-spine w/o acute findings, but noted chronic/unchanged "ventriculomegaly out of proportion to sulcal prominence could represent sequela of central volume loss or normal pressure hydrocephalus." Received coreg 6.25mg x1 and Entresto 97-103mg x1. Admitted to Medicine for further management. (06 Dec 2023 02:01)    Hospital Course: Pt presenting after EMS was called by daughter for concerns of unsafe living conditions at home and inability for pt to take care of self. Course c/b hypertensive urgency in setting of med noncompliance/missing dose. Pt had fall with head strike at home on day prior to presentation. Pt reported was mechanical, slipped on stairs. Denied LOC or prodromal symptoms.  CTH/C-spine w/o acute findings, but noted chronic/unchanged "ventriculomegaly out of proportion to sulcal prominence could represent sequela of central volume loss or normal pressure hydrocephalus. Pt also with SBP to 210s. Likely in setting of med noncompliance/missed dose. Will c/w Coreg and Entresto for now. Echo-> Left ventricular systolic function is severely decreased with an ejection fraction of 30 % by Salazar's method of disks. Global left ventricular hypokinesis. BP has improved. Pt is medically   stable for discharge home.       Important Medication Changes and Reason:________****    Active or Pending Issues Requiring Follow-up: Follow up with PCP     Advanced Directives:   [x ] Full code  [ ] DNR  [ ] Hospice    Discharge Diagnoses:  HFrEF   mod MR   HTN   HLD,  CAD   HCV (s/p Harvoni treatment)   chronic hyponatremia   EtOH use disorder       HPI:  72M w/ hx of HFrEF (EF 25% in 10/2022), mod MR, HTN, HLD, CAD, HCV (s/p Harvoni treatment), chronic hyponatremia, EtOH use disorder, presenting after EMS was called by daughter for concerns of unsafe living conditions at home and inability for pt to take care of self. Pt reported he lives alone with his dog. Said his daughters called EMS without his knowledge, which is why he is in the hospital. He reported feeling well overall and without complaints. Endorsed he fell yesterday, but denied LOC or prodromal symptoms, said he just slipped on the stairs and maybe bumped his head (after the scabs on his forehead was pointed out).    Per chart review, he was found to have SBP to 210s by EMS. He endorsed he had missed taking his medications on day of presentation. Noted he "focuses on taking one medication that he believes is most important" which is his blood pressure medication, but he does not know name of it. He recalled getting a bunch of medications after his discharge from last hospitalization in Feb 2023 (including water pills), but does not take them bedsides the one medication. In ED, he was reportedly not oriented to month, year, or president at one point, but on encounter for admission, pt was AAOx3 (though took a little time to figure out month/president). Reported he drinks 1-2 beers with dinner most nights and last drink was 12/4/23 evening. Toenails appeared unkempt with dark-colored substance between toes, which he attributed to dirt (said he walks outside with shoes often with his dog).    Per collateral information obtained from daughter (Alyson Cortes) by ED, she had called EMS because pt had not been taking his medications and the house he is in is not livable. She had noted his house being very dirty with dog feces all over because he does not take the dog outside. Noted that his girlfriend is a hoarder. Reported that pt has been having difficulty ambulating and fell yesterday hitting his head. Stated pt becomes combative when his children try to help him. Daughter thinks pt is a daily alcohol drinker. Of note, during his prior admission (at [facility] in Feb 2022 for CHF exacerbation), daughter Alyson had also expressed concerns of unsafe conditions and inability for pt to take care of self, for which SW was consulted and Adult Protective Services were contacted. Stated his daughters' concerns are "just fabrication."    In ED: Afebrile, HR 70s-100s, SBP 210s->120s, RR 17-20, sating 100% on RA. Labs notable for Hgb 11.8, MCV 78, Na 129, SCr 1.33, hsTrop 32->31. CTH/C-spine w/o acute findings, but noted chronic/unchanged "ventriculomegaly out of proportion to sulcal prominence could represent sequela of central volume loss or normal pressure hydrocephalus." Received coreg 6.25mg x1 and Entresto 97-103mg x1. Admitted to Medicine for further management. (06 Dec 2023 02:01)    Hospital Course: Pt presenting after EMS was called by daughter for concerns of unsafe living conditions at home and inability for pt to take care of self. Course c/b hypertensive urgency in setting of med noncompliance/missing dose. Pt had fall with head strike at home on day prior to presentation. Pt reported was mechanical, slipped on stairs. Denied LOC or prodromal symptoms.  CTH/C-spine w/o acute findings, but noted chronic/unchanged "ventriculomegaly out of proportion to sulcal prominence could represent sequela of central volume loss or normal pressure hydrocephalus. Pt also with SBP to 210s. Likely in setting of med noncompliance/missed dose. Will c/w Coreg and Entresto for now. Echo-> Left ventricular systolic function is severely decreased with an ejection fraction of 30 % by Salazar's method of disks. Global left ventricular hypokinesis. BP has improved. Pt is medically   stable for discharge home.       Important Medication Changes and Reason:     Active or Pending Issues Requiring Follow-up: Follow up with PCP     Advanced Directives:   [x ] Full code  [ ] DNR  [ ] Hospice    Discharge Diagnoses:  HFrEF   mod MR   HTN   HLD,  CAD   HCV (s/p Harvoni treatment)   chronic hyponatremia   EtOH use disorder       HPI:  72M w/ hx of HFrEF (EF 25% in 10/2022), mod MR, HTN, HLD, CAD, HCV (s/p Harvoni treatment), chronic hyponatremia, EtOH use disorder, presenting after EMS was called by daughter for concerns of unsafe living conditions at home and inability for pt to take care of self. Pt reported he lives alone with his dog. Said his daughters called EMS without his knowledge, which is why he is in the hospital. He reported feeling well overall and without complaints. Endorsed he fell yesterday, but denied LOC or prodromal symptoms, said he just slipped on the stairs and maybe bumped his head (after the scabs on his forehead was pointed out).    Per chart review, he was found to have SBP to 210s by EMS. He endorsed he had missed taking his medications on day of presentation. Noted he "focuses on taking one medication that he believes is most important" which is his blood pressure medication, but he does not know name of it. He recalled getting a bunch of medications after his discharge from last hospitalization in Feb 2023 (including water pills), but does not take them bedsides the one medication. In ED, he was reportedly not oriented to month, year, or president at one point, but on encounter for admission, pt was AAOx3 (though took a little time to figure out month/president). Reported he drinks 1-2 beers with dinner most nights and last drink was 12/4/23 evening. Toenails appeared unkempt with dark-colored substance between toes, which he attributed to dirt (said he walks outside with shoes often with his dog).    Per collateral information obtained from daughter (Alyson Cortes) by ED, she had called EMS because pt had not been taking his medications and the house he is in is not livable. She had noted his house being very dirty with dog feces all over because he does not take the dog outside. Noted that his girlfriend is a hoarder. Reported that pt has been having difficulty ambulating and fell yesterday hitting his head. Stated pt becomes combative when his children try to help him. Daughter thinks pt is a daily alcohol drinker. Of note, during his prior admission (at [facility] in Feb 2022 for CHF exacerbation), daughter Alyson had also expressed concerns of unsafe conditions and inability for pt to take care of self, for which SW was consulted and Adult Protective Services were contacted. Stated his daughters' concerns are "just fabrication."    In ED: Afebrile, HR 70s-100s, SBP 210s->120s, RR 17-20, sating 100% on RA. Labs notable for Hgb 11.8, MCV 78, Na 129, SCr 1.33, hsTrop 32->31. CTH/C-spine w/o acute findings, but noted chronic/unchanged "ventriculomegaly out of proportion to sulcal prominence could represent sequela of central volume loss or normal pressure hydrocephalus." Received coreg 6.25mg x1 and Entresto 97-103mg x1. Admitted to Medicine for further management. (06 Dec 2023 02:01)    Hospital Course: Pt presenting after EMS was called by daughter for concerns of unsafe living conditions at home and inability for pt to take care of self. Course c/b hypertensive urgency in setting of med noncompliance/missing dose. Pt had fall with head strike at home on day prior to presentation. Pt reported was mechanical, slipped on stairs. Denied LOC or prodromal symptoms.  CTH/C-spine w/o acute findings, but noted chronic/unchanged "ventriculomegaly out of proportion to sulcal prominence could represent sequela of central volume loss or normal pressure hydrocephalus. Pt also with SBP to 210s. Likely in setting of med noncompliance/missed dose. Will c/w Coreg and Entresto for now. Echo-> Left ventricular systolic function is severely decreased with an ejection fraction of 30 % by Salazar's method of disks. Global left ventricular hypokinesis. BP has improved. Pt is medically   stable for discharge home.       Important Medication Changes and Reason:     Active or Pending Issues Requiring Follow-up: Follow up with PCP     Advanced Directives:   [x ] Full code  [ ] DNR  [ ] Hospice    Discharge Diagnoses:  HFrEF   mod MR   HTN   HLD,  CAD   HCV (s/p Harvoni treatment)   chronic hyponatremia   EtOH use disorder

## 2023-12-07 NOTE — DISCHARGE NOTE NURSING/CASE MANAGEMENT/SOCIAL WORK - NSDCPEPT PROEDMA_GEN_ALL_CORE
Patient states going for surgical procedure tomorrow and was sent to ED for platelet transfusion prior to. Patient has low platelet count. Yes

## 2023-12-07 NOTE — DISCHARGE NOTE PROVIDER - EXTENDED VTE YES NO FOR MLM ENOXAPARIN
Child here solo but mother is in waiting room    FEEDING: Milk - does not always like milk, does eat cheese                           Diet - eats a lot of sugars, does eat some protein.   SLEEPIN hours at night  URINATION: no enuresis or daytime incontinence  STOOLS: normal  SCHOOL HISTORY:       [employer] - [employer]       Grade - 12       Academic performance - normal       Extracurricular Activities - YES     Plays [employer] in many bands at school. Reading. Works at [email or web address] in [employer].               CONCERNS:  none and mother things she is anemic.   Patient concerned with paleness, is overly tired. Often very cold.     Medications verified, no changes.  Denies known Latex allergy or symptoms of Latex sensitivity.    Immunization History   Administered Date(s) Administered   • DTaP (INFANRIX) 2000, 2000, 01/10/2001, 2001, 05/10/2005   • HIB, Unspecified Formulation 2000, 2000, 2001   • Hep B, adolescent or pediatric 2000, 2000, 2000   • MMR 2001, 05/10/2005   • Meningococcus 01/10/2012   • PPD 2001   • Polio, INACTIVATED 2000, 2000, 2001, 05/10/2005   • Tdap 01/10/2012   • Varicella 2001, 01/10/2012        ,

## 2023-12-07 NOTE — DIETITIAN INITIAL EVALUATION ADULT - PERTINENT LABORATORY DATA
12-07    131<L>  |  95<L>  |  31<H>  ----------------------------<  91  3.9   |  22  |  1.04    Ca    9.3      07 Dec 2023 07:30  Phos  3.0     12-06  Mg     1.6     12-06    TPro  6.9  /  Alb  4.3  /  TBili  0.4  /  DBili  x   /  AST  22  /  ALT  16  /  AlkPhos  89  12-06

## 2023-12-07 NOTE — DIETITIAN INITIAL EVALUATION ADULT - ORAL INTAKE PTA/DIET HISTORY
Patient reports good appetite and PO intake at home PTA. Consumes Ensure oral nutrition supplement drinks occasionally at home, preference for chocolate flavor noted. Reports no dietary restrictions followed PTA. Mostly eats home-cooked food at home. Confirms no known food allergies.

## 2023-12-07 NOTE — DIETITIAN INITIAL EVALUATION ADULT - PERSON TAUGHT/METHOD
Encouraged protein-energy intake, discussed use of oral nutrition supplements to have available throughout the day, patient agreeable to receiving, preference for chocolate flavor noted.   Provided menu and reviewed menu ordering procedures to help maximize PO intake.    Reviewed Heart Failure diet education. Discussed Na restriction, foods high in Na to avoid, reading food labels, tips for limiting Na in your diet. Discussed Na intake in relation to fluid retention, edema and Wt gain. Pt accepted Heart Failure Nutrition Therapy Handout.  RD remains available for diet education review./verbal instruction/written material/patient instructed

## 2023-12-07 NOTE — DISCHARGE NOTE NURSING/CASE MANAGEMENT/SOCIAL WORK - PATIENT PORTAL LINK FT
You can access the FollowMyHealth Patient Portal offered by [facility] by registering at the following website: [email or web address]. By joining [email or web address]’s FollowMyHealth portal, you will also be able to view your health information using other applications (apps) compatible with our system. You can access the FollowMyHealth Patient Portal offered by [facility] by registering at the following website: [email or web address]. By joining [employer]’s FollowMyHealth portal, you will also be able to view your health information using other applications (apps) compatible with our system.

## 2023-12-07 NOTE — DIETITIAN INITIAL EVALUATION ADULT - ENERGY INTAKE
Patient reports good appetite and PO intake since admit. 51-75% intake of meals noted per flowsheets. Fair (50-75%)

## 2023-12-07 NOTE — PHARMACOTHERAPY INTERVENTION NOTE - COMMENTS
Medication Reconciliation completed for patient.  These were confirmed with patient and patient's pharmacy, [facility] on [address] [facility].  Patient shared that he only takes 1 medication, nifedipine, but cannot remember the dose. Updated medications are reflected in Outpatient Medication Review.     Home Medications:  Nifedipine: unknown dose taken once a day. NO RECENT PRESCRIPTIONS OR FILLS AT [facility]   NOTE: Patient noncompliant with medications. Only noted taking one blood pressure pill but unsure of name    Per [facility], fills from last May 2023 included:  Entresto 97/103mg 1 tablet BID  Albuterol inhaler 1 puff as needed  Spironolactone 25mg 1 tablet once a day  Sertraline 50mg 1 tablet once a day  Atorvastatin 40mg 1 tablet once a day  Pantoprazole 40mg 1 tablet once a day  Furosemide 20mg 1 tablet once a day  Carvedilol 6.25mg 1 tablet twice a day    Kesha Maya, PharmD, BCPS  Clinical Pharmacy Specialist  Available on Teams   Medication Reconciliation completed for patient.  These were confirmed with patient and patient's pharmacy, [facility] on [address] [facility].  Patient shared that he only takes 1 medication, nifedipine, but cannot remember the dose. Updated medications are reflected in Outpatient Medication Review.     Home Medications:  Nifedipine: unknown dose taken once a day. NO RECENT PRESCRIPTIONS OR FILLS AT [facility]   NOTE: Patient noncompliant with medications. Only noted taking one blood pressure pill but unsure of name    Per [facility], fills from last May 2023 included:  Entresto 97/103mg 1 tablet BID  Albuterol inhaler 1 puff as needed  Spironolactone 25mg 1 tablet once a day  Sertraline 50mg 1 tablet once a day  Atorvastatin 40mg 1 tablet once a day  Pantoprazole 40mg 1 tablet once a day  Furosemide 20mg 1 tablet once a day  Carvedilol 6.25mg 1 tablet twice a day    Kesha Maya, PharmD, BCPS  Clinical Pharmacy Specialist  Available on Teams

## 2023-12-07 NOTE — DIETITIAN INITIAL EVALUATION ADULT - NSFNSGIIOFT_GEN_A_CORE
Patient reports no nausea/vomiting; no diarrhea or constipation; last BM yesterday per patient; bowel regimen: none

## 2023-12-07 NOTE — DISCHARGE NOTE NURSING/CASE MANAGEMENT/SOCIAL WORK - NSDCVIVACCINE_GEN_ALL_CORE_FT
Tdap; 22-Jan-2020 23:12; Stefani Dobson (SHAHAB); [employer]; [id number] (Exp. Date: 06-Jul-2021); IntraMuscular; Deltoid Left.; 0.5 milliLiter(s); VIS (VIS Published: 09-May-2013, VIS Presented: 22-Jan-2020);    Tdap; 22-Jan-2020 23:12; Stefani Dobson (SHAHAB); [employer]; [id number] (Exp. Date: 06-Jul-2021); IntraMuscular; Deltoid Left.; 0.5 milliLiter(s); VIS (VIS Published: 09-May-2013, VIS Presented: 22-Jan-2020);

## 2023-12-07 NOTE — DIETITIAN INITIAL EVALUATION ADULT - OTHER CALCULATIONS
Defer fluid needs to team.  Estimated nutrient needs based on dosing weight 140lb, with consideration for malnutrition and skin integrity

## 2023-12-07 NOTE — DIETITIAN INITIAL EVALUATION ADULT - NSICDXPASTMEDICALHX_GEN_ALL_CORE_FT
PAST MEDICAL HISTORY:  Alcohol use disorder     CAD (coronary artery disease)     Chronic HFrEF (heart failure with reduced ejection fraction)     Chronic hyponatremia     Hepatitis C virus infection, unspecified chronicity (was tx with Harvoni)    HTN (hypertension)     Major depression, chronic     Moderate mitral regurgitation

## 2023-12-07 NOTE — DIETITIAN INITIAL EVALUATION ADULT - ADD RECOMMEND
-Recommend liberalizing DASH diet to Low Sodium in setting of malnutrition.  -Recommend adding Ensure Plus High Protein x 1/day (provides 350 kcal, 20 g protein) to assist with meeting protein-energy needs.  -Continue Multivitamin, thiamine, folic acid in setting of EtOH use noted in chart.  -Recommend adding vitamin C pending no medical contraindications in setting of stage 2 pressure injury.  -Monitor PO intake, diet, weight, labs, skin, GI symptoms, and BM regularity.  -RD remains available upon request and will follow up per protocol.  -Malnutrition alert placed in chart.

## 2023-12-07 NOTE — DISCHARGE NOTE PROVIDER - NSDCMRMEDTOKEN_GEN_ALL_CORE_FT
NOTE: Patient noncompliant with medications. Only noted taking one blood pressure pill but unsure of name:    aspirin 81 mg oral delayed release tablet: 1 tab(s) orally once a day  atorvastatin 40 mg oral tablet: 1 tab(s) orally once a day (at bedtime)  carvedilol 6.25 mg oral tablet: 1 tab(s) orally every 12 hours  folic acid 1 mg oral tablet: 1 tab(s) orally once a day  Multiple Vitamins oral tablet: 1 tab(s) orally once a day  sacubitril-valsartan 97 mg-103 mg oral tablet: 1 tab(s) orally 2 times a day  thiamine 100 mg oral tablet: 1 tab(s) orally once a day

## 2023-12-07 NOTE — DISCHARGE NOTE NURSING/CASE MANAGEMENT/SOCIAL WORK - NSDCPEFALRISK_GEN_ALL_CORE
For information on Fall & Injury Prevention, visit: [email or web address] OR  [email or web address] OR  https://www.cdc.gov/steadi/patient.html For information on Fall & Injury Prevention, visit: [email or web address] OR  [email or web address] OR  https://www.cdc.gov/steadi/patient.html

## 2023-12-07 NOTE — DIETITIAN INITIAL EVALUATION ADULT - PERTINENT MEDS FT
MEDICATIONS  (STANDING):  aspirin enteric coated 81 milliGRAM(s) Oral daily  atorvastatin 40 milliGRAM(s) Oral at bedtime  carvedilol 6.25 milliGRAM(s) Oral every 12 hours  folic acid 1 milliGRAM(s) Oral daily  influenza  Vaccine (HIGH DOSE) 0.7 milliLiter(s) IntraMuscular once  multivitamin 1 Tablet(s) Oral daily  sacubitril 97 mG/valsartan 103 mG 1 Tablet(s) Oral two times a day  thiamine 100 milliGRAM(s) Oral daily    MEDICATIONS  (PRN):  acetaminophen     Tablet .. 650 milliGRAM(s) Oral every 6 hours PRN Temp greater or equal to 38C (100.4F), Mild Pain (1 - 3)  LORazepam     Tablet 1 milliGRAM(s) Oral every 1 hour PRN CIWA-Ar score 8 or greater  LORazepam     Tablet 1 milliGRAM(s) Oral every 2 hours PRN CIWA-Ar score increase by 2 points and a total score of 7 or less   MEDICATIONS  (STANDING):  aspirin enteric coated 81 milliGRAM(s) Oral daily  atorvastatin 40 milliGRAM(s) Oral at bedtime  carvedilol 6.25 milliGRAM(s) Oral every 12 hours  folic acid 1 milliGRAM(s) Oral daily  multivitamin 1 Tablet(s) Oral daily  sacubitril 97 mG/valsartan 103 mG 1 Tablet(s) Oral two times a day  thiamine 100 milliGRAM(s) Oral daily    MEDICATIONS  (PRN):  LORazepam     Tablet 1 milliGRAM(s) Oral every 1 hour PRN CIWA-Ar score 8 or greater  LORazepam     Tablet 1 milliGRAM(s) Oral every 2 hours PRN CIWA-Ar score increase by 2 points and a total score of 7 or less

## 2023-12-07 NOTE — DIETITIAN INITIAL EVALUATION ADULT - SIGNS/SYMPTOMS
patient with stage 2 pressure injury per flowsheets  physical signs of moderate to severe muscle loss and moderate fat loss

## 2024-02-26 NOTE — ED ADULT TRIAGE NOTE - ESI TRIAGE ACUITY LEVEL, MLM
2 [Negative] : Gastrointestinal [Fever] : no fever [Chills] : no chills [Fatigue] : no fatigue [Chest Pain] : no chest pain [Claudication] : no  leg claudication [Palpitations] : no palpitations [Shortness Of Breath] : no shortness of breath [Wheezing] : no wheezing [Cough] : no cough [Nausea] : no nausea [Abdominal Pain] : no abdominal pain [Constipation] : no constipation [FreeTextEntry5] :  after lifting a heavy object at work, denies pain at current time

## 2024-03-12 PROBLEM — I50.22 CHRONIC SYSTOLIC (CONGESTIVE) HEART FAILURE: Chronic | Status: ACTIVE | Noted: 2023-12-06

## 2024-03-12 PROBLEM — F10.90 ALCOHOL USE, UNSPECIFIED, UNCOMPLICATED: Chronic | Status: ACTIVE | Noted: 2023-12-06

## 2024-03-12 PROBLEM — I25.10 ATHEROSCLEROTIC HEART DISEASE OF NATIVE CORONARY ARTERY WITHOUT ANGINA PECTORIS: Chronic | Status: ACTIVE | Noted: 2023-12-06

## 2024-03-12 PROBLEM — E87.1 HYPO-OSMOLALITY AND HYPONATREMIA: Chronic | Status: ACTIVE | Noted: 2023-12-06

## 2024-03-12 PROBLEM — I34.0 NONRHEUMATIC MITRAL (VALVE) INSUFFICIENCY: Chronic | Status: ACTIVE | Noted: 2023-12-06

## 2024-03-15 ENCOUNTER — LABORATORY RESULT (OUTPATIENT)
Age: 74
End: 2024-03-15

## 2024-03-15 ENCOUNTER — APPOINTMENT (OUTPATIENT)
Dept: INTERNAL MEDICINE | Facility: CLINIC | Age: 74
End: 2024-03-15
Payer: MEDICARE

## 2024-03-15 VITALS
WEIGHT: 114 LBS | SYSTOLIC BLOOD PRESSURE: 180 MMHG | OXYGEN SATURATION: 98 % | HEIGHT: 66 IN | RESPIRATION RATE: 16 BRPM | BODY MASS INDEX: 18.32 KG/M2 | DIASTOLIC BLOOD PRESSURE: 80 MMHG | HEART RATE: 99 BPM

## 2024-03-15 VITALS — DIASTOLIC BLOOD PRESSURE: 98 MMHG | SYSTOLIC BLOOD PRESSURE: 160 MMHG

## 2024-03-15 DIAGNOSIS — F41.9 ANXIETY DISORDER, UNSPECIFIED: ICD-10-CM

## 2024-03-15 DIAGNOSIS — I25.10 ATHEROSCLEROTIC HEART DISEASE OF NATIVE CORONARY ARTERY W/OUT ANGINA PECTORIS: ICD-10-CM

## 2024-03-15 DIAGNOSIS — F10.20 ALCOHOL DEPENDENCE, UNCOMPLICATED: ICD-10-CM

## 2024-03-15 DIAGNOSIS — I50.9 HEART FAILURE, UNSPECIFIED: ICD-10-CM

## 2024-03-15 DIAGNOSIS — R41.3 OTHER AMNESIA: ICD-10-CM

## 2024-03-15 DIAGNOSIS — F32.A ANXIETY DISORDER, UNSPECIFIED: ICD-10-CM

## 2024-03-15 DIAGNOSIS — R74.8 ABNORMAL LEVELS OF OTHER SERUM ENZYMES: ICD-10-CM

## 2024-03-15 DIAGNOSIS — K21.9 GASTRO-ESOPHAGEAL REFLUX DISEASE W/OUT ESOPHAGITIS: ICD-10-CM

## 2024-03-15 DIAGNOSIS — B18.2 CHRONIC VIRAL HEPATITIS C: ICD-10-CM

## 2024-03-15 DIAGNOSIS — R79.89 OTHER SPECIFIED ABNORMAL FINDINGS OF BLOOD CHEMISTRY: ICD-10-CM

## 2024-03-15 DIAGNOSIS — J44.9 CHRONIC OBSTRUCTIVE PULMONARY DISEASE, UNSPECIFIED: ICD-10-CM

## 2024-03-15 DIAGNOSIS — K74.60 UNSPECIFIED CIRRHOSIS OF LIVER: ICD-10-CM

## 2024-03-15 DIAGNOSIS — D64.9 ANEMIA, UNSPECIFIED: ICD-10-CM

## 2024-03-15 PROCEDURE — 99214 OFFICE O/P EST MOD 30 MIN: CPT

## 2024-03-15 RX ORDER — ATORVASTATIN CALCIUM 40 MG/1
40 TABLET, FILM COATED ORAL DAILY
Qty: 90 | Refills: 0 | Status: ACTIVE | COMMUNITY
Start: 2018-03-14 | End: 1900-01-01

## 2024-03-15 RX ORDER — SPIRONOLACTONE 25 MG/1
25 TABLET ORAL
Qty: 90 | Refills: 0 | Status: ACTIVE | COMMUNITY
Start: 2023-04-12 | End: 1900-01-01

## 2024-03-15 RX ORDER — SACUBITRIL AND VALSARTAN 97; 103 MG/1; MG/1
97-103 TABLET, FILM COATED ORAL
Qty: 180 | Refills: 0 | Status: ACTIVE | COMMUNITY
Start: 2023-04-12 | End: 1900-01-01

## 2024-03-15 RX ORDER — CARVEDILOL 6.25 MG/1
6.25 TABLET, FILM COATED ORAL TWICE DAILY
Qty: 180 | Refills: 0 | Status: ACTIVE | COMMUNITY
Start: 2023-04-12 | End: 1900-01-01

## 2024-03-15 RX ORDER — FUROSEMIDE 20 MG/1
20 TABLET ORAL
Qty: 90 | Refills: 0 | Status: ACTIVE | COMMUNITY
Start: 2023-04-12 | End: 1900-01-01

## 2024-03-19 PROBLEM — I50.9 HEART FAILURE: Status: ACTIVE | Noted: 2023-04-12

## 2024-03-19 PROBLEM — K21.9 CHRONIC GERD: Status: ACTIVE | Noted: 2023-04-12

## 2024-03-19 PROBLEM — K74.60 CIRRHOSIS: Status: ACTIVE | Noted: 2018-10-11

## 2024-03-19 PROBLEM — J44.9 COPD, MODERATE: Status: ACTIVE | Noted: 2019-02-13

## 2024-03-19 PROBLEM — F41.9 ANXIETY AND DEPRESSION: Status: ACTIVE | Noted: 2019-07-25

## 2024-03-19 PROBLEM — R79.89 ABNORMAL LIVER FUNCTION TESTS: Status: ACTIVE | Noted: 2017-11-16

## 2024-03-19 PROBLEM — D64.9 ANEMIA, UNSPECIFIED TYPE: Status: ACTIVE | Noted: 2024-03-15

## 2024-03-19 PROBLEM — R41.3 MEMORY DIFFICULTY: Status: ACTIVE | Noted: 2024-03-15

## 2024-03-19 PROBLEM — F10.20 ALCOHOLISM: Status: ACTIVE | Noted: 2017-03-09

## 2024-03-19 PROBLEM — B18.2 CHRONIC HEPATITIS C: Status: RESOLVED | Noted: 2017-11-16 | Resolved: 2024-03-19

## 2024-03-19 PROBLEM — R74.8 ELEVATED LIVER ENZYMES: Status: ACTIVE | Noted: 2017-03-09

## 2024-03-19 RX ORDER — SERTRALINE HYDROCHLORIDE 50 MG/1
50 TABLET, FILM COATED ORAL DAILY
Qty: 90 | Refills: 0 | Status: DISCONTINUED | COMMUNITY
Start: 2019-07-25 | End: 2024-03-19

## 2024-03-19 RX ORDER — TIOTROPIUM BROMIDE INHALATION SPRAY 3.12 UG/1
2.5 SPRAY, METERED RESPIRATORY (INHALATION) DAILY
Qty: 1 | Refills: 0 | Status: DISCONTINUED | COMMUNITY
Start: 2023-04-12 | End: 2024-03-19

## 2024-03-19 RX ORDER — PANTOPRAZOLE 40 MG/1
40 TABLET, DELAYED RELEASE ORAL DAILY
Qty: 90 | Refills: 0 | Status: DISCONTINUED | COMMUNITY
Start: 2023-04-12 | End: 2024-03-19

## 2024-03-19 NOTE — PHYSICAL EXAM
[Normal] : normal rate, regular rhythm, normal S1 and S2 and no murmur heard [No Varicosities] : no varicosities [Pedal Pulses Present] : the pedal pulses are present [No Edema] : there was no peripheral edema [No Extremity Clubbing/Cyanosis] : no extremity clubbing/cyanosis [Soft] : abdomen soft [Non Tender] : non-tender [Non-distended] : non-distended [Normal Bowel Sounds] : normal bowel sounds [de-identified] : Looks a little unkempt

## 2024-03-19 NOTE — ASSESSMENT
[FreeTextEntry1] : In short, this is a rather noncompliant patient who is coming in about a year after he was last seen.  He did not come to his 3-month follow-up appointment after being seen initially in April of 2023.  He also did not follow-up with cardiology, gastroenterology, psychiatry like he was originally referred to do in April 23. He is noncompliant with medications as well.  States he has not taken any of the cardiac medications including Coreg, Lipitor, Lasix, spironolactone at this point for the past 6 to 8 months.  He is stating that he is taking the Entresto at this point, but I am not sure.  Educated the patient about his medical problems and told him to properly follow-up with cardiology, gastroenterology, psychiatry.  Printed out referrals and provided provider list and gave it to him today as well. Educated him about the importance of medication compliance and following up with specialist given his medical comorbidities. Since we are restarting Entresto, Lasix, Aldactone, Coreg.  Will repeat CMP in 2 weeks to make sure kidney function and electrolytes are within normal limit.  Gave patient a CMP to get done on 03-. We will do routine blood work in the form of CBC, CMP, A1c, lipid, TSH, B12 folate at this point. He may follow-up earlier if needed. Pt was told to call with problems or concerns. The patient was told to seek immediate attention by calling 911 or going to the ER if his condition does not improve or gets acutely worse.

## 2024-03-19 NOTE — HISTORY OF PRESENT ILLNESS
[FreeTextEntry1] : follow up. [de-identified] : 73-year-old male is here for checkup.  Patient denies any fevers, chills, nausea, vomiting, diarrhea, constipation, chest pain, shortness of breath, weakness, numbness, tingling at this time.  Patient reports that he has had 2 COVID vaccines in the past.  Alcohol: Endorses. Patient reports that they drink alcohol (1 beer once a week).  States he was a heavy drinker in the past Smoking: He reports that he is a former smoker. He reports that he quit smoking 10 years ago in 2014. Reports that he does not smoke anymore. States that before that he smoked for 15 years. states he smoked 1/2 ppd during that time.  Illicit Drugs: Denies / Endorses. Patient reports that they do not use any recreational drugs.  Colonoscopy: Patient reports that they never had a colonoscopy.  Patient refused undergo screening colonoscopy at this point.  Educated the patient about the harms and side effects of not undergoing routine colon cancer screening including missing colon cancer, colon polyps, etc.  He states he understands the risks and harms of not undergoing routine colon cancer screening but refuses to undergo colon cancer screening/screening colonoscopy at this point Diet: Patient reports that they are not that good with diet  Exercise: Patient reports that they are not good with exercise Living Situation: He reports that he lives alone with a Tenant- Lives with a coworker. Employment Status:  Retired.  Patient reports that he is retired. 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 Education: Reports that the highest level of education is High school. Relationship Status: .  Number of kids : 3 Youngest daughter - Ciera ADLs: Fully functional (bathing, dressing, toileting, transferring, walking, feeding).  Patient reports that they can perform ADLs IADLs: Fully functional and needs no help or supervision to perform IADLs (using the telephone, shopping, preparing meals, housekeeping, doing laundry, using transportation, managing medications and managing finances).  Patient reports that they can perform IADLs.  Walks with a walker.

## 2024-03-20 ENCOUNTER — NON-APPOINTMENT (OUTPATIENT)
Age: 74
End: 2024-03-20

## 2024-03-21 LAB
ALBUMIN SERPL ELPH-MCNC: 4.8 G/DL
ALP BLD-CCNC: 103 U/L
ALT SERPL-CCNC: 6 U/L
ANION GAP SERPL CALC-SCNC: 17 MMOL/L
AST SERPL-CCNC: 13 U/L
BASOPHILS # BLD AUTO: 0.07 K/UL
BASOPHILS NFR BLD AUTO: 0.9 %
BILIRUB SERPL-MCNC: 0.5 MG/DL
BUN SERPL-MCNC: 13 MG/DL
CALCIUM SERPL-MCNC: 10.2 MG/DL
CHLORIDE SERPL-SCNC: 96 MMOL/L
CHOLEST SERPL-MCNC: 174 MG/DL
CO2 SERPL-SCNC: 21 MMOL/L
CREAT SERPL-MCNC: 1.09 MG/DL
EGFR: 72 ML/MIN/1.73M2
EOSINOPHIL # BLD AUTO: 0.09 K/UL
EOSINOPHIL NFR BLD AUTO: 1.2 %
ESTIMATED AVERAGE GLUCOSE: 94 MG/DL
FERRITIN SERPL-MCNC: 244 NG/ML
FOLATE SERPL-MCNC: 11.3 NG/ML
GLUCOSE SERPL-MCNC: 103 MG/DL
HAV IGM SER QL: NONREACTIVE
HBA1C MFR BLD HPLC: 4.9 %
HBV CORE IGM SER QL: NONREACTIVE
HBV SURFACE AG SER QL: NONREACTIVE
HCT VFR BLD CALC: 33.8 %
HCV AB SER QL: REACTIVE
HCV S/CO RATIO: 5.23 S/CO
HDLC SERPL-MCNC: 38 MG/DL
HGB BLD-MCNC: 11.3 G/DL
IMM GRANULOCYTES NFR BLD AUTO: 0.1 %
IRON SATN MFR SERPL: 9 %
IRON SERPL-MCNC: 32 UG/DL
LDLC SERPL CALC-MCNC: 116 MG/DL
LYMPHOCYTES # BLD AUTO: 1.47 K/UL
LYMPHOCYTES NFR BLD AUTO: 19.5 %
MAN DIFF?: NORMAL
MCHC RBC-ENTMCNC: 26.4 PG
MCHC RBC-ENTMCNC: 33.4 GM/DL
MCV RBC AUTO: 79 FL
MONOCYTES # BLD AUTO: 0.58 K/UL
MONOCYTES NFR BLD AUTO: 7.7 %
NEUTROPHILS # BLD AUTO: 5.31 K/UL
NEUTROPHILS NFR BLD AUTO: 70.6 %
NONHDLC SERPL-MCNC: 136 MG/DL
PLATELET # BLD AUTO: 329 K/UL
POTASSIUM SERPL-SCNC: 3.7 MMOL/L
PROT SERPL-MCNC: 8 G/DL
RBC # BLD: 4.28 M/UL
RBC # FLD: 15.7 %
SODIUM SERPL-SCNC: 133 MMOL/L
TIBC SERPL-MCNC: 346 UG/DL
TRIGL SERPL-MCNC: 111 MG/DL
TSH SERPL-ACNC: 1.1 UIU/ML
UIBC SERPL-MCNC: 314 UG/DL
VIT B12 SERPL-MCNC: 522 PG/ML
WBC # FLD AUTO: 7.53 K/UL

## 2024-04-19 LAB
ALBUMIN SERPL ELPH-MCNC: 5 G/DL
ALP BLD-CCNC: 128 U/L
ALT SERPL-CCNC: 9 U/L
ANION GAP SERPL CALC-SCNC: 15 MMOL/L
AST SERPL-CCNC: 18 U/L
BILIRUB SERPL-MCNC: 0.3 MG/DL
BUN SERPL-MCNC: 11 MG/DL
CALCIUM SERPL-MCNC: 10.2 MG/DL
CHLORIDE SERPL-SCNC: 91 MMOL/L
CO2 SERPL-SCNC: 25 MMOL/L
CREAT SERPL-MCNC: 1.1 MG/DL
EGFR: 71 ML/MIN/1.73M2
GLUCOSE SERPL-MCNC: 96 MG/DL
POTASSIUM SERPL-SCNC: 4.4 MMOL/L
PROT SERPL-MCNC: 7.9 G/DL
SODIUM SERPL-SCNC: 132 MMOL/L

## 2024-05-21 ENCOUNTER — APPOINTMENT (OUTPATIENT)
Dept: NEUROLOGY | Facility: HOSPITAL | Age: 74
End: 2024-05-21

## 2024-05-23 ENCOUNTER — APPOINTMENT (OUTPATIENT)
Dept: CARDIOLOGY | Facility: CLINIC | Age: 74
End: 2024-05-23

## 2024-05-29 ENCOUNTER — EMERGENCY (EMERGENCY)
Facility: HOSPITAL | Age: 74
LOS: 1 days | Discharge: HOME | End: 2024-05-29
Attending: EMERGENCY MEDICINE | Admitting: EMERGENCY MEDICINE
Payer: MEDICARE

## 2024-05-29 VITALS
OXYGEN SATURATION: 98 % | WEIGHT: 149.91 LBS | SYSTOLIC BLOOD PRESSURE: 173 MMHG | RESPIRATION RATE: 20 BRPM | TEMPERATURE: 98 F | DIASTOLIC BLOOD PRESSURE: 103 MMHG | HEART RATE: 101 BPM | HEIGHT: 65 IN

## 2024-05-29 VITALS
DIASTOLIC BLOOD PRESSURE: 106 MMHG | SYSTOLIC BLOOD PRESSURE: 201 MMHG | OXYGEN SATURATION: 98 % | RESPIRATION RATE: 18 BRPM | HEART RATE: 95 BPM

## 2024-05-29 DIAGNOSIS — Z98.89 OTHER SPECIFIED POSTPROCEDURAL STATES: Chronic | ICD-10-CM

## 2024-05-29 PROCEDURE — 93010 ELECTROCARDIOGRAM REPORT: CPT

## 2024-05-29 PROCEDURE — 99284 EMERGENCY DEPT VISIT MOD MDM: CPT | Mod: FS

## 2024-05-29 RX ORDER — ALPRAZOLAM 0.25 MG
0.25 TABLET ORAL ONCE
Refills: 0 | Status: DISCONTINUED | OUTPATIENT
Start: 2024-05-29 | End: 2024-05-29

## 2024-05-29 RX ORDER — CARVEDILOL PHOSPHATE 80 MG/1
6.25 CAPSULE, EXTENDED RELEASE ORAL ONCE
Refills: 0 | Status: COMPLETED | OUTPATIENT
Start: 2024-05-29 | End: 2024-05-29

## 2024-05-29 RX ORDER — SACUBITRIL AND VALSARTAN 24; 26 MG/1; MG/1
1 TABLET, FILM COATED ORAL ONCE
Refills: 0 | Status: COMPLETED | OUTPATIENT
Start: 2024-05-29 | End: 2024-05-29

## 2024-05-29 RX ADMIN — Medication 0.25 MILLIGRAM(S): at 13:10

## 2024-05-29 RX ADMIN — SACUBITRIL AND VALSARTAN 1 TABLET(S): 24; 26 TABLET, FILM COATED ORAL at 14:49

## 2024-05-29 RX ADMIN — CARVEDILOL PHOSPHATE 6.25 MILLIGRAM(S): 80 CAPSULE, EXTENDED RELEASE ORAL at 14:42

## 2024-05-29 NOTE — ED PROVIDER NOTE - OBJECTIVE STATEMENT
72 yo w/ hx of HFrEF (EF 25% in 10/2022), mod MR, HTN, HLD, CAD, HCV (s/p Harvoni treatment), chronic hyponatremia, EtOH use disorder 72 yo w/ hx of HFrEF (EF 25% in 10/2022), mod MR, HTN, HLD, CAD, HCV (s/p Harvoni treatment), chronic hyponatremia, EtOH use disorder presents to the ED for difficulty breathing secondary to panic attacks. pt states he has been struggling with panic attacks and each time he feels that its hard for him to breath and shortly resolves. pt states the last episode was yesterday and today while in the ED he starts to think that he is starting to have a panic attack. pt denies CP, SOB, palpitations, SI/HI, AH/VH.

## 2024-05-29 NOTE — ED PROVIDER NOTE - PATIENT PORTAL LINK FT
You can access the FollowMyHealth Patient Portal offered by [facility] by registering at the following website: [email or web address]. By joining [facility]’s FollowMyHealth portal, you will also be able to view your health information using other applications (apps) compatible with our system.

## 2024-05-29 NOTE — ED ADULT TRIAGE NOTE - CHIEF COMPLAINT QUOTE
patient states" I am having difficulty breathing and feel anxious since yesterday : denies chest pain .

## 2024-05-29 NOTE — ED ADULT NURSE NOTE - NSFALLRISKINTERV_ED_ALL_ED

## 2024-05-29 NOTE — ED PROVIDER NOTE - CLINICAL SUMMARY MEDICAL DECISION MAKING FREE TEXT BOX
72 yo male w/ hx of HFrEF (EF 25% in 10/2022), mod MR, HTN, HLD, CAD, HCV (s/p Harvoni treatment), chronic hyponatremia, EtOH use disorder for panic attack. while ot is here he is asymptomatic, no agitation. pt calm and cooperative. no sign of panic attack  will give a dose of xanax and pt to follow up with pmd and crises center  pt is agreeable with the plan

## 2024-05-29 NOTE — ED ADULT NURSE NOTE - OBJECTIVE STATEMENT
Pt presents to the ER with c/c of difficulty breathing, a&ox4, ambulates with walker at baseline, skin intact, respirations even and unlabored, SpO2 100%RA, abd soft and non-distended, nontender to palpation. NSr on CM. Pt reports feeling anxious. Denies chest pain ,fever, chills, or any other symptoms. 20g Iv placed in left arm, MD at bedside assessing pt, will await orders and continue to monitor.

## 2024-05-29 NOTE — ED PROVIDER NOTE - NSFOLLOWUPINSTRUCTIONS_ED_ALL_ED_FT
Follow up with your primary care doctor   Advance activity as tolerated.  Continue all previously prescribed medications as directed unless otherwise instructed.  Follow up with your primary care physician in 48-72 hours- bring copies of your results.  Return to the ER for worsening or persistent symptoms, and/or ANY NEW OR CONCERNING SYMPTOMS. If you have issues obtaining follow up, please call: [phone] to obtain a doctor or specialist who takes your insurance in your area.  You may call [phone] to make an appointment with the internal medicine clinic.

## 2024-05-29 NOTE — ED PROVIDER NOTE - ATTENDING APP SHARED VISIT CONTRIBUTION OF CARE
72 yo w/ hx of HFrEF (EF 25% in 10/2022), mod MR, HTN, HLD, CAD, HCV (s/p Harvoni treatment), chronic hyponatremia, EtOH use disorder presents to the ED for difficulty breathing secondary to panic attacks. pt states he has been struggling with panic attacks and each time he feels that its hard for him to breath and shortly resolves. pt states the last episode was yesterday and today while in the ED he starts to think that he is starting to have a panic attack. pt denies CP, SOB, palpitations, SI/HI, AH/VH.

## 2024-05-31 ENCOUNTER — OUTPATIENT (OUTPATIENT)
Dept: OUTPATIENT SERVICES | Facility: HOSPITAL | Age: 74
LOS: 1 days | Discharge: STILL ADMITTED OR ADMINISTRATIVE | End: 2024-05-31
Payer: MEDICARE

## 2024-05-31 DIAGNOSIS — Z98.89 OTHER SPECIFIED POSTPROCEDURAL STATES: Chronic | ICD-10-CM

## 2024-05-31 PROCEDURE — 99214 OFFICE O/P EST MOD 30 MIN: CPT | Mod: 95

## 2024-05-31 PROCEDURE — 90839 PSYTX CRISIS INITIAL 60 MIN: CPT | Mod: 95,GT

## 2024-06-03 DIAGNOSIS — F41.9 ANXIETY DISORDER, UNSPECIFIED: ICD-10-CM

## 2024-06-24 ENCOUNTER — APPOINTMENT (OUTPATIENT)
Dept: NEUROLOGY | Facility: CLINIC | Age: 74
End: 2024-06-24

## 2024-09-08 ENCOUNTER — INPATIENT (INPATIENT)
Facility: HOSPITAL | Age: 74
LOS: 9 days | Discharge: NURSING, REHAB OR LONG-TERM CARE | End: 2024-09-18
Attending: STUDENT IN AN ORGANIZED HEALTH CARE EDUCATION/TRAINING PROGRAM | Admitting: STUDENT IN AN ORGANIZED HEALTH CARE EDUCATION/TRAINING PROGRAM
Payer: MEDICARE

## 2024-09-08 VITALS
WEIGHT: 149.91 LBS | HEART RATE: 93 BPM | DIASTOLIC BLOOD PRESSURE: 94 MMHG | SYSTOLIC BLOOD PRESSURE: 198 MMHG | OXYGEN SATURATION: 95 % | TEMPERATURE: 98 F | RESPIRATION RATE: 18 BRPM

## 2024-09-08 DIAGNOSIS — F10.239 ALCOHOL DEPENDENCE WITH WITHDRAWAL, UNSPECIFIED: ICD-10-CM

## 2024-09-08 DIAGNOSIS — E87.1 HYPO-OSMOLALITY AND HYPONATREMIA: ICD-10-CM

## 2024-09-08 DIAGNOSIS — F03.90 UNSPECIFIED DEMENTIA, UNSPECIFIED SEVERITY, WITHOUT BEHAVIORAL DISTURBANCE, PSYCHOTIC DISTURBANCE, MOOD DISTURBANCE, AND ANXIETY: ICD-10-CM

## 2024-09-08 DIAGNOSIS — I50.22 CHRONIC SYSTOLIC (CONGESTIVE) HEART FAILURE: ICD-10-CM

## 2024-09-08 DIAGNOSIS — I25.10 ATHEROSCLEROTIC HEART DISEASE OF NATIVE CORONARY ARTERY WITHOUT ANGINA PECTORIS: ICD-10-CM

## 2024-09-08 DIAGNOSIS — I10 ESSENTIAL (PRIMARY) HYPERTENSION: ICD-10-CM

## 2024-09-08 DIAGNOSIS — Z98.89 OTHER SPECIFIED POSTPROCEDURAL STATES: Chronic | ICD-10-CM

## 2024-09-08 DIAGNOSIS — E78.5 HYPERLIPIDEMIA, UNSPECIFIED: ICD-10-CM

## 2024-09-08 DIAGNOSIS — R94.31 ABNORMAL ELECTROCARDIOGRAM [ECG] [EKG]: ICD-10-CM

## 2024-09-08 LAB
ALBUMIN SERPL ELPH-MCNC: 4.8 G/DL — SIGNIFICANT CHANGE UP (ref 3.3–5)
ALP SERPL-CCNC: 143 U/L — HIGH (ref 40–120)
ALT FLD-CCNC: 10 U/L — SIGNIFICANT CHANGE UP (ref 4–41)
ANION GAP SERPL CALC-SCNC: 15 MMOL/L — HIGH (ref 7–14)
APPEARANCE UR: CLEAR — SIGNIFICANT CHANGE UP
AST SERPL-CCNC: 20 U/L — SIGNIFICANT CHANGE UP (ref 4–40)
BACTERIA # UR AUTO: NEGATIVE /HPF — SIGNIFICANT CHANGE UP
BASOPHILS # BLD AUTO: 0.04 K/UL — SIGNIFICANT CHANGE UP (ref 0–0.2)
BASOPHILS NFR BLD AUTO: 0.6 % — SIGNIFICANT CHANGE UP (ref 0–2)
BILIRUB SERPL-MCNC: 0.4 MG/DL — SIGNIFICANT CHANGE UP (ref 0.2–1.2)
BILIRUB UR-MCNC: NEGATIVE — SIGNIFICANT CHANGE UP
BUN SERPL-MCNC: 14 MG/DL — SIGNIFICANT CHANGE UP (ref 7–23)
CALCIUM SERPL-MCNC: 10 MG/DL — SIGNIFICANT CHANGE UP (ref 8.4–10.5)
CAST: 0 /LPF — SIGNIFICANT CHANGE UP (ref 0–4)
CHLORIDE SERPL-SCNC: 87 MMOL/L — LOW (ref 98–107)
CO2 SERPL-SCNC: 24 MMOL/L — SIGNIFICANT CHANGE UP (ref 22–31)
COLOR SPEC: YELLOW — SIGNIFICANT CHANGE UP
CREAT SERPL-MCNC: 1.06 MG/DL — SIGNIFICANT CHANGE UP (ref 0.5–1.3)
DIFF PNL FLD: NEGATIVE — SIGNIFICANT CHANGE UP
EGFR: 74 ML/MIN/1.73M2 — SIGNIFICANT CHANGE UP
EOSINOPHIL # BLD AUTO: 0.04 K/UL — SIGNIFICANT CHANGE UP (ref 0–0.5)
EOSINOPHIL NFR BLD AUTO: 0.6 % — SIGNIFICANT CHANGE UP (ref 0–6)
ETHANOL SERPL-MCNC: <10 MG/DL — SIGNIFICANT CHANGE UP
FLUAV AG NPH QL: SIGNIFICANT CHANGE UP
FLUBV AG NPH QL: SIGNIFICANT CHANGE UP
GAS PNL BLDV: SIGNIFICANT CHANGE UP
GLUCOSE BLDC GLUCOMTR-MCNC: 274 MG/DL — HIGH (ref 70–99)
GLUCOSE SERPL-MCNC: 82 MG/DL — SIGNIFICANT CHANGE UP (ref 70–99)
GLUCOSE UR QL: NEGATIVE MG/DL — SIGNIFICANT CHANGE UP
HCT VFR BLD CALC: 32.7 % — LOW (ref 39–50)
HGB BLD-MCNC: 11.3 G/DL — LOW (ref 13–17)
IANC: 5.16 K/UL — SIGNIFICANT CHANGE UP (ref 1.8–7.4)
IMM GRANULOCYTES NFR BLD AUTO: 0.3 % — SIGNIFICANT CHANGE UP (ref 0–0.9)
KETONES UR-MCNC: NEGATIVE MG/DL — SIGNIFICANT CHANGE UP
LEUKOCYTE ESTERASE UR-ACNC: NEGATIVE — SIGNIFICANT CHANGE UP
LYMPHOCYTES # BLD AUTO: 1.01 K/UL — SIGNIFICANT CHANGE UP (ref 1–3.3)
LYMPHOCYTES # BLD AUTO: 15.1 % — SIGNIFICANT CHANGE UP (ref 13–44)
MAGNESIUM SERPL-MCNC: 1.9 MG/DL — SIGNIFICANT CHANGE UP (ref 1.6–2.6)
MCHC RBC-ENTMCNC: 26 PG — LOW (ref 27–34)
MCHC RBC-ENTMCNC: 34.6 GM/DL — SIGNIFICANT CHANGE UP (ref 32–36)
MCV RBC AUTO: 75.3 FL — LOW (ref 80–100)
MONOCYTES # BLD AUTO: 0.44 K/UL — SIGNIFICANT CHANGE UP (ref 0–0.9)
MONOCYTES NFR BLD AUTO: 6.6 % — SIGNIFICANT CHANGE UP (ref 2–14)
NEUTROPHILS # BLD AUTO: 5.16 K/UL — SIGNIFICANT CHANGE UP (ref 1.8–7.4)
NEUTROPHILS NFR BLD AUTO: 76.8 % — SIGNIFICANT CHANGE UP (ref 43–77)
NITRITE UR-MCNC: NEGATIVE — SIGNIFICANT CHANGE UP
NRBC # BLD: 0 /100 WBCS — SIGNIFICANT CHANGE UP (ref 0–0)
NRBC # FLD: 0 K/UL — SIGNIFICANT CHANGE UP (ref 0–0)
NT-PROBNP SERPL-SCNC: 9341 PG/ML — HIGH
OSMOLALITY UR: 245 MOSM/KG — SIGNIFICANT CHANGE UP (ref 50–1200)
PCP SPEC-MCNC: SIGNIFICANT CHANGE UP
PH UR: 8 — SIGNIFICANT CHANGE UP (ref 5–8)
PHOSPHATE SERPL-MCNC: 3.2 MG/DL — SIGNIFICANT CHANGE UP (ref 2.5–4.5)
PLATELET # BLD AUTO: 255 K/UL — SIGNIFICANT CHANGE UP (ref 150–400)
POTASSIUM SERPL-MCNC: 3.6 MMOL/L — SIGNIFICANT CHANGE UP (ref 3.5–5.3)
POTASSIUM SERPL-SCNC: 3.6 MMOL/L — SIGNIFICANT CHANGE UP (ref 3.5–5.3)
PROT SERPL-MCNC: 8 G/DL — SIGNIFICANT CHANGE UP (ref 6–8.3)
PROT UR-MCNC: SIGNIFICANT CHANGE UP MG/DL
RBC # BLD: 4.34 M/UL — SIGNIFICANT CHANGE UP (ref 4.2–5.8)
RBC # FLD: 13.4 % — SIGNIFICANT CHANGE UP (ref 10.3–14.5)
RBC CASTS # UR COMP ASSIST: 1 /HPF — SIGNIFICANT CHANGE UP (ref 0–4)
RSV RNA NPH QL NAA+NON-PROBE: SIGNIFICANT CHANGE UP
SARS-COV-2 RNA SPEC QL NAA+PROBE: SIGNIFICANT CHANGE UP
SODIUM SERPL-SCNC: 126 MMOL/L — LOW (ref 135–145)
SP GR SPEC: 1.01 — SIGNIFICANT CHANGE UP (ref 1–1.03)
SQUAMOUS # UR AUTO: 0 /HPF — SIGNIFICANT CHANGE UP (ref 0–5)
TROPONIN T, HIGH SENSITIVITY RESULT: 27 NG/L — SIGNIFICANT CHANGE UP
TROPONIN T, HIGH SENSITIVITY RESULT: 32 NG/L — SIGNIFICANT CHANGE UP
UROBILINOGEN FLD QL: 0.2 MG/DL — SIGNIFICANT CHANGE UP (ref 0.2–1)
WBC # BLD: 6.71 K/UL — SIGNIFICANT CHANGE UP (ref 3.8–10.5)
WBC # FLD AUTO: 6.71 K/UL — SIGNIFICANT CHANGE UP (ref 3.8–10.5)
WBC UR QL: 0 /HPF — SIGNIFICANT CHANGE UP (ref 0–5)

## 2024-09-08 PROCEDURE — 72125 CT NECK SPINE W/O DYE: CPT | Mod: 26,MC

## 2024-09-08 PROCEDURE — 71045 X-RAY EXAM CHEST 1 VIEW: CPT | Mod: 26

## 2024-09-08 PROCEDURE — 99285 EMERGENCY DEPT VISIT HI MDM: CPT

## 2024-09-08 PROCEDURE — 70450 CT HEAD/BRAIN W/O DYE: CPT | Mod: 26,MC

## 2024-09-08 PROCEDURE — 99223 1ST HOSP IP/OBS HIGH 75: CPT

## 2024-09-08 PROCEDURE — 73080 X-RAY EXAM OF ELBOW: CPT | Mod: 26,LT

## 2024-09-08 RX ORDER — SPIRONOLACTONE 25 MG/1
1 TABLET, FILM COATED ORAL
Refills: 0 | DISCHARGE

## 2024-09-08 RX ORDER — ENOXAPARIN SODIUM 100 MG/ML
40 INJECTION SUBCUTANEOUS EVERY 24 HOURS
Refills: 0 | Status: DISCONTINUED | OUTPATIENT
Start: 2024-09-09 | End: 2024-09-18

## 2024-09-08 RX ORDER — ONDANSETRON 2 MG/ML
4 INJECTION, SOLUTION INTRAMUSCULAR; INTRAVENOUS EVERY 8 HOURS
Refills: 0 | Status: DISCONTINUED | OUTPATIENT
Start: 2024-09-08 | End: 2024-09-18

## 2024-09-08 RX ORDER — MAGNESIUM, ALUMINUM HYDROXIDE 200-225/5
30 SUSPENSION, ORAL (FINAL DOSE FORM) ORAL EVERY 4 HOURS
Refills: 0 | Status: DISCONTINUED | OUTPATIENT
Start: 2024-09-08 | End: 2024-09-18

## 2024-09-08 RX ORDER — FUROSEMIDE 40 MG
1 TABLET ORAL
Refills: 0 | DISCHARGE

## 2024-09-08 RX ORDER — THIAMINE HCL 250 MG
100 TABLET ORAL ONCE
Refills: 0 | Status: COMPLETED | OUTPATIENT
Start: 2024-09-08 | End: 2024-09-08

## 2024-09-08 RX ORDER — ACETAMINOPHEN 325 MG/1
650 TABLET ORAL EVERY 6 HOURS
Refills: 0 | Status: DISCONTINUED | OUTPATIENT
Start: 2024-09-08 | End: 2024-09-18

## 2024-09-08 RX ORDER — ASPIRIN 81 MG
81 TABLET, DELAYED RELEASE (ENTERIC COATED) ORAL DAILY
Refills: 0 | Status: DISCONTINUED | OUTPATIENT
Start: 2024-09-08 | End: 2024-09-18

## 2024-09-08 RX ORDER — LORAZEPAM 4 MG/ML
2 INJECTION INTRAMUSCULAR; INTRAVENOUS
Refills: 0 | Status: DISCONTINUED | OUTPATIENT
Start: 2024-09-08 | End: 2024-09-10

## 2024-09-08 RX ORDER — FOLIC ACID 1 MG
1 TABLET ORAL ONCE
Refills: 0 | Status: COMPLETED | OUTPATIENT
Start: 2024-09-08 | End: 2024-09-08

## 2024-09-08 RX ORDER — SODIUM CHLORIDE 9 MG/ML
500 INJECTION INTRAMUSCULAR; INTRAVENOUS; SUBCUTANEOUS ONCE
Refills: 0 | Status: COMPLETED | OUTPATIENT
Start: 2024-09-08 | End: 2024-09-08

## 2024-09-08 RX ORDER — FOLIC ACID 1 MG
1 TABLET ORAL DAILY
Refills: 0 | Status: DISCONTINUED | OUTPATIENT
Start: 2024-09-08 | End: 2024-09-18

## 2024-09-08 RX ORDER — HYDRALAZINE HCL 50 MG
25 TABLET ORAL
Refills: 0 | Status: DISCONTINUED | OUTPATIENT
Start: 2024-09-08 | End: 2024-09-09

## 2024-09-08 RX ORDER — LORAZEPAM 4 MG/ML
1 INJECTION INTRAMUSCULAR; INTRAVENOUS ONCE
Refills: 0 | Status: DISCONTINUED | OUTPATIENT
Start: 2024-09-08 | End: 2024-09-09

## 2024-09-08 RX ORDER — ASPIRIN 81 MG
162 TABLET, DELAYED RELEASE (ENTERIC COATED) ORAL ONCE
Refills: 0 | Status: COMPLETED | OUTPATIENT
Start: 2024-09-08 | End: 2024-09-08

## 2024-09-08 RX ORDER — THIAMINE HCL 250 MG
100 TABLET ORAL DAILY
Refills: 0 | Status: COMPLETED | OUTPATIENT
Start: 2024-09-08 | End: 2024-09-11

## 2024-09-08 RX ADMIN — Medication 25 MILLIGRAM(S): at 21:08

## 2024-09-08 RX ADMIN — Medication 1 MILLIGRAM(S): at 17:55

## 2024-09-08 RX ADMIN — Medication 100 MILLIGRAM(S): at 17:55

## 2024-09-08 RX ADMIN — Medication 40 MILLIGRAM(S): at 21:08

## 2024-09-08 RX ADMIN — Medication 162 MILLIGRAM(S): at 20:25

## 2024-09-08 RX ADMIN — Medication 3 MILLIGRAM(S): at 21:08

## 2024-09-08 RX ADMIN — SODIUM CHLORIDE 500 MILLILITER(S): 9 INJECTION INTRAMUSCULAR; INTRAVENOUS; SUBCUTANEOUS at 17:57

## 2024-09-08 NOTE — H&P ADULT - NSHPLABSRESULTS_GEN_ALL_CORE
11.3   6.71  )-----------( 255      ( 08 Sep 2024 16:55 )             32.7     126<L>  |  87<L>  |  14  ----------------------------<  82       3.6   |  24  |  1.06    Ca    10.0      08 Sep 2024 16:55  Phos  3.2       Mg     1.90         TPro  8.0  /  Alb  4.8  /  TBili  0.4  /  DBili  x   /  AST  20  /  ALT  10  /  AlkPhos  143<H>          16:53 - VBG - pH: 7.40  | pCO2: 48    | pO2: <20   | Lactate: 0.5            hs Troponin, T - 27 ng/L (24 @ 19:18)  hs Troponin, T - 32 ng/L (24 @ 16:55)      Pro-BNP: 9341 (24 @ 16:55)          Urinalysis Basic - ( 08 Sep 2024 17:06 )  Color: Yellow / Appearance: Clear / S.009 / pH: 8.0  Gluc: Negative mg/dL / Ketone: Negative mg/dL  / Bili: Negative / Urobili: 0.2 mg/dL   Blood: Negative / Protein: Trace mg/dL / Nitrite: Negative   Leuk Esterase: Negative / RBC: 1 /HPF / WBC 0 /HPF   Sq Epi: 0 /HPF / Bacteria: Negative /HPF  Hyaline Casts: x/WBC Casts: x    CXR interpreted by myself: clear lungs    images interpreted by radiology:  xray left elbow: No acute fracture or dislocation.  CT HEAD: No acute intracranial hemorrhage, mass effect, or midline shift. Age-indeterminate right thalamic lacunar infarct.    CT CERVICAL SPINE: No acute fracture or traumatic subluxation. Multi-level degenerative changes.

## 2024-09-08 NOTE — ED ADULT NURSE NOTE - NSFALLRISKINTERV_ED_ALL_ED

## 2024-09-08 NOTE — H&P ADULT - PROBLEM SELECTOR PLAN 6
Chronic unstable as pt noncompliant with medications   Home regimen: aldactone 25mg daily, entresto 97-103mg BID, lasix 20mg daily, coreg 6.25mb BID  Cardiology consulted: start hydralazine   for Blood Pressure control for now. can start on low dose coreg if Blood Pressure tolerates. echo in am   strict i/o

## 2024-09-08 NOTE — ED PROVIDER NOTE - PHYSICAL EXAMINATION
GENERAL: Awake, alert, elderly gentleman in NAD  HEAD: NC/AT, neck supple, moist mucous membranes  EYES: PERRL, EOM grossly intact, sclera anicteric  LUNGS: normal WOB on RA, CTAB, no wheezes or crackles   CARDIAC: RRR, no m/r/g  ABDOMEN: Soft, non tender, non distended, no rebound, no guarding  BACK: No midline spinal tenderness, no CVA tenderness  EXT: No edema, no calf tenderness, no deformities.  NEURO: A&Ox2 (does not know year). moving all extremities without focal deficit. CN II-XII grossly intact bilaterally.   SKIN: Warm and dry. No rash.  PSYCH: Normal affect. GENERAL: Awake, alert, elderly gentleman in NAD  HEAD: NC/AT, neck supple, moist mucous membranes  EYES: PERRL, EOM grossly intact, sclera anicteric  LUNGS: normal WOB on RA, CTAB, no wheezes or crackles   CARDIAC: RRR, no m/r/g  ABDOMEN: Soft, non tender, non distended, no rebound, no guarding  BACK: No midline spinal tenderness, no CVA tenderness  EXT: No edema, no calf tenderness, no deformities.  NEURO: A&Ox2 (does not know year). moving all extremities without focal deficit. CN II-XII grossly intact bilaterally.   SKIN: Warm and dry. No rash.  PSYCH: Normal affect.  ATTENDING PHYSICAL EXAM  GEN - NAD; Repeatedly walking with walker to doctor's station.  Repeating questions.  Able to be verbally encouraged to return to bed.  HEAD - + left frontal scalp abrasion 4mm diameter, no cranial step-off or hematoma EYES/NOSE - PERRL, EOMI, mucous membranes moist, no discharge; THROAT: Oral cavity and pharynx normal. No inflammation, swelling, exudate, or lesions  NECK: Neck supple, non-tender without lymphadenopathy, no masses, no JVD  PULMONARY - CTA b/l, symmetric breath sounds, no w/r/r  CARDIAC -s1 split s2, RRR, no M,R,G  ABDOMEN - +NABS, ND, NT, soft, no guarding, no rebound, no masses   BACK - no CVA tenderness, No vertebral or paravertebral tenderness  EXTREMITIES - Left elbow abrasion, noted multiple joints with apparent arthritis, symmetric pulses, 2+ dp, capillary refill < 2 seconds, no clubbing, no cyanosis, no edema  NEUROLOGIC - alert, CN 2-12 intact, no aphasia or dysarthria, sensation to light touch nl, coordination nl, finger to nose nl, romberg negative, motor 5/5 RUE/LUE/RLE/LLE/EHL/Plantar flexion, no pronator drift, no lateralizing features, with walker gait stable  PSYCH - repeating questions.  No apparent understanding of situation despite repeated discussions.

## 2024-09-08 NOTE — PATIENT PROFILE ADULT - FALL HARM RISK - HARM RISK INTERVENTIONS
Assistance with ambulation/Assistance OOB with selected safe patient handling equipment/Communicate Risk of Fall with Harm to all staff/Discuss with provider need for PT consult/Monitor for mental status changes/Monitor gait and stability/Provide patient with walking aids - walker, cane, crutches/Reinforce activity limits and safety measures with patient and family/Reorient to person, place and time as needed/Review medications for side effects contributing to fall risk/Sit up slowly, dangle for a short time, stand at bedside before walking/Tailored Fall Risk Interventions/Toileting schedule using arm’s reach rule for commode and bathroom/Bed in lowest position, wheels locked, appropriate side rails in place/Call bell, personal items and telephone in reach/Instruct patient to call for assistance before getting out of bed or chair/Non-slip footwear when patient is out of bed/[place] to call system/Physically safe environment - no spills, clutter or unnecessary equipment/Purposeful Proactive Rounding/Room/bathroom lighting operational, light cord in reach

## 2024-09-08 NOTE — H&P ADULT - PROBLEM SELECTOR PLAN 1
Pt with intermittent memory loss and unable to care for himself at home  Ct head/cervical spine- no acute findings  UA/CXR negative  B12, folate, vitamin D level, tsh in AM   likely dementia with chronic alcohol use contributing   Monitor

## 2024-09-08 NOTE — ED ADULT NURSE NOTE - OBJECTIVE STATEMENT
Pt received to room 23. Pt A&O x 3, ambulatory. Pt c/o c/p and SOB s/p fall yesterday. Pt endorses no pain at this time. Pt denies head strike or LOC. Hx of Hepatitis C, depression, CAD, alcohol use disorder, Pt denies fevers, chills, headache, vision changes, dizziness, chest pain, SOB, numbness or tingling, abdominal pain, N&V, or urinary symptoms. PERRLA observed. No neuro deficits. Respirations even and unlabored. NSR on cardiac monitor. Abdomen soft, nontender, nondistended. +2 pulses in all extremities. 20G IV placed in the left AC. Labs drawn and sent. Medicated as per MD orders. Comfort measures provided. Safety maintained. Pending lab results and imaging.

## 2024-09-08 NOTE — H&P ADULT - PROBLEM SELECTOR PLAN 2
New  EKG interpreted by cardiology: NSR with diffuse TWI with deep TWI in V3-V6  trop 32->27  Cardiology consulted: start hydralazine   for Blood Pressure control for now. can start on low dose coreg if Blood Pressure tolerates. echo in am   Tele

## 2024-09-08 NOTE — H&P ADULT - NSHPPHYSICALEXAM_GEN_ALL_CORE
PHYSICAL EXAM:  GENERAL: NAD, comfortable at bedside   HEAD:  Atraumatic, Normocephalic  EYES: EOMI, PERRL, conjunctiva and sclera clear  NECK: Supple, No JVD  CHEST/LUNG: Clear to auscultation bilaterally; No wheezes, rales or rhonchi  HEART: Regular rate and rhythm; No murmurs, rubs, or gallops, (+)S1, S2  ABDOMEN: Soft, Nontender, Nondistended; Normal Bowel sounds   EXTREMITIES:  2+ Peripheral Pulses, No clubbing, cyanosis, or edema  PSYCH: normal mood and affect  NEUROLOGY: AAOx4, moving all extremities spontaneously   SKIN: No rashes or lesions

## 2024-09-08 NOTE — H&P ADULT - PROBLEM SELECTOR PLAN 8
Chronic unstable as pt noncompliant with medications   Restart atorvastatin 40mg daily   A1c and lipid panel in AM

## 2024-09-08 NOTE — ED PROVIDER NOTE - PROGRESS NOTE DETAILS
Kera Dodge MD PGY-3: cards at bedside to eval patient. recommend TTE in AM. discussed admission with Dr. Madiha MO who accepted patient for admission.

## 2024-09-08 NOTE — CONSULT NOTE ADULT - SUBJECTIVE AND OBJECTIVE BOX
CHIEF COMPLAINT:  "I have so much stress and anxiety. i was forced by my daughter to come to ED"    HISTORY OF PRESENT ILLNESS: 72 yo w/ hx of HFrEF (EF 25% in 10/2022), mod MR, NICM  HTN, HLD, HCV (s/p Harvoni treatment), chronic hyponatremia, ETOH use disorder who was brought by lauren for few months of confusion and 1 day of reported shortness of breath. ED call STEMI attending for concerning ST change on EKG.  Patient does not meet STEMI criteria. Cardiology consulted  EKG showed NSR with diffuse TWI with deep TWI in V3-V6. Patient was seen in ED with daughter at bedside. Patient appear hyperactive and forgetful. Patient denies chest pain ,sob, dizziness, N/V headache. Daughter reports that pt report shortness of breath yesterday but pt denies . Patient reports that he in stress and anxious. He initial report compliance with medication and reports smoking and ETOH but later reports non adherence to medication and use of marijuana .pt live alone. Does not remember the medications.    In ED, T(F): 98.4, HR: 89, BP: 164/90, RR: 18, SpO2: 98%.   Labs remarkable for WBC 6.71, Hb 11.3,HCT 32.7, Platelet 255 Na 126, K 3.6, BUN 14, Creat 1.06, AST 20, ALT 10, Alk phos 143, Mag 1.90, Phos 3.2,Lactate 0.5, trop 34        Allergies    No Known Allergies    	    MEDICATIONS:  aspirin  chewable 162 milliGRAM(s) Oral once          PAST MEDICAL & SURGICAL HISTORY:  HTN (hypertension)      Hepatitis C virus infection, unspecified chronicity  (was tx with Harvoni)      Major depression, chronic      Chronic HFrEF (heart failure with reduced ejection fraction)      CAD (coronary artery disease)      Chronic hyponatremia      Alcohol use disorder      Moderate mitral regurgitation      S/P hernia repair  x 2          FAMILY HISTORY:  Family history of heart disease (Father)    Family history of cancer (Mother)        SOCIAL HISTORY:    [ ] live with: alone  [ ] Non-smoker,[x ] smoker    [ ] no alcohol use [x ] alcohol use:      REVIEW OF SYSTEMS:  General: no fatigue/malaise, weight loss/gain.  Skin: no rashes.  Ophthalmologic: no blurred vision, no loss of vision. 	  ENT: no sore throat, rhinorrhea, sinus congestion.  Cardiovascular:no chest pain ,no palpitation,no dizziness,no diaphoresis,no edema  Respiratory: no SOB, cough or wheeze.  Gastrointestinal:  no N/V/D, no melena/hematemesis/  Genitourinary: no dysuria/hesitancy or hematuria.  Musculoskeletal: no myalgias or arthralgias.  Neurological: no changes in vision or hearing, no lightheadedness/dizziness, no syncope/near syncope	  Psychiatric: + stress/anxiety.       PHYSICAL EXAM:  T(C): 36.9 (09-08-24 @ 18:04), Max: 36.9 (09-08-24 @ 15:37)  HR: 89 (09-08-24 @ 18:04) (89 - 93)  BP: 164/90 (09-08-24 @ 18:04) (164/90 - 198/94)  RR: 18 (09-08-24 @ 18:04) (18 - 18)  SpO2: 98% (09-08-24 @ 18:04) (95% - 98%)  Wt(kg): --  I&O's Summary      Appearance: Normal	  HEENT:   Normal oral mucosa, PERRL, EOMI	  Cardiovascular: Normal S1 S2, No JVD, No murmurs, No edema  Respiratory: Lungs clear to auscultation	  Psychiatry: A & O x 2-3,  hyperactive Mood  Gastrointestinal:  Soft, Non-tender, + BS	  Skin: No rashes, No ecchymoses, No cyanosis	  Neurologic: Non-focal  Extremities: Normal range of motion, No clubbing, cyanosis or edema  Vascular: Peripheral pulses palpable 2+ bilaterally        LABS:	 	    CBC Full  -  ( 08 Sep 2024 16:55 )  WBC Count : 6.71 K/uL  Hemoglobin : 11.3 g/dL  Hematocrit : 32.7 %  Platelet Count - Automated : 255 K/uL  Mean Cell Volume : 75.3 fL  Mean Cell Hemoglobin : 26.0 pg  Mean Cell Hemoglobin Concentration : 34.6 gm/dL  Auto Neutrophil # : 5.16 K/uL  Auto Lymphocyte # : 1.01 K/uL  Auto Monocyte # : 0.44 K/uL  Auto Eosinophil # : 0.04 K/uL  Auto Basophil # : 0.04 K/uL  Auto Neutrophil % : 76.8 %  Auto Lymphocyte % : 15.1 %  Auto Monocyte % : 6.6 %  Auto Eosinophil % : 0.6 %  Auto Basophil % : 0.6 %    09-08    126<L>  |  87<L>  |  14  ----------------------------<  82  3.6   |  24  |  1.06    Ca    10.0      08 Sep 2024 16:55  Phos  3.2     09-08  Mg     1.90     09-08    TPro  8.0  /  Alb  4.8  /  TBili  0.4  /  DBili  x   /  AST  20  /  ALT  10  /  AlkPhos  143<H>  09-08    < from: Cardiac Cath Lab - Adult (09.29.16 @ 12:08) >  VENTRICLES: Global left ventricular function was normal. EF estimated was  65 %.  CORONARY VESSELS: The coronary circulation is right dominant.  LM:   --  LM: Normal.  LAD:   --  LAD: Normal.  CX:   --  Proximal circumflex: There was a 40 % stenosis.  RCA:   --  RCA:Normal.  COMPLICATIONS: There were no complications.    < end of copied text >    < from: TTE W or WO Ultrasound Enhancing Agent (12.07.23 @ 08:06) >      1. Technically difficult image quality.   2. Left ventricular systolic function is severely decreased with an ejection fraction of 30 % by Salazar's method of disks. Global left ventricular hypokinesis.   3. Normal right ventricular cavity size and systolic function.   4. No significant valvular disease.   5. Pulmonary artery systolic pressure could not be estimated.   6.No pericardial effusion seen.   7. No prior echocardiogram is available for comparison.    < end of copied text >

## 2024-09-08 NOTE — H&P ADULT - NSHPREVIEWOFSYSTEMS_GEN_ALL_CORE

## 2024-09-08 NOTE — ED ADULT TRIAGE NOTE - CHIEF COMPLAINT QUOTE
brought in by daughter, s/p fall yesterday. c/o left elbow pain/injury. denies hitting head, LOC, or use of blood thinners. daughter reports pt was also c/o CP and SOB. pt says "feels overwhelmed" and with some agitation in triage. hx of hepatitis C, depression, CAD, alcohol use disorder. .

## 2024-09-08 NOTE — H&P ADULT - HISTORY OF PRESENT ILLNESS
73 yr old male with a pmh HFrEF (EF 30% in 12/2023), mod MR, HTN, HLD, NICM, HCV (s/p Harvoni treatment), chronic hyponatremia, ETOH use disorder who presents after being brought in by family for not being able to care for himself. Collateral from daughter: pt unable to care for himself at home with ADLs, has not been taking his medications, drinks alcohol and smokes marijuana. Today they took him for an MRI as ordered by outpt Neurology to differentiate if pt's intermittent memory loss is due to dementia vs due to alcohol use- unable to perform as pt could not stay still for imaginf. On the way home pt was trying to "escape" out of the care therefore they brought him to the ED for further evaluation/support. Daughter also reports pt had a fall but unable to provide details. Pt had a beer today but otherwise she is unsure when he last drank but he does drink heavily. He has not been taking his medications for some time now as well. Pt lives alone.  Pt himself denies  headache, dizziness, chest pain, palpitations, SOB, abdominal pain, joint pain, diarrhea/constipation, urinary symptoms.   Vitals: T 98.5, HR 89, /90, RR 18 satting 98% RA

## 2024-09-08 NOTE — ED ADULT NURSE REASSESSMENT NOTE - NS ED NURSE REASSESS COMMENT FT1
Pt received from previous RN a&ox4. Respirations even and unlabored. No signs of distress noted. Pt on CO for risk of elopement. Environment checked for safety. Pt admitted pending bed assignment.

## 2024-09-08 NOTE — ED PROVIDER NOTE - CLINICAL SUMMARY MEDICAL DECISION MAKING FREE TEXT BOX
74Y F 74Y M PMH CHrEF, HTN, CAD, Hep C s/p treatment, reported daily alcohol use brought in by daughter for evaluation of increasing confusion/memory issues and inability to care for self. Patient 74Y M PMH CHrEF, HTN, CAD, Hep C s/p treatment, reported daily alcohol use brought in by daughter for evaluation of increasing confusion/memory issues and inability to care for self. Patient with poor insight into why he is in ED today, has no complaints and is requesting discharge. He denies any recent falls, states he is eating fine and able to care for self. He requires frequent re-direction and repetition of the same conversation due to apparent inability to recall conversations that happened within a short period of time. States that he has lived in his current home for 20 years but then notes he bought it in 1970, then tells me the current year is 2040. initial EKG with Twave inversions and ?ST elevations; no chest pain reported, will check troponin and proBNP in addition to basic labs, urine, CT and cervical spine given reported falls, alcohol level. Patient not forthcoming (or does not remember) when last drink was. No evidence of ETOH withdrawal on exam.

## 2024-09-08 NOTE — H&P ADULT - PROBLEM SELECTOR PLAN 4
Hx of chronic hyponatremia  Na 126  urine studies ordered though pt received NS  Strict i/o   BMP in AM

## 2024-09-08 NOTE — H&P ADULT - PROBLEM SELECTOR PLAN 7
Chronic unstable as pt noncompliant with medications   Restart aspirin 81mg daily and atorvastatin 40mg daily

## 2024-09-08 NOTE — H&P ADULT - PROBLEM SELECTOR PLAN 5
chronic moderate exacerbation  /90  Cardiology consulted: start hydralazine   for Blood Pressure control for now. can start on low dose coreg if Blood Pressure tolerates. echo in am   Monitor

## 2024-09-08 NOTE — CONSULT NOTE ADULT - ASSESSMENT
74 yo w/ hx of HFrEF (EF 25% in 10/2022), mod MR, HTN, HLD, NICM, HCV (s/p Harvoni treatment), chronic hyponatremia, ETOH use disorder who was brought by daughter for few months of confusion and 1 day of reported  shortness of breath.  Found  to have incidental ST changes which consistent  apical hypertrophy. Patient denies chest pain , trop 34. No sign of cardiac decompensation . Lactate normal . pt was on Entresto and coreg. Admit to tele/medicine.    Plan   #LVH on EKG  # Hypertension   check trop x1   start hydralazine   for Blood Pressure control for now   can start on low dose coreg if Blood Pressure tolerates   folic and thiamin  echo in am

## 2024-09-08 NOTE — CONSULT NOTE ADULT - NS ATTEND AMEND GEN_ALL_CORE FT
Personally saw and examined pt  labs and vitals reviewed  agree with above assessment and plan  73M with NICM HFrEF (EF 25% 10/22), mod MR, HTN, HLD, HCV +ETOH brought in by family for worsening mental status  no cp sob, euvolemic  no active cv complaints  resume home meds: GDMT for HFrEF  EKG reviewed  TTE pending   appreciate renal, neuro recs  will follow with you

## 2024-09-08 NOTE — ED ADULT NURSE NOTE - SUICIDE SCREENING DEPRESSION
SW reached out to patient for a wellness call.  The patient daughter stated that the family is all pitching in to provide care for the patient.  Currently in patient home is the patient and her son.  The daughter stated they are considering selling the patient home and move the patient into a trailer that is across from the patient other daughter's trailer in [place] so it is easier for her to reach out and care for the patient. The daughter stated the upkeep of patient home in addition to care for the patient is beginning to be overwhelming.    The patient according to the daughter goes to bed at night and lays in bed not falling asleep.  The daughter wanted to know if there was any medications that could help reduce the patient anxiety as well as help her sleep.  SW discussed how patient APN would be able to assist with that issue.  MARY JANE sent her supervisor who is an APN patient daughter request that he call her regarding medication changes for patient in order to help her sleep as well as to alleviate the patient high level of anxiety.     Daughter struggling with levels of care for the patient and SW provided counseling to the patient daughter to alleviate some of her concerns.     MARY JANE provided report to the assigned SW and supervisor APN to follow up and assist with daughters issue of patient care.    35 min.  
Negative

## 2024-09-08 NOTE — ED ADULT TRIAGE NOTE - AS PAIN REST
Please make sure to follow-up with your PCP to discuss today's emergency department visit in for further evaluation and management.  Please return to the emergency department immediately if you develop any concerning symptoms.  
0 (no pain/absence of nonverbal indicators of pain)
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
## 2024-09-08 NOTE — H&P ADULT - PROBLEM SELECTOR PLAN 3
New  hx of alcohol dependence- unknown when last drink  Will order symptom triggered CIWA with PRN ativan -> can upgrade to taper if needed  multivitamin, folic acid, thiamine ordered

## 2024-09-09 DIAGNOSIS — I71.21 ANEURYSM OF THE ASCENDING AORTA, WITHOUT RUPTURE: ICD-10-CM

## 2024-09-09 DIAGNOSIS — Z29.9 ENCOUNTER FOR PROPHYLACTIC MEASURES, UNSPECIFIED: ICD-10-CM

## 2024-09-09 LAB
A1C WITH ESTIMATED AVERAGE GLUCOSE RESULT: 5 % — SIGNIFICANT CHANGE UP (ref 4–5.6)
ADD ON TEST-SPECIMEN IN LAB: SIGNIFICANT CHANGE UP
ANION GAP SERPL CALC-SCNC: 16 MMOL/L — HIGH (ref 7–14)
APPEARANCE UR: CLEAR — SIGNIFICANT CHANGE UP
BACTERIA # UR AUTO: NEGATIVE /HPF — SIGNIFICANT CHANGE UP
BASOPHILS # BLD AUTO: 0.05 K/UL — SIGNIFICANT CHANGE UP (ref 0–0.2)
BASOPHILS NFR BLD AUTO: 0.9 % — SIGNIFICANT CHANGE UP (ref 0–2)
BILIRUB UR-MCNC: NEGATIVE — SIGNIFICANT CHANGE UP
BUN SERPL-MCNC: 17 MG/DL — SIGNIFICANT CHANGE UP (ref 7–23)
CALCIUM SERPL-MCNC: 9.5 MG/DL — SIGNIFICANT CHANGE UP (ref 8.4–10.5)
CAST: 0 /LPF — SIGNIFICANT CHANGE UP (ref 0–4)
CHLORIDE SERPL-SCNC: 89 MMOL/L — LOW (ref 98–107)
CHOLEST SERPL-MCNC: 174 MG/DL — SIGNIFICANT CHANGE UP
CO2 SERPL-SCNC: 21 MMOL/L — LOW (ref 22–31)
COLOR SPEC: YELLOW — SIGNIFICANT CHANGE UP
CREAT ?TM UR-MCNC: 32 MG/DL — SIGNIFICANT CHANGE UP
CREAT SERPL-MCNC: 1.01 MG/DL — SIGNIFICANT CHANGE UP (ref 0.5–1.3)
DIFF PNL FLD: NEGATIVE — SIGNIFICANT CHANGE UP
EGFR: 78 ML/MIN/1.73M2 — SIGNIFICANT CHANGE UP
EOSINOPHIL # BLD AUTO: 0.09 K/UL — SIGNIFICANT CHANGE UP (ref 0–0.5)
EOSINOPHIL NFR BLD AUTO: 1.6 % — SIGNIFICANT CHANGE UP (ref 0–6)
ESTIMATED AVERAGE GLUCOSE: 97 — SIGNIFICANT CHANGE UP
FERRITIN SERPL-MCNC: 435 NG/ML — HIGH (ref 30–400)
FOLATE SERPL-MCNC: 10.6 NG/ML — SIGNIFICANT CHANGE UP (ref 3.1–17.5)
GLUCOSE SERPL-MCNC: 84 MG/DL — SIGNIFICANT CHANGE UP (ref 70–99)
GLUCOSE UR QL: NEGATIVE MG/DL — SIGNIFICANT CHANGE UP
HCT VFR BLD CALC: 36.2 % — LOW (ref 39–50)
HDLC SERPL-MCNC: 74 MG/DL — SIGNIFICANT CHANGE UP
HGB BLD-MCNC: 12.5 G/DL — LOW (ref 13–17)
IANC: 4.15 K/UL — SIGNIFICANT CHANGE UP (ref 1.8–7.4)
IMM GRANULOCYTES NFR BLD AUTO: 0.5 % — SIGNIFICANT CHANGE UP (ref 0–0.9)
IRON SATN MFR SERPL: 20 % — SIGNIFICANT CHANGE UP (ref 14–50)
IRON SATN MFR SERPL: 69 UG/DL — SIGNIFICANT CHANGE UP (ref 45–165)
KETONES UR-MCNC: NEGATIVE MG/DL — SIGNIFICANT CHANGE UP
LEUKOCYTE ESTERASE UR-ACNC: NEGATIVE — SIGNIFICANT CHANGE UP
LIPID PNL WITH DIRECT LDL SERPL: 81 MG/DL — SIGNIFICANT CHANGE UP
LYMPHOCYTES # BLD AUTO: 0.95 K/UL — LOW (ref 1–3.3)
LYMPHOCYTES # BLD AUTO: 16.9 % — SIGNIFICANT CHANGE UP (ref 13–44)
MCHC RBC-ENTMCNC: 26 PG — LOW (ref 27–34)
MCHC RBC-ENTMCNC: 34.5 GM/DL — SIGNIFICANT CHANGE UP (ref 32–36)
MCV RBC AUTO: 75.3 FL — LOW (ref 80–100)
MONOCYTES # BLD AUTO: 0.36 K/UL — SIGNIFICANT CHANGE UP (ref 0–0.9)
MONOCYTES NFR BLD AUTO: 6.4 % — SIGNIFICANT CHANGE UP (ref 2–14)
NEUTROPHILS # BLD AUTO: 4.15 K/UL — SIGNIFICANT CHANGE UP (ref 1.8–7.4)
NEUTROPHILS NFR BLD AUTO: 73.7 % — SIGNIFICANT CHANGE UP (ref 43–77)
NITRITE UR-MCNC: NEGATIVE — SIGNIFICANT CHANGE UP
NON HDL CHOLESTEROL: 100 MG/DL — SIGNIFICANT CHANGE UP
NRBC # BLD: 0 /100 WBCS — SIGNIFICANT CHANGE UP (ref 0–0)
NRBC # FLD: 0 K/UL — SIGNIFICANT CHANGE UP (ref 0–0)
OSMOLALITY SERPL: 270 MOSM/KG — LOW (ref 275–295)
OSMOLALITY UR: 393 MOSM/KG — SIGNIFICANT CHANGE UP (ref 50–1200)
PH UR: 8 — SIGNIFICANT CHANGE UP (ref 5–8)
PLATELET # BLD AUTO: 236 K/UL — SIGNIFICANT CHANGE UP (ref 150–400)
POTASSIUM SERPL-MCNC: 3.7 MMOL/L — SIGNIFICANT CHANGE UP (ref 3.5–5.3)
POTASSIUM SERPL-SCNC: 3.7 MMOL/L — SIGNIFICANT CHANGE UP (ref 3.5–5.3)
POTASSIUM UR-SCNC: 29.2 MMOL/L — SIGNIFICANT CHANGE UP
PROT ?TM UR-MCNC: 26 MG/DL — SIGNIFICANT CHANGE UP
PROT UR-MCNC: 30 MG/DL
PROT/CREAT UR-RTO: 0.8 RATIO — HIGH (ref 0–0.2)
RBC # BLD: 4.81 M/UL — SIGNIFICANT CHANGE UP (ref 4.2–5.8)
RBC # FLD: 13.5 % — SIGNIFICANT CHANGE UP (ref 10.3–14.5)
RBC CASTS # UR COMP ASSIST: 1 /HPF — SIGNIFICANT CHANGE UP (ref 0–4)
SODIUM SERPL-SCNC: 126 MMOL/L — LOW (ref 135–145)
SODIUM UR-SCNC: 121 MMOL/L — SIGNIFICANT CHANGE UP
SP GR SPEC: 1.01 — SIGNIFICANT CHANGE UP (ref 1–1.03)
SQUAMOUS # UR AUTO: 0 /HPF — SIGNIFICANT CHANGE UP (ref 0–5)
T4 FREE+ TSH PNL SERPL: 1.83 UIU/ML — SIGNIFICANT CHANGE UP (ref 0.27–4.2)
TIBC SERPL-MCNC: 337 UG/DL — SIGNIFICANT CHANGE UP (ref 220–430)
TRIGL SERPL-MCNC: 97 MG/DL — SIGNIFICANT CHANGE UP
UIBC SERPL-MCNC: 268 UG/DL — SIGNIFICANT CHANGE UP (ref 110–370)
UROBILINOGEN FLD QL: 0.2 MG/DL — SIGNIFICANT CHANGE UP (ref 0.2–1)
UUN UR-MCNC: 272 MG/DL — SIGNIFICANT CHANGE UP
VIT B12 SERPL-MCNC: 437 PG/ML — SIGNIFICANT CHANGE UP (ref 200–900)
WBC # BLD: 5.63 K/UL — SIGNIFICANT CHANGE UP (ref 3.8–10.5)
WBC # FLD AUTO: 5.63 K/UL — SIGNIFICANT CHANGE UP (ref 3.8–10.5)
WBC UR QL: 0 /HPF — SIGNIFICANT CHANGE UP (ref 0–5)

## 2024-09-09 PROCEDURE — 99223 1ST HOSP IP/OBS HIGH 75: CPT

## 2024-09-09 PROCEDURE — 71275 CT ANGIOGRAPHY CHEST: CPT | Mod: 26

## 2024-09-09 PROCEDURE — 99233 SBSQ HOSP IP/OBS HIGH 50: CPT

## 2024-09-09 RX ORDER — HYDRALAZINE HCL 50 MG
5 TABLET ORAL ONCE
Refills: 0 | Status: COMPLETED | OUTPATIENT
Start: 2024-09-09 | End: 2024-09-09

## 2024-09-09 RX ORDER — CARVEDILOL 6.25 MG/1
6.25 TABLET ORAL EVERY 12 HOURS
Refills: 0 | Status: DISCONTINUED | OUTPATIENT
Start: 2024-09-09 | End: 2024-09-12

## 2024-09-09 RX ORDER — SPIRONOLACTONE 25 MG/1
25 TABLET, FILM COATED ORAL DAILY
Refills: 0 | Status: DISCONTINUED | OUTPATIENT
Start: 2024-09-09 | End: 2024-09-18

## 2024-09-09 RX ORDER — HYDROXYZINE HCL 25 MG
25 TABLET ORAL ONCE
Refills: 0 | Status: COMPLETED | OUTPATIENT
Start: 2024-09-09 | End: 2024-09-09

## 2024-09-09 RX ORDER — SACUBITRIL AND VALSARTAN 49; 51 MG/1; MG/1
1 TABLET, FILM COATED ORAL
Refills: 0 | Status: DISCONTINUED | OUTPATIENT
Start: 2024-09-09 | End: 2024-09-10

## 2024-09-09 RX ADMIN — Medication 3 MILLIGRAM(S): at 00:26

## 2024-09-09 RX ADMIN — LORAZEPAM 1 MILLIGRAM(S): 4 INJECTION INTRAMUSCULAR; INTRAVENOUS at 06:29

## 2024-09-09 RX ADMIN — Medication 40 MILLIGRAM(S): at 21:33

## 2024-09-09 RX ADMIN — Medication 100 MILLIGRAM(S): at 11:20

## 2024-09-09 RX ADMIN — Medication 5 MILLIGRAM(S): at 18:01

## 2024-09-09 RX ADMIN — Medication 25 MILLIGRAM(S): at 16:12

## 2024-09-09 RX ADMIN — ACETAMINOPHEN 650 MILLIGRAM(S): 325 TABLET ORAL at 14:58

## 2024-09-09 RX ADMIN — ACETAMINOPHEN 650 MILLIGRAM(S): 325 TABLET ORAL at 13:58

## 2024-09-09 RX ADMIN — Medication 81 MILLIGRAM(S): at 11:20

## 2024-09-09 RX ADMIN — Medication 1 TABLET(S): at 11:21

## 2024-09-09 RX ADMIN — SACUBITRIL AND VALSARTAN 1 TABLET(S): 49; 51 TABLET, FILM COATED ORAL at 18:02

## 2024-09-09 RX ADMIN — CARVEDILOL 6.25 MILLIGRAM(S): 6.25 TABLET ORAL at 14:56

## 2024-09-09 RX ADMIN — SPIRONOLACTONE 25 MILLIGRAM(S): 25 TABLET, FILM COATED ORAL at 16:12

## 2024-09-09 RX ADMIN — Medication 1 MILLIGRAM(S): at 11:21

## 2024-09-09 RX ADMIN — Medication 25 MILLIGRAM(S): at 05:50

## 2024-09-09 NOTE — PHYSICAL THERAPY INITIAL EVALUATION ADULT - GENERAL OBSERVATIONS, REHAB EVAL
Consult received, chart reviewed. Patient received seated at edge of bed, no apparent distress, +tele. Pt. agreeable to participate in PT.

## 2024-09-09 NOTE — PHYSICAL THERAPY INITIAL EVALUATION ADULT - ADDITIONAL COMMENTS
Pt. reports he lives in a house alone and performs ADLs independently. Pt. ambulates with rollator.    Pt. was left seated at edge of bed post PT Evaluation, no apparent distress, all lines intact, HR 89 bpm.

## 2024-09-09 NOTE — PHYSICAL THERAPY INITIAL EVALUATION ADULT - PERTINENT HX OF CURRENT PROBLEM, REHAB EVAL
Pt. presented with dementia, hyponatremia, abnormal ekg. Per radiology reports, CT head: No acute intracranial hemorrhage, mass effect, or midline shift. Age-indeterminate right thalamic lacunar infarct. CT cervical spine: No acute fracture or traumatic subluxation.

## 2024-09-09 NOTE — PROGRESS NOTE ADULT - ASSESSMENT
72 yo w/ hx of HFrEF (EF 25% in 10/2022), mod MR, HTN, HLD, NICM, HCV (s/p Harvoni treatment), chronic hyponatremia, ETOH use disorder who was brought by daughter for few months of confusion and 1 day of reported  shortness of breath. He appears euvolemic and well compensated from a HF standpoint. EKG is notable for new T wave abnormality (CT head has ruled out intracranial pathology)    Recommendations  - TTE  - Discontinue hydralazine  - Unclear if patient has been taking meds at home  - Resume home coreg 6.25 mg BID  - Resume entresto 24-26 - uptitrate as able  - Resume home aldactone 25 mg    Please see attending attestation for final recommendations        Richie Canchola MD  Cardiology Fellow     All Cardiology service information can be found 24/7 on [email or web address], password: [email or web address]

## 2024-09-09 NOTE — PROGRESS NOTE ADULT - SUBJECTIVE AND OBJECTIVE BOX
[facility] Division of Hospital Medicine  BETOkaye Irving PUENTES  Pager [phone]  Available via MS Teams    SUBJECTIVE / OVERNIGHT EVENTS: No acute events overnight. Patient feels well overall, denies any chest pain or SOB. Denies any headaches, fevers or chills. AAOx4, knows that his children brought him to the hospital. States that he lives with his dog and denies any issues at home.     ADDITIONAL REVIEW OF SYSTEMS:    MEDICATIONS  (STANDING):  aspirin enteric coated 81 milliGRAM(s) Oral daily  atorvastatin 40 milliGRAM(s) Oral at bedtime  enoxaparin Injectable 40 milliGRAM(s) SubCutaneous every 24 hours  folic acid 1 milliGRAM(s) Oral daily  hydrALAZINE 25 milliGRAM(s) Oral two times a day  multivitamin 1 Tablet(s) Oral daily  thiamine 100 milliGRAM(s) Oral daily    MEDICATIONS  (PRN):  acetaminophen     Tablet .. 650 milliGRAM(s) Oral every 6 hours PRN Temp greater or equal to 38C (100.4F), Mild Pain (1 - 3)  aluminum hydroxide/magnesium hydroxide/simethicone Suspension 30 milliLiter(s) Oral every 4 hours PRN Dyspepsia  LORazepam     Tablet 2 milliGRAM(s) Oral every 2 hours PRN Symptom-triggered: 2 point increase in CIWA -Ar score and a total score of 7 or LESS  melatonin 3 milliGRAM(s) Oral at bedtime PRN Insomnia  ondansetron Injectable 4 milliGRAM(s) IV Push every 8 hours PRN Nausea and/or Vomiting      I&O's Summary    09 Sep 2024 07:01  -  09 Sep 2024 14:42  --------------------------------------------------------  IN: 1000 mL / OUT: 200 mL / NET: 800 mL        PHYSICAL EXAM:  Vital Signs Last 24 Hrs  T(C): 36.7 (09 Sep 2024 14:00), Max: 37.2 (08 Sep 2024 22:16)  T(F): 98.1 (09 Sep 2024 14:00), Max: 99 (08 Sep 2024 22:16)  HR: 80 (09 Sep 2024 14:00) (71 - 93)  BP: 202/110 (09 Sep 2024 14:00) (154/70 - 202/110)  BP(mean): --  RR: 18 (09 Sep 2024 14:00) (17 - 18)  SpO2: 100% (09 Sep 2024 14:00) (95% - 100%)    Parameters below as of 09 Sep 2024 14:00  Patient On (Oxygen Delivery Method): room air      CONSTITUTIONAL: NAD, elderly frail male   RESPIRATORY: Normal respiratory effort; lungs are clear to auscultation bilaterally  CARDIOVASCULAR: Regular rate and rhythm, normal S1 and S2, no murmur/rub/gallop; No lower extremity edema  ABDOMEN: Nontender to palpation, normoactive bowel sounds, no rebound/guarding; No hepatosplenomegaly  MUSCULOSKELETAL: no joint swelling or tenderness to palpation  PSYCH: A+O to person, place, and time; affect appropriate  NEUROLOGY: no gross sensory deficits, no foal deficits    SKIN: No rashes; no palpable lesions    LABS:                        12.5   5.63  )-----------( 236      ( 09 Sep 2024 06:44 )             36.2     09-09    126<L>  |  89<L>  |  17  ----------------------------<  84  3.7   |  21<L>  |  1.01    Ca    9.5      09 Sep 2024 06:44  Phos  3.2     09-08  Mg     1.90     09-08    TPro  8.0  /  Alb  4.8  /  TBili  0.4  /  DBili  x   /  AST  20  /  ALT  10  /  AlkPhos  143<H>  09-08          Urinalysis Basic - ( 09 Sep 2024 06:44 )    Color: x / Appearance: x / SG: x / pH: x  Gluc: 84 mg/dL / Ketone: x  / Bili: x / Urobili: x   Blood: x / Protein: x / Nitrite: x   Leuk Esterase: x / RBC: x / WBC x   Sq Epi: x / Non Sq Epi: x / Bacteria: x

## 2024-09-10 ENCOUNTER — RESULT REVIEW (OUTPATIENT)
Age: 74
End: 2024-09-10

## 2024-09-10 DIAGNOSIS — F03.90 UNSPECIFIED DEMENTIA, UNSPECIFIED SEVERITY, WITHOUT BEHAVIORAL DISTURBANCE, PSYCHOTIC DISTURBANCE, MOOD DISTURBANCE, AND ANXIETY: ICD-10-CM

## 2024-09-10 LAB
ALBUMIN SERPL ELPH-MCNC: 3.9 G/DL — SIGNIFICANT CHANGE UP (ref 3.3–5)
ALP SERPL-CCNC: 139 U/L — HIGH (ref 40–120)
ALT FLD-CCNC: 13 U/L — SIGNIFICANT CHANGE UP (ref 4–41)
ANION GAP SERPL CALC-SCNC: 14 MMOL/L — SIGNIFICANT CHANGE UP (ref 7–14)
AST SERPL-CCNC: 26 U/L — SIGNIFICANT CHANGE UP (ref 4–40)
BILIRUB SERPL-MCNC: 0.3 MG/DL — SIGNIFICANT CHANGE UP (ref 0.2–1.2)
BUN SERPL-MCNC: 16 MG/DL — SIGNIFICANT CHANGE UP (ref 7–23)
CALCIUM SERPL-MCNC: 9 MG/DL — SIGNIFICANT CHANGE UP (ref 8.4–10.5)
CHLORIDE SERPL-SCNC: 89 MMOL/L — LOW (ref 98–107)
CO2 SERPL-SCNC: 21 MMOL/L — LOW (ref 22–31)
CREAT SERPL-MCNC: 1.02 MG/DL — SIGNIFICANT CHANGE UP (ref 0.5–1.3)
EGFR: 77 ML/MIN/1.73M2 — SIGNIFICANT CHANGE UP
GLUCOSE SERPL-MCNC: 98 MG/DL — SIGNIFICANT CHANGE UP (ref 70–99)
MAGNESIUM SERPL-MCNC: 1.7 MG/DL — SIGNIFICANT CHANGE UP (ref 1.6–2.6)
PHOSPHATE SERPL-MCNC: 2.7 MG/DL — SIGNIFICANT CHANGE UP (ref 2.5–4.5)
POTASSIUM SERPL-MCNC: 4 MMOL/L — SIGNIFICANT CHANGE UP (ref 3.5–5.3)
POTASSIUM SERPL-SCNC: 4 MMOL/L — SIGNIFICANT CHANGE UP (ref 3.5–5.3)
PROT SERPL-MCNC: 7.1 G/DL — SIGNIFICANT CHANGE UP (ref 6–8.3)
SODIUM SERPL-SCNC: 124 MMOL/L — LOW (ref 135–145)
VIT D25+D1,25 OH+D1,25 PNL SERPL-MCNC: 87 PG/ML — HIGH (ref 19.9–79.3)

## 2024-09-10 PROCEDURE — 99233 SBSQ HOSP IP/OBS HIGH 50: CPT

## 2024-09-10 PROCEDURE — 93306 TTE W/DOPPLER COMPLETE: CPT | Mod: 26

## 2024-09-10 RX ORDER — HYDROXYZINE HCL 25 MG
25 TABLET ORAL
Refills: 0 | Status: DISCONTINUED | OUTPATIENT
Start: 2024-09-10 | End: 2024-09-11

## 2024-09-10 RX ORDER — SODIUM CHLORIDE 9 MG/ML
1 INJECTION INTRAMUSCULAR; INTRAVENOUS; SUBCUTANEOUS
Refills: 0 | Status: DISCONTINUED | OUTPATIENT
Start: 2024-09-10 | End: 2024-09-10

## 2024-09-10 RX ORDER — LORAZEPAM 4 MG/ML
0.5 INJECTION INTRAMUSCULAR; INTRAVENOUS ONCE
Refills: 0 | Status: DISCONTINUED | OUTPATIENT
Start: 2024-09-10 | End: 2024-09-10

## 2024-09-10 RX ORDER — LORAZEPAM 4 MG/ML
2 INJECTION INTRAMUSCULAR; INTRAVENOUS ONCE
Refills: 0 | Status: DISCONTINUED | OUTPATIENT
Start: 2024-09-10 | End: 2024-09-16

## 2024-09-10 RX ORDER — SACUBITRIL AND VALSARTAN 49; 51 MG/1; MG/1
1 TABLET, FILM COATED ORAL
Refills: 0 | Status: DISCONTINUED | OUTPATIENT
Start: 2024-09-10 | End: 2024-09-18

## 2024-09-10 RX ADMIN — Medication 40 MILLIGRAM(S): at 21:43

## 2024-09-10 RX ADMIN — SACUBITRIL AND VALSARTAN 1 TABLET(S): 49; 51 TABLET, FILM COATED ORAL at 21:44

## 2024-09-10 RX ADMIN — Medication 1 MILLIGRAM(S): at 11:27

## 2024-09-10 RX ADMIN — CARVEDILOL 6.25 MILLIGRAM(S): 6.25 TABLET ORAL at 18:56

## 2024-09-10 RX ADMIN — SPIRONOLACTONE 25 MILLIGRAM(S): 25 TABLET, FILM COATED ORAL at 06:01

## 2024-09-10 RX ADMIN — CARVEDILOL 6.25 MILLIGRAM(S): 6.25 TABLET ORAL at 06:01

## 2024-09-10 RX ADMIN — ENOXAPARIN SODIUM 40 MILLIGRAM(S): 100 INJECTION SUBCUTANEOUS at 11:27

## 2024-09-10 RX ADMIN — LORAZEPAM 0.5 MILLIGRAM(S): 4 INJECTION INTRAMUSCULAR; INTRAVENOUS at 06:49

## 2024-09-10 RX ADMIN — Medication 1 TABLET(S): at 11:27

## 2024-09-10 RX ADMIN — Medication 81 MILLIGRAM(S): at 11:27

## 2024-09-10 RX ADMIN — SACUBITRIL AND VALSARTAN 1 TABLET(S): 49; 51 TABLET, FILM COATED ORAL at 06:01

## 2024-09-10 RX ADMIN — Medication 100 MILLIGRAM(S): at 11:27

## 2024-09-10 RX ADMIN — Medication 25 MILLIGRAM(S): at 16:02

## 2024-09-10 RX ADMIN — Medication 3 MILLIGRAM(S): at 21:43

## 2024-09-10 NOTE — PROGRESS NOTE ADULT - ASSESSMENT
72 yo w/ hx of HFrEF (EF 25% in 10/2022), mod MR, HTN, HLD, NICM, HCV (s/p Harvoni treatment), chronic hyponatremia, ETOH use disorder who was brought by daughter for few months of confusion and 1 day of reported  shortness of breath. He appears euvolemic and well compensated from a HF standpoint. EKG is notable for new T wave abnormality (CT head has ruled out intracranial pathology)    Recommendations  - TTE  - Cont home coreg 6.25 mg BID  - Resume home dose entresto  BID  - Cont home aldactone 25 mg    Please see attending attestation for final recommendations        Richie Canchola MD  Cardiology Fellow     All Cardiology service information can be found 24/7 on [email or web address], password: [email or web address]

## 2024-09-10 NOTE — PROGRESS NOTE ADULT - SUBJECTIVE AND OBJECTIVE BOX
Patient seen and examined at bedside.    Overnight Events:   NAEON  No complaints this AM     REVIEW OF SYSTEMS:  All other review of systems is negative unless indicated above.            Current Meds:  acetaminophen     Tablet .. 650 milliGRAM(s) Oral every 6 hours PRN  aluminum hydroxide/magnesium hydroxide/simethicone Suspension 30 milliLiter(s) Oral every 4 hours PRN  aspirin enteric coated 81 milliGRAM(s) Oral daily  atorvastatin 40 milliGRAM(s) Oral at bedtime  carvedilol 6.25 milliGRAM(s) Oral every 12 hours  enoxaparin Injectable 40 milliGRAM(s) SubCutaneous every 24 hours  folic acid 1 milliGRAM(s) Oral daily  LORazepam     Tablet 2 milliGRAM(s) Oral every 2 hours PRN  melatonin 3 milliGRAM(s) Oral at bedtime PRN  multivitamin 1 Tablet(s) Oral daily  ondansetron Injectable 4 milliGRAM(s) IV Push every 8 hours PRN  sacubitril 24 mG/valsartan 26 mG 1 Tablet(s) Oral two times a day  spironolactone 25 milliGRAM(s) Oral daily  thiamine 100 milliGRAM(s) Oral daily      Vitals:  T(F): 98.2 (09-10), Max: 98.5 (09-09)  HR: 91 (09-10) (70 - 91)  BP: 155/115 (09-10) (154/70 - 202/110)  RR: 18 (09-10)  SpO2: 99% (09-10)  I&O's Summary    09 Sep 2024 07:01  -  10 Sep 2024 07:00  --------------------------------------------------------  IN: 1400 mL / OUT: 200 mL / NET: 1200 mL        Physical Exam:  Appearance: No acute distress; well appearing  Eyes:  EOMI  HEENT: Normal oral mucosa  Cardiovascular: RRR, S1, S2, no murmurs, rubs, or gallops; no edema; no JVD  Respiratory: Clear to auscultation bilaterally  Gastrointestinal: soft, non-tender, non-distended  Musculoskeletal: No clubbing;  Neurologic: Non-focal, altered                         12.5   5.63  )-----------( 236      ( 09 Sep 2024 06:44 )             36.2     09-10    124<L>  |  89<L>  |  16  ----------------------------<  98  4.0   |  21<L>  |  1.02    Ca    9.0      10 Sep 2024 06:55  Phos  2.7     09-10  Mg     1.70     09-10    TPro  7.1  /  Alb  3.9  /  TBili  0.3  /  DBili  x   /  AST  26  /  ALT  13  /  AlkPhos  139<H>  09-10

## 2024-09-10 NOTE — PROGRESS NOTE ADULT - SUBJECTIVE AND OBJECTIVE BOX
[facility] Division of Hospital Medicine  Januaryisra Irving PUENTES  Pager [phone]  Available via MS Teams    SUBJECTIVE / OVERNIGHT EVENTS: BP elevated overnight. Discussed with patient about home situation and home being in foreclosure patient states not true and has "agreement with bank". However patient unable to state what that agreement entails. Unable to state meds or outpatient doctors. Able to states that he has HTN and maybe takes Entresto at home.     ADDITIONAL REVIEW OF SYSTEMS:    MEDICATIONS  (STANDING):  aspirin enteric coated 81 milliGRAM(s) Oral daily  atorvastatin 40 milliGRAM(s) Oral at bedtime  carvedilol 6.25 milliGRAM(s) Oral every 12 hours  enoxaparin Injectable 40 milliGRAM(s) SubCutaneous every 24 hours  folic acid 1 milliGRAM(s) Oral daily  multivitamin 1 Tablet(s) Oral daily  sacubitril 97 mG/valsartan 103 mG 1 Tablet(s) Oral two times a day  sodium chloride 1 Gram(s) Oral two times a day  spironolactone 25 milliGRAM(s) Oral daily  thiamine 100 milliGRAM(s) Oral daily    MEDICATIONS  (PRN):  acetaminophen     Tablet .. 650 milliGRAM(s) Oral every 6 hours PRN Temp greater or equal to 38C (100.4F), Mild Pain (1 - 3)  aluminum hydroxide/magnesium hydroxide/simethicone Suspension 30 milliLiter(s) Oral every 4 hours PRN Dyspepsia  melatonin 3 milliGRAM(s) Oral at bedtime PRN Insomnia  ondansetron Injectable 4 milliGRAM(s) IV Push every 8 hours PRN Nausea and/or Vomiting      I&O's Summary    09 Sep 2024 07:01  -  10 Sep 2024 07:00  --------------------------------------------------------  IN: 1400 mL / OUT: 200 mL / NET: 1200 mL        PHYSICAL EXAM:  Vital Signs Last 24 Hrs  T(C): 36.5 (10 Sep 2024 10:46), Max: 36.8 (09 Sep 2024 15:52)  T(F): 97.7 (10 Sep 2024 10:46), Max: 98.3 (09 Sep 2024 19:17)  HR: 93 (10 Sep 2024 10:46) (70 - 93)  BP: 162/118 (10 Sep 2024 10:46) (155/115 - 202/110)  BP(mean): --  RR: 18 (10 Sep 2024 10:46) (18 - 18)  SpO2: 98% (10 Sep 2024 10:46) (98% - 100%)    Parameters below as of 10 Sep 2024 10:46  Patient On (Oxygen Delivery Method): room air      CONSTITUTIONAL: NAD, elderly male   RESPIRATORY: Normal respiratory effort; lungs are clear to auscultation bilaterally  CARDIOVASCULAR: Regular rate and rhythm, normal S1 and S2, no murmur/rub/gallop  ABDOMEN: Nontender to palpation, normoactive bowel sounds, no rebound/guarding  MUSCULOSKELETAL:  Normal gait with rollator use.   PSYCH: A+O to person, place, and time   NEUROLOGY:  no gross sensory deficits   SKIN: No rashes; no palpable lesions    LABS:                        12.5   5.63  )-----------( 236      ( 09 Sep 2024 06:44 )             36.2     09-10    124<L>  |  89<L>  |  16  ----------------------------<  98  4.0   |  21<L>  |  1.02    Ca    9.0      10 Sep 2024 06:55  Phos  2.7     09-10  Mg     1.70     09-10    TPro  7.1  /  Alb  3.9  /  TBili  0.3  /  DBili  x   /  AST  26  /  ALT  13  /  AlkPhos  139<H>  09-10          Urinalysis Basic - ( 10 Sep 2024 06:55 )    Color: x / Appearance: x / SG: x / pH: x  Gluc: 98 mg/dL / Ketone: x  / Bili: x / Urobili: x   Blood: x / Protein: x / Nitrite: x   Leuk Esterase: x / RBC: x / WBC x   Sq Epi: x / Non Sq Epi: x / Bacteria: x

## 2024-09-11 DIAGNOSIS — F10.10 ALCOHOL ABUSE, UNCOMPLICATED: ICD-10-CM

## 2024-09-11 DIAGNOSIS — R41.89 OTHER SYMPTOMS AND SIGNS INVOLVING COGNITIVE FUNCTIONS AND AWARENESS: ICD-10-CM

## 2024-09-11 DIAGNOSIS — F10.20 ALCOHOL DEPENDENCE, UNCOMPLICATED: ICD-10-CM

## 2024-09-11 LAB
ALBUMIN SERPL ELPH-MCNC: 3.9 G/DL — SIGNIFICANT CHANGE UP (ref 3.3–5)
ALP SERPL-CCNC: 127 U/L — HIGH (ref 40–120)
ALT FLD-CCNC: 25 U/L — SIGNIFICANT CHANGE UP (ref 4–41)
ANION GAP SERPL CALC-SCNC: 13 MMOL/L — SIGNIFICANT CHANGE UP (ref 7–14)
AST SERPL-CCNC: 32 U/L — SIGNIFICANT CHANGE UP (ref 4–40)
BILIRUB SERPL-MCNC: 0.4 MG/DL — SIGNIFICANT CHANGE UP (ref 0.2–1.2)
BUN SERPL-MCNC: 23 MG/DL — SIGNIFICANT CHANGE UP (ref 7–23)
CALCIUM SERPL-MCNC: 9.2 MG/DL — SIGNIFICANT CHANGE UP (ref 8.4–10.5)
CHLORIDE SERPL-SCNC: 91 MMOL/L — LOW (ref 98–107)
CO2 SERPL-SCNC: 21 MMOL/L — LOW (ref 22–31)
CREAT SERPL-MCNC: 1.14 MG/DL — SIGNIFICANT CHANGE UP (ref 0.5–1.3)
EGFR: 67 ML/MIN/1.73M2 — SIGNIFICANT CHANGE UP
GLUCOSE SERPL-MCNC: 97 MG/DL — SIGNIFICANT CHANGE UP (ref 70–99)
HCT VFR BLD CALC: 31.9 % — LOW (ref 39–50)
HGB BLD-MCNC: 11 G/DL — LOW (ref 13–17)
MAGNESIUM SERPL-MCNC: 1.6 MG/DL — SIGNIFICANT CHANGE UP (ref 1.6–2.6)
MCHC RBC-ENTMCNC: 26.3 PG — LOW (ref 27–34)
MCHC RBC-ENTMCNC: 34.5 GM/DL — SIGNIFICANT CHANGE UP (ref 32–36)
MCV RBC AUTO: 76.3 FL — LOW (ref 80–100)
NRBC # BLD: 0 /100 WBCS — SIGNIFICANT CHANGE UP (ref 0–0)
NRBC # FLD: 0 K/UL — SIGNIFICANT CHANGE UP (ref 0–0)
PHOSPHATE SERPL-MCNC: 3.5 MG/DL — SIGNIFICANT CHANGE UP (ref 2.5–4.5)
PLATELET # BLD AUTO: 234 K/UL — SIGNIFICANT CHANGE UP (ref 150–400)
POTASSIUM SERPL-MCNC: 4.2 MMOL/L — SIGNIFICANT CHANGE UP (ref 3.5–5.3)
POTASSIUM SERPL-SCNC: 4.2 MMOL/L — SIGNIFICANT CHANGE UP (ref 3.5–5.3)
PROT SERPL-MCNC: 7 G/DL — SIGNIFICANT CHANGE UP (ref 6–8.3)
RBC # BLD: 4.18 M/UL — LOW (ref 4.2–5.8)
RBC # FLD: 14 % — SIGNIFICANT CHANGE UP (ref 10.3–14.5)
SODIUM SERPL-SCNC: 125 MMOL/L — LOW (ref 135–145)
VIT B1 SERPL-MCNC: 198.8 NMOL/L — SIGNIFICANT CHANGE UP (ref 66.5–200)
WBC # BLD: 8.61 K/UL — SIGNIFICANT CHANGE UP (ref 3.8–10.5)
WBC # FLD AUTO: 8.61 K/UL — SIGNIFICANT CHANGE UP (ref 3.8–10.5)

## 2024-09-11 PROCEDURE — 99233 SBSQ HOSP IP/OBS HIGH 50: CPT

## 2024-09-11 PROCEDURE — 99232 SBSQ HOSP IP/OBS MODERATE 35: CPT

## 2024-09-11 PROCEDURE — 90792 PSYCH DIAG EVAL W/MED SRVCS: CPT

## 2024-09-11 RX ORDER — HYDROXYZINE HCL 25 MG
25 TABLET ORAL
Refills: 0 | Status: DISCONTINUED | OUTPATIENT
Start: 2024-09-11 | End: 2024-09-18

## 2024-09-11 RX ORDER — OLANZAPINE 7.5 MG/1
1.25 TABLET ORAL EVERY 6 HOURS
Refills: 0 | Status: DISCONTINUED | OUTPATIENT
Start: 2024-09-11 | End: 2024-09-18

## 2024-09-11 RX ADMIN — CARVEDILOL 6.25 MILLIGRAM(S): 6.25 TABLET ORAL at 17:39

## 2024-09-11 RX ADMIN — SACUBITRIL AND VALSARTAN 1 TABLET(S): 49; 51 TABLET, FILM COATED ORAL at 04:43

## 2024-09-11 RX ADMIN — CARVEDILOL 6.25 MILLIGRAM(S): 6.25 TABLET ORAL at 04:43

## 2024-09-11 RX ADMIN — Medication 81 MILLIGRAM(S): at 13:00

## 2024-09-11 RX ADMIN — Medication 25 MILLIGRAM(S): at 06:27

## 2024-09-11 RX ADMIN — Medication 1 MILLIGRAM(S): at 13:00

## 2024-09-11 RX ADMIN — Medication 3 MILLIGRAM(S): at 21:03

## 2024-09-11 RX ADMIN — Medication 25 MILLIGRAM(S): at 21:03

## 2024-09-11 RX ADMIN — ENOXAPARIN SODIUM 40 MILLIGRAM(S): 100 INJECTION SUBCUTANEOUS at 12:59

## 2024-09-11 RX ADMIN — SACUBITRIL AND VALSARTAN 1 TABLET(S): 49; 51 TABLET, FILM COATED ORAL at 17:39

## 2024-09-11 RX ADMIN — Medication 25 MILLIGRAM(S): at 13:00

## 2024-09-11 RX ADMIN — Medication 1 TABLET(S): at 13:00

## 2024-09-11 RX ADMIN — Medication 100 MILLIGRAM(S): at 13:00

## 2024-09-11 RX ADMIN — SPIRONOLACTONE 25 MILLIGRAM(S): 25 TABLET, FILM COATED ORAL at 04:43

## 2024-09-11 RX ADMIN — Medication 40 MILLIGRAM(S): at 21:03

## 2024-09-11 NOTE — PROGRESS NOTE ADULT - ATTENDING COMMENTS
Personally saw and examined pt  labs and vitals reviewed  agree with above assessment and plan
Personally saw and examined pt  labs and vitals reviewed  agree with above assessment and plan

## 2024-09-11 NOTE — DIETITIAN INITIAL EVALUATION ADULT - EDUCATION DIETARY MODIFICATIONS
recommendations to increase kcal/protein, protein rich foods, oral supplements/(2) meets goals/outcomes/verbalization

## 2024-09-11 NOTE — DIETITIAN INITIAL EVALUATION ADULT - ADD RECOMMEND
Monitor weights, labs, BM's, skin integrity, p.o. intake.   Please monitor % PO intake on flowsheets  Honor food preferences as able to promote intake  Recommend daily multivitamin supplement   Monitor weights, labs, BM's, skin integrity, p.o. intake.   Please monitor % PO intake on flowsheets  Honor food preferences as able to promote intake  continue daily multivitamin supplement

## 2024-09-11 NOTE — DIETITIAN INITIAL EVALUATION ADULT - SIGNS/SYMPTOMS
severe muscle and fat loss severe muscle and fat loss, additionally wt loss 20% in 9 months time frame

## 2024-09-11 NOTE — BH CONSULTATION LIAISON ASSESSMENT NOTE - NSBHCONSULTFOLLOWAFTERCARE_PSY_A_CORE FT
[facility] Outpatient services  For acute crisis: [facility]  [address] [place] [address], First Floor, [place] [address]  [phone]  [email or web address]    [facility] - [phone]

## 2024-09-11 NOTE — DIETITIAN NUTRITION RISK NOTIFICATION - ADDITIONAL COMMENTS/DIETITIAN RECOMMENDATIONS
Please see Dietitian Initial Assessment for complete recommendations.  Porsche Cardona MS, RD, CDN, CNSC (pg #[phone])

## 2024-09-11 NOTE — DIETITIAN INITIAL EVALUATION ADULT - OTHER INFO
Pt observed eating lunch meal, observed pitting out fish after chewing it. Pt denied chewing swallowing problems at encounter. Reported he usually finishes ~75% of meal. Breakfast intake documented %, prev meals 0-26% to % per RN flowsheet. Last BM yesterday as per patient No noted BMS per RN flowsheet. Pt observed with moderate to severe physical depletions, meeting criteria for severe malnutrition in context of social/environmental circumstances (pt lives alone, with hx of alcohol abuse and noted dementia)

## 2024-09-11 NOTE — DIETITIAN INITIAL EVALUATION ADULT - NS FNS DIET ORDER
The patient is a 29y Female complaining of pain, jaw. DASH/TLC (sodium & cholesterol restricted) (DASH) Easy to chew 1500mL Fluid restriction

## 2024-09-11 NOTE — PROGRESS NOTE ADULT - ASSESSMENT
72 yo w/ hx of HFrEF (EF 25% in 10/2022), mod MR, HTN, HLD, NICM, HCV (s/p Harvoni treatment), chronic hyponatremia, ETOH use disorder who was brought by daughter for few months of confusion and 1 day of reported  shortness of breath. He appears euvolemic and well compensated from a HF standpoint. EKG is notable for new T wave abnormality (CT head has ruled out intracranial pathology). TTE noting possible infiltrative CM but improved EF from prior, a work-up for infiltrative disease can be performed as an outpatient given patients stability, funcitonal status, and improvement in EF. TTE also noting echodensity likely reflecting calcium of the aortic valve, that is also noted on CT chest after discussion with radiology.     Recommendations  - Cont home coreg 6.25 mg BID - increase as needed for BP control, hold for HR <60  - Cont home dose entresto  BID  - Cont home aldactone 25 mg    Please see attending attestation for final recommendations        Richie Canchola MD  Cardiology Fellow     All Cardiology service information can be found 24/7 on [email or web address], password: [email or web address]       72 yo w/ hx of HFrEF (EF 25% in 10/2022), mod MR, HTN, HLD, NICM, HCV (s/p Harvoni treatment), chronic hyponatremia, ETOH use disorder who was brought by daughter for few months of confusion and 1 day of reported  shortness of breath. He appears euvolemic and well compensated from a HF standpoint. EKG is notable for new T wave abnormality (CT head has ruled out intracranial pathology). TTE noting possible infiltrative CM but improved EF from prior, a work-up for infiltrative disease can be performed as an outpatient given patients stability, functional status, and improvement in EF. TTE also noting echodensity likely reflecting calcium of the aortic valve, that is also noted on CT chest after discussion with radiology.     Recommendations  - Cont home coreg 6.25 mg BID - increase as needed for BP control, hold for HR <60  - Cont home dose entresto  BID  - Cont home aldactone 25 mg    Please see attending attestation for final recommendations        Richie Canchola MD  Cardiology Fellow     All Cardiology service information can be found 24/7 on [email or web address], password: [email or web address]

## 2024-09-11 NOTE — BH CONSULTATION LIAISON PROGRESS NOTE - NSBHCONSULTFOLLOWAFTERCARE_PSY_A_CORE FT
[facility] Outpatient services  For acute crisis: [facility]  [phone] [place] [address], First Floor, [place] [address]  [phone]  [email or web address]    [facility] [phone]

## 2024-09-11 NOTE — DIETITIAN INITIAL EVALUATION ADULT - REASON FOR ADMISSION
Per chart 73 yr old male with a pmh HFrEF (EF 30% in 12/2023), mod MR, HTN, HLD, NICM, HCV (s/p Harvoni treatment), chronic hyponatremia, ETOH use disorder who presents after being brought in by family for not being able to care for himself. Collateral from daughter: pt unable to care for himself at home with ADLs, has not been taking his medications, drinks alcohol and smokes marijuana. Today they took him for an MRI as ordered by outpt Neurology to differentiate if pt's intermittent memory loss is due to dementia vs due to alcohol use- unable to perform as pt could not stay still for imaginf. On the way home pt was trying to "escape" out of the care therefore they brought him to the ED for further evaluation/support. Daughter also reports pt had a fall but unable to provide details. Pt had a beer today but otherwise she is unsure when he last drank but he does drink heavily

## 2024-09-11 NOTE — BH CONSULTATION LIAISON ASSESSMENT NOTE - SUMMARY
Patient s 74 yr old male presenting with cognitive decline, hyponatremia, abnormal ekg. Psychiatry consulted for cognitive decline/psychiatric evaluations.     Patient grossly oriented, guarded, frustrated/irritable but not agitated, denies any mood sxs, no psychosis, denies SI and HI. Concerns for cognitive decline, MOCA score 15/30 on 9/11.      Plan:   No standing psychiatric medication indicated at this time.   Zyprexa 1.25mg PO/IM q6h prn for agitation if qtc <500  CT head reviewed, pending MRI  May give Thiamine 500mg IV TID for 2-3 days given history of drinking. Obtain Thiamine level, Vitamine D level  Dispo; TBD, no indication for inpatient psych.    will follow  Case d/w Dr. Villatoro. Patient is a 74 yr old male presenting with cognitive decline, hyponatremia, abnormal ekg. Psychiatry consulted for cognitive decline/psychiatric evaluations.     Patient grossly oriented, guarded, frustrated/irritable but not agitated, denies any mood sxs, no psychosis, denies SI and HI. Concerns for cognitive decline, MOCA score 15/30 on 9/11.      Plan:   No standing psychiatric medication indicated at this time.   Zyprexa 1.25mg PO/IM q6h prn for agitation if qtc <500  CT head reviewed, pending MRI  May give Thiamine 500mg IV TID for 2-3 days given history of drinking. Obtain Thiamine level, Vitamine D level  Dispo; TBD, no indication for inpatient psych.    will follow  Case d/w Dr. Villatoro.

## 2024-09-11 NOTE — BH CONSULTATION LIAISON PROGRESS NOTE - CURRENT MEDICATION
MEDICATIONS  (STANDING):  aspirin enteric coated 81 milliGRAM(s) Oral daily  atorvastatin 40 milliGRAM(s) Oral at bedtime  carvedilol 6.25 milliGRAM(s) Oral every 12 hours  enoxaparin Injectable 40 milliGRAM(s) SubCutaneous every 24 hours  folic acid 1 milliGRAM(s) Oral daily  LORazepam     Tablet 2 milliGRAM(s) Oral once  multivitamin 1 Tablet(s) Oral daily  sacubitril 97 mG/valsartan 103 mG 1 Tablet(s) Oral two times a day  spironolactone 25 milliGRAM(s) Oral daily    MEDICATIONS  (PRN):  acetaminophen     Tablet .. 650 milliGRAM(s) Oral every 6 hours PRN Temp greater or equal to 38C (100.4F), Mild Pain (1 - 3)  aluminum hydroxide/magnesium hydroxide/simethicone Suspension 30 milliLiter(s) Oral every 4 hours PRN Dyspepsia  hydrOXYzine hydrochloride 25 milliGRAM(s) Oral two times a day PRN Anxiety  melatonin 3 milliGRAM(s) Oral at bedtime PRN Insomnia  ondansetron Injectable 4 milliGRAM(s) IV Push every 8 hours PRN Nausea and/or Vomiting

## 2024-09-11 NOTE — BH CONSULTATION LIAISON ASSESSMENT NOTE - HPI (INCLUDE ILLNESS QUALITY, SEVERITY, DURATION, TIMING, CONTEXT, MODIFYING FACTORS, ASSOCIATED SIGNS AND SYMPTOMS)
Patient s 74 yr old male presenting with cognitive decline, hyponatremia, abnormal ekg. Psychiatry consulted for cognitive decline/psychiatric evaluations.     Chart reviewed, pt. seen/evaluated, AAOX2-3 (self, place, year), overall guarded, quite frustrated/irritable but not agitated, denies feeling depressed or anxious, stating, " my family just dropped me here and forgot about me" . No evidence of acute psychosis, firmly denies SI and HI. When discussing about his living situation/support system, pt. seems very guarded and did not provide any information and was preoccupied that he just wants to talk to his daughter and go home.  When asked bout his drinking, pt. stated " may be I take 1-2 Beer".  Attempted to do MOCA, though pt. was very resistant, needed lots of encouragement, scored 15/30. Patient is a  74 yr old male presenting with cognitive decline, hyponatremia, abnormal ekg. Psychiatry consulted for cognitive decline/psychiatric evaluations.     Chart reviewed, pt. seen/evaluated, AAOX2-3 (self, place, year), overall guarded, quite frustrated/irritable but not agitated, denies feeling depressed or anxious, stating, " my family just dropped me here and forgot about me" . No evidence of acute psychosis, firmly denies SI and HI. When discussing about his living situation/support system, pt. seems very guarded and did not provide any information and was preoccupied that he just wants to talk to his daughter and go home.  When asked bout his drinking, pt. stated " may be I take 1-2 Beer".  Attempted to do MOCA, though pt. was very resistant, needed lots of encouragement, scored 15/30.

## 2024-09-11 NOTE — PROGRESS NOTE ADULT - SUBJECTIVE AND OBJECTIVE BOX
[facility] Division of Hospital Medicine  BETOkaye Schusterpapo PUENTES  Pager [phone]  Available via MS Teams    SUBJECTIVE / OVERNIGHT EVENTS: No acute events. Patient  states that he feels well, stating that he wants to go home. Explained that home is under foreclosure so cannot go at this time.     ADDITIONAL REVIEW OF SYSTEMS:    MEDICATIONS  (STANDING):  aspirin enteric coated 81 milliGRAM(s) Oral daily  atorvastatin 40 milliGRAM(s) Oral at bedtime  carvedilol 6.25 milliGRAM(s) Oral every 12 hours  enoxaparin Injectable 40 milliGRAM(s) SubCutaneous every 24 hours  folic acid 1 milliGRAM(s) Oral daily  LORazepam     Tablet 2 milliGRAM(s) Oral once  multivitamin 1 Tablet(s) Oral daily  sacubitril 97 mG/valsartan 103 mG 1 Tablet(s) Oral two times a day  spironolactone 25 milliGRAM(s) Oral daily  thiamine 100 milliGRAM(s) Oral daily    MEDICATIONS  (PRN):  acetaminophen     Tablet .. 650 milliGRAM(s) Oral every 6 hours PRN Temp greater or equal to 38C (100.4F), Mild Pain (1 - 3)  aluminum hydroxide/magnesium hydroxide/simethicone Suspension 30 milliLiter(s) Oral every 4 hours PRN Dyspepsia  hydrOXYzine hydrochloride 25 milliGRAM(s) Oral two times a day PRN Anxiety  melatonin 3 milliGRAM(s) Oral at bedtime PRN Insomnia  ondansetron Injectable 4 milliGRAM(s) IV Push every 8 hours PRN Nausea and/or Vomiting      I&O's Summary    10 Sep 2024 07:01  -  11 Sep 2024 07:00  --------------------------------------------------------  IN: 250 mL / OUT: 900 mL / NET: -650 mL        PHYSICAL EXAM:  Vital Signs Last 24 Hrs  T(C): 36.9 (11 Sep 2024 11:01), Max: 37.3 (10 Sep 2024 21:30)  T(F): 98.4 (11 Sep 2024 11:01), Max: 99.1 (10 Sep 2024 21:30)  HR: 73 (11 Sep 2024 11:01) (73 - 92)  BP: 167/89 (11 Sep 2024 11:01) (152/79 - 170/88)  BP(mean): --  RR: 18 (11 Sep 2024 11:01) (18 - 18)  SpO2: 100% (11 Sep 2024 11:01) (100% - 100%)    Parameters below as of 11 Sep 2024 11:01  Patient On (Oxygen Delivery Method): room air      CONSTITUTIONAL: NAD, elderly male   RESPIRATORY: Normal respiratory effort; lungs are clear to auscultation bilaterally  CARDIOVASCULAR: Regular rate and rhythm, normal S1 and S2, no murmur/rub/gallop   ABDOMEN: Nontender to palpation, normoactive bowel sounds, no rebound/guarding; No hepatosplenomegaly  MUSCULOSKELETAL:  Normal gait; no clubbing or cyanosis of digits; no joint swelling or tenderness to palpation  PSYCH: A+O to person, place, and time  NEUROLOGY:  no gross sensory deficits   SKIN: No rashes; no palpable lesions    LABS:                        11.0   8.61  )-----------( 234      ( 11 Sep 2024 04:35 )             31.9     09-11    125<L>  |  91<L>  |  23  ----------------------------<  97  4.2   |  21<L>  |  1.14    Ca    9.2      11 Sep 2024 04:35  Phos  3.5     09-11  Mg     1.60     09-11    TPro  7.0  /  Alb  3.9  /  TBili  0.4  /  DBili  x   /  AST  32  /  ALT  25  /  AlkPhos  127<H>  09-11          Urinalysis Basic - ( 11 Sep 2024 04:35 )    Color: x / Appearance: x / SG: x / pH: x  Gluc: 97 mg/dL / Ketone: x  / Bili: x / Urobili: x   Blood: x / Protein: x / Nitrite: x   Leuk Esterase: x / RBC: x / WBC x   Sq Epi: x / Non Sq Epi: x / Bacteria: x        Culture - Urine (collected 08 Sep 2024 19:30)  Source: Clean Catch Clean Catch (Midstream)  Preliminary Report (10 Sep 2024 13:54):    10,000 - 49,000 CFU/mL Staphylococcus epidermidis "Susceptibilities not    performed"

## 2024-09-11 NOTE — DIETITIAN INITIAL EVALUATION ADULT - PERTINENT LABORATORY DATA
09-11    125<L>  |  91<L>  |  23  ----------------------------<  97  4.2   |  21<L>  |  1.14    Ca    9.2      11 Sep 2024 04:35  Phos  3.5     09-11  Mg     1.60     09-11    TPro  7.0  /  Alb  3.9  /  TBili  0.4  /  DBili  x   /  AST  32  /  ALT  25  /  AlkPhos  127<H>  09-11  A1C with Estimated Average Glucose Result: 5.0 % (09-09-24 @ 06:44)

## 2024-09-11 NOTE — BH CONSULTATION LIAISON PROGRESS NOTE - NSBHASSESSMENTFT_PSY_ALL_CORE
Patient s 74 yr old male presenting with cognitive decline, hyponatremia, abnormal ekg. Psychiatry consulted for cognitive decline/psychiatric evaluations.     Patient grossly oriented, guarded, frustrated/irritable but not agitated, denies any mood sxs, no psychosis, denies SI and HI. Concerns for cognitive decline, MOCA score 15/30 on 9/11.      Plan:   No standing psychiatric medication indicated at this time.   Zyprexa 1.25mg PO/IM q6h prn for agitation if qtc <500  CT head reviewed, pending MRI  May give Thiamine 500mg IV TID for 2-3 days given history of drinking. Obtain Thiamine level, Vitamine D level  Dispo; TBD, no indication for inpatient psych.    will follow  Case d/w Dr. Villatoro.

## 2024-09-11 NOTE — BH CONSULTATION LIAISON PROGRESS NOTE - NSBHCHARTREVIEWVS_PSY_A_CORE FT
Vital Signs Last 24 Hrs  T(C): 36.9 (11 Sep 2024 11:01), Max: 37.3 (10 Sep 2024 21:30)  T(F): 98.4 (11 Sep 2024 11:01), Max: 99.1 (10 Sep 2024 21:30)  HR: 73 (11 Sep 2024 11:01) (73 - 92)  BP: 167/89 (11 Sep 2024 11:01) (152/79 - 170/88)  BP(mean): --  RR: 18 (11 Sep 2024 11:01) (18 - 18)  SpO2: 100% (11 Sep 2024 11:01) (100% - 100%)    Parameters below as of 11 Sep 2024 11:01  Patient On (Oxygen Delivery Method): room air

## 2024-09-11 NOTE — PROGRESS NOTE ADULT - SUBJECTIVE AND OBJECTIVE BOX
Patient seen and examined at bedside.    Overnight Events:   NAEON    REVIEW OF SYSTEMS:  All other review of systems is negative unless indicated above.            Current Meds:  acetaminophen     Tablet .. 650 milliGRAM(s) Oral every 6 hours PRN  aluminum hydroxide/magnesium hydroxide/simethicone Suspension 30 milliLiter(s) Oral every 4 hours PRN  aspirin enteric coated 81 milliGRAM(s) Oral daily  atorvastatin 40 milliGRAM(s) Oral at bedtime  carvedilol 6.25 milliGRAM(s) Oral every 12 hours  enoxaparin Injectable 40 milliGRAM(s) SubCutaneous every 24 hours  folic acid 1 milliGRAM(s) Oral daily  hydrOXYzine hydrochloride 25 milliGRAM(s) Oral two times a day PRN  LORazepam     Tablet 2 milliGRAM(s) Oral once  melatonin 3 milliGRAM(s) Oral at bedtime PRN  multivitamin 1 Tablet(s) Oral daily  OLANZapine 1.25 milliGRAM(s) Oral every 6 hours PRN  ondansetron Injectable 4 milliGRAM(s) IV Push every 8 hours PRN  sacubitril 97 mG/valsartan 103 mG 1 Tablet(s) Oral two times a day  spironolactone 25 milliGRAM(s) Oral daily      Vitals:  T(F): 98.4 (09-11), Max: 99.1 (09-10)  HR: 73 (09-11) (73 - 92)  BP: 167/89 (09-11) (152/79 - 170/88)  RR: 18 (09-11)  SpO2: 100% (09-11)  I&O's Summary    10 Sep 2024 07:01  -  11 Sep 2024 07:00  --------------------------------------------------------  IN: 250 mL / OUT: 900 mL / NET: -650 mL        Physical Exam:  Appearance: No acute distress; well appearing  Eyes:  EOMI  HEENT: Normal oral mucosa  Cardiovascular: RRR, S1, S2, no murmurs, rubs, or gallops; no edema; no JVD  Respiratory: Clear to auscultation bilaterally  Gastrointestinal: soft, non-tender, non-distended  Musculoskeletal: No clubbing;  Neurologic: Non-focal  Psychiatry: AAOx3, mood & affect appropriate                          11.0   8.61  )-----------( 234      ( 11 Sep 2024 04:35 )             31.9     09-11    125<L>  |  91<L>  |  23  ----------------------------<  97  4.2   |  21<L>  |  1.14    Ca    9.2      11 Sep 2024 04:35  Phos  3.5     09-11  Mg     1.60     09-11    TPro  7.0  /  Alb  3.9  /  TBili  0.4  /  DBili  x   /  AST  32  /  ALT  25  /  AlkPhos  127<H>  09-11     Patient seen and examined at bedside.    Overnight Events:   NAEON    REVIEW OF SYSTEMS:  All other review of systems is negative unless indicated above.            Current Meds:  acetaminophen     Tablet .. 650 milliGRAM(s) Oral every 6 hours PRN  aluminum hydroxide/magnesium hydroxide/simethicone Suspension 30 milliLiter(s) Oral every 4 hours PRN  aspirin enteric coated 81 milliGRAM(s) Oral daily  atorvastatin 40 milliGRAM(s) Oral at bedtime  carvedilol 6.25 milliGRAM(s) Oral every 12 hours  enoxaparin Injectable 40 milliGRAM(s) SubCutaneous every 24 hours  folic acid 1 milliGRAM(s) Oral daily  hydrOXYzine hydrochloride 25 milliGRAM(s) Oral two times a day PRN  LORazepam     Tablet 2 milliGRAM(s) Oral once  melatonin 3 milliGRAM(s) Oral at bedtime PRN  multivitamin 1 Tablet(s) Oral daily  OLANZapine 1.25 milliGRAM(s) Oral every 6 hours PRN  ondansetron Injectable 4 milliGRAM(s) IV Push every 8 hours PRN  sacubitril 97 mG/valsartan 103 mG 1 Tablet(s) Oral two times a day  spironolactone 25 milliGRAM(s) Oral daily      Vitals:  T(F): 98.4 (09-11), Max: 99.1 (09-10)  HR: 73 (09-11) (73 - 92)  BP: 167/89 (09-11) (152/79 - 170/88)  RR: 18 (09-11)  SpO2: 100% (09-11)  I&O's Summary    10 Sep 2024 07:01  -  11 Sep 2024 07:00  --------------------------------------------------------  IN: 250 mL / OUT: 900 mL / NET: -650 mL        Physical Exam:  Appearance: No acute distress; well appearing  Eyes:  EOMI  HEENT: Normal oral mucosa  Cardiovascular: RRR, S1, S2, no murmurs, rubs, or gallops; no edema; no JVD  Respiratory: Clear to auscultation bilaterally  Gastrointestinal: soft, non-tender, non-distended  Musculoskeletal: No clubbing;  Neurologic: Non-focal, altered                           11.0   8.61  )-----------( 234      ( 11 Sep 2024 04:35 )             31.9     09-11    125<L>  |  91<L>  |  23  ----------------------------<  97  4.2   |  21<L>  |  1.14    Ca    9.2      11 Sep 2024 04:35  Phos  3.5     09-11  Mg     1.60     09-11    TPro  7.0  /  Alb  3.9  /  TBili  0.4  /  DBili  x   /  AST  32  /  ALT  25  /  AlkPhos  127<H>  09-11

## 2024-09-11 NOTE — DIETITIAN INITIAL EVALUATION ADULT - PERTINENT MEDS FT
MEDICATIONS  (STANDING):  aspirin enteric coated 81 milliGRAM(s) Oral daily  atorvastatin 40 milliGRAM(s) Oral at bedtime  carvedilol 6.25 milliGRAM(s) Oral every 12 hours  enoxaparin Injectable 40 milliGRAM(s) SubCutaneous every 24 hours  folic acid 1 milliGRAM(s) Oral daily  LORazepam     Tablet 2 milliGRAM(s) Oral once  multivitamin 1 Tablet(s) Oral daily  sacubitril 97 mG/valsartan 103 mG 1 Tablet(s) Oral two times a day  spironolactone 25 milliGRAM(s) Oral daily  thiamine 100 milliGRAM(s) Oral daily    MEDICATIONS  (PRN):  acetaminophen     Tablet .. 650 milliGRAM(s) Oral every 6 hours PRN Temp greater or equal to 38C (100.4F), Mild Pain (1 - 3)  aluminum hydroxide/magnesium hydroxide/simethicone Suspension 30 milliLiter(s) Oral every 4 hours PRN Dyspepsia  hydrOXYzine hydrochloride 25 milliGRAM(s) Oral two times a day PRN Anxiety  melatonin 3 milliGRAM(s) Oral at bedtime PRN Insomnia  ondansetron Injectable 4 milliGRAM(s) IV Push every 8 hours PRN Nausea and/or Vomiting

## 2024-09-11 NOTE — DIETITIAN NUTRITION RISK NOTIFICATION - TREATMENT: THE FOLLOWING DIET HAS BEEN RECOMMENDED
Diet, Regular:   DASH/TLC {Sodium & Cholesterol Restricted} (DASH)  Easy to Chew (EASYTOCHEW)  1500mL Fluid Restriction ([id number]) (09-10-24 @ 10:36) [Active]

## 2024-09-11 NOTE — DIETITIAN INITIAL EVALUATION ADULT - ORAL INTAKE PTA/DIET HISTORY
Pt noted with dementia. Attempts to answer questions however unable to elaborate, states he does not remember. Per discussion with daughter over phone call pt does have good appetite and usually eats 3 meals however she has noticed he will chew foods in his mouth, then spit it out after likely amounting to consuming only around 25% of meal as per daughter ongoing for a while as per daughter. Unsure if related to chewing problems associated with dentures. Pt reported to have top and bottom dentures. Pt denied N/V/D or abdominal pain. Per daughter previously used to drink ensure however not recently taking it. Believes wt last year was likely  lbs and believes he has lost since then, last RD note wt note 140 lbs/63.5 kg. current chart weight noted 68 kg/149.6 lbs, weight gain questionable as pt appears cachetic. Pt noted with dementia. Attempts to answer questions however unable to elaborate, states he does not remember. Per discussion with daughter over phone call pt does have good appetite and usually eats 3 meals however she has noticed he will chew foods in his mouth, then spit it out after likely amounting to consuming only around 25% of meal as per daughter ongoing for a while as per daughter. Unsure if related to chewing problems associated with dentures. Pt reported to have top and bottom dentures. Pt denied N/V/D or abdominal pain. Per daughter previously used to drink ensure however not recently taking it. Believes wt last year was likely  lbs and believes he has lost since then, last RD note wt note 140 lbs/63.5 kg 12-7-23. current chart weight noted 68 kg/149.6 lbs, weight gain questionable as pt appears cachetic, writer checked bedscale weight noted to be 50.8 kg (111.7 lbs), value to be used in assessment reflects 20% weightloss over 9 months time frame. Pt noted with no documented HT, per prev admit chart note pt 5'6'' (value used in assessment) Pt noted with dementia. Attempts to answer questions however unable to elaborate, states he does not remember. Per discussion with daughter over phone call pt does have good appetite and usually eats 3 meals however she has noticed he will chew foods in his mouth, then spit it out after likely amounting to consuming only around 25% of meal as per daughter ongoing for a while as per daughter. Unsure if related to chewing problems associated with dentures. Pt reported to have top and bottom dentures. Pt denied N/V/D or abdominal pain. Per daughter previously used to drink ensure however not recently taking it. Believes wt last year was likely  lbs and believes he has lost since then, last RD note wt note 140 lbs/63.5 kg 12-7-23. current chart weight noted 68 kg/149.6 lbs, weight gain questionable as pt appears cachetic, writer checked bedscale weight noted to be 50.8 kg (112 lbs), value to be used in assessment reflects 20% weightloss over 9 months time frame. Pt noted with no documented HT, per prev admit chart note pt 5'6'' (value used in assessment)

## 2024-09-12 ENCOUNTER — TRANSCRIPTION ENCOUNTER (OUTPATIENT)
Age: 74
End: 2024-09-12

## 2024-09-12 LAB
24R-OH-CALCIDIOL SERPL-MCNC: 30.5 NG/ML — SIGNIFICANT CHANGE UP (ref 30–80)
COPPER SERPL-MCNC: 175 UG/DL — HIGH (ref 69–132)
CULTURE RESULTS: ABNORMAL
SPECIMEN SOURCE: SIGNIFICANT CHANGE UP
VIT D25+D1,25 OH+D1,25 PNL SERPL-MCNC: 40.5 PG/ML — SIGNIFICANT CHANGE UP (ref 19.9–79.3)
ZINC SERPL-MCNC: 49 UG/DL — SIGNIFICANT CHANGE UP (ref 44–115)

## 2024-09-12 PROCEDURE — 99232 SBSQ HOSP IP/OBS MODERATE 35: CPT

## 2024-09-12 RX ORDER — CARVEDILOL 6.25 MG/1
12.5 TABLET ORAL EVERY 12 HOURS
Refills: 0 | Status: DISCONTINUED | OUTPATIENT
Start: 2024-09-12 | End: 2024-09-18

## 2024-09-12 RX ADMIN — Medication 1 TABLET(S): at 11:37

## 2024-09-12 RX ADMIN — Medication 25 MILLIGRAM(S): at 11:37

## 2024-09-12 RX ADMIN — Medication 1 MILLIGRAM(S): at 11:37

## 2024-09-12 RX ADMIN — CARVEDILOL 12.5 MILLIGRAM(S): 6.25 TABLET ORAL at 18:00

## 2024-09-12 RX ADMIN — Medication 3 MILLIGRAM(S): at 21:35

## 2024-09-12 RX ADMIN — SPIRONOLACTONE 25 MILLIGRAM(S): 25 TABLET, FILM COATED ORAL at 05:31

## 2024-09-12 RX ADMIN — Medication 25 MILLIGRAM(S): at 18:00

## 2024-09-12 RX ADMIN — ENOXAPARIN SODIUM 40 MILLIGRAM(S): 100 INJECTION SUBCUTANEOUS at 11:37

## 2024-09-12 RX ADMIN — SACUBITRIL AND VALSARTAN 1 TABLET(S): 49; 51 TABLET, FILM COATED ORAL at 05:31

## 2024-09-12 RX ADMIN — Medication 40 MILLIGRAM(S): at 21:35

## 2024-09-12 RX ADMIN — CARVEDILOL 6.25 MILLIGRAM(S): 6.25 TABLET ORAL at 05:31

## 2024-09-12 RX ADMIN — SACUBITRIL AND VALSARTAN 1 TABLET(S): 49; 51 TABLET, FILM COATED ORAL at 18:00

## 2024-09-12 RX ADMIN — Medication 81 MILLIGRAM(S): at 11:37

## 2024-09-12 NOTE — DISCHARGE NOTE PROVIDER - PROVIDER TOKENS
PROVIDER:[TOKEN:[[id number]],FOLLOWUP:[2 weeks]],PROVIDER:[TOKEN:[[id number]],FOLLOWUP:[2 weeks]]

## 2024-09-12 NOTE — DISCHARGE NOTE PROVIDER - ATTENDING DISCHARGE PHYSICAL EXAMINATION:
Physical Exam:  GENERAL: no apparent distress; on RA; calm  CHEST/LUNG: Clear to auscultation bilaterally; No wheezing; No crackles  HEART: no obvious audible murmurs; ext warm to touch; No edema  ABDOMEN: Soft, Nontender, Nondistended; Bowel sounds present  MSK: no joint or back on palpation; no joint erythema or swelling.   NEUROLOGY: Awake and alert; CN 2-12 grossly intact, no obvious FND

## 2024-09-12 NOTE — PROGRESS NOTE ADULT - ASSESSMENT
75 y/o M with pmhx HFrEF (EF 30% in 12/2023), mod MR, HTN,  HLD,  NICM,  HCV (s/p Harvoni treatment), chronic hyponatremia,  ETOH use disorder.

## 2024-09-12 NOTE — DISCHARGE NOTE PROVIDER - NSDCFUSCHEDAPPT_GEN_ALL_CORE_FT
Miguel Ángel Godinez  [facility]  [facility] [address]  Scheduled Appointment: 09/19/2024

## 2024-09-12 NOTE — DISCHARGE NOTE PROVIDER - NSDCCPTREATMENT_GEN_ALL_CORE_FT
PRINCIPAL PROCEDURE  Procedure: MR brain  Findings and Treatment:   < end of copied text >  TECHNICAL LIMITATIONS: There is diffuse mild to moderate motion artifact   on all sequences.  VENTRICLES AND SULCI: Disproportionate enlargement of the bilateral   ventricles and third ventricle, stable since 10/24/2022.  INTRA-AXIAL: There is no diffusion restriction to indicate acute or   recent subacute infarct.  No intraparenchymal blood products, vasogenic   edema, mass effect, or midline shift.  Mild to moderate partially   confluent T2 FLAIR signal hyperintensity in the periventricular/deep   white matter is consistent with microvascular  type changes.  Chronic   lacunar infarct in the right thalamus.  EXTRA-AXIAL: No blood products or fluid collection.  SINUSES:  Trace ethmoid air cell mucosal thickening.  MASTOIDS: Trace right mastoid effusion.  ORBITS: Normal.  CALVARIUM: Intact.  MISCELLANEOUS: None.  IMPRESSION:  No CT evidence of acute intracranial pathology.  Chronic lacunar infarct in the right thalamus.  Mild to moderate partially confluent microvascular type changes in the   periventricular/deep white matter.  Disproportionate ventriculomegaly is stable dating back to 10/24/2022.    The findings may be related to cerebral atrophy versus normal pressure   hydrocephalus in the proper clinical scenario.  Correlate with clinical   symptoms and physical exam findings.< from: MR Head No Cont (09.16.24 @ 18:44) >        SECONDARY PROCEDURE  Procedure: Echo 2D  Findings and Treatment:   < end of copied text >  CONCLUSIONS:      1. Left ventricular cavity is normal in size. Left ventricular wall thickness is normal. There is significant concentric hypertrophy.Left ventricular systolic function is hyperdynamic. There are no regional wall motion abnormalities seen.   2. There is mild (grade 1) left ventricular diastolic dysfunction.   3. Normal right ventricular cavity size, with normal wall thickness, and normal right ventricularsystolic function.   4. Left atrium is normal in size.   5. No significant valvular disease.   6. There is a linear, thick, echobright structure that appears in the LVOT in multiple views, unclear if this is artifact.   7. Left pleural effusion noted.   8. The inferior vena cava is normal in size (normal <2.1cm) with normal inspiratory collapse (normal >50%) consistent with normal right atrial pressure (~3, range 0-5mmHg).   9. Trace pericardial effusion.  10. Consider infiltrative cardiomyopathy.< from: TTE W or WO Ultrasound Enhancing Agent (09.10.24 @ 13:30) >

## 2024-09-12 NOTE — DISCHARGE NOTE PROVIDER - CARE PROVIDERS DIRECT ADDRESSES
[email or web address]

## 2024-09-12 NOTE — DISCHARGE NOTE PROVIDER - DETAILS OF MALNUTRITION DIAGNOSIS/DIAGNOSES
This patient has been assessed with a concern for Malnutrition and was treated during this hospitalization for the following Nutrition diagnosis/diagnoses:     -  09/11/2024: Underweight (BMI < 19)   -  09/11/2024: Severe protein-calorie malnutrition

## 2024-09-12 NOTE — PROGRESS NOTE ADULT - SUBJECTIVE AND OBJECTIVE BOX
[facility] Division of Hospital Medicine  Yoandyannie Irving PUENTES  Pager [phone]  Available via MS Teams    SUBJECTIVE / OVERNIGHT EVENTS: No acute events overnight. Patient states that he feels anxious this AM about being here. Per nursing, patient urinating in cups instead of using urinal.     ADDITIONAL REVIEW OF SYSTEMS:    MEDICATIONS  (STANDING):  aspirin enteric coated 81 milliGRAM(s) Oral daily  atorvastatin 40 milliGRAM(s) Oral at bedtime  carvedilol 6.25 milliGRAM(s) Oral every 12 hours  enoxaparin Injectable 40 milliGRAM(s) SubCutaneous every 24 hours  folic acid 1 milliGRAM(s) Oral daily  LORazepam     Tablet 2 milliGRAM(s) Oral once  multivitamin 1 Tablet(s) Oral daily  sacubitril 97 mG/valsartan 103 mG 1 Tablet(s) Oral two times a day  spironolactone 25 milliGRAM(s) Oral daily    MEDICATIONS  (PRN):  acetaminophen     Tablet .. 650 milliGRAM(s) Oral every 6 hours PRN Temp greater or equal to 38C (100.4F), Mild Pain (1 - 3)  aluminum hydroxide/magnesium hydroxide/simethicone Suspension 30 milliLiter(s) Oral every 4 hours PRN Dyspepsia  hydrOXYzine hydrochloride 25 milliGRAM(s) Oral two times a day PRN Anxiety  melatonin 3 milliGRAM(s) Oral at bedtime PRN Insomnia  OLANZapine 1.25 milliGRAM(s) Oral every 6 hours PRN agitation  ondansetron Injectable 4 milliGRAM(s) IV Push every 8 hours PRN Nausea and/or Vomiting      I&O's Summary      PHYSICAL EXAM:  Vital Signs Last 24 Hrs  T(C): 36.8 (12 Sep 2024 11:04), Max: 36.8 (12 Sep 2024 05:10)  T(F): 98.3 (12 Sep 2024 11:04), Max: 98.3 (12 Sep 2024 11:04)  HR: 79 (12 Sep 2024 11:04) (76 - 79)  BP: 135/87 (12 Sep 2024 11:04) (135/87 - 156/90)  BP(mean): --  RR: 18 (12 Sep 2024 11:04) (18 - 18)  SpO2: 100% (12 Sep 2024 11:04) (100% - 100%)    Parameters below as of 12 Sep 2024 11:04  Patient On (Oxygen Delivery Method): room air      CONSTITUTIONAL: NAD, elderly male   RESPIRATORY: Normal respiratory effort; lungs are clear to auscultation bilaterally  CARDIOVASCULAR: Regular rate and rhythm, normal S1 and S2  ABDOMEN: Nontender to palpation, normoactive bowel sound  MUSCULOSKELETAL:  Normal gait; no clubbing or cyanosis of digits; no joint swelling or tenderness to palpation  PSYCH: A+O to person, place  NEUROLOGY: no gross sensory deficits   SKIN: No rashes; no palpable lesions    LABS:                        11.0   8.61  )-----------( 234      ( 11 Sep 2024 04:35 )             31.9     09-11    125<L>  |  91<L>  |  23  ----------------------------<  97  4.2   |  21<L>  |  1.14    Ca    9.2      11 Sep 2024 04:35  Phos  3.5     09-11  Mg     1.60     09-11    TPro  7.0  /  Alb  3.9  /  TBili  0.4  /  DBili  x   /  AST  32  /  ALT  25  /  AlkPhos  127<H>  09-11          Urinalysis Basic - ( 11 Sep 2024 04:35 )    Color: x / Appearance: x / SG: x / pH: x  Gluc: 97 mg/dL / Ketone: x  / Bili: x / Urobili: x   Blood: x / Protein: x / Nitrite: x   Leuk Esterase: x / RBC: x / WBC x   Sq Epi: x / Non Sq Epi: x / Bacteria: x

## 2024-09-12 NOTE — DISCHARGE NOTE PROVIDER - NSDCMRMEDTOKEN_GEN_ALL_CORE_FT
aspirin 81 mg oral delayed release tablet: 1 tab(s) orally once a day  atorvastatin 40 mg oral tablet: 1 tab(s) orally once a day (at bedtime)  carvedilol 6.25 mg oral tablet: 1 tab(s) orally every 12 hours  Lasix 20 mg oral tablet: 1 tab(s) orally once a day  sacubitril-valsartan 97 mg-103 mg oral tablet: 1 tab(s) orally 2 times a day  spironolactone 25 mg oral tablet: 1 tab(s) orally once a day   aspirin 81 mg oral delayed release tablet: 1 tab(s) orally once a day  atorvastatin 40 mg oral tablet: 1 tab(s) orally once a day (at bedtime)  carvedilol 12.5 mg oral tablet: 1 tab(s) orally every 12 hours  donepezil 5 mg oral tablet: 1 tab(s) orally once a day (at bedtime)  folic acid 1 mg oral tablet: 1 tab(s) orally once a day  Lasix 20 mg oral tablet: 1 tab(s) orally once a day  Multiple Vitamins oral tablet: 1 tab(s) orally once a day  sacubitril-valsartan 97 mg-103 mg oral tablet: 1 tab(s) orally 2 times a day  spironolactone 25 mg oral tablet: 1 tab(s) orally once a day

## 2024-09-12 NOTE — DISCHARGE NOTE PROVIDER - NSDCCPCAREPLAN_GEN_ALL_CORE_FT
PRINCIPAL DISCHARGE DIAGNOSIS  Diagnosis: Dementia  Assessment and Plan of Treatment: You came into the hospital by your family for concern about your mental health. You had  a CT scan that showed a indeterminate stroke, you had an MRI that showed __________.      SECONDARY DISCHARGE DIAGNOSES  Diagnosis: Abnormal EKG  Assessment and Plan of Treatment: You had an abnormal EKG and were seen by cardiolgy. You hade an echo that showed improvment in your heart function. You had some enhncememnt in your echo that is concnern for protein or calcium deposits. There is no emergent need for any procedure per cardiolgy. However you will need to follow- up with a cardiologist as outpatient to discuss need for further testing.    Diagnosis: Chronic HFrEF (heart failure with reduced ejection fraction)  Assessment and Plan of Treatment: You had an echo done that showed improvement in your heart fucntion please continue to take your meds as prescribed.    Diagnosis: Aneurysm, ascending aorta  Assessment and Plan of Treatment: You have a known aortic aneurysm which is a dilation of the large artery in your body. The size remains stable at 4.2cm Please be sure to take all your BP meds daily to maintain good BP control. If you do not take your meds as prescibed that can cause the artery to get larger and larger leading to perforation or bleeding that can lead to death.     PRINCIPAL DISCHARGE DIAGNOSIS  Diagnosis: Dementia  Assessment and Plan of Treatment: You came into the hospital by your family for concern about your mental health. You had  a CT scan that showed a indeterminate stroke, you had an MRI that showed old lacunar infarct. There is cortical atrophy which can lead to dementia. you were evaluated by neurologist and suspecting underline Alzheimer's dementia and component of alcohol induced. please abstain from alcohol. follow up with Neurologist.      SECONDARY DISCHARGE DIAGNOSES  Diagnosis: Abnormal EKG  Assessment and Plan of Treatment: You had an abnormal EKG and were seen by cardiolgy. You hade an echo that showed improvment in your heart function. You had some enhncememnt in your echo that is concnern for protein or calcium deposits. There is no emergent need for any procedure per cardiolgy. However you will need to follow- up with a cardiologist as outpatient to discuss need for further testing.    Diagnosis: Aneurysm, ascending aorta  Assessment and Plan of Treatment: You have a known aortic aneurysm which is a dilation of the large artery in your body. The size remains stable at 4.2cm Please be sure to take all your BP meds daily to maintain good BP control. If you do not take your meds as prescibed that can cause the artery to get larger and larger leading to perforation or bleeding that can lead to death.    Diagnosis: Chronic HFrEF (heart failure with reduced ejection fraction)  Assessment and Plan of Treatment: You had an echo done that showed improvement in your heart fucntion please continue to take your meds as prescribed. Continue regimen from hospital. Follow heart healthy diet. Monitor weight. If you develop severe lower extremity swelling and shortness of breath please seek medial attention. Follow up with your PCP and cardiologist for further evaluation and managment. Please call to make an appointment.    Diagnosis: Hyponatremia  Assessment and Plan of Treatment: you have chronically low sodium level. overall level has been stable. There is no signs of fluid overload. please follow up with PCP with routine monitor of sodium level.

## 2024-09-12 NOTE — DISCHARGE NOTE PROVIDER - CARE PROVIDER_API CALL
Josh Almaguer  Neurology  [address], [address]  [place] [address]  Phone: [phone]  Fax: [phone]  Follow Up Time: 2 weeks    Travis Fofana  Psychology  [address]  [place] [address]  Phone: [phone]  Fax: [phone]  Follow Up Time: 2 weeks

## 2024-09-12 NOTE — DISCHARGE NOTE PROVIDER - HOSPITAL COURSE
HPI:  73 yr old male with a pmh HFrEF (EF 30% in 12/2023), mod MR, HTN, HLD, NICM, HCV (s/p Harvoni treatment), chronic hyponatremia, ETOH use disorder who presents after being brought in by family for not being able to care for himself. Collateral from daughter: pt unable to care for himself at home with ADLs, has not been taking his medications, drinks alcohol and smokes marijuana. Today they took him for an MRI as ordered by outpt Neurology to differentiate if pt's intermittent memory loss is due to dementia vs due to alcohol use- unable to perform as pt could not stay still for imaginf. On the way home pt was trying to "escape" out of the care therefore they brought him to the ED for further evaluation/support. Daughter also reports pt had a fall but unable to provide details. Pt had a beer today but otherwise she is unsure when he last drank but he does drink heavily. He has not been taking his medications for some time now as well. Pt lives alone.  Pt himself denies  headache, dizziness, chest pain, palpitations, SOB, abdominal pain, joint pain, diarrhea/constipation, urinary symptoms.   Vitals: T 98.5, HR 89, /90, RR 18 satting 98% RA (08 Sep 2024 20:43)      Hospital Course:    Patient was brought in by family for concern for inability to take care of himself. Per daughter home is in disarray, she states that there is dog feces all over the bathroom and kitchen is dilapidated.  Patient with poor hygiene. Patient initially on CIWA due to hx of ETOH use disorder, CIWA 0 for 48 hrs so removed. Patient with notable dementia, psych was consulted and performed MOCA 15/30 showing moderate cognitive impairment.  CTH was completed showing both chronic lacunar infarct and age indeterminate infarct in thalamus. MRI was ordered, results showed ____________.   In addition patient home under foreclosure with bank as patient has not paid mortgage for over 1 year. Patient not a safe d/c to home, will need d/c to nursing home. HPI:  73 yr old male with a pmh HFrEF (EF 30% in 12/2023), mod MR, HTN, HLD, NICM, HCV (s/p Harvoni treatment), chronic hyponatremia, ETOH use disorder who presents after being brought in by family for not being able to care for himself. Collateral from daughter: pt unable to care for himself at home with ADLs, has not been taking his medications, drinks alcohol and smokes marijuana. Today they took him for an MRI as ordered by outpt Neurology to differentiate if pt's intermittent memory loss is due to dementia vs due to alcohol use- unable to perform as pt could not stay still for imaginf. On the way home pt was trying to "escape" out of the care therefore they brought him to the ED for further evaluation/support. Daughter also reports pt had a fall but unable to provide details. Pt had a beer today but otherwise she is unsure when he last drank but he does drink heavily. He has not been taking his medications for some time now as well. Pt lives alone.  Pt himself denies  headache, dizziness, chest pain, palpitations, SOB, abdominal pain, joint pain, diarrhea/constipation, urinary symptoms.   Vitals: T 98.5, HR 89, /90, RR 18 satting 98% RA (08 Sep 2024 20:43)      Hospital Course:    Patient was brought in by family for concern for inability to take care of himself. Per daughter home is in disarray, she states that there is dog feces all over the bathroom and kitchen is dilapidated.  Patient with poor hygiene. Patient initially on CIWA due to hx of ETOH use disorder, CIWA 0 for 48 hrs so removed. Patient with notable dementia, psych was consulted and performed MOCA 15/30 showing moderate cognitive impairment.  CTH was completed showing both chronic lacunar infarct and age indeterminate infarct in thalamus. MRI showed chronic lacunar infarct, and c/f NPH. evaluated by neurology, low suspicion for NPH. Patient likely had Alzheimer's dementia with component of alcohol toxicity.   In addition patient home under foreclosure with "[employer] as patient has not paid mortgage for over 1 year. Patient not a safe d/c to home, will need d/c to nursing home. HPI:  73 yr old male with a pmh HFrEF (EF 30% in 12/2023), mod MR, HTN, HLD, NICM, HCV (s/p Harvoni treatment), chronic hyponatremia, ETOH use disorder who presents after being brought in by family for not being able to care for himself. Collateral from daughter: pt unable to care for himself at home with ADLs, has not been taking his medications, drinks alcohol and smokes marijuana. Today they took him for an MRI as ordered by outpt Neurology to differentiate if pt's intermittent memory loss is due to dementia vs due to alcohol use- unable to perform as pt could not stay still for imaginf. On the way home pt was trying to "escape" out of the care therefore they brought him to the ED for further evaluation/support. Daughter also reports pt had a fall but unable to provide details. Pt had a beer today but otherwise she is unsure when he last drank but he does drink heavily. He has not been taking his medications for some time now as well. Pt lives alone.  Pt himself denies  headache, dizziness, chest pain, palpitations, SOB, abdominal pain, joint pain, diarrhea/constipation, urinary symptoms.   Vitals: T 98.5, HR 89, /90, RR 18 satting 98% RA (08 Sep 2024 20:43)      Hospital Course:    Patient was brought in by family for concern for inability to take care of himself. Per daughter home is in disarray, she states that there is dog feces all over the bathroom and kitchen is dilapidated.  Patient with poor hygiene. Patient initially on CIWA due to hx of ETOH use disorder, CIWA 0 for 48 hrs so removed. Patient with notable dementia, psych was consulted and performed MOCA 15/30 showing moderate cognitive impairment.  CTH was completed showing both chronic lacunar infarct and age indeterminate infarct in thalamus. MRI showed chronic lacunar infarct, and c/f NPH. evaluated by neurology, low suspicion for NPH. Patient likely had Alzheimer's dementia with component of alcohol induced dementia. recommended outpatient follow up with neurocognitive specialist.   In addition patient home under foreclosure with bank as patient has not paid mortgage for over 1 year. Patient not a safe d/c to home, will need d/c to nursing home.     Patient is now medically optimized for d/c to [facility].

## 2024-09-13 PROCEDURE — 99232 SBSQ HOSP IP/OBS MODERATE 35: CPT

## 2024-09-13 RX ADMIN — Medication 40 MILLIGRAM(S): at 21:19

## 2024-09-13 RX ADMIN — Medication 3 MILLIGRAM(S): at 21:23

## 2024-09-13 RX ADMIN — Medication 1 MILLIGRAM(S): at 11:25

## 2024-09-13 RX ADMIN — SPIRONOLACTONE 25 MILLIGRAM(S): 25 TABLET, FILM COATED ORAL at 05:14

## 2024-09-13 RX ADMIN — SACUBITRIL AND VALSARTAN 1 TABLET(S): 49; 51 TABLET, FILM COATED ORAL at 17:02

## 2024-09-13 RX ADMIN — Medication 81 MILLIGRAM(S): at 11:25

## 2024-09-13 RX ADMIN — CARVEDILOL 12.5 MILLIGRAM(S): 6.25 TABLET ORAL at 05:14

## 2024-09-13 RX ADMIN — Medication 25 MILLIGRAM(S): at 17:02

## 2024-09-13 RX ADMIN — Medication 1 TABLET(S): at 11:25

## 2024-09-13 RX ADMIN — ENOXAPARIN SODIUM 40 MILLIGRAM(S): 100 INJECTION SUBCUTANEOUS at 11:25

## 2024-09-13 RX ADMIN — CARVEDILOL 12.5 MILLIGRAM(S): 6.25 TABLET ORAL at 17:02

## 2024-09-13 RX ADMIN — SACUBITRIL AND VALSARTAN 1 TABLET(S): 49; 51 TABLET, FILM COATED ORAL at 05:13

## 2024-09-13 NOTE — PROGRESS NOTE ADULT - PROBLEM SELECTOR PLAN 10
DVT ppx: Lovenox   Diet: DASH Diet    Dispo: patient home in foreclosure as patient not paying his mortgage, daughter Ciera currently trying to get more information.  Given unsafe discharge given foreclosure, will need to go to NH vs LTCF.      Spoke with daughter Ciera Cortes on 9/12, answered all questions;   She states that patient has had worsening dementia for over 1-1.5 years. Having forgetful moments and more agitated over same time period.
DVT ppx: Lovenox   Diet: DASH Diet    Dispo: patient home in foreclosure as patient not paying his mortgage, daughter Ciera currently trying to get more information.  Given unsafe discharge given foreclosure, will need to go to NH vs LTCF.      Spoke with daughter Ciera Cortes on 9/10, answered all questions; She states that patient has had worsening dementia for over 1-1.5 years. Having forgetful moments and more agitated over same time period.
DVT ppx: Lovenox   Diet: DASH diet   Dispo:
DVT ppx: Lovenox   Diet: DASH Diet    Dispo: patient home in foreclosure as patient not paying his mortgage, daughter Ciera currently trying to get more information.  Given unsafe discharge given foreclosure, will need to go to NH vs LTCF.      Spoke with daughter Ciera Cortse on 9/12, answered all questions;   She states that patient has had worsening dementia for over 1-1.5 years. Having forgetful moments and more agitated over same time period.
DVT ppx: Lovenox   Diet: DASH Diet    Dispo: patient home in [facility] as patient not paying his mortgage, daughter Ciera currently trying to get more information.     Spoke with daughter Ciera Cortes on 9/10, answered all questions; She states that patient has had worsening dementia for over 1-1.5 years. Having forgetful moments and more agitated over same time period.     Saw Neurologist was supposed to have MR on Saturday 9/7 ,couldn't tolerate as patient was agitated. Daughter states that he feels more disorientated than normal.

## 2024-09-13 NOTE — PROGRESS NOTE ADULT - SUBJECTIVE AND OBJECTIVE BOX
PROGRESS NOTE:   Authored by Josefina Waggoner Ma, MD  Available on MS Teams; Pager [phone]    Patient is a 74y old  Male who presents with a chief complaint of dementia ,hyponatremia, abnormal ekg (12 Sep 2024 13:42)      SUBJECTIVE / OVERNIGHT EVENTS: Pt reports feeling anxious this AM, otherwise no other acute complaints.    All other review of systems is negative unless indicated above.    MEDICATIONS  (STANDING):  aspirin enteric coated 81 milliGRAM(s) Oral daily  atorvastatin 40 milliGRAM(s) Oral at bedtime  carvedilol 12.5 milliGRAM(s) Oral every 12 hours  enoxaparin Injectable 40 milliGRAM(s) SubCutaneous every 24 hours  folic acid 1 milliGRAM(s) Oral daily  LORazepam     Tablet 2 milliGRAM(s) Oral once  multivitamin 1 Tablet(s) Oral daily  sacubitril 97 mG/valsartan 103 mG 1 Tablet(s) Oral two times a day  spironolactone 25 milliGRAM(s) Oral daily    MEDICATIONS  (PRN):  acetaminophen     Tablet .. 650 milliGRAM(s) Oral every 6 hours PRN Temp greater or equal to 38C (100.4F), Mild Pain (1 - 3)  aluminum hydroxide/magnesium hydroxide/simethicone Suspension 30 milliLiter(s) Oral every 4 hours PRN Dyspepsia  hydrOXYzine hydrochloride 25 milliGRAM(s) Oral two times a day PRN Anxiety  melatonin 3 milliGRAM(s) Oral at bedtime PRN Insomnia  OLANZapine 1.25 milliGRAM(s) Oral every 6 hours PRN agitation  ondansetron Injectable 4 milliGRAM(s) IV Push every 8 hours PRN Nausea and/or Vomiting      CAPILLARY BLOOD GLUCOSE        I&O's Summary      PHYSICAL EXAM:  Vital Signs Last 24 Hrs  T(C): 37.1 (13 Sep 2024 11:04), Max: 37.1 (13 Sep 2024 11:04)  T(F): 98.8 (13 Sep 2024 11:04), Max: 98.8 (13 Sep 2024 11:04)  HR: 67 (13 Sep 2024 11:04) (67 - 83)  BP: 115/75 (13 Sep 2024 11:04) (115/75 - 175/89)  BP(mean): --  RR: 18 (13 Sep 2024 11:04) (18 - 18)  SpO2: 100% (13 Sep 2024 11:04) (100% - 100%)    Parameters below as of 13 Sep 2024 11:04  Patient On (Oxygen Delivery Method): room air        GENERAL: No acute distress  HEAD:  Normocephalic, Atraumatic  EYES: conjunctiva and sclera clear  NECK: Supple, no JVD  CHEST/LUNG: CTAB, No wheezes, rales, or rhonchi  HEART: Regular rate and rhythm; No murmurs, rubs, or gallops  ABDOMEN: Soft, non-tender, non-distended  EXTREMITIES:  2+ peripheral pulses b/l, No clubbing, cyanosis, or edema  NEUROLOGY: A&O x 3, no focal deficits  SKIN: No rashes or lesions    No new labs or imaging.

## 2024-09-14 PROCEDURE — 99232 SBSQ HOSP IP/OBS MODERATE 35: CPT

## 2024-09-14 RX ADMIN — Medication 25 MILLIGRAM(S): at 12:14

## 2024-09-14 RX ADMIN — SPIRONOLACTONE 25 MILLIGRAM(S): 25 TABLET, FILM COATED ORAL at 05:43

## 2024-09-14 RX ADMIN — Medication 1 TABLET(S): at 12:15

## 2024-09-14 RX ADMIN — Medication 25 MILLIGRAM(S): at 17:30

## 2024-09-14 RX ADMIN — CARVEDILOL 12.5 MILLIGRAM(S): 6.25 TABLET ORAL at 17:29

## 2024-09-14 RX ADMIN — Medication 3 MILLIGRAM(S): at 21:21

## 2024-09-14 RX ADMIN — ENOXAPARIN SODIUM 40 MILLIGRAM(S): 100 INJECTION SUBCUTANEOUS at 12:15

## 2024-09-14 RX ADMIN — Medication 81 MILLIGRAM(S): at 12:14

## 2024-09-14 RX ADMIN — Medication 1 MILLIGRAM(S): at 12:14

## 2024-09-14 RX ADMIN — Medication 40 MILLIGRAM(S): at 21:21

## 2024-09-14 RX ADMIN — SACUBITRIL AND VALSARTAN 1 TABLET(S): 49; 51 TABLET, FILM COATED ORAL at 17:29

## 2024-09-14 RX ADMIN — SACUBITRIL AND VALSARTAN 1 TABLET(S): 49; 51 TABLET, FILM COATED ORAL at 05:43

## 2024-09-14 RX ADMIN — CARVEDILOL 12.5 MILLIGRAM(S): 6.25 TABLET ORAL at 05:43

## 2024-09-14 NOTE — PROGRESS NOTE ADULT - SUBJECTIVE AND OBJECTIVE BOX
[facility] Division of Hospital Medicine  Aida Whitten MD  Available via MS Teams      SUBJECTIVE / OVERNIGHT EVENTS:  reports he gets claustrophobic with MRI    ADDITIONAL REVIEW OF SYSTEMS:    MEDICATIONS  (STANDING):  aspirin enteric coated 81 milliGRAM(s) Oral daily  atorvastatin 40 milliGRAM(s) Oral at bedtime  carvedilol 12.5 milliGRAM(s) Oral every 12 hours  enoxaparin Injectable 40 milliGRAM(s) SubCutaneous every 24 hours  folic acid 1 milliGRAM(s) Oral daily  LORazepam     Tablet 2 milliGRAM(s) Oral once  multivitamin 1 Tablet(s) Oral daily  sacubitril 97 mG/valsartan 103 mG 1 Tablet(s) Oral two times a day  spironolactone 25 milliGRAM(s) Oral daily    MEDICATIONS  (PRN):  acetaminophen     Tablet .. 650 milliGRAM(s) Oral every 6 hours PRN Temp greater or equal to 38C (100.4F), Mild Pain (1 - 3)  aluminum hydroxide/magnesium hydroxide/simethicone Suspension 30 milliLiter(s) Oral every 4 hours PRN Dyspepsia  hydrOXYzine hydrochloride 25 milliGRAM(s) Oral two times a day PRN Anxiety  melatonin 3 milliGRAM(s) Oral at bedtime PRN Insomnia  OLANZapine 1.25 milliGRAM(s) Oral every 6 hours PRN agitation  ondansetron Injectable 4 milliGRAM(s) IV Push every 8 hours PRN Nausea and/or Vomiting      I&O's Summary    13 Sep 2024 07:01  -  14 Sep 2024 07:00  --------------------------------------------------------  IN: 900 mL / OUT: 300 mL / NET: 600 mL        PHYSICAL EXAM:  Vital Signs Last 24 Hrs  T(C): 36.7 (14 Sep 2024 11:17), Max: 36.7 (14 Sep 2024 05:10)  T(F): 98.1 (14 Sep 2024 11:17), Max: 98.1 (14 Sep 2024 11:17)  HR: 73 (14 Sep 2024 11:17) (66 - 76)  BP: 141/69 (14 Sep 2024 11:17) (141/69 - 167/91)  BP(mean): --  RR: 20 (14 Sep 2024 11:17) (17 - 20)  SpO2: 100% (14 Sep 2024 11:17) (100% - 100%)    Parameters below as of 14 Sep 2024 11:17  Patient On (Oxygen Delivery Method): room air      CONSTITUTIONAL: NAD, well-groomed  EYES: PERRLA; conjunctiva and sclera clear  NECK: Supple, no palpable masses; no thyromegaly  RESPIRATORY: Normal respiratory effort; lungs are clear to auscultation bilaterally  CARDIOVASCULAR: Regular rate and rhythm, normal S1 and S2, no murmur/rub/gallop; No lower extremity edema; Peripheral pulses are 2+ bilaterally  ABDOMEN: Nontender to palpation, normoactive bowel sounds, no rebound/guarding; No hepatosplenomegaly  MUSCULOSKELETAL:  Normal gait; no clubbing or cyanosis of digits; no joint swelling or tenderness to palpation  PSYCH: A+O to person, place, and time; affect appropriate  NEUROLOGY: CN 2-12 are intact and symmetric; no gross sensory deficits   SKIN: No rashes; no palpable lesions    LABS:                        RADIOLOGY & ADDITIONAL TESTS:  New Imaging Personally Reviewed Today:  New Electrocardiogram Personally Reviewed Today:  Other Results Reviewed Today:   Prior or Outpatient Records Reviewed Today with Summary:    COORDINATION OF CARE:  Consultant Communication and Details of Discussion (where applicable):

## 2024-09-15 LAB
ALBUMIN SERPL ELPH-MCNC: 4.2 G/DL — SIGNIFICANT CHANGE UP (ref 3.3–5)
ALP SERPL-CCNC: 120 U/L — SIGNIFICANT CHANGE UP (ref 40–120)
ALT FLD-CCNC: 19 U/L — SIGNIFICANT CHANGE UP (ref 4–41)
ANION GAP SERPL CALC-SCNC: 11 MMOL/L — SIGNIFICANT CHANGE UP (ref 7–14)
AST SERPL-CCNC: 20 U/L — SIGNIFICANT CHANGE UP (ref 4–40)
BILIRUB SERPL-MCNC: 0.2 MG/DL — SIGNIFICANT CHANGE UP (ref 0.2–1.2)
BUN SERPL-MCNC: 27 MG/DL — HIGH (ref 7–23)
CALCIUM SERPL-MCNC: 9.2 MG/DL — SIGNIFICANT CHANGE UP (ref 8.4–10.5)
CHLORIDE SERPL-SCNC: 95 MMOL/L — LOW (ref 98–107)
CO2 SERPL-SCNC: 22 MMOL/L — SIGNIFICANT CHANGE UP (ref 22–31)
CREAT SERPL-MCNC: 1.08 MG/DL — SIGNIFICANT CHANGE UP (ref 0.5–1.3)
EGFR: 72 ML/MIN/1.73M2 — SIGNIFICANT CHANGE UP
GLUCOSE SERPL-MCNC: 106 MG/DL — HIGH (ref 70–99)
POTASSIUM SERPL-MCNC: 4.1 MMOL/L — SIGNIFICANT CHANGE UP (ref 3.5–5.3)
POTASSIUM SERPL-SCNC: 4.1 MMOL/L — SIGNIFICANT CHANGE UP (ref 3.5–5.3)
PROT SERPL-MCNC: 7.2 G/DL — SIGNIFICANT CHANGE UP (ref 6–8.3)
SODIUM SERPL-SCNC: 128 MMOL/L — LOW (ref 135–145)

## 2024-09-15 PROCEDURE — 99232 SBSQ HOSP IP/OBS MODERATE 35: CPT

## 2024-09-15 RX ADMIN — Medication 81 MILLIGRAM(S): at 12:54

## 2024-09-15 RX ADMIN — CARVEDILOL 12.5 MILLIGRAM(S): 6.25 TABLET ORAL at 05:31

## 2024-09-15 RX ADMIN — ENOXAPARIN SODIUM 40 MILLIGRAM(S): 100 INJECTION SUBCUTANEOUS at 12:56

## 2024-09-15 RX ADMIN — Medication 1 TABLET(S): at 12:54

## 2024-09-15 RX ADMIN — SACUBITRIL AND VALSARTAN 1 TABLET(S): 49; 51 TABLET, FILM COATED ORAL at 17:38

## 2024-09-15 RX ADMIN — Medication 40 MILLIGRAM(S): at 21:44

## 2024-09-15 RX ADMIN — Medication 3 MILLIGRAM(S): at 21:43

## 2024-09-15 RX ADMIN — Medication 1 MILLIGRAM(S): at 12:54

## 2024-09-15 RX ADMIN — CARVEDILOL 12.5 MILLIGRAM(S): 6.25 TABLET ORAL at 17:38

## 2024-09-15 RX ADMIN — SPIRONOLACTONE 25 MILLIGRAM(S): 25 TABLET, FILM COATED ORAL at 05:31

## 2024-09-15 RX ADMIN — SACUBITRIL AND VALSARTAN 1 TABLET(S): 49; 51 TABLET, FILM COATED ORAL at 05:31

## 2024-09-15 RX ADMIN — Medication 25 MILLIGRAM(S): at 17:40

## 2024-09-15 NOTE — PROGRESS NOTE ADULT - SUBJECTIVE AND OBJECTIVE BOX
[facility] Division of Hospital Medicine  Aida Whitten MD  Available via MS Teams      SUBJECTIVE / OVERNIGHT EVENTS:  patient did not get MRI    ADDITIONAL REVIEW OF SYSTEMS:    MEDICATIONS  (STANDING):  aspirin enteric coated 81 milliGRAM(s) Oral daily  atorvastatin 40 milliGRAM(s) Oral at bedtime  carvedilol 12.5 milliGRAM(s) Oral every 12 hours  enoxaparin Injectable 40 milliGRAM(s) SubCutaneous every 24 hours  folic acid 1 milliGRAM(s) Oral daily  LORazepam     Tablet 2 milliGRAM(s) Oral once  multivitamin 1 Tablet(s) Oral daily  sacubitril 97 mG/valsartan 103 mG 1 Tablet(s) Oral two times a day  spironolactone 25 milliGRAM(s) Oral daily    MEDICATIONS  (PRN):  acetaminophen     Tablet .. 650 milliGRAM(s) Oral every 6 hours PRN Temp greater or equal to 38C (100.4F), Mild Pain (1 - 3)  aluminum hydroxide/magnesium hydroxide/simethicone Suspension 30 milliLiter(s) Oral every 4 hours PRN Dyspepsia  hydrOXYzine hydrochloride 25 milliGRAM(s) Oral two times a day PRN Anxiety  melatonin 3 milliGRAM(s) Oral at bedtime PRN Insomnia  OLANZapine 1.25 milliGRAM(s) Oral every 6 hours PRN agitation  ondansetron Injectable 4 milliGRAM(s) IV Push every 8 hours PRN Nausea and/or Vomiting      I&O's Summary      PHYSICAL EXAM:  Vital Signs Last 24 Hrs  T(C): 36.7 (15 Sep 2024 11:03), Max: 36.7 (15 Sep 2024 05:15)  T(F): 98 (15 Sep 2024 11:03), Max: 98.1 (15 Sep 2024 05:15)  HR: 66 (15 Sep 2024 11:03) (64 - 80)  BP: 151/84 (15 Sep 2024 11:03) (151/84 - 165/78)  BP(mean): --  RR: 18 (15 Sep 2024 11:03) (18 - 19)  SpO2: 100% (15 Sep 2024 11:03) (100% - 100%)    Parameters below as of 15 Sep 2024 11:03  Patient On (Oxygen Delivery Method): room air      CONSTITUTIONAL: NAD, well-groomed  EYES: PERRLA; conjunctiva and sclera clear  NECK: Supple, no palpable masses; no thyromegaly  RESPIRATORY: Normal respiratory effort; lungs are clear to auscultation bilaterally  CARDIOVASCULAR: Regular rate and rhythm, normal S1 and S2, no murmur/rub/gallop; No lower extremity edema; Peripheral pulses are 2+ bilaterally  ABDOMEN: Nontender to palpation, normoactive bowel sounds, no rebound/guarding; No hepatosplenomegaly  MUSCULOSKELETAL:  Normal gait; no clubbing or cyanosis of digits; no joint swelling or tenderness to palpation  PSYCH: A+O to person, place, and time; affect appropriate  NEUROLOGY: CN 2-12 are intact and symmetric; no gross sensory deficits   SKIN: No rashes; no palpable lesions    LABS:                        RADIOLOGY & ADDITIONAL TESTS:  New Imaging Personally Reviewed Today:  New Electrocardiogram Personally Reviewed Today:  Other Results Reviewed Today:   Prior or Outpatient Records Reviewed Today with Summary:    COORDINATION OF CARE:  Consultant Communication and Details of Discussion (where applicable):

## 2024-09-16 LAB
ANION GAP SERPL CALC-SCNC: 11 MMOL/L — SIGNIFICANT CHANGE UP (ref 7–14)
BUN SERPL-MCNC: 24 MG/DL — HIGH (ref 7–23)
CALCIUM SERPL-MCNC: 9.3 MG/DL — SIGNIFICANT CHANGE UP (ref 8.4–10.5)
CHLORIDE SERPL-SCNC: 94 MMOL/L — LOW (ref 98–107)
CO2 SERPL-SCNC: 23 MMOL/L — SIGNIFICANT CHANGE UP (ref 22–31)
CREAT SERPL-MCNC: 0.93 MG/DL — SIGNIFICANT CHANGE UP (ref 0.5–1.3)
EGFR: 86 ML/MIN/1.73M2 — SIGNIFICANT CHANGE UP
GLUCOSE SERPL-MCNC: 101 MG/DL — HIGH (ref 70–99)
MAGNESIUM SERPL-MCNC: 1.5 MG/DL — LOW (ref 1.6–2.6)
POTASSIUM SERPL-MCNC: 4.4 MMOL/L — SIGNIFICANT CHANGE UP (ref 3.5–5.3)
POTASSIUM SERPL-SCNC: 4.4 MMOL/L — SIGNIFICANT CHANGE UP (ref 3.5–5.3)
SODIUM SERPL-SCNC: 128 MMOL/L — LOW (ref 135–145)
VIT B1 SERPL-MCNC: 145 NMOL/L — SIGNIFICANT CHANGE UP (ref 66.5–200)

## 2024-09-16 PROCEDURE — 70551 MRI BRAIN STEM W/O DYE: CPT | Mod: 26

## 2024-09-16 PROCEDURE — 99232 SBSQ HOSP IP/OBS MODERATE 35: CPT

## 2024-09-16 RX ORDER — LORAZEPAM 4 MG/ML
2 INJECTION INTRAMUSCULAR; INTRAVENOUS ONCE
Refills: 0 | Status: DISCONTINUED | OUTPATIENT
Start: 2024-09-16 | End: 2024-09-18

## 2024-09-16 RX ADMIN — SPIRONOLACTONE 25 MILLIGRAM(S): 25 TABLET, FILM COATED ORAL at 06:21

## 2024-09-16 RX ADMIN — Medication 25 MILLIGRAM(S): at 17:38

## 2024-09-16 RX ADMIN — SACUBITRIL AND VALSARTAN 1 TABLET(S): 49; 51 TABLET, FILM COATED ORAL at 06:21

## 2024-09-16 RX ADMIN — Medication 1 MILLIGRAM(S): at 12:27

## 2024-09-16 RX ADMIN — ENOXAPARIN SODIUM 40 MILLIGRAM(S): 100 INJECTION SUBCUTANEOUS at 12:27

## 2024-09-16 RX ADMIN — Medication 40 MILLIGRAM(S): at 20:35

## 2024-09-16 RX ADMIN — SACUBITRIL AND VALSARTAN 1 TABLET(S): 49; 51 TABLET, FILM COATED ORAL at 19:19

## 2024-09-16 RX ADMIN — CARVEDILOL 12.5 MILLIGRAM(S): 6.25 TABLET ORAL at 06:22

## 2024-09-16 RX ADMIN — CARVEDILOL 12.5 MILLIGRAM(S): 6.25 TABLET ORAL at 19:19

## 2024-09-16 RX ADMIN — Medication 81 MILLIGRAM(S): at 12:27

## 2024-09-16 RX ADMIN — Medication 1 TABLET(S): at 12:27

## 2024-09-16 RX ADMIN — Medication 3 MILLIGRAM(S): at 22:09

## 2024-09-16 NOTE — PROGRESS NOTE ADULT - ASSESSMENT
73 y/o M with pmhx HFrEF (EF 30% in 12/2023), mod MR, HTN,  HLD,  NICM,  HCV (s/p Harvoni treatment), chronic hyponatremia,  ETOH use disorder. BIBF for concern of disorganization and unable to care for himself at home. Patient was found to have dementia and unable to care for himself. now seeking NH placement.

## 2024-09-16 NOTE — PROGRESS NOTE ADULT - SUBJECTIVE AND OBJECTIVE BOX
[facility] Division of Hospital Medicine  Canelo Rae MD  In House Pager [phone]    Patient is a 74y old  Male who presents with a chief complaint of dementia ,hyponatremia, abnormal ekg (15 Sep 2024 15:18)    OVERNIGHT EVENTS: no acute events.   SUBJECTIVE: no new subjective symptoms.   ROS: Denied Fever, Chill, CP, SOB, Abd pain, N/V/D, LE swelling or pain.     MEDICATIONS  (STANDING):  aspirin enteric coated 81 milliGRAM(s) Oral daily  atorvastatin 40 milliGRAM(s) Oral at bedtime  carvedilol 12.5 milliGRAM(s) Oral every 12 hours  enoxaparin Injectable 40 milliGRAM(s) SubCutaneous every 24 hours  folic acid 1 milliGRAM(s) Oral daily  LORazepam     Tablet 2 milliGRAM(s) Oral once  multivitamin 1 Tablet(s) Oral daily  sacubitril 97 mG/valsartan 103 mG 1 Tablet(s) Oral two times a day  spironolactone 25 milliGRAM(s) Oral daily    MEDICATIONS  (PRN):  acetaminophen     Tablet .. 650 milliGRAM(s) Oral every 6 hours PRN Temp greater or equal to 38C (100.4F), Mild Pain (1 - 3)  aluminum hydroxide/magnesium hydroxide/simethicone Suspension 30 milliLiter(s) Oral every 4 hours PRN Dyspepsia  hydrOXYzine hydrochloride 25 milliGRAM(s) Oral two times a day PRN Anxiety  melatonin 3 milliGRAM(s) Oral at bedtime PRN Insomnia  OLANZapine 1.25 milliGRAM(s) Oral every 6 hours PRN agitation  ondansetron Injectable 4 milliGRAM(s) IV Push every 8 hours PRN Nausea and/or Vomiting    CAPILLARY BLOOD GLUCOSE        I&O's Summary      Vital Signs Last 24 Hrs  T(C): 36.7 (16 Sep 2024 11:30), Max: 37.1 (15 Sep 2024 19:52)  T(F): 98 (16 Sep 2024 11:30), Max: 98.8 (15 Sep 2024 19:52)  HR: 73 (16 Sep 2024 11:30) (66 - 73)  BP: 136/87 (16 Sep 2024 11:30) (136/87 - 149/99)  BP(mean): --  RR: 17 (16 Sep 2024 11:30) (17 - 18)  SpO2: 100% (16 Sep 2024 11:30) (98% - 100%)    Parameters below as of 16 Sep 2024 11:30  Patient On (Oxygen Delivery Method): room air        LABS:    09-15    128<L>  |  95<L>  |  27<H>  ----------------------------<  106<H>  4.1   |  22  |  1.08    Ca    9.2      15 Sep 2024 17:40    TPro  7.2  /  Alb  4.2  /  TBili  0.2  /  DBili  x   /  AST  20  /  ALT  19  /  AlkPhos  120  09-15          Urinalysis Basic - ( 15 Sep 2024 17:40 )    Color: x / Appearance: x / SG: x / pH: x  Gluc: 106 mg/dL / Ketone: x  / Bili: x / Urobili: x   Blood: x / Protein: x / Nitrite: x   Leuk Esterase: x / RBC: x / WBC x   Sq Epi: x / Non Sq Epi: x / Bacteria: x        RADIOLOGY & ADDITIONAL TESTS:  Results Reviewed: Y  Imaging Personally Reviewed: Y  Electrocardiogram Personally Reviewed: Y    COORDINATION OF CARE:  Care Discussed with Consultants/Other Providers [Y/N]: Y  Prior or Outpatient Records Reviewed [Y/N]: AMY

## 2024-09-17 PROCEDURE — 99233 SBSQ HOSP IP/OBS HIGH 50: CPT

## 2024-09-17 PROCEDURE — 99222 1ST HOSP IP/OBS MODERATE 55: CPT

## 2024-09-17 RX ADMIN — CARVEDILOL 12.5 MILLIGRAM(S): 6.25 TABLET ORAL at 06:10

## 2024-09-17 RX ADMIN — SPIRONOLACTONE 25 MILLIGRAM(S): 25 TABLET, FILM COATED ORAL at 06:10

## 2024-09-17 RX ADMIN — Medication 1 MILLIGRAM(S): at 12:03

## 2024-09-17 RX ADMIN — CARVEDILOL 12.5 MILLIGRAM(S): 6.25 TABLET ORAL at 18:14

## 2024-09-17 RX ADMIN — Medication 40 MILLIGRAM(S): at 21:13

## 2024-09-17 RX ADMIN — SACUBITRIL AND VALSARTAN 1 TABLET(S): 49; 51 TABLET, FILM COATED ORAL at 06:10

## 2024-09-17 RX ADMIN — Medication 1 TABLET(S): at 12:03

## 2024-09-17 RX ADMIN — ENOXAPARIN SODIUM 40 MILLIGRAM(S): 100 INJECTION SUBCUTANEOUS at 12:06

## 2024-09-17 RX ADMIN — Medication 3 MILLIGRAM(S): at 18:18

## 2024-09-17 RX ADMIN — Medication 81 MILLIGRAM(S): at 12:03

## 2024-09-17 RX ADMIN — Medication 25 MILLIGRAM(S): at 15:22

## 2024-09-17 RX ADMIN — Medication 25 GRAM(S): at 18:13

## 2024-09-17 RX ADMIN — SACUBITRIL AND VALSARTAN 1 TABLET(S): 49; 51 TABLET, FILM COATED ORAL at 18:13

## 2024-09-17 NOTE — CONSULT NOTE ADULT - ATTENDING COMMENTS
Gait: Flex posture related to musculoskeletal abnormalities and not parkinsonism.    A/P  Mr. Cortes is a 75 yo man with dementia - slowly progressive over at least one year with prominent memory impairment, anosognosia.  The differential diagnosis includes: Alzheimer's disease, alcohol related dementia. Gait: Flex posture related to musculoskeletal abnormalities and not parkinsonism.    Thyroid Stimulating Hormone w/FT4 Reflex: 1.83 uIU/mL (09.09.24 @ 06:44)   Vitamin B12, Serum: 437 pg/mL (09.09.24 @ 06:44)   Vitamin B1, Serum: 145.0  MRI brain reviewed.     A/P  Mr. Cortes is a 73 yo man with dementia - slowly progressive over at least one year with prominent memory impairment, anosognosia.  The differential diagnosis includes: Alzheimer's disease, alcohol related dementia.  His gait is not consist Musculoskeletal deformities: multiple joints in hands, knees, spine  Gait: Flex posture related to musculoskeletal deformities and not parkinsonism.    Thyroid Stimulating Hormone w/FT4 Reflex: 1.83 uIU/mL (09.09.24 @ 06:44)   Vitamin B12, Serum: 437 pg/mL (09.09.24 @ 06:44)   Vitamin B1, Serum: 145.0  MRI brain reviewed.     A/P  Mr. Cortes is a 75 yo man with dementia - slowly progressive over at least one year with prominent memory impairment, anosognosia.  The differential diagnosis includes: Alzheimer's disease, alcohol related dementia or a combination.   His gait is not consist with a diagnosis of normal pressure hydrocephalus.  He has had progressively worsening gait and cognitive function during a time when there was no change in the ventricular size on brain imaging studies - now compared to 10/2022.  I agree with work up and management as above.   Neurology signing off. Please reconsult PRN or call [facility] [phone] with any questions.   Thank you

## 2024-09-17 NOTE — PROGRESS NOTE ADULT - SUBJECTIVE AND OBJECTIVE BOX
[facility] Division of Hospital Medicine  Canelo Rae MD  In House Pager [phone]    Patient is a 74y old  Male who presents with a chief complaint of dementia ,hyponatremia, abnormal ekg (16 Sep 2024 12:48)    OVERNIGHT EVENTS: no acute events. MRI was performed.   SUBJECTIVE: no new subjective symptoms. feels well.   ROS: Denied Fever, Chill, CP, SOB, Abd pain, N/V/D, LE swelling or pain.     MEDICATIONS  (STANDING):  aspirin enteric coated 81 milliGRAM(s) Oral daily  atorvastatin 40 milliGRAM(s) Oral at bedtime  carvedilol 12.5 milliGRAM(s) Oral every 12 hours  enoxaparin Injectable 40 milliGRAM(s) SubCutaneous every 24 hours  folic acid 1 milliGRAM(s) Oral daily  LORazepam     Tablet 2 milliGRAM(s) Oral once  multivitamin 1 Tablet(s) Oral daily  sacubitril 97 mG/valsartan 103 mG 1 Tablet(s) Oral two times a day  spironolactone 25 milliGRAM(s) Oral daily    MEDICATIONS  (PRN):  acetaminophen     Tablet .. 650 milliGRAM(s) Oral every 6 hours PRN Temp greater or equal to 38C (100.4F), Mild Pain (1 - 3)  aluminum hydroxide/magnesium hydroxide/simethicone Suspension 30 milliLiter(s) Oral every 4 hours PRN Dyspepsia  hydrOXYzine hydrochloride 25 milliGRAM(s) Oral two times a day PRN Anxiety  melatonin 3 milliGRAM(s) Oral at bedtime PRN Insomnia  OLANZapine 1.25 milliGRAM(s) Oral every 6 hours PRN agitation  ondansetron Injectable 4 milliGRAM(s) IV Push every 8 hours PRN Nausea and/or Vomiting    CAPILLARY BLOOD GLUCOSE        I&O's Summary    16 Sep 2024 07:01  -  17 Sep 2024 07:00  --------------------------------------------------------  IN: 550 mL / OUT: 420 mL / NET: 130 mL        Vital Signs Last 24 Hrs  T(C): 36.4 (17 Sep 2024 06:07), Max: 36.6 (16 Sep 2024 20:12)  T(F): 97.6 (17 Sep 2024 06:07), Max: 97.9 (16 Sep 2024 20:12)  HR: 68 (17 Sep 2024 06:07) (68 - 80)  BP: 149/63 (17 Sep 2024 08:20) (135/90 - 180/105)  BP(mean): --  RR: 17 (17 Sep 2024 06:07) (17 - 18)  SpO2: 100% (17 Sep 2024 06:07) (100% - 100%)    Parameters below as of 16 Sep 2024 20:12  Patient On (Oxygen Delivery Method): room air        LABS:    09-16    128[L]  |  94[L]  |  24[H]  ----------------------------<  101[H]  4.4   |  23  |  0.93    Ca    9.3      16 Sep 2024 16:40  Mg     1.50     09-16    TPro  7.2  /  Alb  4.2  /  TBili  0.2  /  DBili  x   /  AST  20  /  ALT  19  /  AlkPhos  120  09-15          Urinalysis Basic - ( 16 Sep 2024 16:40 )    Color: x / Appearance: x / SG: x / pH: x  Gluc: 101 mg/dL / Ketone: x  / Bili: x / Urobili: x   Blood: x / Protein: x / Nitrite: x   Leuk Esterase: x / RBC: x / WBC x   Sq Epi: x / Non Sq Epi: x / Bacteria: x        RADIOLOGY & ADDITIONAL TESTS:  Results Reviewed: Y  Imaging Personally Reviewed: Y  Electrocardiogram Personally Reviewed: Y    COORDINATION OF CARE:  Care Discussed with Consultants/Other Providers [Y/N]: Y  Prior or Outpatient Records Reviewed [Y/N]: Y
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
## 2024-09-17 NOTE — CONSULT NOTE ADULT - ASSESSMENT
74M PMHx HTN, HLD, CAD, HFrEF, EtOH use brought in by family due to decreased ability to perform iADLs and cognitive decline. Per daughter, patient has worsening memory for 1 year, several falls. Had an episode of agitation prior to admission due to inability to tolerate MRI. On exam, patient is alert and talkative, however demonstrates impaired short and long-term memory. Leans forward during ambulation but no significant gait abnormalities.   TSH and B12 are within normal limits. MRI brain performed in the hospital demonstrates no interval change since prior brain imaging which was from before symptom onset.    Impression: Memory decline and decreased ability to perform iADLs over 1 year, which may be due to Alzheimer dementia vs brain atrophy 2/2 chronic alcohol use. Normal pressure hydrocephalus is not suspected.    Recommendations:  [] No inpatient neurological workup  [] Will consider initiating donepezil however may be best done as outpatient  [] Patient can follow up with neurocognitive specialist Dr. Almaguer at [address] [place] [address] after discharge. Please instruct the patient to call [phone] to schedule this appointment.  [] Patient can follow up for neuropsychological testing with Dr. Travis Fofana at [address] [place] [address] 1-2 weeks after discharge. Please instruct the patient to call [phone] to schedule this appointment.     Seen and discussed with attending Dr. Pillai. Case and plan not finalized until attending attestation. 74M PMHx HTN, HLD, CAD, HFrEF, EtOH use brought in by family due to decreased ability to perform iADLs and cognitive decline. Per daughter, patient has worsening memory for 1 year, several falls. Had an episode of agitation prior to admission due to inability to tolerate MRI. On exam, patient is alert and talkative, however demonstrates impaired short and long-term memory. Leans forward during ambulation but no significant gait abnormalities.   TSH and B12 are within normal limits. MRI brain performed in the hospital demonstrates no interval change since prior brain imaging which was from before symptom onset.    Impression: Memory decline and decreased ability to perform iADLs over 1 year, which may be due to Alzheimer dementia vs 2/2 chronic alcohol use. Normal pressure hydrocephalus is not suspected.    Recommendations:  [] No inpatient neurological workup  [] Will consider initiating donepezil however may be best done as outpatient  [] Patient can follow up with neurocognitive specialist Dr. Almaguer at [address] [facility] after discharge. Please instruct the patient to call [phone] to schedule this appointment.  For a more timely appointment - Dr. Maya or Sean at [phone] or Dr. Horton or someone in her group [phone].   [] Patient can follow up for neuropsychological testing with Dr. Travis Fofana at [address] [facility] 1-2 weeks after discharge. Please instruct the patient to call [phone] to schedule this appointment.     Seen and discussed with attending Dr. Pillai. Case and plan not finalized until attending attestation. 74M PMHx HTN, HLD, CAD, HFrEF, EtOH use brought in by family due to decreased ability to perform iADLs and cognitive decline. Per daughter, patient has worsening memory for 1 year, several falls. Had an episode of agitation prior to admission due to inability to tolerate MRI. On exam, patient is alert and talkative, however demonstrates impaired short and long-term memory. Leans forward during ambulation but no significant gait abnormalities.   TSH and B12 are within normal limits. MRI brain performed in the hospital demonstrates no interval change since prior brain imaging which was from before symptom onset.    Impression: Memory decline and decreased ability to perform iADLs over 1 year, which may be due to Alzheimer dementia vs 2/2 chronic alcohol use. Normal pressure hydrocephalus is not suspected.    Recommendations:  [] No inpatient neurological workup  [] Would start donepezil 5 mg daily now.  Can increase to 10 mg in one month as outpatient.  We counseled him on abstinence from alcohol.   [] Patient can follow up with neurocognitive specialist Dr. Almaguer at [address] [facility] after discharge. Please instruct the patient to call [phone] to schedule this appointment.  For a more timely appointment - Dr. Maya or Sean at [phone] or Dr. Horton or someone in her group [phone].   [] Patient can follow up for neuropsychological testing with Dr. Travis Fofana at [address] [facility] 1-2 weeks after discharge. Please instruct the patient to call [phone] to schedule this appointment.     Seen and discussed with attending Dr. Pillai. Case and plan not finalized until attending attestation.

## 2024-09-17 NOTE — CHART NOTE - NSCHARTNOTEFT_GEN_A_CORE
NUTRITION FOLLOW UP NOTE    Pt seen for malnutrition.     SOURCE: [x] Patient [x] Medical record [] RN/PCA [] Family/Caregiver []Patient unavailable []Patient inappropriate (disoriented, nonverbal, intubated/sedated) [] Other:    Medical Course:  73 y/o M with pmhx HFrEF (EF 30% in 12/2023), mod MR, HTN,  HLD,  NICM,  HCV (s/p Harvoni treatment), chronic hyponatremia,  ETOH use disorder. BIBF for concern of disorganization and unable to care for himself at home. Patient was found to have dementia and unable to care for himself. now seeking NH placement.     Diet Prescription: Diet, Regular:   DASH/TLC {Sodium & Cholesterol Restricted} (DASH)  Easy to Chew (EASYTOCHEW)  1500mL Fluid Restriction ([id number])  Supplement Feeding Modality:  Oral  Ensure Plus High Protein Cans or Servings Per Day:  1       Frequency:  Two Times a day (09-12-24 @ 08:48)      Nutrition Course: Pt remains on 1.5 L fluid restriction, will hold off on sodium tabs in setting of known CHF. Continued on Easy to Chew diet with supplement Ensure Pus HP x2/day.  PO intake. Intake varies between 50-75% per RN flow sheets.   No recent episodes of nausea/vomiting/diarrhea/constipation. Last BM _9/16_ per RN flowsheets.        Pertinent Medications: MEDICATIONS  (STANDING):  aspirin enteric coated 81 milliGRAM(s) Oral daily  atorvastatin 40 milliGRAM(s) Oral at bedtime  carvedilol 12.5 milliGRAM(s) Oral every 12 hours  enoxaparin Injectable 40 milliGRAM(s) SubCutaneous every 24 hours  folic acid 1 milliGRAM(s) Oral daily  LORazepam     Tablet 2 milliGRAM(s) Oral once  multivitamin 1 Tablet(s) Oral daily  sacubitril 97 mG/valsartan 103 mG 1 Tablet(s) Oral two times a day  spironolactone 25 milliGRAM(s) Oral daily    MEDICATIONS  (PRN):  acetaminophen     Tablet .. 650 milliGRAM(s) Oral every 6 hours PRN Temp greater or equal to 38C (100.4F), Mild Pain (1 - 3)  aluminum hydroxide/magnesium hydroxide/simethicone Suspension 30 milliLiter(s) Oral every 4 hours PRN Dyspepsia  hydrOXYzine hydrochloride 25 milliGRAM(s) Oral two times a day PRN Anxiety  melatonin 3 milliGRAM(s) Oral at bedtime PRN Insomnia  OLANZapine 1.25 milliGRAM(s) Oral every 6 hours PRN agitation  ondansetron Injectable 4 milliGRAM(s) IV Push every 8 hours PRN Nausea and/or Vomiting      Pertinent Labs:   09-16 Na128 mmol/L[L] Glu 101 mg/dL[H] K+ 4.4 mmol/L Cr  0.93 mg/dL BUN 24 mg/dL[H] 09-11 Phos 3.5 mg/dL 09-15 Alb 4.2 g/dL 09-09 Chol 174 mg/dL LDL --    HDL 74 mg/dL Trig 97 mg/dL     A1C with Estimated Average Glucose Result: 5.0 % (09-09-24 @ 06:44)    Anthropometrics:   Weight Assessment:149.9# (9/8/24) No new wts since admission.    Edema: none  Pressure Injury: Intact per nursing flow sheet    Estimated Needs:   [x ] No change since previous assessment, based on dosing weight  lbs / kg    Estimated energy needs : 2066-1831 kcal daily @ 28-32 kcal/kg DBW  Estimated protein needs: 51-61 gm protein daily @ 1.2-1.5 gm/kg DBW    Previous Nutrition Diagnosis:   [x ] Malnutrition   [ ] Inadequate Energy Intake   [ ] Inadequate Oral Intake   [ ] Excessive Energy Intake   [ ] Underweight   [ ] Increased Nutrient Needs  [ ] Overweight/Obesity   [ ] Altered GI Function   [ ] Unintended Weight Loss   [ ] Food & Nutrition Related Knowledge Deficit  Nutrition Diagnosis is [ x] ongoing  [ ] resolved [ ] not applicable       Interventions:   [x] Current medical condition precludes nutrition intervention at this time.     Monitor & Evaluate:  PO intake, tolerance to diet/supplement, nutrition related lab values, weight trends, BMs/GI distress, hydration status, skin integrity.    Vania Solis RDN  Available on MS teams

## 2024-09-17 NOTE — PROGRESS NOTE ADULT - ASSESSMENT
75 y/o M with pmhx HFrEF (EF 30% in 12/2023), mod MR, HTN,  HLD,  NICM,  HCV (s/p Harvoni treatment), chronic hyponatremia,  ETOH use disorder. BIBF for concern of disorganization and unable to care for himself at home. Patient was found to have dementia and unable to care for himself. now seeking NH placement.

## 2024-09-18 ENCOUNTER — TRANSCRIPTION ENCOUNTER (OUTPATIENT)
Age: 74
End: 2024-09-18

## 2024-09-18 VITALS
RESPIRATION RATE: 20 BRPM | TEMPERATURE: 98 F | HEART RATE: 82 BPM | OXYGEN SATURATION: 100 % | SYSTOLIC BLOOD PRESSURE: 136 MMHG | DIASTOLIC BLOOD PRESSURE: 60 MMHG

## 2024-09-18 LAB
ANION GAP SERPL CALC-SCNC: 14 MMOL/L — SIGNIFICANT CHANGE UP (ref 7–14)
BUN SERPL-MCNC: 24 MG/DL — HIGH (ref 7–23)
CALCIUM SERPL-MCNC: 8.6 MG/DL — SIGNIFICANT CHANGE UP (ref 8.4–10.5)
CHLORIDE SERPL-SCNC: 93 MMOL/L — LOW (ref 98–107)
CO2 SERPL-SCNC: 20 MMOL/L — LOW (ref 22–31)
CREAT SERPL-MCNC: 1 MG/DL — SIGNIFICANT CHANGE UP (ref 0.5–1.3)
EGFR: 79 ML/MIN/1.73M2 — SIGNIFICANT CHANGE UP
GLUCOSE SERPL-MCNC: 84 MG/DL — SIGNIFICANT CHANGE UP (ref 70–99)
HCT VFR BLD CALC: 28.8 % — LOW (ref 39–50)
HGB BLD-MCNC: 9.9 G/DL — LOW (ref 13–17)
MAGNESIUM SERPL-MCNC: 2 MG/DL — SIGNIFICANT CHANGE UP (ref 1.6–2.6)
MCHC RBC-ENTMCNC: 26.3 PG — LOW (ref 27–34)
MCHC RBC-ENTMCNC: 34.4 GM/DL — SIGNIFICANT CHANGE UP (ref 32–36)
MCV RBC AUTO: 76.6 FL — LOW (ref 80–100)
NRBC # BLD: 0 /100 WBCS — SIGNIFICANT CHANGE UP (ref 0–0)
NRBC # FLD: 0 K/UL — SIGNIFICANT CHANGE UP (ref 0–0)
PHOSPHATE SERPL-MCNC: 3 MG/DL — SIGNIFICANT CHANGE UP (ref 2.5–4.5)
PLATELET # BLD AUTO: 218 K/UL — SIGNIFICANT CHANGE UP (ref 150–400)
POTASSIUM SERPL-MCNC: 4.7 MMOL/L — SIGNIFICANT CHANGE UP (ref 3.5–5.3)
POTASSIUM SERPL-SCNC: 4.7 MMOL/L — SIGNIFICANT CHANGE UP (ref 3.5–5.3)
RBC # BLD: 3.76 M/UL — LOW (ref 4.2–5.8)
RBC # FLD: 14.5 % — SIGNIFICANT CHANGE UP (ref 10.3–14.5)
SODIUM SERPL-SCNC: 127 MMOL/L — LOW (ref 135–145)
WBC # BLD: 6.52 K/UL — SIGNIFICANT CHANGE UP (ref 3.8–10.5)
WBC # FLD AUTO: 6.52 K/UL — SIGNIFICANT CHANGE UP (ref 3.8–10.5)

## 2024-09-18 PROCEDURE — 99239 HOSP IP/OBS DSCHRG MGMT >30: CPT

## 2024-09-18 RX ORDER — CARVEDILOL 6.25 MG/1
1 TABLET ORAL
Qty: 0 | Refills: 0 | DISCHARGE
Start: 2024-09-18

## 2024-09-18 RX ORDER — DONEPEZIL HYDROCHLORIDE 5 MG/1
1 TABLET, FILM COATED ORAL
Qty: 0 | Refills: 0 | DISCHARGE
Start: 2024-09-18

## 2024-09-18 RX ORDER — DONEPEZIL HYDROCHLORIDE 5 MG/1
5 TABLET, FILM COATED ORAL AT BEDTIME
Refills: 0 | Status: DISCONTINUED | OUTPATIENT
Start: 2024-09-18 | End: 2024-09-18

## 2024-09-18 RX ORDER — FOLIC ACID 1 MG
1 TABLET ORAL
Qty: 0 | Refills: 0 | DISCHARGE
Start: 2024-09-18

## 2024-09-18 RX ADMIN — SPIRONOLACTONE 25 MILLIGRAM(S): 25 TABLET, FILM COATED ORAL at 05:16

## 2024-09-18 RX ADMIN — SACUBITRIL AND VALSARTAN 1 TABLET(S): 49; 51 TABLET, FILM COATED ORAL at 17:26

## 2024-09-18 RX ADMIN — Medication 81 MILLIGRAM(S): at 12:03

## 2024-09-18 RX ADMIN — Medication 25 MILLIGRAM(S): at 17:26

## 2024-09-18 RX ADMIN — SACUBITRIL AND VALSARTAN 1 TABLET(S): 49; 51 TABLET, FILM COATED ORAL at 05:16

## 2024-09-18 RX ADMIN — CARVEDILOL 12.5 MILLIGRAM(S): 6.25 TABLET ORAL at 05:16

## 2024-09-18 RX ADMIN — Medication 1 MILLIGRAM(S): at 12:03

## 2024-09-18 RX ADMIN — CARVEDILOL 12.5 MILLIGRAM(S): 6.25 TABLET ORAL at 17:26

## 2024-09-18 RX ADMIN — Medication 1 TABLET(S): at 12:02

## 2024-09-18 RX ADMIN — Medication 25 MILLIGRAM(S): at 14:08

## 2024-09-18 NOTE — PROGRESS NOTE ADULT - PROBLEM SELECTOR PLAN 3
Na 124 -> 125 on 9/11  -hx of chronic hyponatremia  -c/w 1.5 L fluid restriction, will hold off on sodium tabs in setting of known CHF.  - need updated labwork, ordered for today
- CXR showing 4 cm prominent ascending aortic aneurysm, has CT chest done in 2018 that showed 4cm ascending aortic aneurysm  - CTA chest with stable aneurysm was 4.1 cm, now 4.2 cm  - will need routine monitoring as outpatient to assess size  - need to maintain adequate BP contol
- CXR showing 4 cm prominent ascending aortic aneurysm, has CT chest done in 2018 that showed 4cm ascending aortic aneurysm  - will order repeat CT chest to evaluate size of aneurysm, given patient noncompliant as outpatient
Na 124 -> 125  -hx of chronic hyponatremia  -c/w 1.5 L fluid restriction, will hold off on sodium tabs in setting of known CHF.
Na 124 -> 125 on 9/11  -hx of chronic hyponatremia  -c/w 1.5 L fluid restriction, will hold off on sodium tabs in setting of known CHF.  - need updated labwork, ordered for today
EKG is notable for new T wave abnormality (CT head has ruled out intracranial pathology)  - TTE: left ventricular cavity is normal in size. Left ventricular wall thickness is normal. There is significant concentric hypertrophy.Left ventricular systolic function is hyperdynamic. There are no regional wall motion abnormalities seen.   2. There is mild (grade 1) left ventricular diastolic dysfunction.  - cardiology was consulted.   - Cont home coreg 12.5mg BID  - cont home dose entresto  BID  - Cont home aldactone 25 mg
- CXR showing 4 cm prominent ascending aortic aneurysm, has CT chest done in 2018 that showed 4cm ascending aortic aneurysm  - CTA chest with stable aneurysm was 4.1 cm, now 4.2 cm  - will need routine monitoring as outpatient to assess size  - need to maintain adequate BP contol
- CXR showing 4 cm prominent ascending aortic aneurysm, has CT chest done in 2018 that showed 4cm ascending aortic aneurysm  - CTA chest with stable aneurysm was 4.1 cm, now 4.2 cm  - will need routine monitoring as outpatient to assess size  - BP in better control
Na 124 -> 125 on 9/11  -hx of chronic hyponatremia  -c/w 1.5 L fluid restriction, will hold off on sodium tabs in setting of known CHF.  - need updated labwork, ordered for today
- CXR showing 4 cm prominent ascending aortic aneurysm, has CT chest done in 2018 that showed 4cm ascending aortic aneurysm  - CTA chest with stable aneurysm was 4.1 cm, now 4.2 cm  - will need routine monitoring as outpatient to assess size  - BP in better control

## 2024-09-18 NOTE — PROGRESS NOTE ADULT - PROBLEM SELECTOR PROBLEM 6
CAD (coronary artery disease)
Alcohol dependence with withdrawal
CAD (coronary artery disease)
Alcohol dependence with withdrawal
CAD (coronary artery disease)
Alcohol dependence with withdrawal

## 2024-09-18 NOTE — PROGRESS NOTE ADULT - PROBLEM SELECTOR PLAN 8
- c/w  aspirin 81mg daily , c/w atorvastatin 40mg daily  - denies any chest pain, if chest pain would obtain repeat EKG and new set of cardiac enzymes
- c/w  aspirin 81mg daily , c/w atorvastatin 40mg daily  - denies any chest pain, if chest pain would obtain repeat EKG and new set of cardiac enzymes
CXR showing 4 cm prominent ascending aortic aneurysm, has CT chest done in 2018 that showed 4cm ascending aortic aneurysm  - CTA chest with stable aneurysm was 4.1 cm, now 4.2 cm  - will need routine monitoring as outpatient to assess size  - BP in better control.
CXR showing 4 cm prominent ascending aortic aneurysm, has CT chest done in 2018 that showed 4cm ascending aortic aneurysm  - CTA chest with stable aneurysm was 4.1 cm, now 4.2 cm  - will need routine monitoring as outpatient to assess size  - BP in better control.
- c/w  aspirin 81mg daily , c/w atorvastatin 40mg daily  - denies any chest pain, if chest pain would obtain repeat EKG and new set of cardiac enzymes
- c/w  aspirin 81mg daily , c/w atorvastatin 40mg daily  - denies any chest pain, if chest pain would obtain repeat EKG and new set of cardiac enzymes
- c/w  aspirin 81mg daily , c/w atorvastatin 40mg daily  - denies any chest pain, if c hest pain wpould obtain repeat EKG and new set of cardiac enzymes
- CXR showing 4 cm prominent ascending aortic aneurysm, has CT chest done in 2018 that showed 4cm ascending aortic aneurysm  - CTA chest with stable aneurysm was 4.1 cm, now 4.2 cm  - will need routine monitoring as outpatient to assess size  - BP in better control.
CXR showing 4 cm prominent ascending aortic aneurysm, has CT chest done in 2018 that showed 4cm ascending aortic aneurysm  - CTA chest with stable aneurysm was 4.1 cm, now 4.2 cm  - will need routine monitoring as outpatient to assess size  - BP in better control.
CXR showing 4 cm prominent ascending aortic aneurysm, has CT chest done in 2018 that showed 4cm ascending aortic aneurysm  - CTA chest with stable aneurysm was 4.1 cm, now 4.2 cm  - will need routine monitoring as outpatient to assess size  - BP in better control.

## 2024-09-18 NOTE — PROGRESS NOTE ADULT - PROBLEM SELECTOR PLAN 2
- NSR with diffuse TWI with deep TWI in V3-V6; trop 32->27  Cardiology consulted, recommending resume home medication   - will resume coreg 6/25mg BID.   - increased from Entresto 24-26 to    - c/w aldactone 25mg with hold parameters   - no active chest pain at this time   - Pending TTE
EKG is notable for new T wave abnormality (CT head has ruled out intracranial pathology)  - Cont home coreg 6.25 mg BID  - Resume home dose entresto  BID  - Cont home aldactone 25 mg  - TTE: left ventricular cavity is normal in size. Left ventricular wall thickness is normal. There is significant concentric hypertrophy.Left ventricular systolic function is hyperdynamic. There are no regional wall motion abnormalities seen.   2. There is mild (grade 1) left ventricular diastolic dysfunction.  - cardio following.
EKG is notable for new T wave abnormality (CT head has ruled out intracranial pathology)  - TTE: left ventricular cavity is normal in size. Left ventricular wall thickness is normal. There is significant concentric hypertrophy.Left ventricular systolic function is hyperdynamic. There are no regional wall motion abnormalities seen.   2. There is mild (grade 1) left ventricular diastolic dysfunction.  - cardiology was consulted.   - Cont home coreg 6.25 mg BID  - cont home dose entresto  BID  - Cont home aldactone 25 mg
- NSR with diffuse TWI with deep TWI in V3-V6; trop 32->27  Cardiology consulted, recommending resume home medication   - will resume coreg 6/25mg BID.   - c/w Entresto    - c/w aldactone 25mg with hold parameters   - no active chest pain at this time   -  TTE hyperdynamic EF >75%, enhancement noted on exam with possible infiltrative CM, will touch base with cards on need for further
- NSR with diffuse TWI with deep TWI in V3-V6; trop 32->27  Cardiology consulted, recommending resume home medication   - continue coreg  - c/w Entresto    - c/w aldactone 25mg with hold parameters   - no active chest pain at this time   -  TTE hyperdynamic EF >75%, enhancement noted on exam with possible infiltrative CM, outpatient w/u per cardiology
hronic unstable as pt noncompliant with medications   - resume on home regimen, aldactone 25mg daily, Entresto 97-103mg BID, coreg uptitrated to 12.5mg BID  - patient does not appear hypervolemic at this time   - holding Lasix 20mg daily  - TTE 12/2023 with EF 30%, Global left ventricular hypokinesis.  - TTE with EF >75%, hyperdynamic with  linear, thick, echo-bright structure that appears in the LVOT in multiple views, unclear if this is artifact, concern for possible infiltrative process.    - TTE 9/2024 Left ventricular wall thickness is normal. Left ventricular systolic function is hyperdynamic with an ejection fraction visually estimated at >75%. There are no regional wall motion abnormalities seen. There is significant concentric hypertrophy. There is mild (grade 1) left ventricular diastolic dysfunction.    - Cont home coreg 6.25 mg BID  - cont home dose entresto  BID  - Cont home aldactone 25 mg
- NSR with diffuse TWI with deep TWI in V3-V6; trop 32->27  Cardiology consulted, recommending resume home medication   - will resume coreg 6/25mg BID.   - c/w Entresto    - c/w aldactone 25mg with hold parameters   - no active chest pain at this time   -  TTE hyperdynamic EF >75%, enhancement noted on exam with possible infiltrative CM, will touch base with cards on need for further
-  EKG is notable for new T wave abnormality (CT head has ruled out intracranial pathology)  - Cont home coreg 6.25 mg BID  - Resume home dose entresto  BID  - Cont home aldactone 25 mg  - TTE: left ventricular cavity is normal in size. Left ventricular wall thickness is normal. There is significant concentric hypertrophy.Left ventricular systolic function is hyperdynamic. There are no regional wall motion abnormalities seen.   2. There is mild (grade 1) left ventricular diastolic dysfunction.  - cardio following
- NSR with diffuse TWI with deep TWI in V3-V6; trop 32->27  Cardiology consulted, recommending resume home medication   - will resume coreg 6/25mg BID. Entresto 24-26 and aldactone 25mg with hold parameters   - no active chest pain at this time   - will dc hydralazine   - Pending TTE
EKG is notable for new T wave abnormality (CT head has ruled out intracranial pathology)  - TTE: left ventricular cavity is normal in size. Left ventricular wall thickness is normal. There is significant concentric hypertrophy.Left ventricular systolic function is hyperdynamic. There are no regional wall motion abnormalities seen.   2. There is mild (grade 1) left ventricular diastolic dysfunction.  - cardiology was consulted.   - Cont home coreg 6.25 mg BID  - cont home dose entresto  BID  - Cont home aldactone 25 mg

## 2024-09-18 NOTE — PROGRESS NOTE ADULT - PROBLEM SELECTOR PLAN 6
c/w  aspirin 81mg daily , c/w atorvastatin 40mg daily.
c/w  aspirin 81mg daily , c/w atorvastatin 40mg daily
c/w  aspirin 81mg daily , c/w atorvastatin 40mg daily.
hx of alcohol dependence- unknown when last drink  - CIWA 0, will d/c sx triggered ativan  - no tremors patient stable
hx of alcohol dependence- unknown when last drink  - will c/w symptom triggered CIWA with PRN ativan for additional 24 hours, if patient CIWA remains low, will plan for d/c   - c/w multivitamin, folic acid, thiamine ordered
hx of alcohol dependence- unknown when last drink  - CIWA 0, will d/c sx triggered ativan  - no tremors patient stable
hx of alcohol dependence- unknown when last drink  - CIWA 0, will d/c sx triggered ativan
c/w  aspirin 81mg daily , c/w atorvastatin 40mg daily.
hx of alcohol dependence- unknown when last drink  - CIWA 0, will d/c sx triggered ativan  - no tremors patient stable
c/w  aspirin 81mg daily , c/w atorvastatin 40mg daily.

## 2024-09-18 NOTE — PROGRESS NOTE ADULT - PROBLEM SELECTOR PLAN 9
DVT ppx: Lovenox   Diet: DASH Diet    Dispo: patient home in foreclosure as patient not paying his mortgage, daughter Ciera currently trying to get more information.  Given unsafe discharge given foreclosure, will need to go to NH vs LTCF?
DVT ppx: Lovenox   Diet: DASH Diet    Dispo: patient home in foreclosure as patient not paying his mortgage, daughter Ciera currently trying to get more information.  Given unsafe discharge given foreclosure, will need to go to NH vs LTCF.
- c/w atorvastatin, lipid panel stable   - A1c 5%
DVT ppx: Lovenox   Diet: DASH Diet    Dispo: patient home in foreclosure as patient not paying his mortgage, daughter Ciera currently trying to get more information.  Given unsafe discharge given foreclosure, will need to go to NH vs LTCF?
- c/w atorvastatin, lipid panel stable   A1c 5%
- c/w atorvastatin, lipid panel stable   - A1c 5%
DVT ppx: Lovenox   Diet: DASH Diet    Dispo: patient home in foreclosure as patient not paying his mortgage, daughter Ciera currently trying to get more information.  Given unsafe discharge given foreclosure, will need to go to NH vs LTCF?
DVT ppx: Lovenox   Diet: DASH Diet    Dispo: patient home in foreclosure as patient not paying his mortgage, daughter Ciera currently trying to get more information.  Given unsafe discharge given foreclosure, will need to go to NH vs LTCF?

## 2024-09-18 NOTE — PROGRESS NOTE ADULT - REASON FOR ADMISSION
dementia ,hyponatremia, abnormal ekg

## 2024-09-18 NOTE — PROGRESS NOTE ADULT - PROBLEM SELECTOR PROBLEM 7
Hyponatremia
HLD (hyperlipidemia)
Hyponatremia
HLD (hyperlipidemia)
Hyponatremia
HLD (hyperlipidemia)

## 2024-09-18 NOTE — PROGRESS NOTE ADULT - PROBLEM SELECTOR PLAN 7
Hx of chronic hyponatremia  - Na 124 this AM,  urine lytes consistent with SIADH  - will start on 1.5 L fluid restriction, will hold off on sodium tabs in setting of known CHF   - trend with daily BMP
Hx of chronic hyponatremia  - Na 126 this AM,  will order urine lytes  - trend with daily BMP
c/w atorvastatin, lipid panel stable   - A1c 5%.
Hx of chronic hyponatremia  - Na 124 this AM,  urine lytes consistent with SIADH  - will start on 1.5 L fluid restriction, will hold off on sodium tabs in setting of known CHF   - trend with daily BMP
c/w atorvastatin, lipid panel stable   - A1c 5%.
Hx of chronic hyponatremia  - Na 124 this AM,  urine lytes consistent with SIADH  - c/w 1.5 L fluid restriction, will hold off on sodium tabs in setting of known CHF
c/w atorvastatin, lipid panel stable   - A1c 5%.
Hx of chronic hyponatremia  - Na 124 this AM,  urine lytes consistent with SIADH  - c/w 1.5 L fluid restriction, will hold off on sodium tabs in setting of known CHF   - trend with daily BMP

## 2024-09-18 NOTE — DISCHARGE NOTE NURSING/CASE MANAGEMENT/SOCIAL WORK - PATIENT PORTAL LINK FT
You can access the FollowMyHealth Patient Portal offered by [facility] by registering at the following website: [email or web address]. By joining [facility]’s FollowMyHealth portal, you will also be able to view your health information using other applications (apps) compatible with our system.

## 2024-09-18 NOTE — PROGRESS NOTE ADULT - PROBLEM SELECTOR PROBLEM 2
Abnormal EKG
Chronic HFrEF (heart failure with reduced ejection fraction)
Abnormal EKG

## 2024-09-18 NOTE — PROGRESS NOTE ADULT - PROVIDER SPECIALTY LIST ADULT
Cardiology
Hospitalist
Internal Medicine
Hospitalist

## 2024-09-18 NOTE — PROGRESS NOTE ADULT - PROBLEM SELECTOR PROBLEM 5
Chronic HFrEF (heart failure with reduced ejection fraction)
Benign essential HTN
Chronic HFrEF (heart failure with reduced ejection fraction)
Chronic HFrEF (heart failure with reduced ejection fraction)

## 2024-09-18 NOTE — PROGRESS NOTE ADULT - PROBLEM SELECTOR PROBLEM 4
Benign essential HTN
Hyponatremia
Benign essential HTN

## 2024-09-18 NOTE — PROGRESS NOTE ADULT - PROBLEM SELECTOR PLAN 1
- Pt with intermittent memory loss and unable to care for himself at home, spoke with daughter seems that patient has been facing decline for over 1-1.5 years   - patient very forgetful, per daughter patient home is in disarray, states that there dog feces in bathroom, kitchen counters are broken and home is dilapidated  - nursing stating that patient's hygiene is poor, has been urinating in cups instead of using urinal  - CT head with chronic  left posterior pontine lacunar infarct, but with new right thalamus  age-indeterminate lacunar infarct, new from prior exam.  There are periventricular and subcortical white matter hypodensity, consistent with microvascular type changes.  - patient was at MRI as outpatient on Saturday and was unable to tolerate the procedure due to agitation, so study not done   - pending MRI to further assess, will need ativan prior to MRI due to anxiety/claustrophobia   - UA/CXR negative  - B12 nl , folate nl , TSH 1.83 nl    - patient with intermittent anxiety, unable to state triggers, started on PRN atarax
: t with intermittent memory loss and unable to care for himself at home, spoke with daughter seems that patient has been facing decline for over 1-1.5 years   - patient very forgetful, per daughter patient home is in disarray, states that there dog feces in bathroom, kitchen counters are broken and home is dilapidated  - nursing stating that patient's hygiene is poor, has been urinating in cups instead of using urinal  - CT head with chronic  left posterior pontine lacunar infarct, but with new right thalamus  age-indeterminate lacunar infarct, new from prior exam.  There are periventricular and subcortical white matter hypodensity, consistent with microvascular type changes.  - patient was at MRI as outpatient on Saturday and was unable to tolerate the procedure due to agitation, so study not done   - UA/CXR negative  - B12 nl , folate nl , TSH 1.83 nl      - patient states he lives alone and his kids come to visit   - he states he is Advent and praying everyday   - he states foreclosure is started on his house but not he's not getting kicked out right now  - pending MRI to further assess, will need ativan prior to MRI due to anxiety/claustrophobia
t with intermittent memory loss and unable to care for himself at home, spoke with daughter seems that patient has been facing decline for over 1-1.5 years   - patient very forgetful, per daughter patient home is in disarray, states that there dog feces in bathroom, kitchen counters are broken and home is dilapidated  - nursing stating that patient's hygiene is poor, has been urinating in cups instead of using urinal  - CT head with chronic  left posterior pontine lacunar infarct, but with new right thalamus  age-indeterminate lacunar infarct, new from prior exam.  There are periventricular and subcortical white matter hypodensity, consistent with microvascular type changes.  - patient was at MRI as outpatient on Saturday and was unable to tolerate the procedure due to agitation, so study not done   - UA/CXR negative  - B12 nl , folate nl , TSH 1.83 nl    - pending MRI to further assess, will need ativan prior to MRI due to anxiety/claustrophobia
- Pt with intermittent memory loss and unable to care for himself at home, spoke with daughter seems that patient has been facing decline for over 1-1.5 years   - patient very forget  - CT head with chronic  left posterior pontine lacunar infarct, but with new right thalamus  age-indeterminate lacunar infarct, new from prior exam.  There are periventricular and subcortical white matter hypodensity, consistent with microvascular type changes.  - patient was at MRI as outpatient on Saturday and was unable to tolerate the procedure due to agitation, so study not done   - given age indeterminate infarct will order MRI with PO ativan prior   - UA/CXR negative  - B12 nl , folate nl , TSH 1.83 nl    - patient  with intermittent anxiety, unable to state triggers, started on PRN atarax psych consulted will see in AM
: t with intermittent memory loss and unable to care for himself at home, spoke with daughter seems that patient has been facing decline for over 1-1.5 years   - patient very forgetful, per daughter patient home is in disarray, states that there dog feces in bathroom, kitchen counters are broken and home is dilapidated  - nursing stating that patient's hygiene is poor, has been urinating in cups instead of using urinal  - CT head with chronic  left posterior pontine lacunar infarct, but with new right thalamus  age-indeterminate lacunar infarct, new from prior exam.  There are periventricular and subcortical white matter hypodensity, consistent with microvascular type changes.  - patient was at MRI as outpatient on Saturday and was unable to tolerate the procedure due to agitation, so study not done   - UA/CXR negative  - B12 nl , folate nl , TSH 1.83 nl      - patient states he lives alone and his kids come to visit   - he states he is Hinduism and praying everyday   - he states foreclosure is started on his house but not he's not getting kicked out right now  - pending MRI to further assess, will need ativan prior to MRI due to anxiety/claustrophobia
- Pt with intermittent memory loss and unable to care for himself at home, spoke with daughter seems that patient has been facing decline for over 1-1.5 years   - patient very forget  - CT head with chronic  left posterior pontine lacunar infarct, but with new right thalamus  age-indeterminate lacunar infarct, new from prior exam.  There are periventricular and subcortical white matter hypodensity, consistent with microvascular type changes.  - patient was at MRI as outpatient on Saturday and was unable to tolerate the procedure due to agitation, so study not done   - pending MRI to further assess, will need ativan prior to MRI due to anxiety/claustrophobia   - UA/CXR negative  - B12 nl , folate nl , TSH 1.83 nl    - patient  with intermittent anxiety, unable to state triggers, started on PRN atarax psych consulted will see in AM
- Pt with intermittent memory loss and unable to care for himself at home  - Ct head/cervical spine- no acute findings  - UA/CXR negative  - B12 nl , folate nl , TSH 1.83 nl    - likely dementia with chronic alcohol use contributing
: t with intermittent memory loss and unable to care for himself at home, spoke with daughter seems that patient has been facing decline for over 1-1.5 years   - patient very forgetful, per daughter patient home is in disarray, states that there dog feces in bathroom, kitchen counters are broken and home is dilapidated  - nursing stating that patient's hygiene is poor, has been urinating in cups instead of using urinal  - CT head with chronic  left posterior pontine lacunar infarct, but with new right thalamus  age-indeterminate lacunar infarct, new from prior exam.  There are periventricular and subcortical white matter hypodensity, consistent with microvascular type changes.    - UA/CXR negative  - B12 nl , folate nl , TSH 1.83 nl      - patient states he lives alone and his kids come to visit   - he states he is Yarsani and praying everyday   - he states foreclosure is started on his house but not he's not getting kicked out right now  - MRI brain showed chronic lacunar infarct. asymmetric atrophy. questionable NPH > Neuro eval done, no concern for NPH. likely dementia, Alzheimer vs alcohol.  - [] Patient can follow up with neurocognitive specialist Dr. Almaguer at [address] [facility] after discharge. Please instruct the patient to call [phone] to schedule this appointment.  For a more timely appointment - Dr. Maya or Sean at [phone] or Dr. Horton or someone in her group [phone].   [] Patient can follow up for neuropsychological testing with Dr. Travis Fofana at [address] [facility] 1-2 weeks after discharge. Please instruct the patient to call [phone] to schedule this appointment.
: t with intermittent memory loss and unable to care for himself at home, spoke with daughter seems that patient has been facing decline for over 1-1.5 years   - patient very forgetful, per daughter patient home is in disarray, states that there dog feces in bathroom, kitchen counters are broken and home is dilapidated  - nursing stating that patient's hygiene is poor, has been urinating in cups instead of using urinal  - CT head with chronic  left posterior pontine lacunar infarct, but with new right thalamus  age-indeterminate lacunar infarct, new from prior exam.  There are periventricular and subcortical white matter hypodensity, consistent with microvascular type changes.  - patient was at MRI as outpatient on Saturday and was unable to tolerate the procedure due to agitation, so study not done   - UA/CXR negative  - B12 nl , folate nl , TSH 1.83 nl      - patient states he lives alone and his kids come to visit   - he states he is Taoism and praying everyday   - he states foreclosure is started on his house but not he's not getting kicked out right now  - MRI brain showed chronic lacunar infarct. asymmetric atrophy. questionable NPH > Neuro consult.
- Pt with intermittent memory loss and unable to care for himself at home, spoke with daughter seems that patient has been facing decline for over 1-1.5 years   - patient very forgetful, per daughter patient home is in disarray, states that there dog feces in bathroom, kitchen counters are broken and home is dilapidated  - nursing stating that patient's hygiene is poor, has been urinating in cups instead of using urinal  - CT head with chronic  left posterior pontine lacunar infarct, but with new right thalamus  age-indeterminate lacunar infarct, new from prior exam.  There are periventricular and subcortical white matter hypodensity, consistent with microvascular type changes.  - patient was at MRI as outpatient on Saturday and was unable to tolerate the procedure due to agitation, so study not done   - pending MRI to further assess, will need ativan prior to MRI due to anxiety/claustrophobia   - UA/CXR negative  - B12 nl , folate nl , TSH 1.83 nl    - patient with intermittent anxiety, unable to state triggers, started on PRN atarax

## 2024-09-18 NOTE — PROGRESS NOTE ADULT - SUBJECTIVE AND OBJECTIVE BOX
[facility] Division of Hospital Medicine  Canelo Rae MD  In House Pager [phone]    Patient is a 74y old  Male who presents with a chief complaint of dementia ,hyponatremia, abnormal ekg (17 Sep 2024 17:32)    OVERNIGHT EVENTS: no acute events.   SUBJECTIVE: no new subjective symptoms.   ROS: Denied Fever, Chill, CP, SOB, Abd pain, N/V/D, LE swelling or pain.     MEDICATIONS  (STANDING):  aspirin enteric coated 81 milliGRAM(s) Oral daily  atorvastatin 40 milliGRAM(s) Oral at bedtime  carvedilol 12.5 milliGRAM(s) Oral every 12 hours  donepezil 5 milliGRAM(s) Oral at bedtime  enoxaparin Injectable 40 milliGRAM(s) SubCutaneous every 24 hours  folic acid 1 milliGRAM(s) Oral daily  LORazepam     Tablet 2 milliGRAM(s) Oral once  multivitamin 1 Tablet(s) Oral daily  sacubitril 97 mG/valsartan 103 mG 1 Tablet(s) Oral two times a day  spironolactone 25 milliGRAM(s) Oral daily    MEDICATIONS  (PRN):  acetaminophen     Tablet .. 650 milliGRAM(s) Oral every 6 hours PRN Temp greater or equal to 38C (100.4F), Mild Pain (1 - 3)  aluminum hydroxide/magnesium hydroxide/simethicone Suspension 30 milliLiter(s) Oral every 4 hours PRN Dyspepsia  hydrOXYzine hydrochloride 25 milliGRAM(s) Oral two times a day PRN Anxiety  melatonin 3 milliGRAM(s) Oral at bedtime PRN Insomnia  OLANZapine 1.25 milliGRAM(s) Oral every 6 hours PRN agitation  ondansetron Injectable 4 milliGRAM(s) IV Push every 8 hours PRN Nausea and/or Vomiting    CAPILLARY BLOOD GLUCOSE        I&O's Summary      Vital Signs Last 24 Hrs  T(C): 36.4 (18 Sep 2024 04:48), Max: 37.2 (17 Sep 2024 20:02)  T(F): 97.6 (18 Sep 2024 04:48), Max: 98.9 (17 Sep 2024 20:02)  HR: 81 (18 Sep 2024 04:48) (72 - 98)  BP: 136/62 (18 Sep 2024 04:48) (136/62 - 160/54)  BP(mean): --  RR: 18 (18 Sep 2024 04:48) (18 - 19)  SpO2: 100% (18 Sep 2024 04:48) (100% - 100%)    Parameters below as of 18 Sep 2024 04:48  Patient On (Oxygen Delivery Method): room air        LABS:                        9.9    6.52  )-----------( 218      ( 18 Sep 2024 04:30 )             28.8     09-18    127[L]  |  93[L]  |  24[H]  ----------------------------<  84  4.7   |  20[L]  |  1.00    Ca    8.6      18 Sep 2024 04:30  Phos  3.0     09-18  Mg     2.00     09-18            Urinalysis Basic - ( 18 Sep 2024 04:30 )    Color: x / Appearance: x / SG: x / pH: x  Gluc: 84 mg/dL / Ketone: x  / Bili: x / Urobili: x   Blood: x / Protein: x / Nitrite: x   Leuk Esterase: x / RBC: x / WBC x   Sq Epi: x / Non Sq Epi: x / Bacteria: x        RADIOLOGY & ADDITIONAL TESTS:  Results Reviewed: Y  Imaging Personally Reviewed: Y  Electrocardiogram Personally Reviewed: Y    COORDINATION OF CARE:  Care Discussed with Consultants/Other Providers [Y/N]: Y  Prior or Outpatient Records Reviewed [Y/N]: Y

## 2024-09-18 NOTE — PROGRESS NOTE ADULT - PROBLEM SELECTOR PROBLEM 3
Aneurysm, ascending aorta
Abnormal EKG
Aneurysm, ascending aorta
Hyponatremia
Hyponatremia
Aneurysm, ascending aorta
Hyponatremia
Hyponatremia

## 2024-09-18 NOTE — PROGRESS NOTE ADULT - PROBLEM SELECTOR PROBLEM 8
CAD (coronary artery disease)
Aneurysm, ascending aorta
CAD (coronary artery disease)
CAD (coronary artery disease)
Aneurysm, ascending aorta
CAD (coronary artery disease)
Aneurysm, ascending aorta
CAD (coronary artery disease)

## 2024-09-18 NOTE — PROGRESS NOTE ADULT - PROBLEM SELECTOR PLAN 4
aldactone 25mg daily, Entresto 97-103mg BID, coreg uptitrated to 12.5mg BID.
- BP max 202/110 this afternoon, restarted on home meds this AM  - BP trend improving
Na 124 -> 125 on 9/11  -hx of chronic hyponatremia  -c/w 1.5 L fluid restriction, will hold off on sodium tabs in setting of known CHF.  - stable.
- BP elevated 202/110 this afternoon,  will resume home BP meds
- BP max 202/110 this afternoon, restarted on home meds  - BP trend improving
aldactone 25mg daily, Entresto 97-103mg BID, coreg uptitrated to 12.5mg BID.
aldactone 25mg daily, Entresto 97-103mg BID, coreg uptitrated to 12.5mg BID.
-aldactone 25mg daily, Entresto 97-103mg BID, coreg uptitrated to 12.5mg BID
- BP max 202/110 this afternoon, restarted on home meds  - BP trend improving
aldactone 25mg daily, Entresto 97-103mg BID, coreg uptitrated to 12.5mg BID.

## 2024-09-18 NOTE — PROGRESS NOTE ADULT - PROBLEM SELECTOR PROBLEM 9
Need for prophylactic measure
HLD (hyperlipidemia)
Need for prophylactic measure
Need for prophylactic measure
HLD (hyperlipidemia)
Need for prophylactic measure
HLD (hyperlipidemia)
Need for prophylactic measure
HLD (hyperlipidemia)
HLD (hyperlipidemia)

## 2024-09-18 NOTE — PROGRESS NOTE ADULT - PROBLEM SELECTOR PLAN 5
Chronic unstable as pt noncompliant with medications   - resume on home regimen, aldactone 25mg daily, Entresto 97-103mg BID, Lasix 20mg daily, coreg 6.25mg BID  - patient does not appear hypervolemic at this time   - TTE 12/2023 with EF 30%, Global left ventricular hypokinesis.  - pending repeat TTE
Chronic unstable as pt noncompliant with medications   - resume on home regimen, aldactone 25mg daily, Entresto 97-103mg BID, coreg uptitrated to 12.5mg BID  - patient does not appear hypervolemic at this time   - holding Lasix 20mg daily  - TTE 12/2023 with EF 30%, Global left ventricular hypokinesis.  - TTE with EF >75%, hyperdynamic with  linear, thick, echo-bright structure that appears in the LVOT in multiple views, unclear if this is artifact, concern for possible infiltrative process    - TTE 9/2024 Left ventricular wall thickness is normal. Left ventricular systolic function is hyperdynamic with an ejection fraction visually estimated at >75%. There are no regional wall motion abnormalities seen. There is significant concentric hypertrophy. There is mild (grade 1) left ventricular diastolic dysfunction.
Chronic unstable as pt noncompliant with medications   - resume on home regimen, aldactone 25mg daily, Entresto 97-103mg BID, coreg uptitrated to 12.5mg BID  - patient does not appear hypervolemic at this time   - holding Lasix 20mg daily  - TTE 12/2023 with EF 30%, Global left ventricular hypokinesis.  - TTE with EF >75%, hyperdynamic with  linear, thick, echo-bright structure that appears in the LVOT in multiple views, unclear if this is artifact, concern for possible infiltrative process  - spoke with cards can have workup for infiltrative process done as outpatient, no need for further inpatient workup
Chronic unstable as pt noncompliant with medications   - resume on home regimen, aldactone 25mg daily, Entresto 97-103mg BID, Lasix 20mg daily, coreg 6.25mg BID  - patient does not appear hypervolemic at this time   - TTE 12/2023 with EF 30%, Global left ventricular hypokinesis.  - pending repeat TTE
Chronic unstable as pt noncompliant with medications   - resume on home regimen, aldactone 25mg daily, Entresto 97-103mg BID, Lasix 20mg daily, coreg 6.25mg BID  - patient does not appear hypervolemic at this time   - TTE 12/2023 with EF 30%, Global left ventricular hypokinesis.  - TTE with EF >75%, hyperdynamic with  linear, thick, echo-bright structure that appears in the LVOT in multiple views, unclear if this is artifact, concern for possible infiltrative process
Chronic unstable as pt noncompliant with medications   - resume on home regimen, aldactone 25mg daily, Entresto 97-103mg BID, Lasix 20mg daily, coreg 6.25mg BID  - patient does not appear hypervolemic at this time   - TTE 12/2023 with EF 30%, Global left ventricular hypokinesis.  - TTE with EF >75%, hyperdynamic with  linear, thick, echo-bright structure that appears in the LVOT in multiple views, unclear if this is artifact, concern for possible infiltrative process  - spoke with cards can have workup for infiltrative process done as outpatient, no need for further inpatient workup
hronic unstable as pt noncompliant with medications   - resume on home regimen, aldactone 25mg daily, Entresto 97-103mg BID, coreg uptitrated to 12.5mg BID  - patient does not appear hypervolemic at this time   - holding Lasix 20mg daily  - TTE 12/2023 with EF 30%, Global left ventricular hypokinesis.  - TTE with EF >75%, hyperdynamic with  linear, thick, echo-bright structure that appears in the LVOT in multiple views, unclear if this is artifact, concern for possible infiltrative process    - TTE 9/2024 Left ventricular wall thickness is normal. Left ventricular systolic function is hyperdynamic with an ejection fraction visually estimated at >75%. There are no regional wall motion abnormalities seen. There is significant concentric hypertrophy. There is mild (grade 1) left ventricular diastolic dysfunction.
aldactone 25mg daily, Entresto 97-103mg BID, coreg uptitrated to 12.5mg BID.
hronic unstable as pt noncompliant with medications   - resume on home regimen, aldactone 25mg daily, Entresto 97-103mg BID, coreg uptitrated to 12.5mg BID  - patient does not appear hypervolemic at this time   - holding Lasix 20mg daily  - TTE 12/2023 with EF 30%, Global left ventricular hypokinesis.  - TTE with EF >75%, hyperdynamic with  linear, thick, echo-bright structure that appears in the LVOT in multiple views, unclear if this is artifact, concern for possible infiltrative process    - TTE 9/2024 Left ventricular wall thickness is normal. Left ventricular systolic function is hyperdynamic with an ejection fraction visually estimated at >75%. There are no regional wall motion abnormalities seen. There is significant concentric hypertrophy. There is mild (grade 1) left ventricular diastolic dysfunction.
hronic unstable as pt noncompliant with medications   - resume on home regimen, aldactone 25mg daily, Entresto 97-103mg BID, coreg uptitrated to 12.5mg BID  - patient does not appear hypervolemic at this time   - holding Lasix 20mg daily  - TTE 12/2023 with EF 30%, Global left ventricular hypokinesis.  - TTE with EF >75%, hyperdynamic with  linear, thick, echo-bright structure that appears in the LVOT in multiple views, unclear if this is artifact, concern for possible infiltrative process    - TTE 9/2024 Left ventricular wall thickness is normal. Left ventricular systolic function is hyperdynamic with an ejection fraction visually estimated at >75%. There are no regional wall motion abnormalities seen. There is significant concentric hypertrophy. There is mild (grade 1) left ventricular diastolic dysfunction.

## 2024-09-18 NOTE — DISCHARGE NOTE NURSING/CASE MANAGEMENT/SOCIAL WORK - NSDCVIVACCINE_GEN_ALL_CORE_FT
Tdap; 22-Jan-2020 23:12; Stefani Dobson (SHAHAB); [employer]; [id number] (Exp. Date: 06-Jul-2021); IntraMuscular; Deltoid Left.; 0.5 milliLiter(s); VIS (VIS Published: 09-May-2013, VIS Presented: 22-Jan-2020);

## 2024-09-18 NOTE — PROGRESS NOTE ADULT - TIME BILLING
Time spent on review of vitals, physical exam, documentation, and discussion of plan of care with patient
Time spent on chart and data review, clinical assessment, and coordination of care.
Time spent to prepare to see the patient, obtaining and reviewing history, physical examination, explaining the diagnosis, prognosis and treatment plan with the patient/family/caregiver. I also have spent the time ordering studies and testing, interpreting results, medicine reconciliation, subspecialty consultation and documentation as above
- Ordering, reviewing, and interpreting labs, testing, and imaging.  - Independently obtaining a review of systems and performing a physical exam  - Reviewing prior hospitalization and where necessary, outpatient records.  - Reviewing consultant recommendations/communicating with consultants  - Counselling and educating patient and family regarding interpretation of aforementioned items and plan of care.
- Ordering, reviewing, and interpreting labs, testing, and imaging.  - Independently obtaining a review of systems and performing a physical exam  - Reviewing prior hospitalization and where necessary, outpatient records.  - Reviewing consultant recommendations/communicating with consultants  - Counselling and educating patient and family regarding interpretation of aforementioned items and plan of care.
Time spent on chart and data review, clinical assessment, and coordination of care.
Time spent to prepare to see the patient, obtaining and reviewing history, physical examination, explaining the diagnosis, prognosis and treatment plan with the patient/family/caregiver. I also have spent the time ordering studies and testing, interpreting results, medicine reconciliation, subspecialty consultation and documentation as above
Time spent on chart and data review, clinical assessment, and coordination of care.

## 2024-09-18 NOTE — PROGRESS NOTE ADULT - NUTRITIONAL ASSESSMENT
This patient has been assessed with a concern for Malnutrition and has been determined to have a diagnosis/diagnoses of Underweight (BMI < 19) and Severe protein-calorie malnutrition.    This patient is being managed with:   Diet Regular-  DASH/TLC {Sodium & Cholesterol Restricted} (DASH)  Easy to Chew (EASYTOCHEW)  1500mL Fluid Restriction ([id number])  Supplement Feeding Modality:  Oral  Ensure Plus High Protein Cans or Servings Per Day:  1       Frequency:  Two Times a day  Entered: Sep 12 2024  8:47AM  
This patient has been assessed with a concern for Malnutrition and has been determined to have a diagnosis/diagnoses of Underweight (BMI < 19) and Severe protein-calorie malnutrition.    This patient is being managed with:   Diet Regular-  DASH/TLC {Sodium & Cholesterol Restricted} (DASH)  Easy to Chew (EASYTOCHEW)  1500mL Fluid Restriction ([id number])  Supplement Feeding Modality:  Oral  Ensure Plus High Protein Cans or Servings Per Day:  1       Frequency:  Two Times a day  Entered: Sep 12 2024  8:47AM  
This patient has been assessed with a concern for Malnutrition and has been determined to have a diagnosis/diagnoses of Underweight (BMI < 19) and Severe protein-calorie malnutrition.    This patient is being managed with:   Diet Regular-  DASH/TLC {Sodium & Cholesterol Restricted} (DASH)  Easy to Chew (EASYTOCHEW)  1500mL Fluid Restriction ([id number])  Supplement Feeding Modality:  Oral  Ensure Plus High Protein Cans or Servings Per Day:  1       Frequency:  Two Times a day  Entered: Sep 12 2024  8:47AM  
This patient has been assessed with a concern for Malnutrition and has been determined to have a diagnosis/diagnoses of Underweight (BMI < 19) and Severe protein-calorie malnutrition.    This patient is being managed with:   Diet Regular-  DASH/TLC {Sodium & Cholesterol Restricted} (DASH)  Easy to Chew (EASYTOCHEW)  1500mL Fluid Restriction ([id number])  Supplement Feeding Modality:  Oral  Ensure Plus High Protein Cans or Servings Per Day:  1       Frequency:  Two Times a day  Entered: Sep 12 2024  8:47AM  
This patient has been assessed with a concern for Malnutrition and has been determined to have a diagnosis/diagnoses of Underweight (BMI < 19) and Severe protein-calorie malnutrition.    This patient is being managed with:   Diet Regular-  DASH/TLC {Sodium & Cholesterol Restricted} (DASH)  Easy to Chew (EASYTOCHEW)  1500mL Fluid Restriction ([id number])  Supplement Feeding Modality:  Oral  Ensure Plus High Protein Cans or Servings Per Day:  1       Frequency:  Two Times a day  Entered: Sep 12 2024  8:47AM  
This patient has been assessed with a concern for Malnutrition and has been determined to have a diagnosis/diagnoses of Underweight (BMI < 19) and Severe protein-calorie malnutrition.    This patient is being managed with:   Diet Regular-  DASH/TLC {Sodium & Cholesterol Restricted} (DASH)  Easy to Chew (EASYTOCHEW)  1500mL Fluid Restriction ([id number])  Supplement Feeding Modality:  Oral  Ensure Plus High Protein Cans or Servings Per Day:  1       Frequency:  Two Times a day  Entered: Sep 12 2024  8:47AM  
This patient has been assessed with a concern for Malnutrition and has been determined to have a diagnosis/diagnoses of Underweight (BMI < 19) and Severe protein-calorie malnutrition.    This patient is being managed with:   Diet Regular-  1500mL Fluid Restriction ([id number])  Supplement Feeding Modality:  Oral  Ensure Plus High Protein Cans or Servings Per Day:  1       Frequency:  Two Times a day  Entered: Sep 18 2024  9:18AM

## 2024-09-19 ENCOUNTER — APPOINTMENT (OUTPATIENT)
Dept: INTERNAL MEDICINE | Facility: CLINIC | Age: 74
End: 2024-09-19

## 2024-11-06 ENCOUNTER — LABORATORY RESULT (OUTPATIENT)
Age: 74
End: 2024-11-06

## 2024-11-06 ENCOUNTER — APPOINTMENT (OUTPATIENT)
Dept: CARDIOLOGY | Facility: CLINIC | Age: 74
End: 2024-11-06
Payer: MEDICARE

## 2024-11-06 ENCOUNTER — NON-APPOINTMENT (OUTPATIENT)
Age: 74
End: 2024-11-06

## 2024-11-06 VITALS
BODY MASS INDEX: 19.49 KG/M2 | HEIGHT: 65 IN | SYSTOLIC BLOOD PRESSURE: 180 MMHG | OXYGEN SATURATION: 99 % | HEART RATE: 75 BPM | DIASTOLIC BLOOD PRESSURE: 90 MMHG | WEIGHT: 117 LBS

## 2024-11-06 VITALS — DIASTOLIC BLOOD PRESSURE: 100 MMHG | SYSTOLIC BLOOD PRESSURE: 180 MMHG

## 2024-11-06 DIAGNOSIS — I25.10 ATHEROSCLEROTIC HEART DISEASE OF NATIVE CORONARY ARTERY W/OUT ANGINA PECTORIS: ICD-10-CM

## 2024-11-06 DIAGNOSIS — I10 ESSENTIAL (PRIMARY) HYPERTENSION: ICD-10-CM

## 2024-11-06 DIAGNOSIS — I42.8 OTHER CARDIOMYOPATHIES: ICD-10-CM

## 2024-11-06 DIAGNOSIS — Z86.73 PERSONAL HISTORY OF TRANSIENT ISCHEMIC ATTACK (TIA), AND CEREBRAL INFARCTION W/OUT RESIDUAL DEFICITS: ICD-10-CM

## 2024-11-06 DIAGNOSIS — I50.20 UNSPECIFIED SYSTOLIC (CONGESTIVE) HEART FAILURE: ICD-10-CM

## 2024-11-06 PROCEDURE — 93000 ELECTROCARDIOGRAM COMPLETE: CPT

## 2024-11-06 PROCEDURE — 93005 ELECTROCARDIOGRAM TRACING: CPT

## 2024-11-06 PROCEDURE — 99214 OFFICE O/P EST MOD 30 MIN: CPT

## 2024-11-06 PROCEDURE — G2211 COMPLEX E/M VISIT ADD ON: CPT

## 2024-11-06 PROCEDURE — 93010 ELECTROCARDIOGRAM REPORT: CPT

## 2024-11-06 RX ORDER — SACUBITRIL AND VALSARTAN 97; 103 MG/1; MG/1
97-103 TABLET, FILM COATED ORAL TWICE DAILY
Qty: 180 | Refills: 2 | Status: DISCONTINUED | COMMUNITY
Start: 2024-11-06 | End: 2024-11-06

## 2024-11-06 RX ORDER — SACUBITRIL AND VALSARTAN 97; 103 MG/1; MG/1
97-103 TABLET, FILM COATED ORAL TWICE DAILY
Qty: 180 | Refills: 2 | Status: ACTIVE | COMMUNITY
Start: 2024-11-06 | End: 1900-01-01

## 2024-11-06 RX ORDER — CLONIDINE HYDROCHLORIDE 0.1 MG/1
0.1 TABLET ORAL TWICE DAILY
Refills: 0 | Status: DISCONTINUED | COMMUNITY
Start: 2024-11-06 | End: 2024-11-06

## 2024-11-06 RX ORDER — LOSARTAN POTASSIUM 50 MG/1
50 TABLET, FILM COATED ORAL
Refills: 0 | Status: DISCONTINUED | COMMUNITY
End: 2024-11-06

## 2024-11-06 RX ORDER — CARVEDILOL 12.5 MG/1
12.5 TABLET, FILM COATED ORAL TWICE DAILY
Refills: 0 | Status: ACTIVE | COMMUNITY
Start: 2024-11-06

## 2024-11-07 LAB
ALBUMIN SERPL ELPH-MCNC: 4.6 G/DL
ALP BLD-CCNC: 124 U/L
ALT SERPL-CCNC: 15 U/L
ANION GAP SERPL CALC-SCNC: 12 MMOL/L
AST SERPL-CCNC: 18 U/L
BILIRUB SERPL-MCNC: 0.2 MG/DL
BUN SERPL-MCNC: 16 MG/DL
CALCIUM SERPL-MCNC: 10.1 MG/DL
CHLORIDE SERPL-SCNC: 91 MMOL/L
CO2 SERPL-SCNC: 27 MMOL/L
CREAT SERPL-MCNC: 1.12 MG/DL
EGFR: 69 ML/MIN/1.73M2
GLUCOSE SERPL-MCNC: 89 MG/DL
NT-PROBNP SERPL-MCNC: 5449 PG/ML
POTASSIUM SERPL-SCNC: 5 MMOL/L
PROT SERPL-MCNC: 7.8 G/DL
SODIUM SERPL-SCNC: 129 MMOL/L

## 2024-11-11 LAB
ALBUMIN MFR SERPL ELPH: 54.2 %
ALBUMIN SERPL-MCNC: 4.2 G/DL
ALBUMIN/GLOB SERPL: 1.2 RATIO
ALPHA1 GLOB MFR SERPL ELPH: 4.5 %
ALPHA1 GLOB SERPL ELPH-MCNC: 0.4 G/DL
ALPHA2 GLOB MFR SERPL ELPH: 14.3 %
ALPHA2 GLOB SERPL ELPH-MCNC: 1.1 G/DL
B-GLOBULIN MFR SERPL ELPH: 10.9 %
B-GLOBULIN SERPL ELPH-MCNC: 0.9 G/DL
GAMMA GLOB FLD ELPH-MCNC: 1.3 G/DL
GAMMA GLOB MFR SERPL ELPH: 16.1 %
INTERPRETATION SERPL IEP-IMP: NORMAL
M PROTEIN MFR SERPL ELPH: NORMAL
M PROTEIN SPEC IFE-MCNC: NORMAL
MONOCLON BAND OBS SERPL: NORMAL
PROT SERPL-MCNC: 7.8 G/DL
PROT SERPL-MCNC: 7.8 G/DL

## 2024-11-19 ENCOUNTER — APPOINTMENT (OUTPATIENT)
Dept: NEUROLOGY | Facility: CLINIC | Age: 74
End: 2024-11-19

## 2024-11-30 NOTE — PROVIDER CONTACT NOTE (CRITICAL VALUE NOTIFICATION) - NAME OF MD/NP/PA/DO NOTIFIED:
Notified provider about indwelling zamudio catheter discussed removal or continued need.    Did provider choose to remove indwelling zamudio catheter? NO    Provider's zamudio indication for keeping indwelling zamudio catheter: Indication for continued use: Other - new stroke, leave overnight per Dr. Palmer    Is there an order for indwelling zamudio catheter? YES    *If there is a plan to keep zamudio catheter in place at discharge daily notification with provider is not necessary, but please add a notation in the treatment team sticky note that the patient will be discharging with the catheter.     MD made aware

## 2024-12-03 ENCOUNTER — APPOINTMENT (OUTPATIENT)
Dept: NEUROLOGY | Facility: CLINIC | Age: 74
End: 2024-12-03

## 2024-12-04 ENCOUNTER — NON-APPOINTMENT (OUTPATIENT)
Age: 74
End: 2024-12-04

## 2024-12-04 ENCOUNTER — APPOINTMENT (OUTPATIENT)
Dept: CARDIOLOGY | Facility: CLINIC | Age: 74
End: 2024-12-04
Payer: MEDICARE

## 2024-12-04 VITALS
HEIGHT: 65 IN | HEART RATE: 70 BPM | DIASTOLIC BLOOD PRESSURE: 80 MMHG | OXYGEN SATURATION: 99 % | RESPIRATION RATE: 18 BRPM | SYSTOLIC BLOOD PRESSURE: 150 MMHG | BODY MASS INDEX: 20.99 KG/M2 | WEIGHT: 126 LBS

## 2024-12-04 DIAGNOSIS — Z86.73 PERSONAL HISTORY OF TRANSIENT ISCHEMIC ATTACK (TIA), AND CEREBRAL INFARCTION W/OUT RESIDUAL DEFICITS: ICD-10-CM

## 2024-12-04 DIAGNOSIS — I25.10 ATHEROSCLEROTIC HEART DISEASE OF NATIVE CORONARY ARTERY W/OUT ANGINA PECTORIS: ICD-10-CM

## 2024-12-04 DIAGNOSIS — I10 ESSENTIAL (PRIMARY) HYPERTENSION: ICD-10-CM

## 2024-12-04 DIAGNOSIS — I42.8 OTHER CARDIOMYOPATHIES: ICD-10-CM

## 2024-12-04 PROCEDURE — G2211 COMPLEX E/M VISIT ADD ON: CPT

## 2024-12-04 PROCEDURE — 99214 OFFICE O/P EST MOD 30 MIN: CPT

## 2024-12-04 RX ORDER — HYDRALAZINE HYDROCHLORIDE 50 MG/1
50 TABLET ORAL 3 TIMES DAILY
Qty: 90 | Refills: 0 | Status: ACTIVE | COMMUNITY
Start: 2024-12-04 | End: 1900-01-01

## 2024-12-12 ENCOUNTER — APPOINTMENT (OUTPATIENT)
Dept: NEUROLOGY | Facility: CLINIC | Age: 74
End: 2024-12-12

## 2024-12-24 PROBLEM — F10.90 ALCOHOL USE: Status: ACTIVE | Noted: 2017-11-16

## 2025-01-02 ENCOUNTER — APPOINTMENT (OUTPATIENT)
Dept: NEUROLOGY | Facility: CLINIC | Age: 75
End: 2025-01-02
Payer: MEDICARE

## 2025-01-02 VITALS
HEART RATE: 62 BPM | BODY MASS INDEX: 20.99 KG/M2 | WEIGHT: 126 LBS | SYSTOLIC BLOOD PRESSURE: 140 MMHG | DIASTOLIC BLOOD PRESSURE: 79 MMHG | HEIGHT: 65 IN

## 2025-01-02 PROCEDURE — 99214 OFFICE O/P EST MOD 30 MIN: CPT

## 2025-01-10 ENCOUNTER — NON-APPOINTMENT (OUTPATIENT)
Age: 75
End: 2025-01-10

## 2025-01-22 ENCOUNTER — APPOINTMENT (OUTPATIENT)
Dept: NUCLEAR MEDICINE | Facility: IMAGING CENTER | Age: 75
End: 2025-01-22

## 2025-02-05 ENCOUNTER — APPOINTMENT (OUTPATIENT)
Dept: CARDIOLOGY | Facility: CLINIC | Age: 75
End: 2025-02-05
